# Patient Record
Sex: FEMALE | Race: WHITE | NOT HISPANIC OR LATINO | Employment: OTHER | URBAN - METROPOLITAN AREA
[De-identification: names, ages, dates, MRNs, and addresses within clinical notes are randomized per-mention and may not be internally consistent; named-entity substitution may affect disease eponyms.]

---

## 2017-01-05 ENCOUNTER — HOSPITAL ENCOUNTER (OUTPATIENT)
Dept: RADIOLOGY | Facility: HOSPITAL | Age: 82
Discharge: HOME | End: 2017-01-05
Attending: THORACIC SURGERY (CARDIOTHORACIC VASCULAR SURGERY)
Payer: MEDICARE

## 2017-01-05 DIAGNOSIS — I65.22 STENOSIS OF LEFT CAROTID ARTERY: ICD-10-CM

## 2017-01-05 PROCEDURE — 93880 EXTRACRANIAL BILAT STUDY: CPT | Mod: 26,,, | Performed by: RADIOLOGY

## 2017-01-05 PROCEDURE — 93880 EXTRACRANIAL BILAT STUDY: CPT | Mod: TC,PO

## 2017-01-09 ENCOUNTER — TELEPHONE (OUTPATIENT)
Dept: VASCULAR SURGERY | Facility: CLINIC | Age: 82
End: 2017-01-09

## 2017-01-09 DIAGNOSIS — I65.23 BILATERAL CAROTID ARTERY STENOSIS: Primary | ICD-10-CM

## 2017-01-30 ENCOUNTER — LAB VISIT (OUTPATIENT)
Dept: LAB | Facility: HOSPITAL | Age: 82
End: 2017-01-30
Attending: INTERNAL MEDICINE
Payer: MEDICARE

## 2017-01-30 DIAGNOSIS — D51.9 VITAMIN B12 DEFICIENCY ANEMIA: ICD-10-CM

## 2017-01-30 DIAGNOSIS — I10 ESSENTIAL HYPERTENSION, MALIGNANT: ICD-10-CM

## 2017-01-30 DIAGNOSIS — D50.9 IRON DEFICIENCY ANEMIA, UNSPECIFIED: ICD-10-CM

## 2017-01-30 DIAGNOSIS — E03.9 UNSPECIFIED HYPOTHYROIDISM: Primary | ICD-10-CM

## 2017-01-30 DIAGNOSIS — N18.30 CHRONIC KIDNEY DISEASE, STAGE III (MODERATE): ICD-10-CM

## 2017-01-30 DIAGNOSIS — E55.9 UNSPECIFIED VITAMIN D DEFICIENCY: ICD-10-CM

## 2017-01-30 DIAGNOSIS — M81.0 OSTEOPOROSIS, UNSPECIFIED: ICD-10-CM

## 2017-01-30 LAB
ANION GAP SERPL CALC-SCNC: 7 MMOL/L
BASOPHILS # BLD AUTO: 0.03 K/UL
BASOPHILS NFR BLD: 0.6 %
BUN SERPL-MCNC: 22 MG/DL
CALCIUM SERPL-MCNC: 9.3 MG/DL
CHLORIDE SERPL-SCNC: 107 MMOL/L
CO2 SERPL-SCNC: 27 MMOL/L
CREAT SERPL-MCNC: 0.9 MG/DL
DIFFERENTIAL METHOD: ABNORMAL
EOSINOPHIL # BLD AUTO: 0.4 K/UL
EOSINOPHIL NFR BLD: 8.5 %
ERYTHROCYTE [DISTWIDTH] IN BLOOD BY AUTOMATED COUNT: 12.8 %
EST. GFR  (AFRICAN AMERICAN): >60 ML/MIN/1.73 M^2
EST. GFR  (NON AFRICAN AMERICAN): >60 ML/MIN/1.73 M^2
FERRITIN SERPL-MCNC: 67 NG/ML
GLUCOSE SERPL-MCNC: 100 MG/DL
HCT VFR BLD AUTO: 41.4 %
HGB BLD-MCNC: 13.2 G/DL
IRON SERPL-MCNC: 73 UG/DL
LYMPHOCYTES # BLD AUTO: 1.4 K/UL
LYMPHOCYTES NFR BLD: 28.1 %
MCH RBC QN AUTO: 29.9 PG
MCHC RBC AUTO-ENTMCNC: 31.9 %
MCV RBC AUTO: 94 FL
MONOCYTES # BLD AUTO: 0.5 K/UL
MONOCYTES NFR BLD: 11.2 %
NEUTROPHILS # BLD AUTO: 2.5 K/UL
NEUTROPHILS NFR BLD: 51.4 %
PLATELET # BLD AUTO: 298 K/UL
PMV BLD AUTO: 10.1 FL
POTASSIUM SERPL-SCNC: 4.8 MMOL/L
RBC # BLD AUTO: 4.42 M/UL
SATURATED IRON: 23 %
SODIUM SERPL-SCNC: 141 MMOL/L
T3FREE SERPL-MCNC: 2.8 PG/ML
T4 FREE SERPL-MCNC: 1.2 NG/DL
TOTAL IRON BINDING CAPACITY: 314 UG/DL
TRANSFERRIN SERPL-MCNC: 212 MG/DL
TSH SERPL DL<=0.005 MIU/L-ACNC: 1.85 UIU/ML
WBC # BLD AUTO: 4.84 K/UL

## 2017-01-30 PROCEDURE — 82728 ASSAY OF FERRITIN: CPT

## 2017-01-30 PROCEDURE — 85025 COMPLETE CBC W/AUTO DIFF WBC: CPT

## 2017-01-30 PROCEDURE — 83540 ASSAY OF IRON: CPT

## 2017-01-30 PROCEDURE — 80048 BASIC METABOLIC PNL TOTAL CA: CPT

## 2017-01-30 PROCEDURE — 84443 ASSAY THYROID STIM HORMONE: CPT

## 2017-01-30 PROCEDURE — 36415 COLL VENOUS BLD VENIPUNCTURE: CPT | Mod: PO

## 2017-01-30 PROCEDURE — 84481 FREE ASSAY (FT-3): CPT

## 2017-01-30 PROCEDURE — 84439 ASSAY OF FREE THYROXINE: CPT

## 2017-03-13 RX ORDER — ATORVASTATIN CALCIUM 20 MG/1
20 TABLET, FILM COATED ORAL DAILY
Qty: 90 TABLET | Refills: 0 | Status: CANCELLED | OUTPATIENT
Start: 2017-03-13

## 2017-03-17 RX ORDER — ATORVASTATIN CALCIUM 20 MG/1
20 TABLET, FILM COATED ORAL DAILY
Qty: 90 TABLET | Refills: 0 | Status: SHIPPED | OUTPATIENT
Start: 2017-03-17 | End: 2017-05-27 | Stop reason: SDUPTHER

## 2017-03-17 NOTE — TELEPHONE ENCOUNTER
Notify patient her medications have been sent in; and that she needs to schedule an appointment with us to get established at the [facility] within the next 2-4 weeks

## 2017-03-17 NOTE — TELEPHONE ENCOUNTER
----- Message from Sayda Churchill sent at 3/17/2017  3:32 PM CDT -----  Contact: self  Patient called regarding refill of medication. Stating she spoke to the Nurse Nelli during the week and was advised she would submit to the pharmacy. They do not have it and she requested this 2 weeks ago. Stating is almost out of medication. Please contact [phone] (home)     atorvastatin 20 MG tablet    [facility] - [place], [place] - [address]  [address] [address]  Phone: [phone] Fax: [phone]

## 2017-03-24 ENCOUNTER — TELEPHONE (OUTPATIENT)
Dept: FAMILY MEDICINE | Facility: CLINIC | Age: 82
End: 2017-03-24

## 2017-03-24 NOTE — TELEPHONE ENCOUNTER
Spoke with pt she said that she has an appt scheduled for 05/15/2017 she will get new orders for labs and bone Density.

## 2017-03-24 NOTE — TELEPHONE ENCOUNTER
----- Message from Alicja Lynn sent at 3/24/2017  1:55 PM CDT -----  Contact:  call  //[phone]    Calling to  Get   A  Order  Put  In   computer   For  Bone density // please call

## 2017-04-10 ENCOUNTER — PATIENT MESSAGE (OUTPATIENT)
Dept: FAMILY MEDICINE | Facility: CLINIC | Age: 82
End: 2017-04-10

## 2017-04-17 RX ORDER — LEVOTHYROXINE SODIUM 125 UG/1
125 TABLET ORAL
COMMUNITY
End: 2017-06-12 | Stop reason: SDUPTHER

## 2017-05-01 ENCOUNTER — PATIENT OUTREACH (OUTPATIENT)
Dept: ADMINISTRATIVE | Facility: HOSPITAL | Age: 82
End: 2017-05-01

## 2017-05-15 ENCOUNTER — OFFICE VISIT (OUTPATIENT)
Dept: FAMILY MEDICINE | Facility: CLINIC | Age: 82
End: 2017-05-15
Payer: MEDICARE

## 2017-05-15 VITALS
TEMPERATURE: 99 F | SYSTOLIC BLOOD PRESSURE: 125 MMHG | HEIGHT: 64 IN | WEIGHT: 170.19 LBS | HEART RATE: 66 BPM | BODY MASS INDEX: 29.06 KG/M2 | DIASTOLIC BLOOD PRESSURE: 75 MMHG | OXYGEN SATURATION: 97 %

## 2017-05-15 DIAGNOSIS — M81.0 OSTEOPOROSIS, UNSPECIFIED OSTEOPOROSIS TYPE, UNSPECIFIED PATHOLOGICAL FRACTURE PRESENCE: ICD-10-CM

## 2017-05-15 DIAGNOSIS — N18.30 CKD (CHRONIC KIDNEY DISEASE) STAGE 3, GFR 30-59 ML/MIN: ICD-10-CM

## 2017-05-15 DIAGNOSIS — E61.1 IRON DEFICIENCY: ICD-10-CM

## 2017-05-15 DIAGNOSIS — E78.00 PURE HYPERCHOLESTEROLEMIA: Primary | ICD-10-CM

## 2017-05-15 DIAGNOSIS — F41.9 ANXIETY: ICD-10-CM

## 2017-05-15 DIAGNOSIS — B35.3 TINEA PEDIS OF BOTH FEET: ICD-10-CM

## 2017-05-15 DIAGNOSIS — K21.9 GASTROESOPHAGEAL REFLUX DISEASE, ESOPHAGITIS PRESENCE NOT SPECIFIED: ICD-10-CM

## 2017-05-15 DIAGNOSIS — E03.9 HYPOTHYROIDISM, UNSPECIFIED TYPE: ICD-10-CM

## 2017-05-15 DIAGNOSIS — R73.02 IMPAIRED GLUCOSE TOLERANCE: ICD-10-CM

## 2017-05-15 DIAGNOSIS — E55.9 VITAMIN D DEFICIENCY: ICD-10-CM

## 2017-05-15 DIAGNOSIS — I10 ESSENTIAL HYPERTENSION: ICD-10-CM

## 2017-05-15 DIAGNOSIS — Z98.890 S/P CAROTID ENDARTERECTOMY: ICD-10-CM

## 2017-05-15 DIAGNOSIS — E66.3 OVERWEIGHT (BMI 25.0-29.9): ICD-10-CM

## 2017-05-15 PROCEDURE — 99499 UNLISTED E&M SERVICE: CPT | Mod: S$GLB,,, | Performed by: INTERNAL MEDICINE

## 2017-05-15 PROCEDURE — 3074F SYST BP LT 130 MM HG: CPT | Mod: S$GLB,,, | Performed by: INTERNAL MEDICINE

## 2017-05-15 PROCEDURE — 1126F AMNT PAIN NOTED NONE PRSNT: CPT | Mod: S$GLB,,, | Performed by: INTERNAL MEDICINE

## 2017-05-15 PROCEDURE — 99215 OFFICE O/P EST HI 40 MIN: CPT | Mod: S$GLB,,, | Performed by: INTERNAL MEDICINE

## 2017-05-15 PROCEDURE — 3078F DIAST BP <80 MM HG: CPT | Mod: S$GLB,,, | Performed by: INTERNAL MEDICINE

## 2017-05-15 PROCEDURE — 99999 PR PBB SHADOW E&M-EST. PATIENT-LVL III: CPT | Mod: PBBFAC,,, | Performed by: INTERNAL MEDICINE

## 2017-05-15 PROCEDURE — 1159F MED LIST DOCD IN RCRD: CPT | Mod: S$GLB,,, | Performed by: INTERNAL MEDICINE

## 2017-05-15 PROCEDURE — 1160F RVW MEDS BY RX/DR IN RCRD: CPT | Mod: S$GLB,,, | Performed by: INTERNAL MEDICINE

## 2017-05-15 RX ORDER — CALCIUM CARBONATE/VITAMIN D3 500-10/5ML
2 LIQUID (ML) ORAL DAILY
COMMUNITY

## 2017-05-15 RX ORDER — CHOLECALCIFEROL (VITAMIN D3) 25 MCG
1000 TABLET ORAL 2 TIMES DAILY
COMMUNITY

## 2017-05-15 RX ORDER — ASCORBIC ACID 500 MG
500 TABLET ORAL DAILY
COMMUNITY

## 2017-05-15 RX ORDER — CLOTRIMAZOLE AND BETAMETHASONE DIPROPIONATE 10; .64 MG/G; MG/G
CREAM TOPICAL 2 TIMES DAILY
Qty: 45 G | Refills: 0 | Status: SHIPPED | OUTPATIENT
Start: 2017-05-15 | End: 2018-04-03

## 2017-05-15 RX ORDER — IBUPROFEN 200 MG
2 CAPSULE ORAL DAILY
COMMUNITY
End: 2018-04-03

## 2017-05-15 NOTE — PROGRESS NOTES
Subjective:       Patient ID: Nida Kline is a 81 y.o. female.    Chief Complaint: Establish Care    HPI  very pleasant 81-year-old established patient of mine here to get re-established with me at [facility].  Most recent documented office visit at [facility] 2/13/17.  At that time diagnosis is included: Hypothyroidism, CKD-3,, essential hypertension, history of CEA,, osteoporosis with vitamin D deficiency, impaired glucose tolerance test, pure hypercholesterolemia, GERD iron   deficiency and overweight.  Past medical history and surgical medical history were obtained, delineated, and documented.  Family medical history and social medical history were also delineated and noted.  Review of systems were obtained at length before physical exam was performed.  Appropriate labs were ordered on follow-up after review.  Patient appears to have rather significant involvement of athlete's feet bilaterally involving the plantar aspect of her feet and then rolling over the sides of her feet and then involving the distal pretibial area.  With impaired glucose tolerance, she is trying to get her weight down.  For hyperlipidemia, she is tolerating atorvastatin fine and she is on a low-fat high-fiber diet.  For history of hypertension her blood pressure runs around 120/70-75; she feels good.  She has a history of GERD which occasionally bothers her; she is on ranitidine which controls it.  She takes her thyroid medicines appropriately.  For osteoporosis, she does weight bearing exercise with walking 2-3 times per week; she takes a calcium with vitamin D and also takes magnesium supplemenst.  A DEXA scan as needed to be repeated with the last greater than 1 year.  Various diagnosis's were discussed as well as planning care.  Appropriate labs were ordered on follow-up    Review of Systems   Constitutional: Negative for appetite change, fatigue, fever and unexpected weight change.  "  HENT:         history of seasonal ALLERGIES    Eyes: Negative for discharge and itching.   Respiratory: Negative for cough, chest tightness, shortness of breath and wheezing.    Cardiovascular: Negative for chest pain, palpitations and leg swelling.   Gastrointestinal: Negative for abdominal distention, abdominal pain, blood in stool, constipation, diarrhea, nausea and vomiting.        Denies melena as well   Endocrine:        HLP; hypothyroid     Genitourinary: Negative for dysuria, hematuria and urgency.   Musculoskeletal: Negative for arthralgias and myalgias.   Skin: Negative for rash.        Patches of pruritic rash min involving LE's.    Allergic/Immunologic: Positive for environmental allergies. Negative for food allergies and immunocompromised state.        Denies seasonal or perennial ALLERGIES.   Neurological: Negative for tremors, seizures and syncope.   Hematological: Negative for adenopathy. Bruises/bleeds easily.   Psychiatric/Behavioral:        Denies anxiety or depression       Objective:      Vitals:    05/15/17 1439   BP: 125/75   BP Location: Right arm   Patient Position: Sitting   BP Method: Manual   Pulse: 66   Temp: 98.8 °F (37.1 °C)   TempSrc: Oral   SpO2: 97%   Weight: 77.2 kg (170 lb 3.1 oz)   Height: 5' 4" (1.626 m)     Body mass index is 29.21 kg/(m^2).    Physical Exam   Constitutional: She is oriented to person, place, and time. She appears well-developed and well-nourished.   HENT:   Head: Normocephalic and atraumatic.   Eyes: EOM are normal.   Neck: Normal range of motion. Neck supple. No thyromegaly present.   Cardiovascular: Normal rate, regular rhythm and normal heart sounds.  Exam reveals no gallop.    No murmur heard.  Pulmonary/Chest: Effort normal and breath sounds normal. No respiratory distress. She has no wheezes. She has no rales.   Abdominal: Soft. Bowel sounds are normal. She exhibits no distension. There is no tenderness. There is no rebound and no guarding. "   Musculoskeletal: Normal range of motion. She exhibits no edema.   Lymphadenopathy:     She has no cervical adenopathy.   Neurological: She is alert and oriented to person, place, and time.   Moves all 4 extremities fine.   Skin: Skin is dry. Rash noted.   forrest LE athlete's feet plantar aspect feet forrest turning around sides of foot; pruritic; does go into distal pretib   Psychiatric: She has a normal mood and affect. Her behavior is normal. Thought content normal.   Vitals reviewed.      Assessment:       1. Pure hypercholesterolemia    2. S/P carotid endarterectomy    3. Essential hypertension    4. Hypothyroidism, unspecified type    5. Gastroesophageal reflux disease, esophagitis presence not specified    6. Overweight (BMI 25.0-29.9)    7. Osteoporosis, unspecified osteoporosis type, unspecified pathological fracture presence    8. Vitamin D deficiency    9. Tinea pedis of both feet    10. CKD (chronic kidney disease) stage 3, GFR 30-59 ml/min    11. Iron deficiency    12. Anxiety    13. Impaired glucose tolerance        Plan:       Pure hypercholesterolemia. Maintain low fat high fiber diet, exercise regularly.  Continue atorvastatin.  -     Comprehensive metabolic panel; Future; Expected date: 5/15/17  -     Lipid panel; Future; Expected date: 5/15/17  -     TSH; Future; Expected date: 5/15/17  -     T4, free; Future; Expected date: 5/15/17  -     T3, free; Future; Expected date: 5/15/17    S/P carotid endarterectomy; continue aspirin 81 mg daily    Essential hypertension. Maintain < 2 Gm Na a day diet, and monitor BP at home; keep a log.  Continue metoprolol succinate with valsartan  -     CBC auto differential; Future; Expected date: 5/15/17  -     Comprehensive metabolic panel; Future; Expected date: 5/15/17  -     Urinalysis; Future; Expected date: 5/15/17    Hypothyroidism, unspecified type; continue levothyroxine at present dosing; thyroid function test pending  -     TSH; Future; Expected date:  5/15/17  -     T4, free; Future; Expected date: 5/15/17  -     T3, free; Future; Expected date: 5/15/17    Gastroesophageal reflux disease, esophagitis presence not specified; no bedtime snacking; continue omeprazole    Overweight (BMI 25.0-29.9). Caloric restriction w regular exercise and weight reduction.    Osteoporosis, unspecified osteoporosis type, unspecified pathological fracture presence; Weight bearing exercises, continue calcium and vit D supplements.  -     Magnesium; Future; Expected date: 5/15/17  -     DXA Bone Density Spine And Hip; Future; Expected date: 5/15/17    Vitamin D deficiency  -     Magnesium; Future; Expected date: 5/15/17  -     Vitamin D; Future; Expected date: 5/15/17  -     DXA Bone Density Spine And Hip; Future; Expected date: 5/15/17    Tinea pedis of both feet; he per feet dry needs to quit wearing her present pair of sandal shoes; Lac-Hydrin topical for dry skin  -     clotrimazole-betamethasone 1-0.05% (LOTRISONE) cream; Apply topically 2 (two) times daily. Til resolved; max 2 weeks.  Dispense: 45 g; Refill:     CKD (chronic kidney disease) stage 3, GFR 30-59 ml/min; minimum 6-7 glasses of fluid per day    Iron deficiency; we'll update iron, TIBC, and ferritin levels  -     Iron and TIBC; Future; Expected date: 5/15/17  -     Ferritin; Future; Expected date: 5/15/17    Anxiety; on BuSpar when necessary basis    Impaired glucose tolerance; will follow hemoglobin A1c's; regular weightbearing exercise and weight reduction needed

## 2017-05-15 NOTE — PATIENT INSTRUCTIONS
[facility] for [facility] labs; ordered; obtain before f/u in 6 weeks. DEXA to also be obtained before f/u. Cont presnt management. Monitor her BP at home; keep a log. Min 6-7 glasses of fluid a day. Weight bearing exercise w weight reduction.

## 2017-05-15 NOTE — MR AVS SNAPSHOT
[facility]  [address]  [place] [address]  Phone: [phone]  Fax: [phone]                  Nida Kline   5/15/2017 2:20 PM   Office Visit    Description:  Female : 1935   Provider:  Khoi Vazquez MD   Department:  [facility]           Reason for Visit     Establish Care           Diagnoses this Visit        Comments    Pure hypercholesterolemia    -  Primary     S/P carotid endarterectomy         Essential hypertension         Hypothyroidism, unspecified type         Gastroesophageal reflux disease, esophagitis presence not specified         Overweight (BMI 25.0-29.9)         Osteoporosis, unspecified osteoporosis type, unspecified pathological fracture presence         Vitamin D deficiency         Tinea pedis of both feet         CKD (chronic kidney disease) stage 3, GFR 30-59 ml/min         Iron deficiency                To Do List           Future Appointments        Provider Department Dept Phone    6/15/2017 8:15 AM LAB, [place] [facility] [phone]    6/15/2017 9:40 AM [facility] DEXA1 [facility] [phone]    6/15/2017 10:15 AM LAB, [place] [facility] [phone]    2017 1:00 PM Khoi Vazquez MD [facility] [phone]      Goals (5 Years of Data)     None      Follow-Up and Disposition     Return in about 6 weeks (around 2017) for Reassessment and go over her labs.       These Medications        Disp Refills Start End    clotrimazole-betamethasone 1-0.05% (LOTRISONE) cream 45 g 0 5/15/2017     Apply topically 2 (two) times daily. Til resolved; max 2 weeks. - Topical (Top)    Pharmacy: [facility] - [place] [id number] [facility] Ph #: [phone]         [facility]     [facility] Nurse Care Line -  Assistance  Unless otherwise directed by your provider, please contact [facility], our  nurse care line that is available for 24/7 assistance.     Registered nurses in the [facility] provide: appointment scheduling, clinical advisement, health education, and other advisory services.  Call: [phone] (toll free)               Medications           Message regarding Medications     Verify the changes and/or additions to your medication regime listed below are the same as discussed with your clinician today.  If any of these changes or additions are incorrect, please notify your healthcare provider.        START taking these NEW medications        Refills    clotrimazole-betamethasone 1-0.05% (LOTRISONE) cream 0    Sig: Apply topically 2 (two) times daily. Til resolved; max 2 weeks.    Class: Normal    Route: Topical (Top)           Verify that the below list of medications is an accurate representation of the medications you are currently taking.  If none reported, the list may be blank. If incorrect, please contact your healthcare provider. Carry this list with you in case of emergency.           Current Medications     ammonium lactate (LAC-HYDRIN) 12 % lotion Apply 1 application topically every evening.     ascorbic acid, vitamin C, (VITAMIN C) 500 MG tablet Take 500 mg by mouth once daily.    aspirin (ECOTRIN) 81 MG EC tablet Take 81 mg by mouth once.    atorvastatin (LIPITOR) 20 MG tablet Take 1 tablet (20 mg total) by mouth once daily.    busPIRone (BUSPAR) 15 MG tablet Take 7.5 mg by mouth 3 (three) times daily as needed.     calcium citrate (CALCITRATE) 200 mg (950 mg) tablet Take 2 tablets by mouth once daily.    levothyroxine (SYNTHROID) 125 MCG tablet Take 125 mcg by mouth. 1/2 (125 mcg) tab every M,W, F    levothyroxine (SYNTHROID) 50 MCG tablet Take 50 mcg by mouth every Tuesday, Thursday, Saturday, Sunday.     magnesium oxide 400 mg Cap Take 400 mg by mouth once daily.    metoprolol succinate (TOPROL-XL) 25 MG 24 hr tablet TAKE 1 TABLET EVERY EVENING    omeprazole  "(PRILOSEC) 40 MG capsule TAKE 1 CAPSULE DAILY    valsartan (DIOVAN) 80 MG tablet Take 80 mg by mouth once daily.    vitamin D 1000 units Tab Take 1,000 Units by mouth 2 (two) times daily.    clotrimazole-betamethasone 1-0.05% (LOTRISONE) cream Apply topically 2 (two) times daily. Til resolved; max 2 weeks.           Clinical Reference Information           Your Vitals Were     BP Pulse Temp Height Weight SpO2    125/75 (BP Location: Right arm, Patient Position: Sitting, BP Method: Manual) 66 98.8 °F (37.1 °C) (Oral) 5' 4" (1.626 m) 77.2 kg (170 lb 3.1 oz) 97%    BMI                29.21 kg/m2          Blood Pressure          Most Recent Value    BP  125/75      Allergies as of 5/15/2017     Lunesta [Eszopiclone]    Trazodone    Propofol Analogues      Immunizations Administered on Date of Encounter - 5/15/2017     None      Orders Placed During Today's Visit     Future Labs/Procedures Expected by Expires    CBC auto differential  5/15/2017 7/14/2018    Comprehensive metabolic panel  5/15/2017 7/14/2018    DXA Bone Density Spine And Hip  5/15/2017 5/15/2018    Ferritin  5/15/2017 7/14/2018    Iron and TIBC  5/15/2017 7/14/2018    Lipid panel  5/15/2017 7/14/2018    Magnesium  5/15/2017 7/14/2018    T3, free  5/15/2017 7/14/2018    T4, free  5/15/2017 7/14/2018    TSH  5/15/2017 7/14/2018    Urinalysis  5/15/2017 7/14/2018    Vitamin D  5/15/2017 7/14/2018      Instructions    [facility] for [facility]; ordered; obtain before f/u in 6 weeks. DEXA to also be obtained before f/u. Cont presnt management. Monitor her BP at home; keep a log. Min 6-7 glasses of fluid a day. Weight bearing exercise w weight reduction.       Language Assistance Services     ATTENTION: Language assistance services are available, free of charge. Please call [phone].      ATENCIÓN: Si habla mary, tiene a raymond disposición servicios gratuitos de asistencia lingüística. Llame al [phone].     RONAN Ý: N?u b?n nói Ti?ng Vi?t, có các d?ch v? " h? tr? sunshine ng? mi?n phí dành cho b?n. G?i s? [phone].         [facility] complies with applicable Federal civil rights laws and does not discriminate on the basis of race, color, national origin, age, disability, or sex.

## 2017-05-27 RX ORDER — ATORVASTATIN CALCIUM 20 MG/1
TABLET, FILM COATED ORAL
Qty: 90 TABLET | Refills: 1 | Status: SHIPPED | OUTPATIENT
Start: 2017-05-27 | End: 2017-10-24 | Stop reason: SDUPTHER

## 2017-06-08 ENCOUNTER — OFFICE VISIT (OUTPATIENT)
Dept: FAMILY MEDICINE | Facility: CLINIC | Age: 82
End: 2017-06-08
Payer: MEDICARE

## 2017-06-08 VITALS
TEMPERATURE: 98 F | BODY MASS INDEX: 29.13 KG/M2 | WEIGHT: 170.63 LBS | SYSTOLIC BLOOD PRESSURE: 120 MMHG | OXYGEN SATURATION: 97 % | HEART RATE: 78 BPM | DIASTOLIC BLOOD PRESSURE: 85 MMHG | HEIGHT: 64 IN

## 2017-06-08 DIAGNOSIS — B35.3 TINEA PEDIS OF BOTH FEET: ICD-10-CM

## 2017-06-08 DIAGNOSIS — I10 ESSENTIAL HYPERTENSION: Chronic | ICD-10-CM

## 2017-06-08 DIAGNOSIS — J30.2 SEASONAL ALLERGIC RHINITIS, UNSPECIFIED ALLERGIC RHINITIS TRIGGER: ICD-10-CM

## 2017-06-08 DIAGNOSIS — J02.9 ACUTE PHARYNGITIS, UNSPECIFIED ETIOLOGY: ICD-10-CM

## 2017-06-08 DIAGNOSIS — J01.90 ACUTE NON-RECURRENT SINUSITIS, UNSPECIFIED LOCATION: ICD-10-CM

## 2017-06-08 DIAGNOSIS — J98.01 BRONCHOSPASM, ACUTE: ICD-10-CM

## 2017-06-08 DIAGNOSIS — J20.9 ACUTE BRONCHITIS, UNSPECIFIED ORGANISM: Primary | ICD-10-CM

## 2017-06-08 PROCEDURE — 99999 PR PBB SHADOW E&M-EST. PATIENT-LVL IV: CPT | Mod: PBBFAC,,, | Performed by: INTERNAL MEDICINE

## 2017-06-08 PROCEDURE — 1159F MED LIST DOCD IN RCRD: CPT | Mod: S$GLB,,, | Performed by: INTERNAL MEDICINE

## 2017-06-08 PROCEDURE — 1126F AMNT PAIN NOTED NONE PRSNT: CPT | Mod: S$GLB,,, | Performed by: INTERNAL MEDICINE

## 2017-06-08 PROCEDURE — 99214 OFFICE O/P EST MOD 30 MIN: CPT | Mod: S$GLB,,, | Performed by: INTERNAL MEDICINE

## 2017-06-08 PROCEDURE — 99499 UNLISTED E&M SERVICE: CPT | Mod: S$GLB,,, | Performed by: INTERNAL MEDICINE

## 2017-06-08 RX ORDER — HYDROCODONE POLISTIREX AND CHLORPHENIRAMINE POLISTIREX 10; 8 MG/5ML; MG/5ML
5 SUSPENSION, EXTENDED RELEASE ORAL EVERY 12 HOURS PRN
Qty: 115 ML | Refills: 0 | Status: SHIPPED | OUTPATIENT
Start: 2017-06-08 | End: 2017-11-01

## 2017-06-08 RX ORDER — CEFUROXIME AXETIL 500 MG/1
500 TABLET ORAL EVERY 12 HOURS
Qty: 20 TABLET | Refills: 0 | Status: SHIPPED | OUTPATIENT
Start: 2017-06-08 | End: 2017-06-18

## 2017-06-08 RX ORDER — FLUTICASONE PROPIONATE 50 MCG
1 SPRAY, SUSPENSION (ML) NASAL 2 TIMES DAILY PRN
Qty: 1 BOTTLE | Refills: 0 | Status: SHIPPED | OUTPATIENT
Start: 2017-06-08 | End: 2017-11-01

## 2017-06-08 NOTE — PATIENT INSTRUCTIONS
Take ceftin 500 mg 2x a day for 10 days; use tussionex 5 ml q 12 hrs as needed for severe cough; proair 2 puffs laureen 4 hrs as needed for wheezing or shortness of breath. Use mucinex DM otc for cough and congestion; flonase nasal 1 spray 2x a day as needed for nasal congestion. Simply saline 1 spray 1-3x a day for congestion; keep follow up w us later in June w labs prior.

## 2017-06-08 NOTE — PROGRESS NOTES
Subjective:       Patient ID: Nida Kline is a 81 y.o. female.    Chief Complaint: Cough (x 5 days with dry cough; went on a crusie last week )    HPI  presents today for evaluation of cough fever and sore throat.  Her cough is been since she's gotten off her cruise ship recently from [place] cruise she took which even included tubing on a river.  She had fever 102° yesterday; took Advil; had some sweating last night apparently from her fever breaking; ; she's had a raspy throat the last 5 days; throat soreness has cleared; she has sinus drip and nasal congestion; does also have a headache from coughing at times and has spasm at night which she's heard some wheezing.  She has a congested cough but little produced; she is producing some yellow dark phlegm; she also has a history of seasonal ALLERGIES.    Review of Systems   Constitutional: Positive for fever. Negative for appetite change and unexpected weight change.        Resolved; yesterday.     HENT: Positive for postnasal drip, rhinorrhea, sinus pressure and sore throat. Negative for congestion.         Eyes history of seasonal ALLERGIES or perennial ALLERGIES   Eyes: Negative for discharge.   Respiratory: Positive for cough and wheezing. Negative for chest tightness and shortness of breath.    Cardiovascular: Negative for chest pain, palpitations and leg swelling.   Gastrointestinal: Negative for abdominal distention, abdominal pain, blood in stool, constipation, diarrhea, nausea and vomiting.        Denies melena as well   Endocrine: Negative for polydipsia, polyphagia and polyuria.   Genitourinary: Positive for urgency. Negative for dysuria and hematuria.        From coughing.   Musculoskeletal: Negative for arthralgias and myalgias.   Skin: Positive for rash.        T. Pedis and distal legs.   Allergic/Immunologic: Positive for environmental allergies. Negative for food allergies and immunocompromised state.        Denies seasonal or perennial  "ALLERGIES.   Neurological: Negative for tremors, seizures and syncope.   Hematological: Negative for adenopathy.   Psychiatric/Behavioral:        Denies anxiety or depression       Objective:      Vitals:    06/08/17 1121   BP: 120/85   BP Location: Left arm   Patient Position: Sitting   BP Method: Manual   Pulse: 78   Temp: 98.3 °F (36.8 °C)   TempSrc: Oral   SpO2: 97%   Weight: 77.4 kg (170 lb 10.2 oz)   Height: 5' 4" (1.626 m)     Body mass index is 29.29 kg/m².    Physical Exam   Constitutional: She is oriented to person, place, and time. She appears well-developed and well-nourished.   HENT:   Head: Normocephalic and atraumatic.   TM's pink; NMswollen/slightly inflamed; yel-gold mucus; no sinus tenderness to palp. Throat slighly inflamed and post pharynx.   Eyes: EOM are normal.   Neck: Normal range of motion. Neck supple. No thyromegaly present.   Cardiovascular: Normal rate, regular rhythm and normal heart sounds.  Exam reveals no gallop.    No murmur heard.  Pulmonary/Chest: Effort normal and breath sounds normal. No respiratory distress. She has no wheezes. She has no rales.   Abdominal: Soft. Bowel sounds are normal. She exhibits no distension. There is no tenderness. There is no rebound and no guarding.   Musculoskeletal: Normal range of motion. She exhibits no edema.   Lymphadenopathy:     She has no cervical adenopathy.   Neurological: She is alert and oriented to person, place, and time.   Moves all 4 extremities fine.   Skin: Rash noted.   Although infection involving her feet and had distal legs has improved a fair amount   Psychiatric: She has a normal mood and affect. Her behavior is normal. Thought content normal.   Vitals reviewed.      Assessment:       1. Acute bronchitis, unspecified organism    2. Bronchospasm, acute    3. Acute non-recurrent sinusitis, unspecified location    4. Acute pharyngitis, unspecified etiology    5. Essential hypertension    6. Tinea pedis of both feet    7. Seasonal " allergic rhinitis, unspecified allergic rhinitis trigger        Plan:       Acute bronchitis, unspecified organism; ceftin 500 mg BID for 10 days. Mucinex DM otc prn congestion/cough  -     hydrocodone-chlorpheniramine (TUSSIONEX) 10-8 mg/5 mL suspension; Take 5 mLs by mouth every 12 (twelve) hours as needed for Cough (cough/wheezing).  Dispense: 115 mL; Refill: 0    Bronchospasm, acute  -     hydrocodone-chlorpheniramine (TUSSIONEX) 10-8 mg/5 mL suspension; Take 5 mLs by mouth every 12 (twelve) hours as needed for Cough (cough/wheezing).  Dispense: 115 mL; Refill: 0    Acute non-recurrent sinusitis, unspecified location; claritin 10 mg a day prn congestion.    Acute pharyngitis, unspecified etiology; chloraseptic spray prn sore throat.    Essential hypertension; Maintain < 2 Gm Na a day diet, and monitor BP at home; keep a log.    T.pedis; improving on lotrisone.    Seasonal allergic rhinitis, unspecified allergic rhinitis trigger; Simply Saline 1 spray 1-3x a day prn congestion.

## 2017-06-10 RX ORDER — VALSARTAN 80 MG/1
TABLET ORAL
Qty: 90 TABLET | Refills: 1 | Status: SHIPPED | OUTPATIENT
Start: 2017-06-10 | End: 2017-09-14

## 2017-06-10 RX ORDER — OMEPRAZOLE 40 MG/1
CAPSULE, DELAYED RELEASE ORAL
Qty: 90 CAPSULE | Refills: 1 | Status: SHIPPED | OUTPATIENT
Start: 2017-06-10 | End: 2017-09-14 | Stop reason: SDUPTHER

## 2017-06-10 RX ORDER — METOPROLOL SUCCINATE 25 MG/1
TABLET, EXTENDED RELEASE ORAL
Qty: 90 TABLET | Refills: 1 | Status: SHIPPED | OUTPATIENT
Start: 2017-06-10 | End: 2017-10-24 | Stop reason: SDUPTHER

## 2017-06-13 RX ORDER — LEVOTHYROXINE SODIUM 125 UG/1
TABLET ORAL
Qty: 45 TABLET | Refills: 2 | Status: SHIPPED | OUTPATIENT
Start: 2017-06-13 | End: 2017-07-07 | Stop reason: SDUPTHER

## 2017-06-15 ENCOUNTER — HOSPITAL ENCOUNTER (OUTPATIENT)
Dept: RADIOLOGY | Facility: HOSPITAL | Age: 82
Discharge: HOME | End: 2017-06-15
Attending: INTERNAL MEDICINE
Payer: MEDICARE

## 2017-06-15 DIAGNOSIS — M81.0 OSTEOPOROSIS, UNSPECIFIED OSTEOPOROSIS TYPE, UNSPECIFIED PATHOLOGICAL FRACTURE PRESENCE: ICD-10-CM

## 2017-06-15 DIAGNOSIS — E55.9 VITAMIN D DEFICIENCY: ICD-10-CM

## 2017-06-15 PROCEDURE — 77080 DXA BONE DENSITY AXIAL: CPT | Mod: TC,PO

## 2017-06-15 PROCEDURE — 77080 DXA BONE DENSITY AXIAL: CPT | Mod: 26,,, | Performed by: RADIOLOGY

## 2017-06-16 ENCOUNTER — TELEPHONE (OUTPATIENT)
Dept: FAMILY MEDICINE | Facility: CLINIC | Age: 82
End: 2017-06-16

## 2017-06-16 NOTE — TELEPHONE ENCOUNTER
----- Message from Esther Mercado sent at 6/16/2017  2:55 PM CDT -----  Contact: Patient  Nida, patient [phone], Returning the nurse's call. Please advise. thanks.

## 2017-06-16 NOTE — TELEPHONE ENCOUNTER
Spoke with pt and informed her per Dr. Vazquez that her dexa scan shows that she has osteoporosis and that she needs to keep follow up appt scheduled for 06/29/2017.

## 2017-07-03 ENCOUNTER — TELEPHONE (OUTPATIENT)
Dept: FAMILY MEDICINE | Facility: CLINIC | Age: 82
End: 2017-07-03

## 2017-07-03 NOTE — TELEPHONE ENCOUNTER
----- Message from Michelle Tavera sent at 7/3/2017 11:23 AM CDT -----  Contact: Winston gamboa/ [facility]  Winston (Pharmacist w/ [facility]) calling in regards to requesting clarification of dosage for Levothyroxine. Specifically on the 50 mcg. Please advise.  Call back Winston , ext [phone]--- fax .  Thanks!

## 2017-07-06 NOTE — TELEPHONE ENCOUNTER
----- Message from Johnnie Zamudio sent at 7/6/2017 11:48 AM CDT -----  Contact: [facility] pharmacy- [phone]- [phone]  Pharmacy called regarding rx levothyroxine, need clarification for rx directions.Thanks!

## 2017-07-06 NOTE — TELEPHONE ENCOUNTER
Attempted to contact Winston with [facility] pharmacy; no answer; unable to leave message phone just rang and rang. Will try again later.

## 2017-07-07 ENCOUNTER — OFFICE VISIT (OUTPATIENT)
Dept: FAMILY MEDICINE | Facility: CLINIC | Age: 82
End: 2017-07-07
Payer: MEDICARE

## 2017-07-07 VITALS
HEIGHT: 64 IN | BODY MASS INDEX: 29.43 KG/M2 | TEMPERATURE: 98 F | SYSTOLIC BLOOD PRESSURE: 130 MMHG | DIASTOLIC BLOOD PRESSURE: 80 MMHG | OXYGEN SATURATION: 95 % | WEIGHT: 172.38 LBS | HEART RATE: 74 BPM

## 2017-07-07 DIAGNOSIS — E61.1 IRON DEFICIENCY: ICD-10-CM

## 2017-07-07 DIAGNOSIS — E55.9 VITAMIN D DEFICIENCY: ICD-10-CM

## 2017-07-07 DIAGNOSIS — B35.3 TINEA PEDIS OF BOTH FEET: ICD-10-CM

## 2017-07-07 DIAGNOSIS — E03.9 ACQUIRED HYPOTHYROIDISM: Primary | ICD-10-CM

## 2017-07-07 DIAGNOSIS — I10 ESSENTIAL HYPERTENSION: ICD-10-CM

## 2017-07-07 DIAGNOSIS — E66.3 OVERWEIGHT (BMI 25.0-29.9): ICD-10-CM

## 2017-07-07 DIAGNOSIS — K21.9 GASTROESOPHAGEAL REFLUX DISEASE, ESOPHAGITIS PRESENCE NOT SPECIFIED: ICD-10-CM

## 2017-07-07 DIAGNOSIS — N18.30 CKD (CHRONIC KIDNEY DISEASE) STAGE 3, GFR 30-59 ML/MIN: ICD-10-CM

## 2017-07-07 DIAGNOSIS — M81.0 OSTEOPOROSIS, UNSPECIFIED OSTEOPOROSIS TYPE, UNSPECIFIED PATHOLOGICAL FRACTURE PRESENCE: ICD-10-CM

## 2017-07-07 DIAGNOSIS — E53.8 B12 DEFICIENCY: ICD-10-CM

## 2017-07-07 DIAGNOSIS — E78.00 PURE HYPERCHOLESTEROLEMIA: ICD-10-CM

## 2017-07-07 PROCEDURE — 99215 OFFICE O/P EST HI 40 MIN: CPT | Mod: S$GLB,,, | Performed by: INTERNAL MEDICINE

## 2017-07-07 PROCEDURE — 1126F AMNT PAIN NOTED NONE PRSNT: CPT | Mod: S$GLB,,, | Performed by: INTERNAL MEDICINE

## 2017-07-07 PROCEDURE — 99499 UNLISTED E&M SERVICE: CPT | Mod: S$GLB,,, | Performed by: INTERNAL MEDICINE

## 2017-07-07 PROCEDURE — 1159F MED LIST DOCD IN RCRD: CPT | Mod: S$GLB,,, | Performed by: INTERNAL MEDICINE

## 2017-07-07 PROCEDURE — 99999 PR PBB SHADOW E&M-EST. PATIENT-LVL IV: CPT | Mod: PBBFAC,,, | Performed by: INTERNAL MEDICINE

## 2017-07-07 RX ORDER — LEVOTHYROXINE SODIUM 50 UG/1
50 TABLET ORAL
Qty: 90 TABLET | Refills: 1 | OUTPATIENT
Start: 2017-07-08

## 2017-07-07 RX ORDER — LEVOTHYROXINE SODIUM 125 UG/1
TABLET ORAL
Qty: 45 TABLET | Refills: 1 | Status: SHIPPED | OUTPATIENT
Start: 2017-07-07 | End: 2017-12-19 | Stop reason: SDUPTHER

## 2017-07-07 RX ORDER — LEVOTHYROXINE SODIUM 125 UG/1
TABLET ORAL
Qty: 90 TABLET | Refills: 1 | OUTPATIENT
Start: 2017-07-07

## 2017-07-07 RX ORDER — LEVOTHYROXINE SODIUM 125 UG/1
TABLET ORAL
Qty: 30 TABLET | Refills: 1 | Status: SHIPPED | OUTPATIENT
Start: 2017-07-07 | End: 2017-07-07

## 2017-07-07 RX ORDER — LEVOTHYROXINE SODIUM 50 UG/1
50 TABLET ORAL
Qty: 60 TABLET | Refills: 1 | Status: SHIPPED | OUTPATIENT
Start: 2017-07-08 | End: 2017-07-07

## 2017-07-07 NOTE — TELEPHONE ENCOUNTER
----- Message from Rosalie Caldwell sent at 7/7/2017 10:47 AM CDT -----  Contact: [facility] [phone] ext [phone]  [facility] [phone] ext [phone] returning nurse phone call

## 2017-07-07 NOTE — TELEPHONE ENCOUNTER
Spoke with Winston with [facility] and he said that he needs a new Rx sent for Levothyroxine 125 mcg on Mondays, Wednesdays and Friday and levothyroxine 50 mcg on Tuesdays, Thursdays, Saturdays and Sundays sent in.

## 2017-07-07 NOTE — PROGRESS NOTES
Subjective:       Patient ID: Nida Kline is a 82 y.o. female.    Chief Complaint: Follow up labs    HPI  Overall she's been doing fine. HTN: BP avr same about 130/80; watches her salt intake. CKD3 noted; GFR worsened slighly to 52.6; on valsartan and metoprolol.  Labs reviewed with patient at length and discussed.  GERD is doing fine. No bedtime snacks. On omeprazole qod and doing fine. HLP: on low fat high fiber diet. On fish oil and atorvastatin; tolerates them fine. Takes thyroid supplements appropriately. Refilled for her today 90 days +1 refill. DEXA scan discusseed from 6/15/17; osteoporotic still; doesn't want fosamax or miacalcin.  Repeat DEXA in 1 year. Cont her ca w vit D, and increase Vit D3 to 3000 iu a day.  Various diagnosis is discussed as well as plan of management; medications were refilled where needed.  After review thyroid function tests will increase her levothyroxine dosage to half of 125 µg by mouth daily; reassess on follow-up with thyroid function tests at 2 months with repeat BMP as well    Review of Systems   Constitutional: Negative for appetite change, fever and unexpected weight change.   HENT: Negative for postnasal drip, rhinorrhea and sinus pressure.         Eyes history of seasonal ALLERGIES or perennial ALLERGIES   Eyes: Negative for discharge and itching.   Respiratory: Negative for cough, chest tightness, shortness of breath and wheezing.    Cardiovascular: Negative for chest pain, palpitations and leg swelling.   Gastrointestinal: Negative for abdominal distention, abdominal pain, blood in stool, constipation, diarrhea, nausea and vomiting.        Denies melena as well   Endocrine: Negative for polydipsia, polyphagia and polyuria.   Genitourinary: Negative for dysuria, hematuria and urgency.   Musculoskeletal: Negative for arthralgias and myalgias.   Skin: Negative for rash.   Allergic/Immunologic: Negative for environmental allergies and food allergies.        Denies  "seasonal or perennial ALLERGIES.   Neurological: Negative for tremors, seizures and syncope.   Hematological: Negative for adenopathy. Bruises/bleeds easily.   Psychiatric/Behavioral:        Denies anxiety or depression       Objective:      Vitals:    07/07/17 1423   BP: 130/80   BP Location: Left arm   Patient Position: Sitting   BP Method: Manual   Pulse: 74   Temp: 98.3 °F (36.8 °C)   TempSrc: Oral   SpO2: 95%   Weight: 78.2 kg (172 lb 6.4 oz)   Height: 5' 4" (1.626 m)     Body mass index is 29.59 kg/m².    Physical Exam   Constitutional: She is oriented to person, place, and time. She appears well-developed and well-nourished.   HENT:   Head: Normocephalic and atraumatic.   Eyes: EOM are normal.   Neck: Normal range of motion. Neck supple. No thyromegaly present.   Cardiovascular: Normal rate, regular rhythm and normal heart sounds.  Exam reveals no gallop.    No murmur heard.  Pulmonary/Chest: Effort normal and breath sounds normal. No respiratory distress. She has no wheezes. She has no rales.   Abdominal: Soft. Bowel sounds are normal. She exhibits no distension. There is no tenderness. There is no rebound and no guarding.   Musculoskeletal: Normal range of motion. She exhibits no edema.   Lymphadenopathy:     She has no cervical adenopathy.   Neurological: She is alert and oriented to person, place, and time.   Moves all 4 extremities fine.   Skin: No rash noted.   Still w athlete's feet infection but a lot better; distal pretib and feet involved.   Psychiatric: She has a normal mood and affect. Her behavior is normal. Thought content normal.   Vitals reviewed.      Assessment:       1. Acquired hypothyroidism    2. Essential hypertension    3. CKD (chronic kidney disease) stage 3, GFR 30-59 ml/min    4. Gastroesophageal reflux disease, esophagitis presence not specified    5. Pure hypercholesterolemia    6. Iron deficiency    7. B12 deficiency    8. Overweight (BMI 25.0-29.9)    9. Osteoporosis, " unspecified osteoporosis type, unspecified pathological fracture presence    10. Vitamin D deficiency    11. Tinea pedis of both feet        Plan:       Essential hypertension. Maintain < 2 Gm Na a day diet, and monitor BP at home; keep a log.    CKD3; decrease valsartan to 40 mg a day. Min 6-7 glasses of fluid a day. No NSAID's; add metoprolol succinate 25 mg 1/2 po q 4-6 pm.    Acquired hypothyroidism; increase levothyroxine dosage to 125 mcg 1/2 po daily; check thyroid function test before follow-up for reassessment    Gastroesophageal reflux disease, esophagitis presence not specified; stable w PPI qod. No bedtime snacks.    Pure hypercholesterolemia. Maintain low fat high fiber diet, exercise regularly. Cont atorvastatin at present dose.    Overweight (BMI 25.0-29.9). Caloric restriction w regular exercise and weight reduction.    Iron deficiency; stop iron supplement daily; MVI w iron.    B12 deficiency; cont presnt doseage. Decrease dose back to 500 mcg a day. Had increased to 1000 mcg a day on her own.    Vit D deficiency; increase vit D to 3000 iu a day. DEXA in 1 yr. as osteoporosis noted on 6/15/17 DEXA; continue her calcium with vitamin D, and weightbearing exercises.  Patient does not desire Fosamax or miacalcin nasal spray    T. Pedis; cont same antifungal; to see derm for eval. as has improved but still present

## 2017-07-07 NOTE — TELEPHONE ENCOUNTER
2nd attempt to contact Winston with [facility] pharmacy, no answer; left message to return call.

## 2017-08-08 RX ORDER — BUSPIRONE HYDROCHLORIDE 15 MG/1
TABLET ORAL
Qty: 45 TABLET | Refills: 1 | Status: SHIPPED | OUTPATIENT
Start: 2017-08-08 | End: 2017-12-05 | Stop reason: SDUPTHER

## 2017-08-28 ENCOUNTER — LAB VISIT (OUTPATIENT)
Dept: LAB | Facility: HOSPITAL | Age: 82
End: 2017-08-28
Attending: INTERNAL MEDICINE
Payer: MEDICARE

## 2017-08-28 DIAGNOSIS — E53.8 B12 DEFICIENCY: ICD-10-CM

## 2017-08-28 DIAGNOSIS — R73.02 IMPAIRED GLUCOSE TOLERANCE: ICD-10-CM

## 2017-08-28 DIAGNOSIS — I10 ESSENTIAL HYPERTENSION: ICD-10-CM

## 2017-08-28 DIAGNOSIS — E03.9 ACQUIRED HYPOTHYROIDISM: ICD-10-CM

## 2017-08-28 DIAGNOSIS — N18.30 CKD (CHRONIC KIDNEY DISEASE) STAGE 3, GFR 30-59 ML/MIN: ICD-10-CM

## 2017-08-28 LAB
ANION GAP SERPL CALC-SCNC: 9 MMOL/L
BASOPHILS # BLD AUTO: 0.03 K/UL
BASOPHILS NFR BLD: 0.5 %
BUN SERPL-MCNC: 20 MG/DL
CALCIUM SERPL-MCNC: 8.9 MG/DL
CHLORIDE SERPL-SCNC: 109 MMOL/L
CO2 SERPL-SCNC: 25 MMOL/L
CREAT SERPL-MCNC: 1 MG/DL
DIFFERENTIAL METHOD: NORMAL
EOSINOPHIL # BLD AUTO: 0.4 K/UL
EOSINOPHIL NFR BLD: 5.5 %
ERYTHROCYTE [DISTWIDTH] IN BLOOD BY AUTOMATED COUNT: 13.4 %
EST. GFR  (AFRICAN AMERICAN): >60 ML/MIN/1.73 M^2
EST. GFR  (NON AFRICAN AMERICAN): 52.6 ML/MIN/1.73 M^2
ESTIMATED AVG GLUCOSE: 117 MG/DL
GLUCOSE SERPL-MCNC: 101 MG/DL
HBA1C MFR BLD HPLC: 5.7 %
HCT VFR BLD AUTO: 38.8 %
HGB BLD-MCNC: 12.6 G/DL
LYMPHOCYTES # BLD AUTO: 1.5 K/UL
LYMPHOCYTES NFR BLD: 23.9 %
MCH RBC QN AUTO: 30.3 PG
MCHC RBC AUTO-ENTMCNC: 32.5 G/DL
MCV RBC AUTO: 93 FL
MONOCYTES # BLD AUTO: 0.6 K/UL
MONOCYTES NFR BLD: 9.7 %
NEUTROPHILS # BLD AUTO: 3.8 K/UL
NEUTROPHILS NFR BLD: 60.2 %
PLATELET # BLD AUTO: 265 K/UL
PMV BLD AUTO: 10.5 FL
POTASSIUM SERPL-SCNC: 4.8 MMOL/L
RBC # BLD AUTO: 4.16 M/UL
SODIUM SERPL-SCNC: 143 MMOL/L
T3FREE SERPL-MCNC: 2.1 PG/ML
T4 FREE SERPL-MCNC: 1.11 NG/DL
TSH SERPL DL<=0.005 MIU/L-ACNC: 1.43 UIU/ML
VIT B12 SERPL-MCNC: 940 PG/ML
WBC # BLD AUTO: 6.36 K/UL

## 2017-08-28 PROCEDURE — 83036 HEMOGLOBIN GLYCOSYLATED A1C: CPT

## 2017-08-28 PROCEDURE — 84443 ASSAY THYROID STIM HORMONE: CPT

## 2017-08-28 PROCEDURE — 84439 ASSAY OF FREE THYROXINE: CPT

## 2017-08-28 PROCEDURE — 80048 BASIC METABOLIC PNL TOTAL CA: CPT

## 2017-08-28 PROCEDURE — 85025 COMPLETE CBC W/AUTO DIFF WBC: CPT

## 2017-08-28 PROCEDURE — 82607 VITAMIN B-12: CPT

## 2017-08-28 PROCEDURE — 84481 FREE ASSAY (FT-3): CPT

## 2017-08-28 PROCEDURE — 36415 COLL VENOUS BLD VENIPUNCTURE: CPT | Mod: PO

## 2017-09-14 ENCOUNTER — OFFICE VISIT (OUTPATIENT)
Dept: FAMILY MEDICINE | Facility: CLINIC | Age: 82
End: 2017-09-14
Payer: MEDICARE

## 2017-09-14 VITALS
HEART RATE: 68 BPM | WEIGHT: 172.38 LBS | TEMPERATURE: 98 F | SYSTOLIC BLOOD PRESSURE: 125 MMHG | BODY MASS INDEX: 29.43 KG/M2 | OXYGEN SATURATION: 96 % | HEIGHT: 64 IN | DIASTOLIC BLOOD PRESSURE: 80 MMHG

## 2017-09-14 DIAGNOSIS — E03.9 ACQUIRED HYPOTHYROIDISM: ICD-10-CM

## 2017-09-14 DIAGNOSIS — Z98.890 S/P CAROTID ENDARTERECTOMY: ICD-10-CM

## 2017-09-14 DIAGNOSIS — E66.3 OVERWEIGHT (BMI 25.0-29.9): ICD-10-CM

## 2017-09-14 DIAGNOSIS — N18.30 CKD (CHRONIC KIDNEY DISEASE) STAGE 3, GFR 30-59 ML/MIN: ICD-10-CM

## 2017-09-14 DIAGNOSIS — K21.9 GASTROESOPHAGEAL REFLUX DISEASE, ESOPHAGITIS PRESENCE NOT SPECIFIED: ICD-10-CM

## 2017-09-14 DIAGNOSIS — R73.01 IMPAIRED FASTING GLUCOSE: ICD-10-CM

## 2017-09-14 DIAGNOSIS — E78.00 PURE HYPERCHOLESTEROLEMIA: ICD-10-CM

## 2017-09-14 DIAGNOSIS — I10 ESSENTIAL HYPERTENSION: Primary | ICD-10-CM

## 2017-09-14 PROCEDURE — 99999 PR PBB SHADOW E&M-EST. PATIENT-LVL III: CPT | Mod: PBBFAC,,, | Performed by: INTERNAL MEDICINE

## 2017-09-14 PROCEDURE — 3008F BODY MASS INDEX DOCD: CPT | Mod: S$GLB,,, | Performed by: INTERNAL MEDICINE

## 2017-09-14 PROCEDURE — 3074F SYST BP LT 130 MM HG: CPT | Mod: S$GLB,,, | Performed by: INTERNAL MEDICINE

## 2017-09-14 PROCEDURE — 1159F MED LIST DOCD IN RCRD: CPT | Mod: S$GLB,,, | Performed by: INTERNAL MEDICINE

## 2017-09-14 PROCEDURE — 1126F AMNT PAIN NOTED NONE PRSNT: CPT | Mod: S$GLB,,, | Performed by: INTERNAL MEDICINE

## 2017-09-14 PROCEDURE — 99499 UNLISTED E&M SERVICE: CPT | Mod: S$GLB,,, | Performed by: INTERNAL MEDICINE

## 2017-09-14 PROCEDURE — 99214 OFFICE O/P EST MOD 30 MIN: CPT | Mod: S$GLB,,, | Performed by: INTERNAL MEDICINE

## 2017-09-14 PROCEDURE — 3079F DIAST BP 80-89 MM HG: CPT | Mod: S$GLB,,, | Performed by: INTERNAL MEDICINE

## 2017-09-14 RX ORDER — VALSARTAN 80 MG/1
80 TABLET ORAL EVERY MORNING
Qty: 90 TABLET | Refills: 1 | Status: SHIPPED | OUTPATIENT
Start: 2017-09-14 | End: 2017-09-14 | Stop reason: SDUPTHER

## 2017-09-14 RX ORDER — OMEPRAZOLE 40 MG/1
40 CAPSULE, DELAYED RELEASE ORAL DAILY
Qty: 90 CAPSULE | Refills: 1 | Status: SHIPPED | OUTPATIENT
Start: 2017-09-14 | End: 2018-04-03 | Stop reason: SDUPTHER

## 2017-09-14 RX ORDER — VALSARTAN 80 MG/1
40 TABLET ORAL EVERY MORNING
Qty: 90 TABLET | Refills: 1 | Status: SHIPPED | OUTPATIENT
Start: 2017-09-14 | End: 2017-10-24 | Stop reason: SDUPTHER

## 2017-09-14 NOTE — PROGRESS NOTES
Subjective:       Patient ID: Nida Kline is a 82 y.o. female.    Chief Complaint: Follow up labs    HPI  Overall she's been doing fine. HTN: BP avr 125/80; watches her salt intake; went back to 80 mg a day valsartan due to her BP, but forgot to add metoprolol 12.5 mg each pm. Will try again. GERD has been doing fine. No bedtime snacks. HLP: on low fat high fiber diet. Tolerates atorvastatin fine. Takes her thyroid med appropriately.Exercising 2-3x a week; walking. Labs discussed w pt.    Review of Systems   Constitutional: Negative for appetite change, fever and unexpected weight change.   HENT: Negative for postnasal drip, rhinorrhea and sinus pressure.         Eyes history of seasonal ALLERGIES or perennial ALLERGIES   Eyes: Negative for discharge and itching.   Respiratory: Negative for cough, chest tightness, shortness of breath and wheezing.    Cardiovascular: Negative for chest pain, palpitations and leg swelling.   Gastrointestinal: Negative for abdominal distention, abdominal pain, blood in stool, constipation, diarrhea, nausea and vomiting.        Denies melena as well   Endocrine: Positive for polyuria. Negative for polydipsia and polyphagia.        Hx of hysterectomy and bladder lift; BSO as well. Chronic cystitis for yrs til tx w sulphur med.   Genitourinary: Negative for dysuria and hematuria.   Musculoskeletal: Negative for arthralgias and myalgias.   Skin: Negative for rash.   Allergic/Immunologic: Negative for environmental allergies and food allergies.        Denies seasonal or perennial ALLERGIES.   Neurological: Negative for tremors, seizures and syncope.   Hematological: Negative for adenopathy. Does not bruise/bleed easily.   Psychiatric/Behavioral:        Denies anxiety or depression       Objective:      Vitals:    09/14/17 1457   BP: 125/80   BP Location: Right arm   Patient Position: Sitting   BP Method: Medium (Manual)   Pulse: 68   Temp: 98.2 °F (36.8 °C)   TempSrc: Oral   SpO2: 96%  "  Weight: 78.2 kg (172 lb 6.4 oz)   Height: 5' 4" (1.626 m)     Body mass index is 29.59 kg/m².    Physical Exam   Constitutional: She is oriented to person, place, and time. She appears well-developed and well-nourished.   HENT:   Head: Normocephalic and atraumatic.   Eyes: EOM are normal.   Neck: Normal range of motion. Neck supple. No thyromegaly present.   No carotid bruits heard.   Cardiovascular: Normal rate, regular rhythm and normal heart sounds.  Exam reveals no gallop.    No murmur heard.  Pulmonary/Chest: Effort normal and breath sounds normal. No respiratory distress. She has no wheezes. She has no rales.   Abdominal: Soft. Bowel sounds are normal. She exhibits no distension. There is no tenderness. There is no rebound and no guarding.   Musculoskeletal: Normal range of motion. She exhibits no edema.   Lymphadenopathy:     She has no cervical adenopathy.   Neurological: She is alert and oriented to person, place, and time.   Moves all 4 extremities fine.   Skin: No rash noted.   Psychiatric: She has a normal mood and affect. Her behavior is normal. Thought content normal.   Vitals reviewed.      Assessment:       1. Essential hypertension    2. CKD (chronic kidney disease) stage 3, GFR 30-59 ml/min    3. Gastroesophageal reflux disease, esophagitis presence not specified    4. Overweight (BMI 25.0-29.9)    5. Pure hypercholesterolemia    6. S/P carotid endarterectomy    7. Acquired hypothyroidism    8. Impaired fasting glucose        Plan:       Essential hypertension. Maintain < 2 Gm Na a day diet, and monitor BP at home; keep a log. Change valsartan to 40 mg each morning;     add metoprolol succinate 25 mg 1/2 po each 4 pm; if needed can go to 25 mg each 4 pm.    CKD (chronic kidney disease) stage 3, GFR 30-59 ml/min; see above changes to try and help her renal function. Adequate fluid intake.    Gastroesophageal reflux disease, esophagitis presence not specified; has been stable; no bedtime snacks. On " omeprazole 40 mg a day as needed.    Overweight (BMI 25.0-29.9). Caloric restriction w regular exercise and weight reduction.    Pure hypercholesterolemia. Maintain low fat high fiber diet, exercise regularly. Cont atorvastatin. Lipids on f/u.    S/P carotid endarterectomy; US last 1/5/17 w 30-50% narrowing dist ESTEPHANIA; due 1/2018    Acquired hypothyroidism; same thyroid dosing; q 6 mos TFT's.    Impaired fasting glucose. Exercise recommended with weight reduction and low carb diet; we'll follow hemoglobin A1c's with you periodically.

## 2017-09-14 NOTE — PATIENT INSTRUCTIONS
Essential hypertension. Maintain < 2 Gm Na a day diet, and monitor BP at home; keep a log. Change valsartan to 40 mg each morning;     add metoprolol succinate 25 mg 1/2 po each 4 pm; if needed can go to 25 mg each 4 pm.    CKD (chronic kidney disease) stage 3, GFR 30-59 ml/min; see above changes to try and help her renal function. Adequate fluid intake.    Gastroesophageal reflux disease, esophagitis presence not specified; has been stable; no bedtime snacks. On omeprazole 40 mg a day as needed.    Overweight (BMI 25.0-29.9). Caloric restriction w regular exercise and weight reduction.    Pure hypercholesterolemia. Maintain low fat high fiber diet, exercise regularly. Cont atorvastatin. Lipids on f/u.    S/P carotid endarterectomy; US last 1/5/17 w 30-50% narrowing dist ESTEPHANIA; due 1/2018    Acquired hypothyroidism; same thyroid dosing; q 6 mos TFT's.    Impaired fasting glucose. Exercise recommended with weight reduction and low carb diet; we'll follow hemoglobin A1c's with you periodically.    Return to clinic in early December w labs a few days prior. Overnight fast before her labs are done.

## 2017-10-25 RX ORDER — ATORVASTATIN CALCIUM 20 MG/1
TABLET, FILM COATED ORAL
Qty: 90 TABLET | Refills: 1 | Status: SHIPPED | OUTPATIENT
Start: 2017-10-25 | End: 2018-04-03 | Stop reason: SDUPTHER

## 2017-10-25 RX ORDER — VALSARTAN 80 MG/1
TABLET ORAL
Qty: 90 TABLET | Refills: 1 | Status: SHIPPED | OUTPATIENT
Start: 2017-10-25 | End: 2018-04-03 | Stop reason: SDUPTHER

## 2017-10-25 RX ORDER — METOPROLOL SUCCINATE 25 MG/1
TABLET, EXTENDED RELEASE ORAL
Qty: 90 TABLET | Refills: 1 | Status: SHIPPED | OUTPATIENT
Start: 2017-10-25 | End: 2018-04-03 | Stop reason: SDUPTHER

## 2017-11-01 ENCOUNTER — HOSPITAL ENCOUNTER (OUTPATIENT)
Dept: RADIOLOGY | Facility: HOSPITAL | Age: 82
Discharge: HOME | End: 2017-11-01
Attending: UROLOGY
Payer: MEDICARE

## 2017-11-01 ENCOUNTER — OFFICE VISIT (OUTPATIENT)
Dept: UROLOGY | Facility: CLINIC | Age: 82
End: 2017-11-01
Payer: MEDICARE

## 2017-11-01 VITALS
DIASTOLIC BLOOD PRESSURE: 72 MMHG | WEIGHT: 175.5 LBS | HEIGHT: 64 IN | HEART RATE: 68 BPM | SYSTOLIC BLOOD PRESSURE: 142 MMHG | BODY MASS INDEX: 29.96 KG/M2

## 2017-11-01 DIAGNOSIS — N19 RENAL FAILURE, UNSPECIFIED CHRONICITY: ICD-10-CM

## 2017-11-01 DIAGNOSIS — R35.1 NOCTURIA MORE THAN TWICE PER NIGHT: ICD-10-CM

## 2017-11-01 DIAGNOSIS — R35.0 INCREASED URINARY FREQUENCY: ICD-10-CM

## 2017-11-01 DIAGNOSIS — N18.30 CHRONIC RENAL FAILURE, STAGE 3 (MODERATE): Primary | ICD-10-CM

## 2017-11-01 LAB
BILIRUB SERPL-MCNC: NORMAL MG/DL
BLOOD URINE, POC: NORMAL
COLOR, POC UA: YELLOW
GLUCOSE UR QL STRIP: NORMAL
KETONES UR QL STRIP: NORMAL
LEUKOCYTE ESTERASE URINE, POC: NORMAL
NITRITE, POC UA: NORMAL
PH, POC UA: 5
PROTEIN, POC: NORMAL
SPECIFIC GRAVITY, POC UA: 1.01
UROBILINOGEN, POC UA: NORMAL

## 2017-11-01 PROCEDURE — 76770 US EXAM ABDO BACK WALL COMP: CPT | Mod: 26,,, | Performed by: RADIOLOGY

## 2017-11-01 PROCEDURE — 99999 PR PBB SHADOW E&M-EST. PATIENT-LVL III: CPT | Mod: PBBFAC,,, | Performed by: UROLOGY

## 2017-11-01 PROCEDURE — 81002 URINALYSIS NONAUTO W/O SCOPE: CPT | Mod: S$GLB,,, | Performed by: UROLOGY

## 2017-11-01 PROCEDURE — 99499 UNLISTED E&M SERVICE: CPT | Mod: S$GLB,,, | Performed by: UROLOGY

## 2017-11-01 PROCEDURE — 99203 OFFICE O/P NEW LOW 30 MIN: CPT | Mod: 25,S$GLB,, | Performed by: UROLOGY

## 2017-11-01 PROCEDURE — 76770 US EXAM ABDO BACK WALL COMP: CPT | Mod: TC,PO

## 2017-11-01 NOTE — PROGRESS NOTES
Subjective:       Patient ID: Nida lKine is a 82 y.o. female.    Chief Complaint: Annual Exam and Urinary Frequency    HPI     82 year old with History of chronic renal failure stage 3.  She is here for evaluation.  She also complains of urinary frequency, nocturia and urgency.  She denies incontinence.  She has a distant history of chronic urinary tract infection and at one time took a sulfa antibiotic for 3 months.  Had bladder lift 40 years ago.   She denies hematuria and dysuria.  She denies flank pain.  She has a distant history of kidney stones.  No previous OAB meds and she says she is not interested in taking any more medications.  Urine dipstick shows negative for all components.    Past Medical History:   Diagnosis Date    Back pain     Cataract extraction status of eye     Gallbladder disease     GERD (gastroesophageal reflux disease)     HTN (hypertension)     Hypothyroidism     Osteoporosis     Overweight (BMI 25.0-29.9)     PONV (postoperative nausea and vomiting)     Pure hypercholesterolemia     Squamous cell carcinoma     Thyroid disease     hypothyroidism    Trouble in sleeping     Vitamin D deficiency      Past Surgical History:   Procedure Laterality Date    CAROTID ENDARTERECTOMY Left 2015    CATARACT EXTRACTION      OU    CHOLECYSTECTOMY      HYSTERECTOMY      TOTAL ABDOMINAL HYSTERECTOMY W/ BILATERAL SALPINGOOPHORECTOMY         Current Outpatient Prescriptions:     ammonium lactate (LAC-HYDRIN) 12 % lotion, Apply 1 application topically every evening. , Disp: , Rfl: 0    ascorbic acid, vitamin C, (VITAMIN C) 500 MG tablet, Take 500 mg by mouth once daily., Disp: , Rfl:     aspirin (ECOTRIN) 81 MG EC tablet, Take 81 mg by mouth once., Disp: , Rfl:     atorvastatin (LIPITOR) 20 MG tablet, TAKE 1 TABLET EVERY DAY, Disp: 90 tablet, Rfl: 1    busPIRone (BUSPAR) 15 MG tablet, TAKE 1/3 TO 1/2 TABLET THREE TIMES A DAY AS NEEDED FOR ANXIETY, Disp: 45 tablet, Rfl: 1     calcium citrate (CALCITRATE) 200 mg (950 mg) tablet, Take 2 tablets by mouth once daily., Disp: , Rfl:     clotrimazole-betamethasone 1-0.05% (LOTRISONE) cream, Apply topically 2 (two) times daily. Til resolved; max 2 weeks., Disp: 45 g, Rfl: 0    levothyroxine (SYNTHROID) 125 MCG tablet, 1/2 of 125 mcg po daily., Disp: 45 tablet, Rfl: 1    magnesium oxide 400 mg Cap, Take 400 mg by mouth once daily., Disp: , Rfl:     metoprolol succinate (TOPROL-XL) 25 MG 24 hr tablet, TAKE 1 TABLET EVERY DAY, Disp: 90 tablet, Rfl: 1    omeprazole (PRILOSEC) 40 MG capsule, Take 1 capsule (40 mg total) by mouth once daily. Prn reflux, Disp: 90 capsule, Rfl: 1    valsartan (DIOVAN) 80 MG tablet, TAKE 1 TABLET EVERY MORNING, Disp: 90 tablet, Rfl: 1    vitamin D 1000 units Tab, Take 1,000 Units by mouth 2 (two) times daily., Disp: , Rfl:     Review of Systems   Constitutional: Negative for chills and fever.   Eyes: Negative for visual disturbance.   Respiratory: Negative for shortness of breath.    Cardiovascular: Negative for chest pain.   Gastrointestinal: Negative for abdominal pain and nausea.   Genitourinary: Negative for dysuria, flank pain and hematuria.   Musculoskeletal: Negative for gait problem.   Skin: Negative for rash.   Neurological: Negative for seizures.   Psychiatric/Behavioral: Negative for confusion.       Objective:      Physical Exam   Constitutional: She is oriented to person, place, and time. She appears well-developed and well-nourished.   HENT:   Head: Normocephalic.   Eyes: Conjunctivae and EOM are normal.   Neck: Normal range of motion.   Cardiovascular: Normal rate.    Pulmonary/Chest: Effort normal.   Abdominal: Soft. She exhibits no distension and no mass. There is no tenderness.   Genitourinary:   Genitourinary Comments: Bladder non-tender and nondistended  No CVA tenderness   Musculoskeletal: She exhibits no edema.   Neurological: She is alert and oriented to person, place, and time.   Skin: Skin  is warm and dry. No rash noted. No erythema.   Psychiatric: She has a normal mood and affect. Her behavior is normal.   Vitals reviewed.      Assessment:       1. Chronic renal failure, stage 3 (moderate)    2. Increased urinary frequency    3. Nocturia more than twice per night        Plan:       Chronic renal failure, stage 3 (moderate)  -     POCT urine dipstick without microscope  -     US Retroperitoneal Complete (Kidney and; Future; Expected date: 11/01/2017    Increased urinary frequency    Nocturia more than twice per night

## 2017-11-14 ENCOUNTER — OFFICE VISIT (OUTPATIENT)
Dept: FAMILY MEDICINE | Facility: CLINIC | Age: 82
End: 2017-11-14
Payer: MEDICARE

## 2017-11-14 VITALS
BODY MASS INDEX: 29.5 KG/M2 | HEIGHT: 64 IN | WEIGHT: 172.81 LBS | SYSTOLIC BLOOD PRESSURE: 122 MMHG | DIASTOLIC BLOOD PRESSURE: 65 MMHG | HEART RATE: 72 BPM

## 2017-11-14 DIAGNOSIS — E78.00 PURE HYPERCHOLESTEROLEMIA: ICD-10-CM

## 2017-11-14 DIAGNOSIS — E55.9 VITAMIN D DEFICIENCY: ICD-10-CM

## 2017-11-14 DIAGNOSIS — I51.89 DIASTOLIC DYSFUNCTION: Chronic | ICD-10-CM

## 2017-11-14 DIAGNOSIS — Z98.890 S/P CAROTID ENDARTERECTOMY: ICD-10-CM

## 2017-11-14 DIAGNOSIS — I10 ESSENTIAL HYPERTENSION: ICD-10-CM

## 2017-11-14 DIAGNOSIS — Z00.00 ENCOUNTER FOR PREVENTIVE HEALTH EXAMINATION: Primary | ICD-10-CM

## 2017-11-14 DIAGNOSIS — E61.1 IRON DEFICIENCY: ICD-10-CM

## 2017-11-14 DIAGNOSIS — I70.0 AORTIC ATHEROSCLEROSIS: ICD-10-CM

## 2017-11-14 DIAGNOSIS — G47.00 INSOMNIA, UNSPECIFIED TYPE: ICD-10-CM

## 2017-11-14 DIAGNOSIS — N18.30 CHRONIC KIDNEY DISEASE, STAGE III (MODERATE): ICD-10-CM

## 2017-11-14 DIAGNOSIS — K21.9 GASTROESOPHAGEAL REFLUX DISEASE, ESOPHAGITIS PRESENCE NOT SPECIFIED: ICD-10-CM

## 2017-11-14 DIAGNOSIS — M81.0 OSTEOPOROSIS, UNSPECIFIED OSTEOPOROSIS TYPE, UNSPECIFIED PATHOLOGICAL FRACTURE PRESENCE: ICD-10-CM

## 2017-11-14 DIAGNOSIS — E66.3 OVERWEIGHT (BMI 25.0-29.9): ICD-10-CM

## 2017-11-14 DIAGNOSIS — Z96.1 PSEUDOPHAKIA OF BOTH EYES: ICD-10-CM

## 2017-11-14 DIAGNOSIS — E03.9 ACQUIRED HYPOTHYROIDISM: ICD-10-CM

## 2017-11-14 DIAGNOSIS — I77.9 BILATERAL CAROTID ARTERY DISEASE: ICD-10-CM

## 2017-11-14 PROBLEM — J20.9 ACUTE BRONCHITIS: Status: RESOLVED | Noted: 2017-06-08 | Resolved: 2017-11-14

## 2017-11-14 PROCEDURE — 99499 UNLISTED E&M SERVICE: CPT | Mod: S$GLB,,, | Performed by: NURSE PRACTITIONER

## 2017-11-14 PROCEDURE — 99999 PR PBB SHADOW E&M-EST. PATIENT-LVL IV: CPT | Mod: PBBFAC,,, | Performed by: NURSE PRACTITIONER

## 2017-11-14 PROCEDURE — G0439 PPPS, SUBSEQ VISIT: HCPCS | Mod: S$GLB,,, | Performed by: NURSE PRACTITIONER

## 2017-11-14 NOTE — PATIENT INSTRUCTIONS
Counseling and Referral of Other Preventative  (Italic type indicates deductible and co-insurance are waived)    Patient Name: Nida Kline  Today's Date: 11/14/2017      SERVICE LIMITATIONS RECOMMENDATION    Vaccines    · Pneumococcal (once after 65)    · Influenza (annually)    · Hepatitis B (if medium/high risk)    · Prevnar 13      Hepatitis B medium/high risk factors:       - End-stage renal disease       - Hemophiliacs who received Factor VII or         IX concentrates       - Clients of institutions for the mentally             retarded       - Persons who live in the same house as          a HepB carrier       - Homosexual men       - Illicit injectable drug abusers     Pneumococcal: Done, no repeat necessary     Influenza: Recommended to patient, declined     Hepatitis B: N/A     Prevnar 13: Done, no repeat necessary    Mammogram (biennial age 50-74)  Annually (age 40 or over)  N/A    Pap (up to age 70 and after 70 if unknown history or abnormal study last 10 years)    N/A     The USPSTF recommends against screening for cervical cancer in women older than age 65 years who have had adequate prior screening and are not otherwise at high risk for cervical cancer.   and The USPSTF recommends against screening for cervical cancer in women who have had a hysterectomy with removal of the cervix and who do not have a history of a high-grade precancerous lesion (cervical intraepithelial neoplasia [WOOD] grade 2 or 3) or cervical cancer.     Colorectal cancer screening (to age 75)    · Fecal occult blood test (annual)  · Flexible sigmoidoscopy (5y)  · Screening colonoscopy (10y)  · Barium enema   N/A    Diabetes self-management training (no USPSTF recommendations)  Requires referral by treating physician for patient with diabetes or renal disease. 10 hours of initial DSMT sessions of no less than 30 minutes each in a continuous 12-month period. 2 hours of follow-up DSMT in subsequent years.  N/A    Bone mass  measurements (age 65 & older, biennial)  Requires diagnosis related to osteoporosis or estrogen deficiency. Biennial benefit unless patient has history of long-term glucocorticoid  Last done 6/2017, recommend to repeat every 3  years    Glaucoma screening (no USPSTF recommendation)  Diabetes mellitus, family history   , age 50 or over    American, age 65 or over  Recommend follow up with eye care professional regularly    Medical nutrition therapy for diabetes or renal disease (no recommended schedule)  Requires referral by treating physician for patient with diabetes or renal disease or kidney transplant within the past 3 years.  Can be provided in same year as diabetes self-management training (DSMT), and CMS recommends medical nutrition therapy take place after DSMT. Up to 3 hours for initial year and 2 hours in subsequent years.  N/A    Cardiovascular screening blood tests (every 5 years)  · Fasting lipid panel  Order as a panel if possible  Done this year, repeat every year    Diabetes screening tests (at least every 3 years, Medicare covers annually or at 6-month intervals for prediabetic patients)  · Fasting blood sugar (FBS) or glucose tolerance test (GTT)  Patient must be diagnosed with one of the following:       - Hypertension       - Dyslipidemia       - Obesity (BMI 30kg/m2)       - Previous elevated impaired FBS or GTT       ... or any two of the following:       - Overweight (BMI 25 but <30)       - Family history of diabetes       - Age 65 or older       - History of gestational diabetes or birth of baby weighing more than 9 pounds  Done this year, repeat every year    HIV screening (annually for increased risk patients)  · HIV-1 and HIV-2 by EIA, or CORNELIO, rapid antibody test or oral mucosa transudate  Patients must be at increased risk for HIV infection per USPSTF guidelines or pregnant. Tests covered annually for patient at increased risk or as requested by the patient.  Pregnant patients may receive up to 3 tests during pregnancy.  Risks discussed, screening is not recommended    Smoking cessation counseling (up to 8 sessions per year)  Patients must be asymptomatic of tobacco-related conditions to receive as a preventative service.  Non-smoker    Subsequent annual wellness visit  At least 12 months since last AWV  Return in one year     The following information is provided to all patients.  This information is to help you find resources for any of the problems found today that may be affecting your health:                Living healthy guide: www.[facility].[email or web address].[facility]      Understanding Diabetes: www.diabetes.org      Eating healthy: www.cdc.gov/healthyweight      [facility] home safety checklist: www.cdc.gov/steadi/patient.html      Agency on Aging: www.goea.[place].[facility]      Alcoholics anonymous (AA): www.aa.org      Physical Activity: www.tristin.nih.gov/ma0bwiy      Tobacco use: www.quitwithusla.org

## 2017-11-16 PROBLEM — I70.0 AORTIC ATHEROSCLEROSIS: Status: ACTIVE | Noted: 2017-11-16

## 2017-11-16 NOTE — PROGRESS NOTES
"Nida Kline presented for a  Medicare AWV and comprehensive Health Risk Assessment today. The following components were reviewed and updated:    · Medical history  · Family History  · Social history  · Allergies and Current Medications  · Health Risk Assessment  · Health Maintenance  · Care Team     Review of Systems   Constitutional: Negative for fever and malaise/fatigue.   Respiratory: Negative for cough, shortness of breath and wheezing.    Cardiovascular: Negative for chest pain.   Gastrointestinal: Negative for abdominal pain, blood in stool, constipation, diarrhea, nausea and vomiting.   Skin: Negative for rash.   Neurological: Negative for dizziness, weakness and headaches.     ** See Completed Assessments for Annual Wellness Visit within the encounter summary.**     The following assessments were completed:  · Living Situation  · CAGE  · Depression Screening  · Timed Get Up and Go  · Whisper Test  · Cognitive Function Screening      · Nutrition Screening  · ADL Screening  · PAQ Screening    Vitals:    11/14/17 1305   BP: 122/65   BP Location: Left arm   Patient Position: Sitting   BP Method: Medium (Automatic)   Pulse: 72   Weight: 78.4 kg (172 lb 13.5 oz)   Height: 5' 4" (1.626 m)     Body mass index is 29.67 kg/m².  Physical Exam   Constitutional: She is oriented to person, place, and time. She appears well-nourished.   Cardiovascular: Normal rate, regular rhythm, normal heart sounds and intact distal pulses.    Pulmonary/Chest: Effort normal and breath sounds normal. She has no wheezes. She has no rales.   Neurological: She is alert and oriented to person, place, and time.   Skin: Skin is warm and dry. No rash noted.   Vitals reviewed.        Diagnoses and health risks identified today and associated recommendations/orders:    1. Encounter for preventive health examination  Reviewed and discussed health maintenance.     2. Essential hypertension  Stable- continue current treatment and follow up " routinely with PCP     3. Pure hypercholesterolemia  Stable- continue current treatment and follow up routinely with PCP     4. Diastolic dysfunction  Stable- continue current treatment and follow up routinely with PCP     5. Bilateral carotid artery disease  Stable- continue current treatment and follow up routinely with PCP  And vascular (Dr. Salter)   Repeat US ordered     6. S/P carotid endarterectomy  Stable- continue current treatment and follow up routinely with PCP  And vascular (Dr. Salter)   Repeat US ordered     7. Iron deficiency  Stable- continue current treatment and follow up routinely with PCP     8. Overweight (BMI 25.0-29.9)  Encouraged healthy eating, weight loss and routine exercise     9. Acquired hypothyroidism  Stable- continue current treatment and follow up routinely with PCP     10. Vitamin D deficiency  Stable- continue current treatment and follow up routinely with PCP     11. Chronic kidney disease, stage III (moderate)  Stable- continue current treatment and follow up routinely with PCP   Labs reviewed  Encouraged to increase water intake and avoid NSAIDs    12. Gastroesophageal reflux disease, esophagitis presence not specified  Stable- continue current treatment and follow up routinely with PCP     13. Osteoporosis, unspecified osteoporosis type, unspecified pathological fracture presence  DEXA 2017. Continue current treatment and follow up routinely with PCP    14. Insomnia, unspecified type  Stable- continue current treatment and follow up routinely with PCP     15. Pseudophakia of both eyes  No new complaints. Follow routinely for eye exams    16. Aortic atherosclerosis  Stable- continue current treatment and follow up routinely with PCP   cxr 2015    Provided Nida with a 5-10 year written screening schedule and personal prevention plan. Recommendations were developed using the USPSTF age appropriate recommendations. Education, counseling, and referrals were provided as needed.  After Visit Summary printed and given to patient which includes a list of additional screenings\tests needed.    Roseanna Conway, NP

## 2017-11-21 ENCOUNTER — LAB VISIT (OUTPATIENT)
Dept: LAB | Facility: HOSPITAL | Age: 82
End: 2017-11-21
Attending: INTERNAL MEDICINE
Payer: MEDICARE

## 2017-11-21 DIAGNOSIS — N18.30 CKD (CHRONIC KIDNEY DISEASE) STAGE 3, GFR 30-59 ML/MIN: ICD-10-CM

## 2017-11-21 DIAGNOSIS — I10 ESSENTIAL HYPERTENSION: ICD-10-CM

## 2017-11-21 DIAGNOSIS — R73.01 IMPAIRED FASTING GLUCOSE: ICD-10-CM

## 2017-11-21 LAB
BACTERIA #/AREA URNS AUTO: ABNORMAL /HPF
BILIRUB UR QL STRIP: NEGATIVE
CLARITY UR REFRACT.AUTO: CLEAR
COLOR UR AUTO: YELLOW
GLUCOSE UR QL STRIP: NEGATIVE
HGB UR QL STRIP: NEGATIVE
KETONES UR QL STRIP: NEGATIVE
LEUKOCYTE ESTERASE UR QL STRIP: ABNORMAL
MICROSCOPIC COMMENT: ABNORMAL
NITRITE UR QL STRIP: NEGATIVE
NON-SQ EPI CELLS #/AREA URNS AUTO: <1 /HPF
PH UR STRIP: 5 [PH] (ref 5–8)
PROT UR QL STRIP: NEGATIVE
RBC #/AREA URNS AUTO: 0 /HPF (ref 0–4)
SP GR UR STRIP: 1.01 (ref 1–1.03)
SQUAMOUS #/AREA URNS AUTO: 3 /HPF
URN SPEC COLLECT METH UR: ABNORMAL
UROBILINOGEN UR STRIP-ACNC: NEGATIVE EU/DL
WBC #/AREA URNS AUTO: 8 /HPF (ref 0–5)

## 2017-11-21 PROCEDURE — 81001 URINALYSIS AUTO W/SCOPE: CPT

## 2017-12-05 ENCOUNTER — OFFICE VISIT (OUTPATIENT)
Dept: FAMILY MEDICINE | Facility: CLINIC | Age: 82
End: 2017-12-05
Payer: MEDICARE

## 2017-12-05 VITALS
TEMPERATURE: 99 F | WEIGHT: 172.81 LBS | HEART RATE: 70 BPM | BODY MASS INDEX: 29.5 KG/M2 | HEIGHT: 64 IN | OXYGEN SATURATION: 98 % | SYSTOLIC BLOOD PRESSURE: 120 MMHG | DIASTOLIC BLOOD PRESSURE: 75 MMHG

## 2017-12-05 DIAGNOSIS — I10 ESSENTIAL HYPERTENSION: Primary | Chronic | ICD-10-CM

## 2017-12-05 DIAGNOSIS — E55.9 VITAMIN D DEFICIENCY: ICD-10-CM

## 2017-12-05 DIAGNOSIS — E78.00 PURE HYPERCHOLESTEROLEMIA: ICD-10-CM

## 2017-12-05 DIAGNOSIS — B35.3 TINEA PEDIS OF BOTH FEET: ICD-10-CM

## 2017-12-05 DIAGNOSIS — R73.01 IMPAIRED FASTING GLUCOSE: ICD-10-CM

## 2017-12-05 DIAGNOSIS — N18.30 CHRONIC KIDNEY DISEASE, STAGE III (MODERATE): ICD-10-CM

## 2017-12-05 DIAGNOSIS — E03.9 ACQUIRED HYPOTHYROIDISM: ICD-10-CM

## 2017-12-05 DIAGNOSIS — E66.3 OVERWEIGHT (BMI 25.0-29.9): ICD-10-CM

## 2017-12-05 DIAGNOSIS — M81.0 OSTEOPOROSIS, UNSPECIFIED OSTEOPOROSIS TYPE, UNSPECIFIED PATHOLOGICAL FRACTURE PRESENCE: ICD-10-CM

## 2017-12-05 DIAGNOSIS — Z98.890 S/P CAROTID ENDARTERECTOMY: ICD-10-CM

## 2017-12-05 PROCEDURE — 99214 OFFICE O/P EST MOD 30 MIN: CPT | Mod: S$GLB,,, | Performed by: INTERNAL MEDICINE

## 2017-12-05 PROCEDURE — 99999 PR PBB SHADOW E&M-EST. PATIENT-LVL IV: CPT | Mod: PBBFAC,,, | Performed by: INTERNAL MEDICINE

## 2017-12-05 PROCEDURE — 99499 UNLISTED E&M SERVICE: CPT | Mod: S$GLB,,, | Performed by: INTERNAL MEDICINE

## 2017-12-05 RX ORDER — BUSPIRONE HYDROCHLORIDE 15 MG/1
TABLET ORAL
Qty: 45 TABLET | Refills: 1 | Status: SHIPPED | OUTPATIENT
Start: 2017-12-05 | End: 2018-11-12 | Stop reason: SDUPTHER

## 2017-12-05 NOTE — PATIENT INSTRUCTIONS
Essential hypertension. Maintain < 2 Gm Na a day diet, and monitor BP at home; keep a log. Cont valsartan/metoprolol.    Chronic kidney disease, stage III (moderate); stay well hydrated.    Pure hypercholesterolemia. Maintain low fat high fiber diet, exercise regularly. Increase atorvastatin to 40 mg on Mon/Fri; 20 mg on other days.    S/P carotid endarterectomy; US carotids in Jan, 2018.    Acquired hypothyroidism; TFT on f/u; cont present doseage of supplements    Impaired fasting glucose. Exercise recommended with weight reduction and low carb diet; we'll follow hemoglobin A1c's with you periodically.    Tinea pedis of both feet; change to lotrisone powdered spray 2x a day; change her shoes; use socks when not wearing open shoes.    Osteoporosis, unspecified osteoporosis type, unspecified pathological fracture presence. Weight bearing exercises, continue calcium and vit D supplements. DEXA scan need as 1 year repeat.    Vitamin D deficiency; will check levels.    Overweight (BMI 25.0-29.9). Caloric restriction w regular exercise and weight reduction.

## 2017-12-05 NOTE — PROGRESS NOTES
"Subjective:       Patient ID: Nida Kline is a 82 y.o. female.    Chief Complaint: Follow up on labs    HPI  Overall she's doing great. HTN: BP avr same as today; 120/75. HLP: on low fat high fiber diet; tolerates atorvastatin fine. Has carotid US for 1/2018; L CEA. GERD: doing fine. Prn omeprazole. No bedtime snacks. Hypothyroidism; takes her thyroid meds appropriately. Osteoporosis.: 6/15/17 last DEXA; Lsp -2.7; see DEXA report; repeat 1 Yr f/u. Exercises 2-3x a week; needs to do weightbearing 5x a week for 25-30 min each time. Doesn't want any other meds for osteoporosis.     Review of Systems   Constitutional: Negative for appetite change, fever and unexpected weight change.   HENT: Negative for congestion, postnasal drip, rhinorrhea and sinus pressure.    Eyes: Negative for discharge and itching.   Respiratory: Negative for cough, chest tightness, shortness of breath and wheezing.    Cardiovascular: Negative for chest pain, palpitations and leg swelling.   Gastrointestinal: Negative for abdominal distention, abdominal pain, blood in stool, constipation, diarrhea, nausea and vomiting.   Endocrine: Negative for polydipsia, polyphagia and polyuria.   Genitourinary: Negative for dysuria and hematuria.   Musculoskeletal: Negative for arthralgias and myalgias.   Skin: Negative for rash.   Allergic/Immunologic: Negative for environmental allergies and food allergies.   Neurological: Negative for tremors, seizures and syncope.   Hematological: Negative for adenopathy. Does not bruise/bleed easily.   Psychiatric/Behavioral:        Denies anxiety or depression       Objective:      Vitals:    12/05/17 1259   BP: 120/75   BP Location: Left arm   Patient Position: Sitting   BP Method: Medium (Manual)   Pulse: 70   Temp: 99 °F (37.2 °C)   TempSrc: Oral   SpO2: 98%   Weight: 78.4 kg (172 lb 13.5 oz)   Height: 5' 4" (1.626 m)     Body mass index is 29.67 kg/m².    Physical Exam   Constitutional: She is oriented to person, " place, and time. She appears well-developed and well-nourished.   HENT:   Head: Normocephalic and atraumatic.   Eyes: EOM are normal.   Neck: Normal range of motion. Neck supple. No thyromegaly present.   Cardiovascular: Normal rate, regular rhythm and normal heart sounds.  Exam reveals no gallop.    No murmur heard.  Pulmonary/Chest: Effort normal and breath sounds normal. No respiratory distress. She has no wheezes. She has no rales.   Abdominal: Soft. Bowel sounds are normal. She exhibits no distension. There is no tenderness. There is no rebound and no guarding.   Musculoskeletal: Normal range of motion. She exhibits no edema.   Lymphadenopathy:     She has no cervical adenopathy.   Neurological: She is alert and oriented to person, place, and time.   Moves all 4 extremities fine.   Skin: No rash noted.   Fungal foot inf noted R foot around periphery less so L foot; uses shoes without socks that rub against area; advised to use open shoes or shoes w higher tops w socks to absorb moisture.   Psychiatric: She has a normal mood and affect. Her behavior is normal. Thought content normal.   Vitals reviewed.      Assessment:       1. Essential hypertension    2. Chronic kidney disease, stage III (moderate)    3. Pure hypercholesterolemia    4. S/P carotid endarterectomy    5. Acquired hypothyroidism    6. Impaired fasting glucose    7. Tinea pedis of both feet    8. Osteoporosis, unspecified osteoporosis type, unspecified pathological fracture presence    9. Vitamin D deficiency    10. Overweight (BMI 25.0-29.9)        Plan:       Essential hypertension. Maintain < 2 Gm Na a day diet, and monitor BP at home; keep a log. Cont valsartan/metoprolol.    Chronic kidney disease, stage III (moderate); stay well hydrated.    Pure hypercholesterolemia. Maintain low fat high fiber diet, exercise regularly. Increase atorvastatin to 40 mg on Mon/Fri; 20 mg on other days.    S/P carotid endarterectomy; US carotids in Jan,  2018.    Acquired hypothyroidism; TFT on f/u; cont present doseage of supplements    Impaired fasting glucose. Exercise recommended with weight reduction and low carb diet; we'll follow hemoglobin A1c's with you periodically.    Tinea pedis of both feet; change to lotrisone powdered spray 2x a day; change her shoes; use socks when not wearing open shoes.    Osteoporosis, unspecified osteoporosis type, unspecified pathological fracture presence. Weight bearing exercises, continue calcium and vit D supplements. DEXA scan need as 1 year repeat.    Vitamin D deficiency; will check levels.    Overweight (BMI 25.0-29.9). Caloric restriction w regular exercise and weight reduction.

## 2017-12-20 RX ORDER — LEVOTHYROXINE SODIUM 125 UG/1
TABLET ORAL
Qty: 45 TABLET | Refills: 1 | Status: SHIPPED | OUTPATIENT
Start: 2017-12-20 | End: 2018-05-22 | Stop reason: SDUPTHER

## 2018-01-09 ENCOUNTER — HOSPITAL ENCOUNTER (OUTPATIENT)
Dept: RADIOLOGY | Facility: HOSPITAL | Age: 83
Discharge: HOME | End: 2018-01-09
Attending: THORACIC SURGERY (CARDIOTHORACIC VASCULAR SURGERY)
Payer: MEDICARE

## 2018-01-09 DIAGNOSIS — I65.23 BILATERAL CAROTID ARTERY STENOSIS: ICD-10-CM

## 2018-01-09 PROCEDURE — 93880 EXTRACRANIAL BILAT STUDY: CPT | Mod: 26,,, | Performed by: RADIOLOGY

## 2018-01-09 PROCEDURE — 93880 EXTRACRANIAL BILAT STUDY: CPT | Mod: TC,PO

## 2018-03-13 ENCOUNTER — LAB VISIT (OUTPATIENT)
Dept: LAB | Facility: HOSPITAL | Age: 83
End: 2018-03-13
Attending: INTERNAL MEDICINE
Payer: MEDICARE

## 2018-03-13 DIAGNOSIS — I10 ESSENTIAL HYPERTENSION: Chronic | ICD-10-CM

## 2018-03-13 DIAGNOSIS — R73.01 IMPAIRED FASTING GLUCOSE: ICD-10-CM

## 2018-03-13 LAB
BILIRUB UR QL STRIP: NEGATIVE
CLARITY UR REFRACT.AUTO: CLEAR
COLOR UR AUTO: YELLOW
GLUCOSE UR QL STRIP: NEGATIVE
HGB UR QL STRIP: NEGATIVE
KETONES UR QL STRIP: NEGATIVE
LEUKOCYTE ESTERASE UR QL STRIP: NEGATIVE
MICROSCOPIC COMMENT: NORMAL
NITRITE UR QL STRIP: NEGATIVE
PH UR STRIP: 5 [PH] (ref 5–8)
PROT UR QL STRIP: NEGATIVE
RBC #/AREA URNS AUTO: 0 /HPF (ref 0–4)
SP GR UR STRIP: 1.01 (ref 1–1.03)
URN SPEC COLLECT METH UR: NORMAL
UROBILINOGEN UR STRIP-ACNC: NEGATIVE EU/DL
WBC #/AREA URNS AUTO: 0 /HPF (ref 0–5)

## 2018-03-13 PROCEDURE — 81001 URINALYSIS AUTO W/SCOPE: CPT

## 2018-04-03 ENCOUNTER — OFFICE VISIT (OUTPATIENT)
Dept: FAMILY MEDICINE | Facility: CLINIC | Age: 83
End: 2018-04-03
Payer: MEDICARE

## 2018-04-03 VITALS
TEMPERATURE: 99 F | SYSTOLIC BLOOD PRESSURE: 130 MMHG | HEIGHT: 64 IN | HEART RATE: 70 BPM | DIASTOLIC BLOOD PRESSURE: 82 MMHG | WEIGHT: 173.5 LBS | BODY MASS INDEX: 29.62 KG/M2 | OXYGEN SATURATION: 97 %

## 2018-04-03 DIAGNOSIS — K21.9 GASTROESOPHAGEAL REFLUX DISEASE, ESOPHAGITIS PRESENCE NOT SPECIFIED: ICD-10-CM

## 2018-04-03 DIAGNOSIS — E55.9 VITAMIN D DEFICIENCY: ICD-10-CM

## 2018-04-03 DIAGNOSIS — E03.9 ACQUIRED HYPOTHYROIDISM: ICD-10-CM

## 2018-04-03 DIAGNOSIS — M81.0 OSTEOPOROSIS, UNSPECIFIED OSTEOPOROSIS TYPE, UNSPECIFIED PATHOLOGICAL FRACTURE PRESENCE: ICD-10-CM

## 2018-04-03 DIAGNOSIS — I10 ESSENTIAL HYPERTENSION: Primary | Chronic | ICD-10-CM

## 2018-04-03 DIAGNOSIS — L85.3 DRY SKIN DERMATITIS: ICD-10-CM

## 2018-04-03 DIAGNOSIS — B35.3 TINEA PEDIS OF BOTH FEET: ICD-10-CM

## 2018-04-03 DIAGNOSIS — N18.30 CHRONIC KIDNEY DISEASE, STAGE III (MODERATE): ICD-10-CM

## 2018-04-03 DIAGNOSIS — E78.00 PURE HYPERCHOLESTEROLEMIA: ICD-10-CM

## 2018-04-03 DIAGNOSIS — E66.3 OVERWEIGHT (BMI 25.0-29.9): ICD-10-CM

## 2018-04-03 PROCEDURE — 3075F SYST BP GE 130 - 139MM HG: CPT | Mod: CPTII,S$GLB,, | Performed by: INTERNAL MEDICINE

## 2018-04-03 PROCEDURE — 99499 UNLISTED E&M SERVICE: CPT | Mod: S$GLB,,, | Performed by: INTERNAL MEDICINE

## 2018-04-03 PROCEDURE — 99999 PR PBB SHADOW E&M-EST. PATIENT-LVL III: CPT | Mod: PBBFAC,,, | Performed by: INTERNAL MEDICINE

## 2018-04-03 PROCEDURE — 99214 OFFICE O/P EST MOD 30 MIN: CPT | Mod: S$GLB,,, | Performed by: INTERNAL MEDICINE

## 2018-04-03 PROCEDURE — 3079F DIAST BP 80-89 MM HG: CPT | Mod: CPTII,S$GLB,, | Performed by: INTERNAL MEDICINE

## 2018-04-03 RX ORDER — CLOTRIMAZOLE AND BETAMETHASONE DIPROPIONATE 10; .5 MG/ML; MG/ML
LOTION TOPICAL 2 TIMES DAILY
Qty: 30 ML | Refills: 2 | Status: SHIPPED | OUTPATIENT
Start: 2018-04-03 | End: 2018-04-03 | Stop reason: SDUPTHER

## 2018-04-03 RX ORDER — CLOTRIMAZOLE AND BETAMETHASONE DIPROPIONATE 10; .5 MG/ML; MG/ML
LOTION TOPICAL 2 TIMES DAILY
Qty: 30 ML | Refills: 2 | Status: SHIPPED | OUTPATIENT
Start: 2018-04-03 | End: 2018-04-24

## 2018-04-03 RX ORDER — AMMONIUM LACTATE 12 G/100G
LOTION TOPICAL 2 TIMES DAILY PRN
Qty: 225 G | Refills: 1 | Status: SHIPPED | OUTPATIENT
Start: 2018-04-03 | End: 2019-09-26

## 2018-04-03 RX ORDER — AMMONIUM LACTATE 12 G/100G
LOTION TOPICAL 2 TIMES DAILY PRN
Qty: 225 G | Refills: 1 | Status: SHIPPED | OUTPATIENT
Start: 2018-04-03 | End: 2018-04-03 | Stop reason: SDUPTHER

## 2018-04-03 NOTE — PATIENT INSTRUCTIONS
Essential hypertension. Maintain < 2 Gm Na a day diet, and monitor BP at home; keep a log. Cont her meds;     Pure hypercholesterolemia. Maintain low fat high fiber diet, exercise regularly. Decrease dairy products.    Chronic kidney disease, stage III (moderate); keep pushing fluids; improved.    Tinea pedis of both feet/legs; lac hydrin 12% for dry skin dermatitis 2x a day as needed.  -     clotrimazole-betamethasone (LOTRISONE) lotion; Apply topically 2 (two) times daily. For total 2 weeks.  Dispense: 30 mL;            Refill: 2; apply between toes as well as feet and lower legs.    Osteoporosis, unspecified osteoporosis type, unspecified pathological fracture presence; Weight bearing exercises, continue calcium and       vit D supplements. DEXA scabn at 2 yr intervals as needed. DEXA due 6/15/18. Cont calcium w vit d and D3 supplements; see HPI.    Vitamin D deficiency; increase Vit D3 to 2000 iu a day.     Overweight (BMI 25.0-29.9). Caloric restriction w regular exercise and weight reduction.    Acquired hypothyroidism; therapeutic levels; same dosing; serial TFT's.    Gastroesophageal reflux disease, esophagitis presence not specified. No bedtime snacks; weight reduction. Omeprazole as needed daily.

## 2018-04-03 NOTE — PROGRESS NOTES
Subjective:       Patient ID: Nida Kline is a 82 y.o. female.    Chief Complaint: Follow-up (labs)    HPI  Overall doing fine. HTN: BP runs at avr 125/80; watches her salt intake. BP here 130/82. On valsartan/metoprolol. GERD: doing fine; occ omeprazole. Overweight: exercises 2-3x a week; 20-25 min each. HLP: on low fat high fiber diet; tolerates atorvastatin. Osteoporosis; walks frequently; on calcium citrate 500-D 2 at once daily; needs to separate by 6 hrs; vit D3 1000 iu a day. Needs to increase to 2000 iu a day. Doesn't want miacalcin, or biphosphinate. DEXA 6/15/17 discussed w pt at length needs to repeat 6/2018. Weight bearing exercise 5-7x a week for min 30 minutes each time. Labs reviewed w pt; has increased fluid intake; GFR improved as welll.    Review of Systems   Constitutional: Negative for appetite change, fever and unexpected weight change.   HENT: Negative for congestion, postnasal drip, rhinorrhea and sinus pressure.    Eyes: Negative for discharge and itching.   Respiratory: Negative for cough, chest tightness, shortness of breath and wheezing.    Cardiovascular: Negative for chest pain, palpitations and leg swelling.   Gastrointestinal: Negative for abdominal distention, abdominal pain, blood in stool, constipation, diarrhea, nausea and vomiting.        Reflux has been stable.   Endocrine: Negative for polydipsia, polyphagia and polyuria.   Genitourinary: Negative for dysuria and hematuria.   Musculoskeletal: Negative for arthralgias and myalgias.   Skin: Positive for rash.        Feet and ascends up her lower leg/ankles    Allergic/Immunologic: Negative for environmental allergies.   Neurological: Negative for tremors, seizures and syncope.   Hematological: Negative for adenopathy. Does not bruise/bleed easily.   Psychiatric/Behavioral:        Denies anxiety or depression.       Objective:      Vitals:    04/03/18 1305   BP: 130/82   BP Location: Left arm   Patient Position: Sitting   BP  "Method: Medium (Manual)   Pulse: 70   Temp: 99 °F (37.2 °C)   TempSrc: Oral   SpO2: 97%   Weight: 78.7 kg (173 lb 8 oz)   Height: 5' 4" (1.626 m)     Body mass index is 29.78 kg/m².    Physical Exam   Constitutional: She is oriented to person, place, and time. She appears well-developed and well-nourished.   HENT:   Head: Normocephalic and atraumatic.   Eyes: EOM are normal.   Neck: Normal range of motion. Neck supple. No thyromegaly present.   Cardiovascular: Normal rate, regular rhythm and normal heart sounds.  Exam reveals no gallop.    No murmur heard.  Pulmonary/Chest: Effort normal and breath sounds normal. No respiratory distress. She has no wheezes. She has no rales.   Abdominal: Soft. Bowel sounds are normal. She exhibits no distension. There is no tenderness. There is no rebound and no guarding.   Musculoskeletal: Normal range of motion. She exhibits no edema.   Lymphadenopathy:     She has no cervical adenopathy.   Neurological: She is alert and oriented to person, place, and time.   Moves all 4 extremities fine.   Skin: No rash noted.   Feet plantar areas and ascending onto feet above as well as up her ankles and lower legs; suspected fungal rash; between toes as well; scaly and dry; using calamine as well; occ pruritic.    Psychiatric: She has a normal mood and affect. Her behavior is normal. Thought content normal.   Vitals reviewed.      Assessment:       1. Essential hypertension    2. Pure hypercholesterolemia    3. Chronic kidney disease, stage III (moderate)    4. Tinea pedis of both feet    5. Osteoporosis, unspecified osteoporosis type, unspecified pathological fracture presence    6. Vitamin D deficiency    7. Overweight (BMI 25.0-29.9)    8. Acquired hypothyroidism    9. Gastroesophageal reflux disease, esophagitis presence not specified        Plan:       Essential hypertension. Maintain < 2 Gm Na a day diet, and monitor BP at home; keep a log. Cont her meds;     Pure hypercholesterolemia. " Maintain low fat high fiber diet, exercise regularly. Decrease dairy products.    Chronic kidney disease, stage III (moderate); keep pushing fluids; improved.    Tinea pedis of both feet/legs; lac hydrin 12% for dry skin dermatitis 2x a day as needed.  -     clotrimazole-betamethasone (LOTRISONE) lotion; Apply topically 2 (two) times daily. For total 2 weeks.  Dispense: 30 mL;            Refill: 2; apply between toes as well as feet and lower legs.    Osteoporosis, unspecified osteoporosis type, unspecified pathological fracture presence; Weight bearing exercises, continue calcium and       vit D supplements. DEXA scabn at 2 yr intervals as needed. DEXA due 6/15/18. Cont calcium w vit d and D3 supplements; see HPI.    Vitamin D deficiency; increase Vit D3 to 2000 iu a day.     Overweight (BMI 25.0-29.9). Caloric restriction w regular exercise and weight reduction.    Acquired hypothyroidism; therapeutic levels; same dosing; serial TFT's.    Gastroesophageal reflux disease, esophagitis presence not specified. No bedtime snacks; weight reduction. Omeprazole as needed daily.

## 2018-04-03 NOTE — TELEPHONE ENCOUNTER
Pt was seen in office today and you sent medications to [facility] mail order. Pt is needing these medications to go to her local pharmacy.     Thanks.     Spoke with Katia at [facility] and she cancelled the order.

## 2018-04-05 RX ORDER — VALSARTAN 80 MG/1
TABLET ORAL
Qty: 90 TABLET | Refills: 1 | Status: SHIPPED | OUTPATIENT
Start: 2018-04-05 | End: 2018-07-24

## 2018-04-05 RX ORDER — ATORVASTATIN CALCIUM 20 MG/1
TABLET, FILM COATED ORAL
Qty: 90 TABLET | Refills: 1 | Status: SHIPPED | OUTPATIENT
Start: 2018-04-05 | End: 2018-07-24

## 2018-04-05 RX ORDER — OMEPRAZOLE 40 MG/1
CAPSULE, DELAYED RELEASE ORAL
Qty: 90 CAPSULE | Refills: 1 | Status: SHIPPED | OUTPATIENT
Start: 2018-04-05 | End: 2018-11-12 | Stop reason: SDUPTHER

## 2018-04-05 RX ORDER — METOPROLOL SUCCINATE 25 MG/1
TABLET, EXTENDED RELEASE ORAL
Qty: 90 TABLET | Refills: 1 | Status: SHIPPED | OUTPATIENT
Start: 2018-04-05 | End: 2018-11-12 | Stop reason: SDUPTHER

## 2018-04-24 ENCOUNTER — OFFICE VISIT (OUTPATIENT)
Dept: FAMILY MEDICINE | Facility: CLINIC | Age: 83
End: 2018-04-24
Payer: MEDICARE

## 2018-04-24 VITALS
TEMPERATURE: 98 F | HEIGHT: 64 IN | WEIGHT: 172.06 LBS | BODY MASS INDEX: 29.38 KG/M2 | OXYGEN SATURATION: 98 % | SYSTOLIC BLOOD PRESSURE: 130 MMHG | HEART RATE: 77 BPM | DIASTOLIC BLOOD PRESSURE: 80 MMHG

## 2018-04-24 DIAGNOSIS — E78.2 MIXED HYPERLIPIDEMIA: ICD-10-CM

## 2018-04-24 DIAGNOSIS — I10 ESSENTIAL HYPERTENSION: ICD-10-CM

## 2018-04-24 DIAGNOSIS — B35.3 TINEA PEDIS OF BOTH FEET: Primary | ICD-10-CM

## 2018-04-24 DIAGNOSIS — E03.9 ACQUIRED HYPOTHYROIDISM: ICD-10-CM

## 2018-04-24 DIAGNOSIS — L85.3 DRY SKIN DERMATITIS: ICD-10-CM

## 2018-04-24 DIAGNOSIS — N18.30 CHRONIC KIDNEY DISEASE, STAGE III (MODERATE): ICD-10-CM

## 2018-04-24 DIAGNOSIS — M81.0 AGE-RELATED OSTEOPOROSIS WITHOUT CURRENT PATHOLOGICAL FRACTURE: ICD-10-CM

## 2018-04-24 PROCEDURE — 3075F SYST BP GE 130 - 139MM HG: CPT | Mod: CPTII,S$GLB,, | Performed by: INTERNAL MEDICINE

## 2018-04-24 PROCEDURE — 99499 UNLISTED E&M SERVICE: CPT | Mod: S$GLB,,, | Performed by: INTERNAL MEDICINE

## 2018-04-24 PROCEDURE — 3079F DIAST BP 80-89 MM HG: CPT | Mod: CPTII,S$GLB,, | Performed by: INTERNAL MEDICINE

## 2018-04-24 PROCEDURE — 99213 OFFICE O/P EST LOW 20 MIN: CPT | Mod: S$GLB,,, | Performed by: INTERNAL MEDICINE

## 2018-04-24 PROCEDURE — 99999 PR PBB SHADOW E&M-EST. PATIENT-LVL III: CPT | Mod: PBBFAC,,, | Performed by: INTERNAL MEDICINE

## 2018-04-24 RX ORDER — CLOTRIMAZOLE AND BETAMETHASONE DIPROPIONATE 10; .64 MG/G; MG/G
CREAM TOPICAL
Qty: 15 G | Refills: 2 | Status: SHIPPED | OUTPATIENT
Start: 2018-04-24 | End: 2018-10-11 | Stop reason: SDUPTHER

## 2018-04-24 NOTE — PATIENT INSTRUCTIONS
Tinea pedis of both feet; sees derm Dr Young next week for evaluation. Keep feet dry.  -     clotrimazole-betamethasone 1-0.05% (LOTRISONE) cream; aplly topically BID as needed for fungal foot involvement.  Dispense: 15 g; Refill: 2    Dry skin dermatitis; ammonium lactate topical helping. Use BID as needed.    Essential hypertension. Maintain < 2 Gm Na a day diet, and monitor BP at home; keep a log. On valsartan and metoprolol  -     Comprehensive metabolic panel; Future; Expected date: 04/24/2018  -     Urinalysis Microscopic; Future; Expected date: 07/24/2018    Mixed hyperlipidemia. Maintain low fat high fiber diet, exercise regularly. On atorvastatin. Fish oil. Limit dairy products as well.  -     Lipid panel; Future; Expected date: 04/24/2018  -     Comprehensive metabolic panel; Future; Expected date: 04/24/2018    Acquired hypothyroidism; TFT on f/u;deborah dosing for now.  -     TSH; Future; Expected date: 04/24/2018  -     T4, free; Future; Expected date: 04/24/2018  -     T3, free; Future; Expected date: 04/24/2018    Chronic kidney disease, stage III (moderate); keep well hydrated; no NSAID agents.    Age-related osteoporosis without current pathological. .-     Vitamin D; Future; Expected date: 04/24/2018  -     TSH; Future; Expected date: 04/24/2018  -     T4, free; Future; Expected date: 04/24/2018  -     T3, free; Future; Expected date: 04/24/2018  -     DXA Bone Density Spine And Hip; Future; Expected date: 04/24/2018

## 2018-04-24 NOTE — PROGRESS NOTES
"Subjective:       Patient ID: Nida Kline is a 82 y.o. female.    Chief Complaint: Follow-up (on Tinea Pedis )    HPI  She's been doing fine. Ammonium lactate lotion helping her dry skin in her feet; didn't start lotrisone due to cost of lotion; will change to cream due to cost savings.   HTN at home avr 130/80; 130/80 here as well.   Mix HLP: on low fat high fiber diet; tolerates atorvastatin fine.  Hypothyroidism; takes her thyroid supplements appropriately.   Osteoporosis; last DEXA 6/15/17 w Lsp Tscore -2.7; L fem neck -2.2; needs repeat this June.     Review of Systems   Constitutional: Negative for appetite change, fatigue and unexpected weight change.   HENT: Negative for congestion, postnasal drip, rhinorrhea and sinus pressure.    Respiratory: Negative for cough, chest tightness, shortness of breath and wheezing.    Cardiovascular: Negative for palpitations and leg swelling.   Gastrointestinal: Negative for abdominal pain, blood in stool, constipation, diarrhea and nausea.   Genitourinary: Negative for dysuria and urgency.   Musculoskeletal: Negative for arthralgias and myalgias.   Skin: Positive for rash.        forrest feet and above plantar areas.       Objective:      Vitals:    04/24/18 1329   BP: 130/80   BP Location: Right arm   Patient Position: Sitting   BP Method: Medium (Manual)   Pulse: 77   Temp: 98.2 °F (36.8 °C)   TempSrc: Oral   SpO2: 98%   Weight: 78 kg (172 lb 1.1 oz)   Height: 5' 4" (1.626 m)     Body mass index is 29.54 kg/m².    Physical Exam   Constitutional: She is oriented to person, place, and time. She appears well-developed and well-nourished.   HENT:   Head: Normocephalic and atraumatic.   Eyes: EOM are normal.   Neck: Normal range of motion. Neck supple. No thyromegaly present.   Cardiovascular: Normal rate, regular rhythm and normal heart sounds.  Exam reveals no gallop.    No murmur heard.  Pulmonary/Chest: Effort normal and breath sounds normal. No respiratory distress. She " has no wheezes. She has no rales.   Abdominal: Soft. Bowel sounds are normal. She exhibits no distension. There is no tenderness. There is no rebound and no guarding.   Musculoskeletal: Normal range of motion. She exhibits no edema.   Lymphadenopathy:     She has no cervical adenopathy.   Neurological: She is alert and oriented to person, place, and time.   Moves all 4 extremities fine.   Skin: No rash noted.   Dry skin noted both feet extending up sides of her feet above plantar areas bilaterally. Also between her toes as well; extending up the bacck of her lower achilles bilaterally.   Psychiatric: She has a normal mood and affect. Her behavior is normal. Thought content normal.   Vitals reviewed.      Assessment:       1. Tinea pedis of both feet    2. Dry skin dermatitis    3. Essential hypertension    4. Mixed hyperlipidemia    5. Acquired hypothyroidism    6. Chronic kidney disease, stage III (moderate)    7. Age-related osteoporosis without current pathological fracture        Plan:       Tinea pedis of both feet; sees derm Dr Young next week for evaluation. Keep feet dry.  -     clotrimazole-betamethasone 1-0.05% (LOTRISONE) cream; aplly topically BID as needed for fungal foot involvement.  Dispense: 15 g; Refill: 2    Dry skin dermatitis; ammonium lactate topical helping. Use BID as needed.    Essential hypertension. Maintain < 2 Gm Na a day diet, and monitor BP at home; keep a log. On valsartan and metoprolol  -     Comprehensive metabolic panel; Future; Expected date: 04/24/2018  -     Urinalysis Microscopic; Future; Expected date: 07/24/2018    Mixed hyperlipidemia. Maintain low fat high fiber diet, exercise regularly. On atorvastatin. Fish oil. Limit dairy products as well.  -     Lipid panel; Future; Expected date: 04/24/2018  -     Comprehensive metabolic panel; Future; Expected date: 04/24/2018    Acquired hypothyroidism; TFT on f/u;deborah dosing for now.  -     TSH; Future; Expected date:  04/24/2018  -     T4, free; Future; Expected date: 04/24/2018  -     T3, free; Future; Expected date: 04/24/2018    Chronic kidney disease, stage III (moderate); keep well hydrated; no NSAID agents.    Age-related osteoporosis without current pathological. DEXA scan update needed.  -     Vitamin D; Future; Expected date: 04/24/2018  -     TSH; Future; Expected date: 04/24/2018  -     T4, free; Future; Expected date: 04/24/2018  -     T3, free; Future; Expected date: 04/24/2018  -     DXA Bone Density Spine And Hip; Future; Expected date: 04/24/2018

## 2018-04-26 ENCOUNTER — TELEPHONE (OUTPATIENT)
Dept: FAMILY MEDICINE | Facility: CLINIC | Age: 83
End: 2018-04-26

## 2018-04-26 NOTE — TELEPHONE ENCOUNTER
Spoke with pt and informed her that [employer] already has script on truck to go out today and that it would only $9.99 compared to $18.00.     Pt was ok with keeping it with [facility]

## 2018-04-26 NOTE — TELEPHONE ENCOUNTER
Attempted to contact pt to inform her that I spoke with [facility] and they said that her medication is already on the truck to go out today and that the cost would be $9.99 where as at [facility] it was $18.    No answer; left message to return call.

## 2018-04-26 NOTE — TELEPHONE ENCOUNTER
----- Message from Dc Baldwin sent at 4/26/2018 12:03 PM CDT -----  Contact: self   Patient want to inform office to send clotrimazole-betamethasone to [facility] any questions please call back at [phone] (home)     [facility]pharm[facility] #[id number] - [place] - [address]  [place] [id number]  Phone: [phone] Fax: [phone]

## 2018-05-22 RX ORDER — LEVOTHYROXINE SODIUM 125 UG/1
TABLET ORAL
Qty: 45 TABLET | Refills: 1 | Status: SHIPPED | OUTPATIENT
Start: 2018-05-22 | End: 2018-11-26 | Stop reason: SDUPTHER

## 2018-06-19 ENCOUNTER — HOSPITAL ENCOUNTER (OUTPATIENT)
Dept: RADIOLOGY | Facility: HOSPITAL | Age: 83
Discharge: HOME | End: 2018-06-19
Attending: INTERNAL MEDICINE
Payer: MEDICARE

## 2018-06-19 DIAGNOSIS — M81.0 AGE-RELATED OSTEOPOROSIS WITHOUT CURRENT PATHOLOGICAL FRACTURE: ICD-10-CM

## 2018-06-19 PROCEDURE — 77080 DXA BONE DENSITY AXIAL: CPT | Mod: TC,PO

## 2018-06-19 PROCEDURE — 77080 DXA BONE DENSITY AXIAL: CPT | Mod: 26,,, | Performed by: RADIOLOGY

## 2018-06-20 ENCOUNTER — PES CALL (OUTPATIENT)
Dept: ADMINISTRATIVE | Facility: CLINIC | Age: 83
End: 2018-06-20

## 2018-07-10 ENCOUNTER — LAB VISIT (OUTPATIENT)
Dept: LAB | Facility: HOSPITAL | Age: 83
End: 2018-07-10
Attending: INTERNAL MEDICINE
Payer: MEDICARE

## 2018-07-10 DIAGNOSIS — I10 ESSENTIAL HYPERTENSION: ICD-10-CM

## 2018-07-10 DIAGNOSIS — M81.0 AGE-RELATED OSTEOPOROSIS WITHOUT CURRENT PATHOLOGICAL FRACTURE: ICD-10-CM

## 2018-07-10 DIAGNOSIS — E03.9 ACQUIRED HYPOTHYROIDISM: ICD-10-CM

## 2018-07-10 DIAGNOSIS — E78.2 MIXED HYPERLIPIDEMIA: ICD-10-CM

## 2018-07-10 LAB
25(OH)D3+25(OH)D2 SERPL-MCNC: 43 NG/ML
ALBUMIN SERPL BCP-MCNC: 3.6 G/DL
ALP SERPL-CCNC: 99 U/L
ALT SERPL W/O P-5'-P-CCNC: 12 U/L
ANION GAP SERPL CALC-SCNC: 10 MMOL/L
AST SERPL-CCNC: 16 U/L
BILIRUB SERPL-MCNC: 0.5 MG/DL
BUN SERPL-MCNC: 24 MG/DL
CALCIUM SERPL-MCNC: 9.4 MG/DL
CHLORIDE SERPL-SCNC: 105 MMOL/L
CHOLEST SERPL-MCNC: 158 MG/DL
CHOLEST/HDLC SERPL: 2.8 {RATIO}
CO2 SERPL-SCNC: 25 MMOL/L
CREAT SERPL-MCNC: 1 MG/DL
EST. GFR  (AFRICAN AMERICAN): >60 ML/MIN/1.73 M^2
EST. GFR  (NON AFRICAN AMERICAN): 52.2 ML/MIN/1.73 M^2
GLUCOSE SERPL-MCNC: 89 MG/DL
HDLC SERPL-MCNC: 56 MG/DL
HDLC SERPL: 35.4 %
LDLC SERPL CALC-MCNC: 81.4 MG/DL
MAGNESIUM SERPL-MCNC: 2.3 MG/DL
NONHDLC SERPL-MCNC: 102 MG/DL
POTASSIUM SERPL-SCNC: 4.8 MMOL/L
PROT SERPL-MCNC: 7.3 G/DL
SODIUM SERPL-SCNC: 140 MMOL/L
T3FREE SERPL-MCNC: 2.4 PG/ML
T4 FREE SERPL-MCNC: 1.03 NG/DL
TRIGL SERPL-MCNC: 103 MG/DL
TSH SERPL DL<=0.005 MIU/L-ACNC: 2.07 UIU/ML

## 2018-07-10 PROCEDURE — 36415 COLL VENOUS BLD VENIPUNCTURE: CPT | Mod: PN

## 2018-07-10 PROCEDURE — 84443 ASSAY THYROID STIM HORMONE: CPT

## 2018-07-10 PROCEDURE — 84439 ASSAY OF FREE THYROXINE: CPT

## 2018-07-10 PROCEDURE — 83735 ASSAY OF MAGNESIUM: CPT

## 2018-07-10 PROCEDURE — 80053 COMPREHEN METABOLIC PANEL: CPT

## 2018-07-10 PROCEDURE — 80061 LIPID PANEL: CPT

## 2018-07-10 PROCEDURE — 82306 VITAMIN D 25 HYDROXY: CPT

## 2018-07-10 PROCEDURE — 84481 FREE ASSAY (FT-3): CPT

## 2018-07-24 ENCOUNTER — OFFICE VISIT (OUTPATIENT)
Dept: FAMILY MEDICINE | Facility: CLINIC | Age: 83
End: 2018-07-24
Payer: MEDICARE

## 2018-07-24 VITALS
DIASTOLIC BLOOD PRESSURE: 60 MMHG | HEART RATE: 64 BPM | WEIGHT: 171.38 LBS | BODY MASS INDEX: 29.26 KG/M2 | SYSTOLIC BLOOD PRESSURE: 122 MMHG | TEMPERATURE: 99 F | HEIGHT: 64 IN

## 2018-07-24 DIAGNOSIS — I10 ESSENTIAL HYPERTENSION: Primary | ICD-10-CM

## 2018-07-24 DIAGNOSIS — E78.00 PURE HYPERCHOLESTEROLEMIA: ICD-10-CM

## 2018-07-24 DIAGNOSIS — Z98.890 S/P CAROTID ENDARTERECTOMY: ICD-10-CM

## 2018-07-24 DIAGNOSIS — E03.9 ACQUIRED HYPOTHYROIDISM: ICD-10-CM

## 2018-07-24 DIAGNOSIS — E66.3 OVERWEIGHT (BMI 25.0-29.9): ICD-10-CM

## 2018-07-24 DIAGNOSIS — M81.0 OSTEOPOROSIS WITHOUT CURRENT PATHOLOGICAL FRACTURE, UNSPECIFIED OSTEOPOROSIS TYPE: ICD-10-CM

## 2018-07-24 DIAGNOSIS — N18.30 CHRONIC KIDNEY DISEASE, STAGE III (MODERATE): ICD-10-CM

## 2018-07-24 PROCEDURE — 3074F SYST BP LT 130 MM HG: CPT | Mod: CPTII,S$GLB,, | Performed by: INTERNAL MEDICINE

## 2018-07-24 PROCEDURE — 99214 OFFICE O/P EST MOD 30 MIN: CPT | Mod: S$GLB,,, | Performed by: INTERNAL MEDICINE

## 2018-07-24 PROCEDURE — 99999 PR PBB SHADOW E&M-EST. PATIENT-LVL III: CPT | Mod: PBBFAC,,, | Performed by: INTERNAL MEDICINE

## 2018-07-24 PROCEDURE — 3078F DIAST BP <80 MM HG: CPT | Mod: CPTII,S$GLB,, | Performed by: INTERNAL MEDICINE

## 2018-07-24 RX ORDER — LOSARTAN POTASSIUM 50 MG/1
50 TABLET ORAL EVERY MORNING
Qty: 30 TABLET | Refills: 2 | Status: SHIPPED | OUTPATIENT
Start: 2018-07-24 | End: 2018-11-02 | Stop reason: SDUPTHER

## 2018-07-24 RX ORDER — ATORVASTATIN CALCIUM 40 MG/1
40 TABLET, FILM COATED ORAL DAILY
Qty: 30 TABLET | Refills: 2 | Status: SHIPPED | OUTPATIENT
Start: 2018-07-24 | End: 2018-11-12 | Stop reason: SDUPTHER

## 2018-07-24 NOTE — PROGRESS NOTES
"Subjective:       Patient ID: Nida Kline is a 83 y.o. female.    Chief Complaint: Follow-up and recall (valastatin)    HPI  Overall she's been doing fine.   HTN: BP avr 120/70-80 at home; watches her salt intake. On valsartan 80 mg a day which will be changed due to recall.  GERD: doing well; omeprazole 1-2x a week needed.  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  Mix HLP: on low fat high fiber diet; tolerates atorvastatin fine.  Hypothyroid; takes her supplements appropriately.  CKD-3; tries to stay well hydrated; 60 ounces of fluid a day. No NSAID agents.  Osteoporosis; 6/19/18 DEXA reviewed; Lsp Tscore -2.5; f neck -2.4; repeat in 1 yr.    Review of Systems   Constitutional: Negative for appetite change, fever and unexpected weight change.   HENT: Negative for congestion, postnasal drip, rhinorrhea and sinus pressure.    Eyes: Negative for discharge and itching.   Respiratory: Negative for cough, chest tightness, shortness of breath and wheezing.    Cardiovascular: Negative for chest pain, palpitations and leg swelling.   Gastrointestinal: Negative for abdominal distention, abdominal pain, anal bleeding, blood in stool, constipation, diarrhea, nausea and vomiting.   Endocrine: Negative for polydipsia, polyphagia and polyuria.   Genitourinary: Negative for dysuria and hematuria.   Musculoskeletal: Negative for arthralgias and myalgias.   Skin: Negative for rash.   Allergic/Immunologic: Negative for environmental allergies and food allergies.   Neurological: Negative for tremors, seizures and syncope.   Hematological: Negative for adenopathy. Does not bruise/bleed easily.   Psychiatric/Behavioral:        Denies anxiety or depression       Objective:      Vitals:    07/24/18 1347   BP: 122/60   Pulse: 64   Temp: 99.3 °F (37.4 °C)   Weight: 77.7 kg (171 lb 6.4 oz)   Height: 5' 4" (1.626 m)     Body mass index is 29.42 kg/m².    Physical Exam "   Constitutional: She is oriented to person, place, and time. She appears well-developed and well-nourished.   HENT:   Head: Normocephalic and atraumatic.   Eyes: EOM are normal.   Neck: Normal range of motion. Neck supple. No thyromegaly present.   Cardiovascular: Normal rate, regular rhythm and normal heart sounds.  Exam reveals no gallop.    No murmur heard.  Pulmonary/Chest: Effort normal and breath sounds normal. No respiratory distress. She has no wheezes. She has no rales.   Abdominal: Soft. Bowel sounds are normal. She exhibits no distension. There is no tenderness. There is no rebound and no guarding.   Musculoskeletal: Normal range of motion. She exhibits no edema.   Lymphadenopathy:     She has no cervical adenopathy.   Neurological: She is alert and oriented to person, place, and time.   Moves all 4 extremities fine.   Skin: No rash noted.   Psychiatric: She has a normal mood and affect. Her behavior is normal. Thought content normal.   Vitals reviewed.      Assessment:       1. Essential hypertension    2. Chronic kidney disease, stage III (moderate)    3. Overweight (BMI 25.0-29.9)    4. Acquired hypothyroidism    5. S/P carotid endarterectomy    6. Pure hypercholesterolemia    7. Osteoporosis without current pathological fracture, unspecified osteoporosis type        Plan:       Essential hypertension; stop valsartan due to recall. Add cozaar 50 mg a day. Maintain < 2 Gm Na a day diet, and monitor BP at home; keep a log.  -     losartan (COZAAR) 50 MG tablet; Take 1 tablet (50 mg total) by mouth every morning.  Dispense: 30 tablet; Refill: 2    Chronic kidney disease, stage III (moderate); maintain hydration; no NSAID agents; push more fluids during the day.  -     losartan (COZAAR) 50 MG tablet; Take 1 tablet (50 mg total) by mouth every morning.  Dispense: 30 tablet; Refill: 2    Overweight (BMI 25.0-29.9). Caloric restriction w regular exercise and weight reduction.    Acquired hypothyroidism;  therapeutic levels; same dosing.    S/P carotid endarterectomy; yearly US carotids; 1/9/18 US w no significant hem significant stenosis noted. Repeat in 1 yr.    Pure hypercholesterolemia. Maintain low fat high fiber diet, exercise regularly. Increase atorvastatin to 40 mg a day; adhere to diet     Osteoporosis; DEXA after 6/19/19; Weight bearing exercises, continue calcium and vit D supplements.Take your calcium w vit D. Doesn't want fosamax, prollia or miacalcin nasal. Ector declined as well.

## 2018-07-24 NOTE — PATIENT INSTRUCTIONS
Essential hypertension; stop valsartan due to recall. Add cozaar 50 mg a day. Maintain < 2 Gm Na a day diet, and monitor BP at home; keep a log.  -     losartan (COZAAR) 50 MG tablet; Take 1 tablet (50 mg total) by mouth every morning.  Dispense: 30 tablet; Refill: 2    Chronic kidney disease, stage III (moderate); maintain hydration; no NSAID agents; push more fluids during the day.  -     losartan (COZAAR) 50 MG tablet; Take 1 tablet (50 mg total) by mouth every morning.  Dispense: 30 tablet; Refill: 2    Overweight (BMI 25.0-29.9). Caloric restriction w regular exercise and weight reduction.    Acquired hypothyroidism; therapeutic levels; same dosing.    S/P carotid endarterectomy; yearly US carotids; 1/9/18 US w no significant hem significant stenosis noted. Repeat in 1 yr.    Pure hypercholesterolemia. Maintain low fat high fiber diet, exercise regularly. Increase atorvastatin to 40 mg a day; adhere to diet     Osteoporosis; DEXA after 6/19/19; Weight bearing exercises, continue calcium and vit D supplements.Take your calcium w vit D. Doesn't want fosamax, prollia or miacalcin nasal. Ector declined as well.

## 2018-07-30 ENCOUNTER — PES CALL (OUTPATIENT)
Dept: ADMINISTRATIVE | Facility: CLINIC | Age: 83
End: 2018-07-30

## 2018-08-22 ENCOUNTER — PES CALL (OUTPATIENT)
Dept: ADMINISTRATIVE | Facility: CLINIC | Age: 83
End: 2018-08-22

## 2018-09-11 ENCOUNTER — TELEPHONE (OUTPATIENT)
Dept: FAMILY MEDICINE | Facility: CLINIC | Age: 83
End: 2018-09-11

## 2018-10-11 ENCOUNTER — TELEPHONE (OUTPATIENT)
Dept: FAMILY MEDICINE | Facility: CLINIC | Age: 83
End: 2018-10-11

## 2018-10-11 DIAGNOSIS — B35.3 TINEA PEDIS OF BOTH FEET: ICD-10-CM

## 2018-10-11 NOTE — TELEPHONE ENCOUNTER
----- Message from Pete Thompson sent at 10/11/2018 10:48 AM CDT -----  Contact: self  Pt called in about getting refill on cream: clotrimazole-betamethasone 1-0.05% (LOTRISONE) cream. Pt would like the nurse to give her a call back      Pt can be reached at [phone]        TY

## 2018-10-11 NOTE — TELEPHONE ENCOUNTER
----- Message from Cheryl Robert sent at 10/11/2018 12:36 PM CDT -----  Contact: Patient  Type:  Patient Returning Call    Who Called:  Patient  Who Left Message for Patient:  Neda  Does the patient know what this is regarding?:    Best Call Back Number:    Additional Information:

## 2018-10-12 RX ORDER — CLOTRIMAZOLE AND BETAMETHASONE DIPROPIONATE 10; .64 MG/G; MG/G
CREAM TOPICAL
Qty: 15 G | Refills: 2 | Status: SHIPPED | OUTPATIENT
Start: 2018-10-12 | End: 2019-09-26

## 2018-10-30 ENCOUNTER — LAB VISIT (OUTPATIENT)
Dept: LAB | Facility: HOSPITAL | Age: 83
End: 2018-10-30
Attending: INTERNAL MEDICINE
Payer: MEDICARE

## 2018-10-30 DIAGNOSIS — I10 ESSENTIAL HYPERTENSION: ICD-10-CM

## 2018-10-30 DIAGNOSIS — M81.0 OSTEOPOROSIS WITHOUT CURRENT PATHOLOGICAL FRACTURE, UNSPECIFIED OSTEOPOROSIS TYPE: ICD-10-CM

## 2018-10-30 DIAGNOSIS — N18.30 CHRONIC KIDNEY DISEASE, STAGE III (MODERATE): ICD-10-CM

## 2018-10-30 DIAGNOSIS — Z98.890 S/P CAROTID ENDARTERECTOMY: ICD-10-CM

## 2018-10-30 DIAGNOSIS — E78.00 PURE HYPERCHOLESTEROLEMIA: ICD-10-CM

## 2018-10-30 LAB
ALBUMIN SERPL BCP-MCNC: 3.4 G/DL
ALP SERPL-CCNC: 94 U/L
ALT SERPL W/O P-5'-P-CCNC: 8 U/L
ANION GAP SERPL CALC-SCNC: 6 MMOL/L
AST SERPL-CCNC: 12 U/L
BILIRUB SERPL-MCNC: 0.5 MG/DL
BUN SERPL-MCNC: 16 MG/DL
CALCIUM SERPL-MCNC: 9 MG/DL
CHLORIDE SERPL-SCNC: 107 MMOL/L
CHOLEST SERPL-MCNC: 157 MG/DL
CHOLEST/HDLC SERPL: 3.2 {RATIO}
CO2 SERPL-SCNC: 26 MMOL/L
CREAT SERPL-MCNC: 1 MG/DL
EST. GFR  (AFRICAN AMERICAN): >60 ML/MIN/1.73 M^2
EST. GFR  (NON AFRICAN AMERICAN): 52.2 ML/MIN/1.73 M^2
GLUCOSE SERPL-MCNC: 103 MG/DL
HDLC SERPL-MCNC: 49 MG/DL
HDLC SERPL: 31.2 %
LDLC SERPL CALC-MCNC: 87.6 MG/DL
MAGNESIUM SERPL-MCNC: 2.1 MG/DL
NONHDLC SERPL-MCNC: 108 MG/DL
POTASSIUM SERPL-SCNC: 4.6 MMOL/L
PROT SERPL-MCNC: 7.2 G/DL
SODIUM SERPL-SCNC: 139 MMOL/L
TRIGL SERPL-MCNC: 102 MG/DL

## 2018-10-30 PROCEDURE — 80053 COMPREHEN METABOLIC PANEL: CPT | Mod: HCWC

## 2018-10-30 PROCEDURE — 80061 LIPID PANEL: CPT | Mod: HCWC

## 2018-10-30 PROCEDURE — 83735 ASSAY OF MAGNESIUM: CPT | Mod: HCWC

## 2018-10-30 PROCEDURE — 36415 COLL VENOUS BLD VENIPUNCTURE: CPT | Mod: HCWC,PN

## 2018-11-02 DIAGNOSIS — N18.30 CHRONIC KIDNEY DISEASE, STAGE III (MODERATE): ICD-10-CM

## 2018-11-02 DIAGNOSIS — I10 ESSENTIAL HYPERTENSION: ICD-10-CM

## 2018-11-03 RX ORDER — LOSARTAN POTASSIUM 50 MG/1
50 TABLET ORAL EVERY MORNING
Qty: 90 TABLET | Refills: 1 | Status: SHIPPED | OUTPATIENT
Start: 2018-11-03 | End: 2019-06-25

## 2018-11-06 ENCOUNTER — OFFICE VISIT (OUTPATIENT)
Dept: FAMILY MEDICINE | Facility: CLINIC | Age: 83
End: 2018-11-06
Payer: MEDICARE

## 2018-11-06 VITALS
HEIGHT: 64 IN | WEIGHT: 172.38 LBS | TEMPERATURE: 98 F | HEART RATE: 68 BPM | BODY MASS INDEX: 29.43 KG/M2 | SYSTOLIC BLOOD PRESSURE: 110 MMHG | DIASTOLIC BLOOD PRESSURE: 56 MMHG | RESPIRATION RATE: 16 BRPM

## 2018-11-06 DIAGNOSIS — G47.00 INSOMNIA, UNSPECIFIED TYPE: ICD-10-CM

## 2018-11-06 DIAGNOSIS — N18.30 CHRONIC KIDNEY DISEASE, STAGE III (MODERATE): ICD-10-CM

## 2018-11-06 DIAGNOSIS — I10 ESSENTIAL HYPERTENSION: Primary | Chronic | ICD-10-CM

## 2018-11-06 DIAGNOSIS — K21.9 GASTROESOPHAGEAL REFLUX DISEASE, ESOPHAGITIS PRESENCE NOT SPECIFIED: ICD-10-CM

## 2018-11-06 DIAGNOSIS — F41.9 ANXIETY: ICD-10-CM

## 2018-11-06 DIAGNOSIS — E78.2 MIXED HYPERLIPIDEMIA: ICD-10-CM

## 2018-11-06 DIAGNOSIS — Z98.890 S/P CAROTID ENDARTERECTOMY: ICD-10-CM

## 2018-11-06 PROCEDURE — 3078F DIAST BP <80 MM HG: CPT | Mod: CPTII,HCWC,S$GLB, | Performed by: INTERNAL MEDICINE

## 2018-11-06 PROCEDURE — 99999 PR PBB SHADOW E&M-EST. PATIENT-LVL III: CPT | Mod: PBBFAC,HCWC,, | Performed by: INTERNAL MEDICINE

## 2018-11-06 PROCEDURE — 1101F PT FALLS ASSESS-DOCD LE1/YR: CPT | Mod: CPTII,HCWC,S$GLB, | Performed by: INTERNAL MEDICINE

## 2018-11-06 PROCEDURE — 99214 OFFICE O/P EST MOD 30 MIN: CPT | Mod: HCWC,S$GLB,, | Performed by: INTERNAL MEDICINE

## 2018-11-06 PROCEDURE — 3074F SYST BP LT 130 MM HG: CPT | Mod: CPTII,HCWC,S$GLB, | Performed by: INTERNAL MEDICINE

## 2018-11-06 RX ORDER — EZETIMIBE 10 MG/1
TABLET ORAL
Qty: 30 TABLET | Refills: 1 | Status: SHIPPED | OUTPATIENT
Start: 2018-11-06 | End: 2019-05-01

## 2018-11-06 NOTE — PROGRESS NOTES
"Subjective:       Patient ID: Nida Kline is a 83 y.o. female.    Chief Complaint: Follow-up    HPI    Review of Systems   Constitutional: Negative for appetite change, fever and unexpected weight change.   HENT: Negative for congestion, postnasal drip, rhinorrhea and sinus pressure.    Eyes: Negative for discharge and itching.   Respiratory: Negative for cough, chest tightness, shortness of breath and wheezing.    Cardiovascular: Negative for chest pain, palpitations and leg swelling.   Gastrointestinal: Negative for abdominal distention, abdominal pain, blood in stool, constipation, diarrhea, nausea and vomiting.   Endocrine: Negative for polydipsia, polyphagia and polyuria.   Genitourinary: Negative for dysuria and hematuria.   Musculoskeletal: Negative for arthralgias and myalgias.   Skin: Negative for rash.   Allergic/Immunologic: Negative for environmental allergies and food allergies.   Neurological: Negative for tremors, seizures and syncope.   Hematological: Negative for adenopathy. Does not bruise/bleed easily.   Psychiatric/Behavioral:        No depression. Has buspar for anxiety. Melatonin for sleep if needed.       Objective:      Vitals:    11/06/18 1346   BP: (!) 110/56   Pulse: 68   Resp: 16   Temp: 98.3 °F (36.8 °C)   TempSrc: Oral   Weight: 78.2 kg (172 lb 6.4 oz)   Height: 5' 4" (1.626 m)     Body mass index is 29.59 kg/m².    Physical Exam   Constitutional: She is oriented to person, place, and time. She appears well-developed and well-nourished.   HENT:   Head: Normocephalic and atraumatic.   Eyes: EOM are normal.   Neck: Normal range of motion. Neck supple. No thyromegaly present.   S/p L CEA; no carotid bruits heard.   Cardiovascular: Normal rate, regular rhythm and normal heart sounds. Exam reveals no gallop.   No murmur heard.  Pulmonary/Chest: Effort normal and breath sounds normal. No respiratory distress. She has no wheezes. She has no rales.   Abdominal: Soft. Bowel sounds are " normal. She exhibits no distension. There is no tenderness. There is no rebound and no guarding.   Musculoskeletal: Normal range of motion. She exhibits no edema.   Lymphadenopathy:     She has no cervical adenopathy.   Neurological: She is alert and oriented to person, place, and time.   Moves all 4 extremities fine.   Skin: No rash noted.   Psychiatric: She has a normal mood and affect. Her behavior is normal. Thought content normal.   Vitals reviewed.      Assessment:       1. Essential hypertension    2. S/P carotid endarterectomy    3. Mixed hyperlipidemia    4. Chronic kidney disease, stage III (moderate)    5. Gastroesophageal reflux disease, esophagitis presence not specified    6. Anxiety    7. Insomnia, unspecified type        Plan:       Essential hypertension. Maintain < 2 Gm Na a day diet, and monitor BP at home; keep a log. On losartan and metoprolol.  -     Comprehensive metabolic panel; Future; Expected date: 11/06/2018  -     Lipid panel; Future; Expected date: 11/06/2018  -     TSH; Future; Expected date: 11/06/2018  -     T4, free; Future; Expected date: 11/06/2018  -     T3, free; Future; Expected date: 11/06/2018  -     Urinalysis; Future; Expected date: 11/06/2018    S/P carotid endarterectomy; followed by Dr Salter  -     Lipid panel; Future; Expected date: 11/06/2018    Mixed hyperlipidemia. Maintain low fat high fiber diet, exercise regularly. Add zetia 1/2 of 10 mg every other day. Cont atorvastatin at 40 mg a day  -     Comprehensive metabolic panel; Future; Expected date: 11/06/2018  -     Lipid panel; Future; Expected date: 11/06/2018    Chronic kidney disease, stage III (moderate); keep well hydrated; nop NSAID agents. Use tylenol for pain.  -     Comprehensive metabolic panel; Future; Expected date: 11/06/2018    Gastroesophageal reflux disease, esophagitis presence not specified. No bedtime snacks; weight reduction. Omeprazole or ranitidine as needed.    Anxiety; buspar as needed.  -      TSH; Future; Expected date: 11/06/2018  -     T4, free; Future; Expected date: 11/06/2018  -     T3, free; Future; Expected date: 11/06/2018    Insomnia, unspecified type; melatonin up to 10 mg as needed for sleep.  -     TSH; Future; Expected date: 11/06/2018  -     T4, free; Future; Expected date: 11/06/2018  -     T3, free; Future; Expected date: 11/06/2018

## 2018-11-06 NOTE — PATIENT INSTRUCTIONS
Essential hypertension. Maintain < 2 Gm Na a day diet, and monitor BP at home; keep a log. On losartan and metoprolol.  -     Comprehensive metabolic panel; Future; Expected date: 11/06/2018  -     Lipid panel; Future; Expected date: 11/06/2018  -     TSH; Future; Expected date: 11/06/2018  -     T4, free; Future; Expected date: 11/06/2018  -     T3, free; Future; Expected date: 11/06/2018  -     Urinalysis; Future; Expected date: 11/06/2018    S/P carotid endarterectomy; followed by Dr Salter  -     Lipid panel; Future; Expected date: 11/06/2018    Mixed hyperlipidemia. Maintain low fat high fiber diet, exercise regularly. Add zetia 1/2 of 10 mg every other day. Cont atorvastatin at 40 mg a day  -     Comprehensive metabolic panel; Future; Expected date: 11/06/2018  -     Lipid panel; Future; Expected date: 11/06/2018    Chronic kidney disease, stage III (moderate); keep well hydrated; nop NSAID agents. Use tylenol for pain.  -     Comprehensive metabolic panel; Future; Expected date: 11/06/2018    Gastroesophageal reflux disease, esophagitis presence not specified. No bedtime snacks; weight reduction. Omeprazole or ranitidine as needed.    Anxiety; buspar as needed.  -     TSH; Future; Expected date: 11/06/2018  -     T4, free; Future; Expected date: 11/06/2018  -     T3, free; Future; Expected date: 11/06/2018    Insomnia, unspecified type; melatonin up to 10 mg as needed for sleep.  -     TSH; Future; Expected date: 11/06/2018  -     T4, free; Future; Expected date: 11/06/2018  -     T3, free; Future; Expected date: 11/06/2018

## 2018-11-12 DIAGNOSIS — Z98.890 S/P CAROTID ENDARTERECTOMY: ICD-10-CM

## 2018-11-12 DIAGNOSIS — E78.00 PURE HYPERCHOLESTEROLEMIA: ICD-10-CM

## 2018-11-12 DIAGNOSIS — F41.9 ANXIETY: ICD-10-CM

## 2018-11-12 DIAGNOSIS — K21.9 GASTROESOPHAGEAL REFLUX DISEASE, ESOPHAGITIS PRESENCE NOT SPECIFIED: Primary | ICD-10-CM

## 2018-11-12 NOTE — TELEPHONE ENCOUNTER
Last seen on: 11/06/2018    Next appt: 02/12/2019    Last refill:04/05/2018, 07/24/2018, 12/15/2017    Allergies:   Review of patient's allergies indicates:   Allergen Reactions    Lunesta [eszopiclone]      Paresthesia, lip swell     Trazodone     Propofol analogues Nausea And Vomiting       Pharmacy:   [facility] - [place], [place] - [address]  [address] [address]  Phone: [phone] Fax: [phone]    [id number] - [place] - [address]  [address]  [place] [address]  Phone: [phone] Fax: [phone]      Labs: Please review.    CMP  Sodium   Date Value Ref Range Status   10/30/2018 139 136 - 145 mmol/L Final     Potassium   Date Value Ref Range Status   10/30/2018 4.6 3.5 - 5.1 mmol/L Final     Chloride   Date Value Ref Range Status   10/30/2018 107 95 - 110 mmol/L Final     CO2   Date Value Ref Range Status   10/30/2018 26 23 - 29 mmol/L Final     Glucose   Date Value Ref Range Status   10/30/2018 103 70 - 110 mg/dL Final     BUN, Bld   Date Value Ref Range Status   10/30/2018 16 8 - 23 mg/dL Final     Creatinine   Date Value Ref Range Status   10/30/2018 1.0 0.5 - 1.4 mg/dL Final     Calcium   Date Value Ref Range Status   10/30/2018 9.0 8.7 - 10.5 mg/dL Final     Total Protein   Date Value Ref Range Status   10/30/2018 7.2 6.0 - 8.4 g/dL Final     Albumin   Date Value Ref Range Status   10/30/2018 3.4 (L) 3.5 - 5.2 g/dL Final     Total Bilirubin   Date Value Ref Range Status   10/30/2018 0.5 0.1 - 1.0 mg/dL Final     Comment:     For infants and newborns, interpretation of results should be based  on gestational age, weight and in agreement with clinical  observations.  Premature Infant recommended reference ranges:  Up to 24 hours.............<8.0 mg/dL  Up to 48 hours............<12.0 mg/dL  3-5 days..................<15.0 mg/dL  6-29 days.................<15.0 mg/dL       Alkaline Phosphatase   Date Value Ref Range Status    10/30/2018 94 55 - 135 U/L Final     AST   Date Value Ref Range Status   10/30/2018 12 10 - 40 U/L Final     ALT   Date Value Ref Range Status   10/30/2018 8 (L) 10 - 44 U/L Final     Anion Gap   Date Value Ref Range Status   10/30/2018 6 (L) 8 - 16 mmol/L Final     eGFR if    Date Value Ref Range Status   10/30/2018 >60.0 >60 mL/min/1.73 m^2 Final     eGFR if non    Date Value Ref Range Status   10/30/2018 52.2 (A) >60 mL/min/1.73 m^2 Final     Comment:     Calculation used to obtain the estimated glomerular filtration  rate (eGFR) is the CKD-EPI equation.          Lab Results   Component Value Date    TSH 2.071 07/10/2018       Lab Results   Component Value Date    WBC 6.53 03/13/2018    HGB 12.6 03/13/2018    HCT 40.2 03/13/2018    MCV 93 03/13/2018     03/13/2018         Please review! Thank you!

## 2018-11-13 RX ORDER — BUSPIRONE HYDROCHLORIDE 15 MG/1
TABLET ORAL
Qty: 90 TABLET | Refills: 1 | Status: SHIPPED | OUTPATIENT
Start: 2018-11-13 | End: 2019-12-13 | Stop reason: SDUPTHER

## 2018-11-13 RX ORDER — ATORVASTATIN CALCIUM 40 MG/1
40 TABLET, FILM COATED ORAL DAILY
Qty: 90 TABLET | Refills: 1 | Status: SHIPPED | OUTPATIENT
Start: 2018-11-13 | End: 2019-10-29 | Stop reason: SDUPTHER

## 2018-11-13 RX ORDER — METOPROLOL SUCCINATE 25 MG/1
25 TABLET, EXTENDED RELEASE ORAL DAILY
Qty: 90 TABLET | Refills: 1 | Status: SHIPPED | OUTPATIENT
Start: 2018-11-13 | End: 2019-04-26 | Stop reason: SDUPTHER

## 2018-11-13 RX ORDER — OMEPRAZOLE 40 MG/1
CAPSULE, DELAYED RELEASE ORAL
Qty: 90 CAPSULE | Refills: 1 | Status: SHIPPED | OUTPATIENT
Start: 2018-11-13 | End: 2019-12-13 | Stop reason: SDUPTHER

## 2018-11-27 RX ORDER — LEVOTHYROXINE SODIUM 125 UG/1
TABLET ORAL
Qty: 45 TABLET | Refills: 1 | Status: SHIPPED | OUTPATIENT
Start: 2018-11-27 | End: 2019-02-12

## 2018-12-24 ENCOUNTER — TELEPHONE (OUTPATIENT)
Dept: VASCULAR SURGERY | Facility: CLINIC | Age: 83
End: 2018-12-24

## 2018-12-24 NOTE — TELEPHONE ENCOUNTER
----- Message from Gracia Barfield sent at 12/24/2018 10:50 AM CST -----  Contact: self [phone]  She said you normally order an ultrasound of her right carotid artery.  Please enter the order and notify her so she can schedule it.  Thank you!

## 2018-12-26 DIAGNOSIS — I65.23 BILATERAL CAROTID ARTERY STENOSIS: Primary | ICD-10-CM

## 2018-12-26 NOTE — TELEPHONE ENCOUNTER
S/P left carotid endarterectomy done 12/12/15, 12 month follow up ultrasound ordered and scheduled per written order guidelines, informed once results are reviewed by Dr Salter we will call her back with his recommendations. Voices understanding, will call back as needed.

## 2019-01-03 ENCOUNTER — HOSPITAL ENCOUNTER (OUTPATIENT)
Dept: RADIOLOGY | Facility: HOSPITAL | Age: 84
Discharge: HOME | End: 2019-01-03
Attending: THORACIC SURGERY (CARDIOTHORACIC VASCULAR SURGERY)
Payer: MEDICARE

## 2019-01-03 DIAGNOSIS — I65.23 BILATERAL CAROTID ARTERY STENOSIS: ICD-10-CM

## 2019-01-03 PROCEDURE — 93880 US CAROTID BILATERAL: ICD-10-PCS | Mod: 26,,, | Performed by: RADIOLOGY

## 2019-01-03 PROCEDURE — 93880 EXTRACRANIAL BILAT STUDY: CPT | Mod: TC,PO

## 2019-01-03 PROCEDURE — 93880 EXTRACRANIAL BILAT STUDY: CPT | Mod: 26,,, | Performed by: RADIOLOGY

## 2019-01-29 ENCOUNTER — LAB VISIT (OUTPATIENT)
Dept: LAB | Facility: HOSPITAL | Age: 84
End: 2019-01-29
Attending: INTERNAL MEDICINE
Payer: MEDICARE

## 2019-01-29 DIAGNOSIS — E78.2 MIXED HYPERLIPIDEMIA: ICD-10-CM

## 2019-01-29 DIAGNOSIS — F41.9 ANXIETY: ICD-10-CM

## 2019-01-29 DIAGNOSIS — Z98.890 S/P CAROTID ENDARTERECTOMY: ICD-10-CM

## 2019-01-29 DIAGNOSIS — I10 ESSENTIAL HYPERTENSION: Chronic | ICD-10-CM

## 2019-01-29 DIAGNOSIS — G47.00 INSOMNIA, UNSPECIFIED TYPE: ICD-10-CM

## 2019-01-29 DIAGNOSIS — N18.30 CHRONIC KIDNEY DISEASE, STAGE III (MODERATE): ICD-10-CM

## 2019-01-29 LAB
ALBUMIN SERPL BCP-MCNC: 3.3 G/DL
ALP SERPL-CCNC: 85 U/L
ALT SERPL W/O P-5'-P-CCNC: 8 U/L
ANION GAP SERPL CALC-SCNC: 7 MMOL/L
AST SERPL-CCNC: 14 U/L
BILIRUB SERPL-MCNC: 0.6 MG/DL
BUN SERPL-MCNC: 30 MG/DL
CALCIUM SERPL-MCNC: 9.2 MG/DL
CHLORIDE SERPL-SCNC: 105 MMOL/L
CHOLEST SERPL-MCNC: 145 MG/DL
CHOLEST/HDLC SERPL: 3.1 {RATIO}
CO2 SERPL-SCNC: 25 MMOL/L
CREAT SERPL-MCNC: 0.9 MG/DL
EST. GFR  (AFRICAN AMERICAN): >60 ML/MIN/1.73 M^2
EST. GFR  (NON AFRICAN AMERICAN): 59.3 ML/MIN/1.73 M^2
GLUCOSE SERPL-MCNC: 101 MG/DL
HDLC SERPL-MCNC: 47 MG/DL
HDLC SERPL: 32.4 %
LDLC SERPL CALC-MCNC: 75.4 MG/DL
NONHDLC SERPL-MCNC: 98 MG/DL
POTASSIUM SERPL-SCNC: 4.4 MMOL/L
PROT SERPL-MCNC: 6.9 G/DL
SODIUM SERPL-SCNC: 137 MMOL/L
T3FREE SERPL-MCNC: 2.2 PG/ML
T4 FREE SERPL-MCNC: 1.05 NG/DL
TRIGL SERPL-MCNC: 113 MG/DL
TSH SERPL DL<=0.005 MIU/L-ACNC: 2.92 UIU/ML

## 2019-01-29 PROCEDURE — 84481 FREE ASSAY (FT-3): CPT | Mod: HCNC

## 2019-01-29 PROCEDURE — 80061 LIPID PANEL: CPT | Mod: HCNC

## 2019-01-29 PROCEDURE — 36415 COLL VENOUS BLD VENIPUNCTURE: CPT | Mod: HCNC,PN

## 2019-01-29 PROCEDURE — 84443 ASSAY THYROID STIM HORMONE: CPT | Mod: HCNC

## 2019-01-29 PROCEDURE — 84439 ASSAY OF FREE THYROXINE: CPT | Mod: HCNC

## 2019-01-29 PROCEDURE — 80053 COMPREHEN METABOLIC PANEL: CPT | Mod: HCNC

## 2019-01-31 ENCOUNTER — TELEPHONE (OUTPATIENT)
Dept: FAMILY MEDICINE | Facility: CLINIC | Age: 84
End: 2019-01-31

## 2019-01-31 NOTE — TELEPHONE ENCOUNTER
"----- Message from Marbella Parra sent at 1/31/2019  9:30 AM CST -----  Type:  Patient Returning Call    Who Called:  Patient   Who Left Message for Patient:  "nurse"  Does the patient know what this is regarding?:  ??  Best Call Back Number: [phone]  Additional Information:      "

## 2019-02-12 ENCOUNTER — PES CALL (OUTPATIENT)
Dept: ADMINISTRATIVE | Facility: CLINIC | Age: 84
End: 2019-02-12

## 2019-02-12 ENCOUNTER — OFFICE VISIT (OUTPATIENT)
Dept: FAMILY MEDICINE | Facility: CLINIC | Age: 84
End: 2019-02-12
Payer: MEDICARE

## 2019-02-12 VITALS
DIASTOLIC BLOOD PRESSURE: 62 MMHG | HEIGHT: 64 IN | TEMPERATURE: 98 F | HEART RATE: 64 BPM | BODY MASS INDEX: 29.42 KG/M2 | SYSTOLIC BLOOD PRESSURE: 112 MMHG | WEIGHT: 172.31 LBS | OXYGEN SATURATION: 97 %

## 2019-02-12 DIAGNOSIS — E03.9 ACQUIRED HYPOTHYROIDISM: ICD-10-CM

## 2019-02-12 DIAGNOSIS — M81.0 AGE-RELATED OSTEOPOROSIS WITHOUT CURRENT PATHOLOGICAL FRACTURE: ICD-10-CM

## 2019-02-12 DIAGNOSIS — E66.3 OVERWEIGHT (BMI 25.0-29.9): ICD-10-CM

## 2019-02-12 DIAGNOSIS — K21.9 GASTROESOPHAGEAL REFLUX DISEASE, ESOPHAGITIS PRESENCE NOT SPECIFIED: ICD-10-CM

## 2019-02-12 DIAGNOSIS — N18.30 CKD (CHRONIC KIDNEY DISEASE) STAGE 3, GFR 30-59 ML/MIN: ICD-10-CM

## 2019-02-12 DIAGNOSIS — E78.2 MIXED HYPERLIPIDEMIA: ICD-10-CM

## 2019-02-12 DIAGNOSIS — I10 ESSENTIAL HYPERTENSION: Primary | Chronic | ICD-10-CM

## 2019-02-12 DIAGNOSIS — Z98.890 S/P CAROTID ENDARTERECTOMY: ICD-10-CM

## 2019-02-12 PROCEDURE — 3078F PR MOST RECENT DIASTOLIC BLOOD PRESSURE < 80 MM HG: ICD-10-PCS | Mod: HCNC,CPTII,S$GLB, | Performed by: INTERNAL MEDICINE

## 2019-02-12 PROCEDURE — 99214 PR OFFICE/OUTPT VISIT, EST, LEVL IV, 30-39 MIN: ICD-10-PCS | Mod: HCNC,S$GLB,, | Performed by: INTERNAL MEDICINE

## 2019-02-12 PROCEDURE — 3074F SYST BP LT 130 MM HG: CPT | Mod: HCNC,CPTII,S$GLB, | Performed by: INTERNAL MEDICINE

## 2019-02-12 PROCEDURE — 99214 OFFICE O/P EST MOD 30 MIN: CPT | Mod: HCNC,S$GLB,, | Performed by: INTERNAL MEDICINE

## 2019-02-12 PROCEDURE — 99999 PR PBB SHADOW E&M-EST. PATIENT-LVL III: CPT | Mod: PBBFAC,HCNC,, | Performed by: INTERNAL MEDICINE

## 2019-02-12 PROCEDURE — 3078F DIAST BP <80 MM HG: CPT | Mod: HCNC,CPTII,S$GLB, | Performed by: INTERNAL MEDICINE

## 2019-02-12 PROCEDURE — 99499 UNLISTED E&M SERVICE: CPT | Mod: HCNC,S$GLB,, | Performed by: INTERNAL MEDICINE

## 2019-02-12 PROCEDURE — 1101F PT FALLS ASSESS-DOCD LE1/YR: CPT | Mod: HCNC,CPTII,S$GLB, | Performed by: INTERNAL MEDICINE

## 2019-02-12 PROCEDURE — 3074F PR MOST RECENT SYSTOLIC BLOOD PRESSURE < 130 MM HG: ICD-10-PCS | Mod: HCNC,CPTII,S$GLB, | Performed by: INTERNAL MEDICINE

## 2019-02-12 PROCEDURE — 1101F PR PT FALLS ASSESS DOC 0-1 FALLS W/OUT INJ PAST YR: ICD-10-PCS | Mod: HCNC,CPTII,S$GLB, | Performed by: INTERNAL MEDICINE

## 2019-02-12 PROCEDURE — 99499 RISK ADDL DX/OHS AUDIT: ICD-10-PCS | Mod: HCNC,S$GLB,, | Performed by: INTERNAL MEDICINE

## 2019-02-12 PROCEDURE — 99999 PR PBB SHADOW E&M-EST. PATIENT-LVL III: ICD-10-PCS | Mod: PBBFAC,HCNC,, | Performed by: INTERNAL MEDICINE

## 2019-02-12 RX ORDER — LEVOTHYROXINE SODIUM 75 UG/1
75 TABLET ORAL DAILY
Qty: 90 TABLET | Refills: 1 | Status: SHIPPED | OUTPATIENT
Start: 2019-02-12 | End: 2019-07-18 | Stop reason: SDUPTHER

## 2019-02-12 NOTE — PROGRESS NOTES
Subjective:       Patient ID: Nida Kline is a 83 y.o. female.    Chief Complaint: Follow-up    HPI  Overall she's been doing fine.  HTN: BP avr 120/80 at home; watches her salt. Valsartan changed to losartan; to check w her pharmacist regarding any recall w her lot of losartan. Feeling good w her BP.  GERD: has been interm; omeprazole helps when bothered or ranitidine.   Hypothyroidism: takes her thyroid supplements appropriately.  Mix HLP; on low fat high fiber diet; tolerates atorvastatin fine. Not taking zetia as afraid of potential SE's. Also doesn't want to change her cholesterol med. LDL 75; forrest carotid ds w left CEA 4 yrs ago. 95% blockage before surgery. Repeat US carotid reviewed w pt.    Overweight : BMI 29.57; weight bearing exercises daily or close; for about 20 minutes; goal is 30 minutes.   Osteoporosis: 6/19/18 DEXA w Lsp -2.5; left femoral neck -2.4. DEXA in 1 yr. On calcium w vit D, and Mg supplements. Doesn't want any other meds for her osteoporosis.    Review of Systems   Constitutional: Negative for appetite change, fever and unexpected weight change.   HENT: Negative for congestion, postnasal drip, rhinorrhea and sinus pressure.    Eyes: Negative for discharge and itching.   Respiratory: Negative for cough, chest tightness, shortness of breath and wheezing.    Cardiovascular: Negative for chest pain, palpitations and leg swelling.   Gastrointestinal: Negative for abdominal distention, abdominal pain, blood in stool, constipation, diarrhea, nausea and vomiting.        Some reflux at times.   Endocrine: Negative for polydipsia, polyphagia and polyuria.   Genitourinary: Negative for dysuria and hematuria.   Musculoskeletal: Negative for arthralgias and myalgias.   Skin: Negative for rash.   Allergic/Immunologic: Negative for environmental allergies and food allergies.   Neurological: Negative for tremors, seizures and syncope.   Hematological: Negative for adenopathy. Bruises/bleeds easily.  "  Psychiatric/Behavioral:        Denies anxiety or depression       Objective:      Vitals:    02/12/19 1317   BP: 112/62   BP Location: Right arm   Patient Position: Sitting   BP Method: Medium (Manual)   Pulse: 64   Temp: 98.3 °F (36.8 °C)   TempSrc: Oral   SpO2: 97%   Weight: 78.1 kg (172 lb 4.6 oz)   Height: 5' 4" (1.626 m)     Body mass index is 29.57 kg/m².    Physical Exam   Constitutional: She is oriented to person, place, and time. She appears well-developed and well-nourished.   HENT:   Head: Normocephalic and atraumatic.   Eyes: EOM are normal.   Neck: Normal range of motion. Neck supple. No thyromegaly present.   Cardiovascular: Normal rate, regular rhythm and normal heart sounds. Exam reveals no gallop.   No murmur heard.  Pulmonary/Chest: Effort normal and breath sounds normal. No respiratory distress. She has no wheezes. She has no rales.   Abdominal: Soft. Bowel sounds are normal. She exhibits no distension. There is no tenderness. There is no rebound and no guarding.   Musculoskeletal: Normal range of motion. She exhibits no edema.   Lymphadenopathy:     She has no cervical adenopathy.   Neurological: She is alert and oriented to person, place, and time.   Moves all 4 extremities fine.   Skin: No rash noted.   Psychiatric: She has a normal mood and affect. Her behavior is normal. Thought content normal.   Vitals reviewed.      Assessment:       1. Essential hypertension    2. S/P carotid endarterectomy    3. Mixed hyperlipidemia    4. CKD (chronic kidney disease) stage 3, GFR 30-59 ml/min    5. Acquired hypothyroidism    6. Overweight (BMI 25.0-29.9)    7. Gastroesophageal reflux disease, esophagitis presence not specified    8. Age-related osteoporosis without current pathological fracture        Plan:       Essential hypertension. Maintain < 2 Gm Na a day diet, and monitor BP at home; keep a log.  -     T3, free; Future; Expected date: 02/12/2019  -     T4, free; Future; Expected date: " 02/12/2019  -     TSH; Future; Expected date: 02/12/2019  -     Basic metabolic panel; Future; Expected date: 02/12/2019  -     CBC auto differential; Future; Expected date: 02/12/2019    S/P carotid endarterectomy; ASA 81 mg a day to cont. Thyroid supplement increase should help her chol numbers.  -     levothyroxine (SYNTHROID) 75 MCG tablet; Take 1 tablet (75 mcg total) by mouth once daily.  Dispense: 90 tablet; Refill: 1    Mixed hyperlipidemia. Maintain low fat high fiber diet, exercise regularly. On atorvastatin; doesn't want to increase further. Can reach <70 w diet and exercise w weight reduction  -     levothyroxine (SYNTHROID) 75 MCG tablet; Take 1 tablet (75 mcg total) by mouth once daily.  Dispense: 90 tablet; Refill: 1  -     T3, free; Future; Expected date: 02/12/2019  -     T4, free; Future; Expected date: 02/12/2019  -     TSH; Future; Expected date: 02/12/2019    CKD (chronic kidney disease) stage 3, GFR 30-59 ml/min; keep well hydrated. Avoid NSAID agents; can use tylenol for pain.  -     Basic metabolic panel; Future; Expected date: 02/12/2019    Acquired hypothyroidism  -     levothyroxine (SYNTHROID) 75 MCG tablet; Take 1 tablet (75 mcg total) by mouth once daily.  Dispense: 90 tablet; Refill: 1  -     T3, free; Future; Expected date: 02/12/2019  -     T4, free; Future; Expected date: 02/12/2019  -     TSH; Future; Expected date: 02/12/2019    Overweight (BMI 25.0-29.9)  -     levothyroxine (SYNTHROID) 75 MCG tablet; Take 1 tablet (75 mcg total) by mouth once daily.  Dispense: 90 tablet; Refill: 1    Gastroesophageal reflux disease, esophagitis presence not specified    Age-related osteoporosis without current pathological fracture. Weight bearing exercises, continue calcium and vit D supplements. DEXA due 6/19/2019; doesn't want biphosphonates or any other meds for her osteoporosis.

## 2019-02-12 NOTE — PATIENT INSTRUCTIONS
Essential hypertension. Maintain < 2 Gm Na a day diet, and monitor BP at home; keep a log.  -     T3, free; Future; Expected date: 02/12/2019  -     T4, free; Future; Expected date: 02/12/2019  -     TSH; Future; Expected date: 02/12/2019  -     Basic metabolic panel; Future; Expected date: 02/12/2019  -     CBC auto differential; Future; Expected date: 02/12/2019    S/P carotid endarterectomy; ASA 81 mg a day to cont. Thyroid supplement increase should help her chol numbers.  -     levothyroxine (SYNTHROID) 75 MCG tablet; Take 1 tablet (75 mcg total) by mouth once daily.  Dispense: 90 tablet; Refill: 1    Mixed hyperlipidemia. Maintain low fat high fiber diet, exercise regularly. On atorvastatin; doesn't want to increase further. Can reach <70 w diet and exercise w weight reduction  -     levothyroxine (SYNTHROID) 75 MCG tablet; Take 1 tablet (75 mcg total) by mouth once daily.  Dispense: 90 tablet; Refill: 1  -     T3, free; Future; Expected date: 02/12/2019  -     T4, free; Future; Expected date: 02/12/2019  -     TSH; Future; Expected date: 02/12/2019    CKD (chronic kidney disease) stage 3, GFR 30-59 ml/min; keep well hydrated. Avoid NSAID agents; can use tylenol for pain.  -     Basic metabolic panel; Future; Expected date: 02/12/2019    Acquired hypothyroidism  -     levothyroxine (SYNTHROID) 75 MCG tablet; Take 1 tablet (75 mcg total) by mouth once daily.  Dispense: 90 tablet; Refill: 1  -     T3, free; Future; Expected date: 02/12/2019  -     T4, free; Future; Expected date: 02/12/2019  -     TSH; Future; Expected date: 02/12/2019    Overweight (BMI 25.0-29.9)  -     levothyroxine (SYNTHROID) 75 MCG tablet; Take 1 tablet (75 mcg total) by mouth once daily.  Dispense: 90 tablet; Refill: 1    Gastroesophageal reflux disease, esophagitis presence not specified    Age-related osteoporosis without current pathological fracture. Weight bearing exercises, continue calcium and vit D supplements. DEXA due 6/19/2019;  doesn't want biphosphonates or any other meds for her osteoporosis.

## 2019-04-30 ENCOUNTER — TELEPHONE (OUTPATIENT)
Dept: FAMILY MEDICINE | Facility: CLINIC | Age: 84
End: 2019-04-30

## 2019-04-30 RX ORDER — METOPROLOL SUCCINATE 25 MG/1
TABLET, EXTENDED RELEASE ORAL
Qty: 90 TABLET | Refills: 1 | Status: SHIPPED | OUTPATIENT
Start: 2019-04-30 | End: 2019-11-14 | Stop reason: SDUPTHER

## 2019-04-30 NOTE — TELEPHONE ENCOUNTER
----- Message from Miya Gonzalez sent at 4/30/2019 12:13 PM CDT -----  Contact: Nida  Type: Needs Medical Advice    Who Called:  patient  Symptoms (please be specific):  Cough and sinus drip   How long has patient had these symptoms:  Two weeks  Pharmacy name and phone #:    [id number] - [place] - [address]  [address]  [place] [address]  Phone: [phone] Fax: [phone]  Best Call Back Number: [phone]  Additional Information:  Asking for Rx as first available appointment is 5/2/19 with Dr Vazquez and 5/1/19 with any other provider and she does not want to wait. Thanks!

## 2019-05-01 ENCOUNTER — OFFICE VISIT (OUTPATIENT)
Dept: FAMILY MEDICINE | Facility: CLINIC | Age: 84
End: 2019-05-01
Payer: MEDICARE

## 2019-05-01 VITALS
HEIGHT: 64 IN | BODY MASS INDEX: 29.03 KG/M2 | SYSTOLIC BLOOD PRESSURE: 104 MMHG | RESPIRATION RATE: 16 BRPM | WEIGHT: 170.06 LBS | DIASTOLIC BLOOD PRESSURE: 58 MMHG | TEMPERATURE: 98 F | HEART RATE: 80 BPM

## 2019-05-01 DIAGNOSIS — R05.9 COUGH: ICD-10-CM

## 2019-05-01 DIAGNOSIS — R09.82 POST-NASAL DRIP: Primary | ICD-10-CM

## 2019-05-01 PROCEDURE — 3074F SYST BP LT 130 MM HG: CPT | Mod: HCNC,CPTII,S$GLB, | Performed by: NURSE PRACTITIONER

## 2019-05-01 PROCEDURE — 1101F PR PT FALLS ASSESS DOC 0-1 FALLS W/OUT INJ PAST YR: ICD-10-PCS | Mod: HCNC,CPTII,S$GLB, | Performed by: NURSE PRACTITIONER

## 2019-05-01 PROCEDURE — 3074F PR MOST RECENT SYSTOLIC BLOOD PRESSURE < 130 MM HG: ICD-10-PCS | Mod: HCNC,CPTII,S$GLB, | Performed by: NURSE PRACTITIONER

## 2019-05-01 PROCEDURE — 99214 PR OFFICE/OUTPT VISIT, EST, LEVL IV, 30-39 MIN: ICD-10-PCS | Mod: HCNC,S$GLB,, | Performed by: NURSE PRACTITIONER

## 2019-05-01 PROCEDURE — 1101F PT FALLS ASSESS-DOCD LE1/YR: CPT | Mod: HCNC,CPTII,S$GLB, | Performed by: NURSE PRACTITIONER

## 2019-05-01 PROCEDURE — 99214 OFFICE O/P EST MOD 30 MIN: CPT | Mod: HCNC,S$GLB,, | Performed by: NURSE PRACTITIONER

## 2019-05-01 PROCEDURE — 3078F DIAST BP <80 MM HG: CPT | Mod: HCNC,CPTII,S$GLB, | Performed by: NURSE PRACTITIONER

## 2019-05-01 PROCEDURE — 99999 PR PBB SHADOW E&M-EST. PATIENT-LVL IV: CPT | Mod: PBBFAC,HCNC,, | Performed by: NURSE PRACTITIONER

## 2019-05-01 PROCEDURE — 3078F PR MOST RECENT DIASTOLIC BLOOD PRESSURE < 80 MM HG: ICD-10-PCS | Mod: HCNC,CPTII,S$GLB, | Performed by: NURSE PRACTITIONER

## 2019-05-01 PROCEDURE — 99999 PR PBB SHADOW E&M-EST. PATIENT-LVL IV: ICD-10-PCS | Mod: PBBFAC,HCNC,, | Performed by: NURSE PRACTITIONER

## 2019-05-01 RX ORDER — FLUTICASONE PROPIONATE 50 MCG
1 SPRAY, SUSPENSION (ML) NASAL DAILY
Qty: 16 G | Refills: 0 | Status: SHIPPED | OUTPATIENT
Start: 2019-05-01 | End: 2019-05-31

## 2019-05-01 NOTE — PROGRESS NOTES
This dictation has been generated using Modal Fluency Dictation some phonetic errors may occur. Please contact author for clarification if needed.     Problem List Items Addressed This Visit     None      Visit Diagnoses     Post-nasal drip    -  Primary    Cough              Orders Placed This Encounter    fluticasone propionate (FLONASE) 50 mcg/actuation nasal spray     Patient Instructions   Claritin(loratadine) for runny nose, post nasal drip, or congestion. Take in the pm.     Flonase in the AM     Postnasal drip and cough.  No evidence of sinusitis.  No bronchitis or pneumonia.    Follow up in about 1 week (around 5/8/2019), or if symptoms worsen or fail to improve.    ________________________________________________________________  ________________________________________________________________      Chief Complaint   Patient presents with    Sinus Problem    Cough    Hypotension     History of present illness  This 83 y.o. presents today for complaint of sinus drip.  Symptoms have been present for 3 weeks.  A week ago she started taking Coricidin which helped some.  Symptoms have not resolved.  Review of systems  No fever or chills.  She does note fatigue.  No sinus pain. No sore throat symptoms.  She does note postnasal drip.  She has a headache but notes it is due to coughing.  Cough is productive.  No chest pain or shortness of breath.  No nausea vomiting diarrhea reflux symptoms  Past medical and social history reviewed.  Patient new to me.  Follows with in the clinic    Past Medical History:   Diagnosis Date    Back pain     Cataract extraction status of eye     Gallbladder disease     GERD (gastroesophageal reflux disease)     HTN (hypertension)     Hypothyroidism     Osteoporosis     Overweight (BMI 25.0-29.9)     PONV (postoperative nausea and vomiting)     Pure hypercholesterolemia     Squamous cell carcinoma     Thyroid disease     hypothyroidism    Trouble in sleeping      Vitamin D deficiency        Past Surgical History:   Procedure Laterality Date    CAROTID ENDARTERECTOMY Left 2015    CATARACT EXTRACTION      OU    CHOLECYSTECTOMY      ENDARTERECTOMY-CAROTID Left 12/30/2015    [facility]    HYSTERECTOMY      TOTAL ABDOMINAL HYSTERECTOMY W/ BILATERAL SALPINGOOPHORECTOMY         Family History   Problem Relation Age of Onset    Kidney disease Mother     Diabetes Mother     COPD Father     Heart disease Father        Social History     Socioeconomic History    Marital status:      Spouse name: Not on file    Number of children: 4    Years of education: Not on file    Highest education level: Not on file   Occupational History    Occupation: retired insurance work.   Social Needs    Financial resource strain: Not on file    Food insecurity:     Worry: Not on file     Inability: Not on file    Transportation needs:     Medical: Not on file     Non-medical: Not on file   Tobacco Use    Smoking status: Never Smoker    Smokeless tobacco: Never Used   Substance and Sexual Activity    Alcohol use: No    Drug use: No    Sexual activity: Not on file   Lifestyle    Physical activity:     Days per week: Not on file     Minutes per session: Not on file    Stress: Not on file   Relationships    Social connections:     Talks on phone: Not on file     Gets together: Not on file     Attends Buddhist service: Not on file     Active member of club or organization: Not on file     Attends meetings of clubs or organizations: Not on file     Relationship status: Not on file   Other Topics Concern    Not on file   Social History Narrative    Not on file       Current Outpatient Medications   Medication Sig Dispense Refill    ammonium lactate (LAC-HYDRIN) 12 % lotion Apply topically 2 (two) times daily as needed for Dry Skin. 225 g 1    ascorbic acid, vitamin C, (VITAMIN C) 500 MG tablet Take 500 mg by mouth once daily.      aspirin  (ECOTRIN) 81 MG EC tablet Take 81 mg by mouth once.      atorvastatin (LIPITOR) 40 MG tablet Take 1 tablet (40 mg total) by mouth once daily. 90 tablet 1    busPIRone (BUSPAR) 15 MG tablet TAKE 1/3 TO 1/2 TABLET THREE TIMES A DAY AS NEEDED FOR ANXIETY 90 tablet 1    clotrimazole-betamethasone 1-0.05% (LOTRISONE) cream aplly topically BID as needed for fungal foot involvement. 15 g 2    levothyroxine (SYNTHROID) 75 MCG tablet Take 1 tablet (75 mcg total) by mouth once daily. 90 tablet 1    losartan (COZAAR) 50 MG tablet TAKE 1 TABLET (50 MG TOTAL) BY MOUTH EVERY MORNING. 90 tablet 1    magnesium oxide 400 mg Cap Take 400 mg by mouth once daily.      metoprolol succinate (TOPROL-XL) 25 MG 24 hr tablet TAKE 1 TABLET EVERY DAY 90 tablet 1    omeprazole (PRILOSEC) 40 MG capsule TAKE 1 CAPSULE EVERY DAY AS NEEDED FOR REFLUX 90 capsule 1    vitamin D 1000 units Tab Take 1,000 Units by mouth 2 (two) times daily.      fluticasone propionate (FLONASE) 50 mcg/actuation nasal spray 1 spray (50 mcg total) by Each Nare route once daily. 16 g 0     No current facility-administered medications for this visit.        Review of patient's allergies indicates:   Allergen Reactions    Lunesta [eszopiclone]      Paresthesia, lip swell     Trazodone     Propofol analogues Nausea And Vomiting       Physical examination  Vitals Reviewed  Gen. Well-dressed well-nourished   Skin warm dry and intact.  No rashes noted.  HEENT.  TM intact bilateral with normal light reflex.  No mastoid tenderness during percussion.  Nares patent bilateral.  Pharynx is unremarkable except postnasal drip.  No maxillary or frontal sinus tenderness when percussed.   Neck is supple without adenopathy  Chest.  Respirations are even unlabored.  Lungs are clear to auscultation.  Cardiac regular rate and rhythm.  No chest wall adenopathy noted.  Neuro. Awake alert oriented x4.  Normal judgment and cognition noted.  Extremities no clubbing cyanosis or edema  noted.     Call or return to clinic prn if these symptoms worsen or fail to improve as anticipated.

## 2019-05-01 NOTE — PATIENT INSTRUCTIONS
Claritin(loratadine) for runny nose, post nasal drip, or congestion. Take in the pm.     Flonase in the AM

## 2019-06-11 ENCOUNTER — PATIENT OUTREACH (OUTPATIENT)
Dept: ADMINISTRATIVE | Facility: HOSPITAL | Age: 84
End: 2019-06-11

## 2019-06-19 ENCOUNTER — LAB VISIT (OUTPATIENT)
Dept: LAB | Facility: HOSPITAL | Age: 84
End: 2019-06-19
Attending: INTERNAL MEDICINE
Payer: MEDICARE

## 2019-06-19 DIAGNOSIS — E03.9 ACQUIRED HYPOTHYROIDISM: ICD-10-CM

## 2019-06-19 DIAGNOSIS — I10 ESSENTIAL HYPERTENSION: Chronic | ICD-10-CM

## 2019-06-19 DIAGNOSIS — E78.2 MIXED HYPERLIPIDEMIA: ICD-10-CM

## 2019-06-19 DIAGNOSIS — N18.30 CKD (CHRONIC KIDNEY DISEASE) STAGE 3, GFR 30-59 ML/MIN: ICD-10-CM

## 2019-06-19 LAB
ANION GAP SERPL CALC-SCNC: 11 MMOL/L (ref 8–16)
BASOPHILS # BLD AUTO: 0.04 K/UL (ref 0–0.2)
BASOPHILS NFR BLD: 0.7 % (ref 0–1.9)
BUN SERPL-MCNC: 25 MG/DL (ref 8–23)
CALCIUM SERPL-MCNC: 9.3 MG/DL (ref 8.7–10.5)
CHLORIDE SERPL-SCNC: 107 MMOL/L (ref 95–110)
CO2 SERPL-SCNC: 21 MMOL/L (ref 23–29)
CREAT SERPL-MCNC: 1.1 MG/DL (ref 0.5–1.4)
DIFFERENTIAL METHOD: ABNORMAL
EOSINOPHIL # BLD AUTO: 0.4 K/UL (ref 0–0.5)
EOSINOPHIL NFR BLD: 6 % (ref 0–8)
ERYTHROCYTE [DISTWIDTH] IN BLOOD BY AUTOMATED COUNT: 14.1 % (ref 11.5–14.5)
EST. GFR  (AFRICAN AMERICAN): 53.3 ML/MIN/1.73 M^2
EST. GFR  (NON AFRICAN AMERICAN): 46.2 ML/MIN/1.73 M^2
GLUCOSE SERPL-MCNC: 149 MG/DL (ref 70–110)
HCT VFR BLD AUTO: 35.8 % (ref 37–48.5)
HGB BLD-MCNC: 11 G/DL (ref 12–16)
IMM GRANULOCYTES # BLD AUTO: 0.02 K/UL (ref 0–0.04)
IMM GRANULOCYTES NFR BLD AUTO: 0.3 % (ref 0–0.5)
LYMPHOCYTES # BLD AUTO: 1.6 K/UL (ref 1–4.8)
LYMPHOCYTES NFR BLD: 26.7 % (ref 18–48)
MCH RBC QN AUTO: 28.4 PG (ref 27–31)
MCHC RBC AUTO-ENTMCNC: 30.7 G/DL (ref 32–36)
MCV RBC AUTO: 93 FL (ref 82–98)
MONOCYTES # BLD AUTO: 0.5 K/UL (ref 0.3–1)
MONOCYTES NFR BLD: 9.1 % (ref 4–15)
NEUTROPHILS # BLD AUTO: 3.4 K/UL (ref 1.8–7.7)
NEUTROPHILS NFR BLD: 57.2 % (ref 38–73)
NRBC BLD-RTO: 0 /100 WBC
PLATELET # BLD AUTO: 315 K/UL (ref 150–350)
PMV BLD AUTO: 11.1 FL (ref 9.2–12.9)
POTASSIUM SERPL-SCNC: 4.6 MMOL/L (ref 3.5–5.1)
RBC # BLD AUTO: 3.87 M/UL (ref 4–5.4)
SODIUM SERPL-SCNC: 139 MMOL/L (ref 136–145)
T3FREE SERPL-MCNC: 2.4 PG/ML (ref 2.3–4.2)
T4 FREE SERPL-MCNC: 1.19 NG/DL (ref 0.71–1.51)
TSH SERPL DL<=0.005 MIU/L-ACNC: 0.94 UIU/ML (ref 0.4–4)
WBC # BLD AUTO: 5.96 K/UL (ref 3.9–12.7)

## 2019-06-19 PROCEDURE — 84481 FREE ASSAY (FT-3): CPT | Mod: HCNC

## 2019-06-19 PROCEDURE — 84439 ASSAY OF FREE THYROXINE: CPT | Mod: HCNC

## 2019-06-19 PROCEDURE — 85025 COMPLETE CBC W/AUTO DIFF WBC: CPT | Mod: HCNC

## 2019-06-19 PROCEDURE — 36415 COLL VENOUS BLD VENIPUNCTURE: CPT | Mod: HCNC,PN

## 2019-06-19 PROCEDURE — 80048 BASIC METABOLIC PNL TOTAL CA: CPT | Mod: HCNC

## 2019-06-19 PROCEDURE — 84443 ASSAY THYROID STIM HORMONE: CPT | Mod: HCNC

## 2019-06-25 ENCOUNTER — TELEPHONE (OUTPATIENT)
Dept: FAMILY MEDICINE | Facility: CLINIC | Age: 84
End: 2019-06-25

## 2019-06-25 ENCOUNTER — LAB VISIT (OUTPATIENT)
Dept: LAB | Facility: HOSPITAL | Age: 84
End: 2019-06-25
Attending: INTERNAL MEDICINE
Payer: MEDICARE

## 2019-06-25 ENCOUNTER — OFFICE VISIT (OUTPATIENT)
Dept: FAMILY MEDICINE | Facility: CLINIC | Age: 84
End: 2019-06-25
Payer: MEDICARE

## 2019-06-25 VITALS
SYSTOLIC BLOOD PRESSURE: 100 MMHG | HEART RATE: 64 BPM | WEIGHT: 170.63 LBS | HEIGHT: 64 IN | BODY MASS INDEX: 29.13 KG/M2 | DIASTOLIC BLOOD PRESSURE: 66 MMHG

## 2019-06-25 DIAGNOSIS — Z98.890 S/P CAROTID ENDARTERECTOMY: ICD-10-CM

## 2019-06-25 DIAGNOSIS — G47.00 INSOMNIA, UNSPECIFIED TYPE: ICD-10-CM

## 2019-06-25 DIAGNOSIS — I10 ESSENTIAL HYPERTENSION: Primary | ICD-10-CM

## 2019-06-25 DIAGNOSIS — E61.1 IRON DEFICIENCY: ICD-10-CM

## 2019-06-25 DIAGNOSIS — D64.9 NORMOCYTIC ANEMIA: ICD-10-CM

## 2019-06-25 DIAGNOSIS — K21.9 GASTROESOPHAGEAL REFLUX DISEASE, ESOPHAGITIS PRESENCE NOT SPECIFIED: ICD-10-CM

## 2019-06-25 DIAGNOSIS — E66.3 OVERWEIGHT (BMI 25.0-29.9): ICD-10-CM

## 2019-06-25 DIAGNOSIS — E03.9 ACQUIRED HYPOTHYROIDISM: ICD-10-CM

## 2019-06-25 DIAGNOSIS — Z86.39 HISTORY OF IRON DEFICIENCY: ICD-10-CM

## 2019-06-25 DIAGNOSIS — N18.30 CHRONIC KIDNEY DISEASE, STAGE III (MODERATE): ICD-10-CM

## 2019-06-25 DIAGNOSIS — E78.2 MIXED HYPERLIPIDEMIA: ICD-10-CM

## 2019-06-25 DIAGNOSIS — N28.9 ACUTE RENAL INSUFFICIENCY: ICD-10-CM

## 2019-06-25 LAB — RETICS/RBC NFR AUTO: 1.9 % (ref 0.5–2.5)

## 2019-06-25 PROCEDURE — 83540 ASSAY OF IRON: CPT | Mod: HCNC

## 2019-06-25 PROCEDURE — 82728 ASSAY OF FERRITIN: CPT | Mod: HCNC

## 2019-06-25 PROCEDURE — 3078F PR MOST RECENT DIASTOLIC BLOOD PRESSURE < 80 MM HG: ICD-10-PCS | Mod: HCNC,CPTII,S$GLB, | Performed by: INTERNAL MEDICINE

## 2019-06-25 PROCEDURE — 36415 COLL VENOUS BLD VENIPUNCTURE: CPT | Mod: HCNC,PN

## 2019-06-25 PROCEDURE — 99499 RISK ADDL DX/OHS AUDIT: ICD-10-PCS | Mod: S$GLB,,, | Performed by: INTERNAL MEDICINE

## 2019-06-25 PROCEDURE — 99214 OFFICE O/P EST MOD 30 MIN: CPT | Mod: HCNC,S$GLB,, | Performed by: INTERNAL MEDICINE

## 2019-06-25 PROCEDURE — 99214 PR OFFICE/OUTPT VISIT, EST, LEVL IV, 30-39 MIN: ICD-10-PCS | Mod: HCNC,S$GLB,, | Performed by: INTERNAL MEDICINE

## 2019-06-25 PROCEDURE — 99499 UNLISTED E&M SERVICE: CPT | Mod: S$GLB,,, | Performed by: INTERNAL MEDICINE

## 2019-06-25 PROCEDURE — 1101F PR PT FALLS ASSESS DOC 0-1 FALLS W/OUT INJ PAST YR: ICD-10-PCS | Mod: HCNC,CPTII,S$GLB, | Performed by: INTERNAL MEDICINE

## 2019-06-25 PROCEDURE — 85045 AUTOMATED RETICULOCYTE COUNT: CPT | Mod: HCNC

## 2019-06-25 PROCEDURE — 3078F DIAST BP <80 MM HG: CPT | Mod: HCNC,CPTII,S$GLB, | Performed by: INTERNAL MEDICINE

## 2019-06-25 PROCEDURE — 99999 PR PBB SHADOW E&M-EST. PATIENT-LVL III: ICD-10-PCS | Mod: PBBFAC,HCNC,, | Performed by: INTERNAL MEDICINE

## 2019-06-25 PROCEDURE — 1101F PT FALLS ASSESS-DOCD LE1/YR: CPT | Mod: HCNC,CPTII,S$GLB, | Performed by: INTERNAL MEDICINE

## 2019-06-25 PROCEDURE — 3074F PR MOST RECENT SYSTOLIC BLOOD PRESSURE < 130 MM HG: ICD-10-PCS | Mod: HCNC,CPTII,S$GLB, | Performed by: INTERNAL MEDICINE

## 2019-06-25 PROCEDURE — 99999 PR PBB SHADOW E&M-EST. PATIENT-LVL III: CPT | Mod: PBBFAC,HCNC,, | Performed by: INTERNAL MEDICINE

## 2019-06-25 PROCEDURE — 3074F SYST BP LT 130 MM HG: CPT | Mod: HCNC,CPTII,S$GLB, | Performed by: INTERNAL MEDICINE

## 2019-06-25 RX ORDER — LOSARTAN POTASSIUM 25 MG/1
25 TABLET ORAL DAILY
Qty: 90 TABLET | Refills: 1 | Status: SHIPPED | OUTPATIENT
Start: 2019-06-25 | End: 2019-12-13 | Stop reason: SDUPTHER

## 2019-06-25 RX ORDER — FERROUS GLUCONATE 324(38)MG
324 TABLET ORAL
COMMUNITY
End: 2019-09-26

## 2019-06-25 NOTE — PATIENT INSTRUCTIONS
Essential hypertension; Maintain < 2 Gm Na a day diet, and monitor BP at home; keep a log. Decrease losartan from 50 to 25 mg a day due to hypotension; and worsened renal function. Increase her fluid intake during the day   -     losartan (COZAAR) 25 MG tablet; Take 1 tablet (25 mg total) by mouth once daily.  Dispense: 90 tablet; Refill: 1  -     Basic metabolic panel; Future; Expected date: 06/25/2019  -     CBC auto differential; Future; Expected date: 06/25/2019  -     Urinalysis Microscopic; Future; Expected date: 06/25/2019  -     Urinalysis; Future; Expected date: 06/25/2019    Acute renal insufficiency; no alcohol or NSAID agents. Push fluids more during the day. Decrease losartan to 25 mg a day from 50 mg a day.  -     Basic metabolic panel; Future; Expected date: 06/25/2019  -     CBC auto differential; Future; Expected date: 06/25/2019  -     Urinalysis Microscopic; Future; Expected date: 06/25/2019  -     Urinalysis; Future; Expected date: 06/25/2019    Chronic kidney disease, stage III (moderate); keep well hydrated.   -     Basic metabolic panel; Future; Expected date: 06/25/2019    Normocytic anemia; hx of iron deficiency; resume her iron supplements one a day; to call w med and doseage.   -     Iron and TIBC; Future; Expected date: 06/25/2019  -     Ferritin; Future; Expected date: 06/25/2019  -     Reticulocytes; Future; Expected date: 06/25/2019  -     CBC auto differential; Future; Expected date: 06/25/2019    History of iron deficiency; as above.   -     Iron and TIBC; Future; Expected date: 06/25/2019  -     Ferritin; Future; Expected date: 06/25/2019  -     Reticulocytes; Future; Expected date: 06/25/2019  -     CBC auto differential; Future; Expected date: 06/25/2019    Mixed hyperlipidemia.Maintain low fat high fiber diet, exercise regularly. Cont present treatment.     Acquired hypothyroidism; same thyroid dosing; levels are therapeutic.     S/P carotid endarterectomy    Overweight (BMI  25.0-29.9).Caloric restriction w regular exercise and weight reduction.    Gastroesophageal reflux disease, esophagitis presence not specified; stop omeprazole; take zantac  mg each night as needed for reflux.     Insomnia, unspecified type; limit caffeine; melatonin as needed for sleep. May reduce doseage.

## 2019-06-25 NOTE — TELEPHONE ENCOUNTER
----- Message from Karlavenu Yepez sent at 6/25/2019  3:42 PM CDT -----  Contact: Patient  Type: Needs Medical Advice    Who Called:  Patient  Additional Information: Patient is stating she was told to call office once she arrived home after her appointment today.Please call back and advise.

## 2019-06-25 NOTE — PROGRESS NOTES
Subjective:       Patient ID: Nida Kline is a 84 y.o. female.    Chief Complaint: Follow-up (3 month f/u for HTN)    HPI  patient here today for reassessment and go over internal medicine issues including reassessment of her hypertension.  Essential hypertension:  She adheres to low-salt diet blood pressure here manually is borderline low at 100 over 66.  She claims average at home was 100/60; will decrease her Cozaar to 25 mg a day due to low blood pressure.    Acquired hypothyroidism:  Takes her levothyroxine a appropriately she is on 75 mcg a day.  Thyroid function test are therapeutic; continue same dosing.    Mixed hyperlipidemia:  She is on low-fat high-fiber diet; tolerates atorvastatin 40 mg per day fine.    Acute renal insufficiency/CKD stage 3:  GFR has worsened from 59.3-46.2; recently she has had decreased fluid intake; she has also recently been on a cruise for 1 week before her labs were drawn and drank less    GERD:  Doing fine except with acidic food.  She has been on omeprazole about 3 times a week; will change to Zantac  mg per night for reflux  Overweight:  BMI 29.29.  Regular exercise and caloric restriction for weight reduction will help.    Iron deficiency anemia suspected; off iron tablets around 3 months; now hemoglobin slightly reduced at 11.0/35.8 MCV within normal limits; resume her iron tablets 1 a day; to call with dosage that she used to take.    Insomnia:  Sleeps better with melatonin use.  Knows to limit caffeine late afternoon    Review of Systems   Constitutional: Negative for appetite change, fever and unexpected weight change.   HENT: Negative for congestion, postnasal drip, rhinorrhea and sinus pressure.    Eyes: Negative for discharge and itching.   Respiratory: Negative for cough, chest tightness, shortness of breath and wheezing.    Cardiovascular: Negative for chest pain, palpitations and leg swelling.   Gastrointestinal: Negative for abdominal distention,  "abdominal pain, blood in stool, constipation, diarrhea, nausea and vomiting.        Occ reflux noted.    Endocrine: Negative for polydipsia, polyphagia and polyuria.   Genitourinary: Negative for dysuria and hematuria.   Musculoskeletal: Negative for arthralgias and myalgias.   Skin: Negative for rash.   Allergic/Immunologic: Negative for environmental allergies and food allergies.   Neurological: Negative for tremors, seizures and syncope.   Hematological: Negative for adenopathy. Does not bruise/bleed easily.   Psychiatric/Behavioral:        Denies anxiety or depression; sleeps better w melatonin.        Objective:      Vitals:    06/25/19 1251   BP: 100/66   Pulse: 64   Weight: 77.4 kg (170 lb 10.2 oz)   Height: 5' 4" (1.626 m)     Body mass index is 29.29 kg/m².    Physical Exam   Constitutional: She is oriented to person, place, and time. She appears well-developed and well-nourished.   HENT:   Head: Normocephalic and atraumatic.   Eyes: EOM are normal.   Neck: Normal range of motion. Neck supple. No thyromegaly present.   No carotid bruits heard.   Cardiovascular: Normal rate, regular rhythm and normal heart sounds. Exam reveals no gallop.   No murmur heard.  Pulmonary/Chest: Effort normal and breath sounds normal. No respiratory distress. She has no wheezes. She has no rales.   Abdominal: Soft. Bowel sounds are normal. She exhibits no distension. There is no tenderness. There is no rebound and no guarding.   Musculoskeletal: Normal range of motion. She exhibits no edema.   Lymphadenopathy:     She has no cervical adenopathy.   Neurological: She is alert and oriented to person, place, and time.   Moves all 4 extremities fine.   Skin: No rash noted.   Psychiatric: She has a normal mood and affect. Her behavior is normal. Thought content normal.   Vitals reviewed.      Assessment:       1. Essential hypertension    2. Acute renal insufficiency    3. Chronic kidney disease, stage III (moderate)    4. Normocytic " anemia    5. History of iron deficiency    6. Mixed hyperlipidemia    7. Acquired hypothyroidism    8. S/P carotid endarterectomy    9. Overweight (BMI 25.0-29.9)    10. Gastroesophageal reflux disease, esophagitis presence not specified    11. Insomnia, unspecified type        Plan:       Essential hypertension; Maintain < 2 Gm Na a day diet, and monitor BP at home; keep a log. Decrease losartan from 50 to 25 mg a day due to hypotension; and worsened renal function. Increase her fluid intake during the day   -     losartan (COZAAR) 25 MG tablet; Take 1 tablet (25 mg total) by mouth once daily.  Dispense: 90 tablet; Refill: 1  -     Basic metabolic panel; Future; Expected date: 06/25/2019  -     CBC auto differential; Future; Expected date: 06/25/2019  -     Urinalysis Microscopic; Future; Expected date: 06/25/2019  -     Urinalysis; Future; Expected date: 06/25/2019    Acute renal insufficiency; no alcohol or NSAID agents. Push fluids more during the day. Decrease losartan to 25 mg a day from 50 mg a day.  Knows not to take any NSA ID agents and has been advised to avoid caffeine as well  -     Basic metabolic panel; Future; Expected date: 06/25/2019  -     CBC auto differential; Future; Expected date: 06/25/2019  -     Urinalysis Microscopic; Future; Expected date: 06/25/2019  -     Urinalysis; Future; Expected date: 06/25/2019    Chronic kidney disease, stage III (moderate); keep well hydrated.   -     Basic metabolic panel; Future; Expected date: 06/25/2019    Normocytic anemia; hx of iron deficiency; resume her iron supplements one a day; to call w med and doseage.   -     Iron and TIBC; Future; Expected date: 06/25/2019  -     Ferritin; Future; Expected date: 06/25/2019  -     Reticulocytes; Future; Expected date: 06/25/2019  -     CBC auto differential; Future; Expected date: 06/25/2019    History of iron deficiency; as above.   -     Iron and TIBC; Future; Expected date: 06/25/2019  -     Ferritin; Future;  Expected date: 06/25/2019  -     Reticulocytes; Future; Expected date: 06/25/2019  -     CBC auto differential; Future; Expected date: 06/25/2019    Mixed hyperlipidemia.Maintain low fat high fiber diet, exercise regularly. Cont present treatment.     Acquired hypothyroidism; same thyroid dosing; levels are therapeutic.     S/P carotid endarterectomy    Overweight (BMI 25.0-29.9).Caloric restriction w regular exercise and weight reduction.    Gastroesophageal reflux disease, esophagitis presence not specified; stop omeprazole; take zantac  mg each night as needed for reflux.     Insomnia, unspecified type; limit caffeine; melatonin as needed for sleep. May reduce doseage.

## 2019-06-26 LAB
FERRITIN SERPL-MCNC: 21 NG/ML (ref 20–300)
IRON SERPL-MCNC: 56 UG/DL (ref 30–160)
SATURATED IRON: 13 % (ref 20–50)
TOTAL IRON BINDING CAPACITY: 419 UG/DL (ref 250–450)
TRANSFERRIN SERPL-MCNC: 283 MG/DL (ref 200–375)

## 2019-06-30 ENCOUNTER — TELEPHONE (OUTPATIENT)
Dept: FAMILY MEDICINE | Facility: CLINIC | Age: 84
End: 2019-06-30

## 2019-06-30 DIAGNOSIS — D64.9 NORMOCYTIC ANEMIA: ICD-10-CM

## 2019-06-30 DIAGNOSIS — E61.1 IRON DEFICIENCY: Primary | ICD-10-CM

## 2019-06-30 NOTE — TELEPHONE ENCOUNTER
Please add iron levels as well as ferritin levels with retake count to has scheduled labs for follow-up.  These have been ordered.  Please also inquire of any signs and symptoms from the patient of a possible urinary tract infection as a UA dipstick with microscopic was recently performed and is suspicious for UTI

## 2019-07-02 NOTE — TELEPHONE ENCOUNTER
Please notify patient that her ferritin is low normal at 21; serum iron is low normal at 56; and percentage on sat is reduced at 13.  She never did call us and let us know what her iron tablets or that she has been taking at home; please obtain further details about how iron supplementation that she start resumed at home at 1 a day.  Please also inquire about any signs and symptoms of a UTI; please see her urinalysis.    Please also tell patient that higher sent in stool for occult blood x3 for her to perform due to the fact that she has iron deficiency anemia to check for any blood loss in her stool as a source of iron loss; please ask her to perform 3 of these

## 2019-07-03 NOTE — TELEPHONE ENCOUNTER
Spoke w/ pt , pt denies pain , denies odor, denies frequency , denies urgency  . Pt did give clean catch specimen     Added Ferrous Gluconate 324 mg, and b-12 1,000 mcg been on for one week.    Spoke w/ pt. Informed pt about results and recommendations per provider. pt verbalized understanding.    (see above notes)

## 2019-07-18 DIAGNOSIS — Z98.890 S/P CAROTID ENDARTERECTOMY: ICD-10-CM

## 2019-07-18 DIAGNOSIS — E66.3 OVERWEIGHT (BMI 25.0-29.9): ICD-10-CM

## 2019-07-18 DIAGNOSIS — E78.2 MIXED HYPERLIPIDEMIA: ICD-10-CM

## 2019-07-18 DIAGNOSIS — E03.9 ACQUIRED HYPOTHYROIDISM: ICD-10-CM

## 2019-07-19 RX ORDER — LEVOTHYROXINE SODIUM 75 UG/1
TABLET ORAL
Qty: 90 TABLET | Refills: 1 | Status: SHIPPED | OUTPATIENT
Start: 2019-07-19 | End: 2020-01-26

## 2019-09-17 ENCOUNTER — LAB VISIT (OUTPATIENT)
Dept: LAB | Facility: HOSPITAL | Age: 84
End: 2019-09-17
Attending: INTERNAL MEDICINE
Payer: MEDICARE

## 2019-09-17 DIAGNOSIS — N28.9 ACUTE RENAL INSUFFICIENCY: ICD-10-CM

## 2019-09-17 DIAGNOSIS — D64.9 NORMOCYTIC ANEMIA: ICD-10-CM

## 2019-09-17 DIAGNOSIS — I10 ESSENTIAL HYPERTENSION: ICD-10-CM

## 2019-09-17 DIAGNOSIS — N18.30 CHRONIC KIDNEY DISEASE, STAGE III (MODERATE): ICD-10-CM

## 2019-09-17 DIAGNOSIS — Z86.39 HISTORY OF IRON DEFICIENCY: ICD-10-CM

## 2019-09-17 DIAGNOSIS — E78.2 MIXED HYPERLIPIDEMIA: ICD-10-CM

## 2019-09-17 DIAGNOSIS — Z98.890 S/P CAROTID ENDARTERECTOMY: ICD-10-CM

## 2019-09-17 DIAGNOSIS — E61.1 IRON DEFICIENCY: ICD-10-CM

## 2019-09-17 LAB
BASOPHILS # BLD AUTO: 0.03 K/UL (ref 0–0.2)
BASOPHILS NFR BLD: 0.5 % (ref 0–1.9)
DIFFERENTIAL METHOD: ABNORMAL
EOSINOPHIL # BLD AUTO: 0.4 K/UL (ref 0–0.5)
EOSINOPHIL NFR BLD: 6.3 % (ref 0–8)
ERYTHROCYTE [DISTWIDTH] IN BLOOD BY AUTOMATED COUNT: 14.4 % (ref 11.5–14.5)
HCT VFR BLD AUTO: 39.2 % (ref 37–48.5)
HGB BLD-MCNC: 11.9 G/DL (ref 12–16)
IMM GRANULOCYTES # BLD AUTO: 0.01 K/UL (ref 0–0.04)
IMM GRANULOCYTES NFR BLD AUTO: 0.2 % (ref 0–0.5)
LYMPHOCYTES # BLD AUTO: 1.6 K/UL (ref 1–4.8)
LYMPHOCYTES NFR BLD: 27.3 % (ref 18–48)
MCH RBC QN AUTO: 29.5 PG (ref 27–31)
MCHC RBC AUTO-ENTMCNC: 30.4 G/DL (ref 32–36)
MCV RBC AUTO: 97 FL (ref 82–98)
MONOCYTES # BLD AUTO: 0.6 K/UL (ref 0.3–1)
MONOCYTES NFR BLD: 10.1 % (ref 4–15)
NEUTROPHILS # BLD AUTO: 3.2 K/UL (ref 1.8–7.7)
NEUTROPHILS NFR BLD: 55.6 % (ref 38–73)
NRBC BLD-RTO: 0 /100 WBC
PLATELET # BLD AUTO: 311 K/UL (ref 150–350)
PMV BLD AUTO: 10.5 FL (ref 9.2–12.9)
RBC # BLD AUTO: 4.04 M/UL (ref 4–5.4)
RETICS/RBC NFR AUTO: 2.2 % (ref 0.5–2.5)
WBC # BLD AUTO: 5.75 K/UL (ref 3.9–12.7)

## 2019-09-17 PROCEDURE — 83540 ASSAY OF IRON: CPT | Mod: HCNC

## 2019-09-17 PROCEDURE — 85045 AUTOMATED RETICULOCYTE COUNT: CPT | Mod: HCNC

## 2019-09-17 PROCEDURE — 80061 LIPID PANEL: CPT | Mod: HCNC

## 2019-09-17 PROCEDURE — 80048 BASIC METABOLIC PNL TOTAL CA: CPT | Mod: HCNC

## 2019-09-17 PROCEDURE — 85025 COMPLETE CBC W/AUTO DIFF WBC: CPT | Mod: HCNC

## 2019-09-17 PROCEDURE — 82728 ASSAY OF FERRITIN: CPT | Mod: HCNC

## 2019-09-17 PROCEDURE — 36415 COLL VENOUS BLD VENIPUNCTURE: CPT | Mod: HCNC,PN

## 2019-09-18 LAB
ANION GAP SERPL CALC-SCNC: 10 MMOL/L (ref 8–16)
BUN SERPL-MCNC: 20 MG/DL (ref 8–23)
CALCIUM SERPL-MCNC: 9.1 MG/DL (ref 8.7–10.5)
CHLORIDE SERPL-SCNC: 106 MMOL/L (ref 95–110)
CHOLEST SERPL-MCNC: 158 MG/DL (ref 120–199)
CHOLEST/HDLC SERPL: 2.7 {RATIO} (ref 2–5)
CO2 SERPL-SCNC: 24 MMOL/L (ref 23–29)
CREAT SERPL-MCNC: 1 MG/DL (ref 0.5–1.4)
EST. GFR  (AFRICAN AMERICAN): 59.8 ML/MIN/1.73 M^2
EST. GFR  (NON AFRICAN AMERICAN): 51.9 ML/MIN/1.73 M^2
FERRITIN SERPL-MCNC: 28 NG/ML (ref 20–300)
GLUCOSE SERPL-MCNC: 97 MG/DL (ref 70–110)
HDLC SERPL-MCNC: 59 MG/DL (ref 40–75)
HDLC SERPL: 37.3 % (ref 20–50)
IRON SERPL-MCNC: 48 UG/DL (ref 30–160)
LDLC SERPL CALC-MCNC: 77.4 MG/DL (ref 63–159)
NONHDLC SERPL-MCNC: 99 MG/DL
POTASSIUM SERPL-SCNC: 4.4 MMOL/L (ref 3.5–5.1)
SATURATED IRON: 13 % (ref 20–50)
SODIUM SERPL-SCNC: 140 MMOL/L (ref 136–145)
TOTAL IRON BINDING CAPACITY: 358 UG/DL (ref 250–450)
TRANSFERRIN SERPL-MCNC: 242 MG/DL (ref 200–375)
TRIGL SERPL-MCNC: 108 MG/DL (ref 30–150)

## 2019-09-26 ENCOUNTER — TELEPHONE (OUTPATIENT)
Dept: FAMILY MEDICINE | Facility: CLINIC | Age: 84
End: 2019-09-26

## 2019-09-26 ENCOUNTER — OFFICE VISIT (OUTPATIENT)
Dept: FAMILY MEDICINE | Facility: CLINIC | Age: 84
End: 2019-09-26
Payer: MEDICARE

## 2019-09-26 VITALS
WEIGHT: 170.19 LBS | DIASTOLIC BLOOD PRESSURE: 64 MMHG | OXYGEN SATURATION: 96 % | BODY MASS INDEX: 29.06 KG/M2 | SYSTOLIC BLOOD PRESSURE: 116 MMHG | HEART RATE: 62 BPM | HEIGHT: 64 IN | TEMPERATURE: 98 F

## 2019-09-26 DIAGNOSIS — K21.9 GASTROESOPHAGEAL REFLUX DISEASE, ESOPHAGITIS PRESENCE NOT SPECIFIED: ICD-10-CM

## 2019-09-26 DIAGNOSIS — Z98.890 S/P CAROTID ENDARTERECTOMY: ICD-10-CM

## 2019-09-26 DIAGNOSIS — E03.9 ACQUIRED HYPOTHYROIDISM: ICD-10-CM

## 2019-09-26 DIAGNOSIS — I10 ESSENTIAL HYPERTENSION: Primary | Chronic | ICD-10-CM

## 2019-09-26 DIAGNOSIS — D50.9 IRON DEFICIENCY ANEMIA, UNSPECIFIED IRON DEFICIENCY ANEMIA TYPE: ICD-10-CM

## 2019-09-26 DIAGNOSIS — Z23 NEED FOR 23-POLYVALENT PNEUMOCOCCAL POLYSACCHARIDE VACCINE: ICD-10-CM

## 2019-09-26 DIAGNOSIS — N18.30 CHRONIC KIDNEY DISEASE, STAGE III (MODERATE): ICD-10-CM

## 2019-09-26 DIAGNOSIS — E78.2 MIXED HYPERLIPIDEMIA: ICD-10-CM

## 2019-09-26 PROCEDURE — 99999 PR PBB SHADOW E&M-EST. PATIENT-LVL III: CPT | Mod: PBBFAC,HCNC,, | Performed by: INTERNAL MEDICINE

## 2019-09-26 PROCEDURE — 99999 PR PBB SHADOW E&M-EST. PATIENT-LVL III: ICD-10-PCS | Mod: PBBFAC,HCNC,, | Performed by: INTERNAL MEDICINE

## 2019-09-26 PROCEDURE — 1101F PR PT FALLS ASSESS DOC 0-1 FALLS W/OUT INJ PAST YR: ICD-10-PCS | Mod: HCNC,CPTII,S$GLB, | Performed by: INTERNAL MEDICINE

## 2019-09-26 PROCEDURE — G0009 ADMIN PNEUMOCOCCAL VACCINE: HCPCS | Mod: HCNC,S$GLB,, | Performed by: INTERNAL MEDICINE

## 2019-09-26 PROCEDURE — 3078F DIAST BP <80 MM HG: CPT | Mod: HCNC,CPTII,S$GLB, | Performed by: INTERNAL MEDICINE

## 2019-09-26 PROCEDURE — 99214 PR OFFICE/OUTPT VISIT, EST, LEVL IV, 30-39 MIN: ICD-10-PCS | Mod: HCNC,25,S$GLB, | Performed by: INTERNAL MEDICINE

## 2019-09-26 PROCEDURE — 99214 OFFICE O/P EST MOD 30 MIN: CPT | Mod: HCNC,25,S$GLB, | Performed by: INTERNAL MEDICINE

## 2019-09-26 PROCEDURE — 3074F PR MOST RECENT SYSTOLIC BLOOD PRESSURE < 130 MM HG: ICD-10-PCS | Mod: HCNC,CPTII,S$GLB, | Performed by: INTERNAL MEDICINE

## 2019-09-26 PROCEDURE — G0009 PNEUMOCOCCAL POLYSACCHARIDE VACCINE 23-VALENT =>2YO SQ IM: ICD-10-PCS | Mod: HCNC,S$GLB,, | Performed by: INTERNAL MEDICINE

## 2019-09-26 PROCEDURE — 3078F PR MOST RECENT DIASTOLIC BLOOD PRESSURE < 80 MM HG: ICD-10-PCS | Mod: HCNC,CPTII,S$GLB, | Performed by: INTERNAL MEDICINE

## 2019-09-26 PROCEDURE — 3074F SYST BP LT 130 MM HG: CPT | Mod: HCNC,CPTII,S$GLB, | Performed by: INTERNAL MEDICINE

## 2019-09-26 PROCEDURE — 1101F PT FALLS ASSESS-DOCD LE1/YR: CPT | Mod: HCNC,CPTII,S$GLB, | Performed by: INTERNAL MEDICINE

## 2019-09-26 PROCEDURE — 90732 PNEUMOCOCCAL POLYSACCHARIDE VACCINE 23-VALENT =>2YO SQ IM: ICD-10-PCS | Mod: HCNC,S$GLB,, | Performed by: INTERNAL MEDICINE

## 2019-09-26 PROCEDURE — 90732 PPSV23 VACC 2 YRS+ SUBQ/IM: CPT | Mod: HCNC,S$GLB,, | Performed by: INTERNAL MEDICINE

## 2019-09-26 PROCEDURE — 99499 RISK ADDL DX/OHS AUDIT: ICD-10-PCS | Mod: HCNC,S$GLB,, | Performed by: INTERNAL MEDICINE

## 2019-09-26 PROCEDURE — 99499 UNLISTED E&M SERVICE: CPT | Mod: HCNC,S$GLB,, | Performed by: INTERNAL MEDICINE

## 2019-09-26 RX ORDER — FERROUS SULFATE 325(65) MG
325 TABLET ORAL DAILY
COMMUNITY
End: 2020-01-28

## 2019-09-26 NOTE — TELEPHONE ENCOUNTER
Call and check with patient to ensure that she picked up herstool for occult blood x3 cart before she left here from her visit today.  Magnesium lab ordered also please add to his scheduled labs before her follow-up

## 2019-09-26 NOTE — PATIENT INSTRUCTIONS
Essential hypertension.Maintain < 2 Gm Na a day diet, and monitor BP at home; keep a log. Same treatment.   -     CBC auto differential; Future; Expected date: 09/26/2019  -     Comprehensive metabolic panel; Future; Expected date: 09/26/2019  -     Lipid panel; Future; Expected date: 09/26/2019  -     TSH; Future; Expected date: 09/26/2019  -     T4, free; Future; Expected date: 09/26/2019  -     T3, free; Future; Expected date: 09/26/2019    Mixed hyperlipidemia; Maintain low fat high fiber diet, exercise regularly. Weight reduction where indicated. Cont atorvastatin,   add metamucil once daily.   -     Comprehensive metabolic panel; Future; Expected date: 09/26/2019  -     Lipid panel; Future; Expected date: 09/26/2019    S/P carotid endarterectomy; Dr Humphries does yearly studies.   -     Lipid panel; Future; Expected date: 09/26/2019    Chronic kidney disease, stage III (moderate); keep well hydrated. No NSAID agents.   -     Comprehensive metabolic panel; Future; Expected date: 09/26/2019    Acquired hypothyroidism; same thyroid dosing.   -     TSH; Future; Expected date: 09/26/2019  -     T4, free; Future; Expected date: 09/26/2019  -     T3, free; Future; Expected date: 09/26/2019    Gastroesophageal reflux disease, esophagitis presence not specified.No bedtime snacks; weight reduction. Has omeprazole if needed for reflux.     Iron deficiency anemia, unspecified iron deficiency anemia type; needs stool for occul;t blood x3. Ferrous sulfate 325 mg a day to continue.   -     Occult blood x 1, stool; Future; Expected date: 09/26/2019  -     Occult blood x 1, stool; Future; Expected date: 09/26/2019  -     Occult blood x 1, stool; Future; Expected date: 09/26/2019  -     CBC auto differential; Future; Expected date: 09/26/2019  -     Ferritin; Future; Expected date: 09/26/2019    Need for 23-polyvalent pneumococcal polysaccharide vaccine; to be given in office today

## 2019-09-26 NOTE — TELEPHONE ENCOUNTER
Spoke to pt. Pt states she did not get the stool cards today but will come by and get them either tomorrow or Monday.

## 2019-09-26 NOTE — PROGRESS NOTES
Subjective:       Patient ID: Nida Kline is a 84 y.o. female.    Chief Complaint: Follow-up (review lab results)    HPI  Pt here today for reassessment and go over her labs.  Essential hypertension:  On a low-salt diet; blood pressure here manually- 116/64; blood pressure at home has been averaging-  115/54 at home.   Have recently cut back on losartan  CKD-stage III:  Has improved from a GFR 46 to 52; advised to keep well hydrated during the day.  Mixed hyperlipidemia:  On low-fat high-fiber diet or at least try; tolerates atorvastatin fine.  Status post carotid end arterectomy; left on 2015; Dr Salter follows her w carotid study every January. LDL 77; doesn't want to increase her statin any further. Goal 60's for LDL.   Hypothyroidism:  Takes her levothyroxine appropriately; she is on 75 mcg p.o. Daily. 6/19/19 therapeutic thyroid levels.   GERD:  Knows not to take bedtime snacks; patient has omeprazole 40 mg daily as needed for reflux.   Iron deficiency anemia:  Hemoglobin has improved with iron levels being about the same; ferritin has improved from 21 to 28 but still low normal; on ferrous sulfate 325 mg a day.   Overweight:  BMI 29.217:  To exercise regularly as tolerated along with caloric restriction help her weight come down.    Review of Systems   Constitutional: Negative for appetite change, fever and unexpected weight change.   HENT: Negative for congestion, postnasal drip and rhinorrhea.    Respiratory: Negative for cough, chest tightness, shortness of breath and wheezing.    Cardiovascular: Negative for chest pain, palpitations and leg swelling.   Gastrointestinal: Negative for abdominal pain, blood in stool, constipation, diarrhea, nausea and vomiting.   Genitourinary: Negative for dysuria and hematuria.   Musculoskeletal: Negative for arthralgias and myalgias.   Skin: Negative for rash.   Allergic/Immunologic: Negative for food allergies.   Neurological: Negative for syncope and weakness.  "  Hematological: Negative for adenopathy. Does not bruise/bleed easily.   Psychiatric/Behavioral: Negative for dysphoric mood. The patient is not nervous/anxious.         No anxiety or depression       Objective:      Vitals:    09/26/19 1258   BP: 116/64   BP Location: Right arm   Patient Position: Sitting   BP Method: Large (Manual)   Pulse: 62   Temp: 98 °F (36.7 °C)   TempSrc: Oral   SpO2: 96%   Weight: 77.2 kg (170 lb 3.1 oz)   Height: 5' 4" (1.626 m)     Body mass index is 29.21 kg/m².    Physical Exam   Constitutional: She is oriented to person, place, and time. She appears well-developed and well-nourished.   HENT:   Head: Normocephalic and atraumatic.   Eyes: EOM are normal.   Neck: Normal range of motion. Neck supple. No thyromegaly present.   No carotid bruits heard   Cardiovascular: Normal rate, regular rhythm and normal heart sounds. Exam reveals no gallop.   No murmur heard.  Pulmonary/Chest: Effort normal and breath sounds normal. No respiratory distress. She has no wheezes. She has no rales.   Abdominal: Soft. Bowel sounds are normal. She exhibits no distension. There is no tenderness. There is no rebound and no guarding.   Musculoskeletal: Normal range of motion. She exhibits no edema.   Lymphadenopathy:     She has no cervical adenopathy.   Neurological: She is alert and oriented to person, place, and time.   Moves all 4 extremities fine.   Skin: No rash noted.   Psychiatric: She has a normal mood and affect. Her behavior is normal. Thought content normal.   Vitals reviewed.      Assessment:       1. Essential hypertension    2. Mixed hyperlipidemia    3. S/P carotid endarterectomy    4. Chronic kidney disease, stage III (moderate)    5. Acquired hypothyroidism    6. Gastroesophageal reflux disease, esophagitis presence not specified    7. Iron deficiency anemia, unspecified iron deficiency anemia type    8. Need for 23-polyvalent pneumococcal polysaccharide vaccine        Plan:       Essential " hypertension.Maintain < 2 Gm Na a day diet, and monitor BP at home; keep a log. Same treatment.   -     CBC auto differential; Future; Expected date: 09/26/2019  -     Comprehensive metabolic panel; Future; Expected date: 09/26/2019  -     Lipid panel; Future; Expected date: 09/26/2019  -     TSH; Future; Expected date: 09/26/2019  -     T4, free; Future; Expected date: 09/26/2019  -     T3, free; Future; Expected date: 09/26/2019    Mixed hyperlipidemia; Maintain low fat high fiber diet, exercise regularly. Weight reduction where indicated. Cont atorvastatin,   add metamucil once daily.   -     Comprehensive metabolic panel; Future; Expected date: 09/26/2019  -     Lipid panel; Future; Expected date: 09/26/2019    S/P carotid endarterectomy; Dr Salter does yearly studies.   -     Lipid panel; Future; Expected date: 09/26/2019    Chronic kidney disease, stage III (moderate); keep well hydrated. No NSAID agents.   -     Comprehensive metabolic panel; Future; Expected date: 09/26/2019    Acquired hypothyroidism; same thyroid dosing.   -     TSH; Future; Expected date: 09/26/2019  -     T4, free; Future; Expected date: 09/26/2019  -     T3, free; Future; Expected date: 09/26/2019    Gastroesophageal reflux disease, esophagitis presence not specified.No bedtime snacks; weight reduction. Has omeprazole if needed for reflux.     Iron deficiency anemia, unspecified iron deficiency anemia type; needs stool for occul;t blood x3. Ferrous sulfate 325 mg a day to continue.   -     Occult blood x 1, stool; Future; Expected date: 09/26/2019  -     Occult blood x 1, stool; Future; Expected date: 09/26/2019  -     Occult blood x 1, stool; Future; Expected date: 09/26/2019  -     CBC auto differential; Future; Expected date: 09/26/2019  -     Ferritin; Future; Expected date: 09/26/2019    Need for 23-polyvalent pneumococcal polysaccharide vaccine; to be given in office today

## 2019-10-29 DIAGNOSIS — Z98.890 S/P CAROTID ENDARTERECTOMY: ICD-10-CM

## 2019-10-29 DIAGNOSIS — E78.00 PURE HYPERCHOLESTEROLEMIA: ICD-10-CM

## 2019-10-30 RX ORDER — ATORVASTATIN CALCIUM 40 MG/1
TABLET, FILM COATED ORAL
Qty: 90 TABLET | Refills: 1 | Status: SHIPPED | OUTPATIENT
Start: 2019-10-30 | End: 2020-03-30

## 2019-11-16 RX ORDER — METOPROLOL SUCCINATE 25 MG/1
TABLET, EXTENDED RELEASE ORAL
Qty: 90 TABLET | Refills: 3 | Status: SHIPPED | OUTPATIENT
Start: 2019-11-16 | End: 2020-09-01

## 2019-12-04 DIAGNOSIS — I65.23 BILATERAL CAROTID ARTERY STENOSIS: Primary | ICD-10-CM

## 2019-12-13 DIAGNOSIS — F41.9 ANXIETY: ICD-10-CM

## 2019-12-13 DIAGNOSIS — K21.9 GASTROESOPHAGEAL REFLUX DISEASE, ESOPHAGITIS PRESENCE NOT SPECIFIED: ICD-10-CM

## 2019-12-13 DIAGNOSIS — I10 ESSENTIAL HYPERTENSION: ICD-10-CM

## 2019-12-15 RX ORDER — BUSPIRONE HYDROCHLORIDE 15 MG/1
TABLET ORAL
Qty: 90 TABLET | Refills: 1 | Status: SHIPPED | OUTPATIENT
Start: 2019-12-15 | End: 2020-03-30

## 2019-12-15 RX ORDER — OMEPRAZOLE 40 MG/1
CAPSULE, DELAYED RELEASE ORAL
Qty: 90 CAPSULE | Refills: 1 | Status: SHIPPED | OUTPATIENT
Start: 2019-12-15 | End: 2020-01-28

## 2019-12-15 RX ORDER — LOSARTAN POTASSIUM 25 MG/1
TABLET ORAL
Qty: 90 TABLET | Refills: 1 | Status: SHIPPED | OUTPATIENT
Start: 2019-12-15 | End: 2020-08-05 | Stop reason: SDUPTHER

## 2020-01-14 ENCOUNTER — HOSPITAL ENCOUNTER (OUTPATIENT)
Dept: RADIOLOGY | Facility: HOSPITAL | Age: 85
Discharge: HOME | End: 2020-01-14
Attending: THORACIC SURGERY (CARDIOTHORACIC VASCULAR SURGERY)
Payer: MEDICARE

## 2020-01-14 DIAGNOSIS — I65.23 BILATERAL CAROTID ARTERY STENOSIS: ICD-10-CM

## 2020-01-14 PROCEDURE — 93880 EXTRACRANIAL BILAT STUDY: CPT | Mod: TC,HCNC,PO

## 2020-01-14 PROCEDURE — 93880 US CAROTID BILATERAL: ICD-10-PCS | Mod: 26,HCNC,, | Performed by: RADIOLOGY

## 2020-01-14 PROCEDURE — 93880 EXTRACRANIAL BILAT STUDY: CPT | Mod: 26,HCNC,, | Performed by: RADIOLOGY

## 2020-01-21 ENCOUNTER — LAB VISIT (OUTPATIENT)
Dept: LAB | Facility: HOSPITAL | Age: 85
End: 2020-01-21
Attending: INTERNAL MEDICINE
Payer: MEDICARE

## 2020-01-21 DIAGNOSIS — Z98.890 S/P CAROTID ENDARTERECTOMY: ICD-10-CM

## 2020-01-21 DIAGNOSIS — D50.9 IRON DEFICIENCY ANEMIA, UNSPECIFIED IRON DEFICIENCY ANEMIA TYPE: ICD-10-CM

## 2020-01-21 DIAGNOSIS — I10 ESSENTIAL HYPERTENSION: Chronic | ICD-10-CM

## 2020-01-21 DIAGNOSIS — N18.30 CHRONIC KIDNEY DISEASE, STAGE III (MODERATE): ICD-10-CM

## 2020-01-21 DIAGNOSIS — E78.2 MIXED HYPERLIPIDEMIA: ICD-10-CM

## 2020-01-21 DIAGNOSIS — E03.9 ACQUIRED HYPOTHYROIDISM: ICD-10-CM

## 2020-01-21 LAB
ALBUMIN SERPL BCP-MCNC: 3.7 G/DL (ref 3.5–5.2)
ALP SERPL-CCNC: 96 U/L (ref 55–135)
ALT SERPL W/O P-5'-P-CCNC: 8 U/L (ref 10–44)
ANION GAP SERPL CALC-SCNC: 11 MMOL/L (ref 8–16)
AST SERPL-CCNC: 15 U/L (ref 10–40)
BASOPHILS # BLD AUTO: 0.04 K/UL (ref 0–0.2)
BASOPHILS NFR BLD: 0.6 % (ref 0–1.9)
BILIRUB SERPL-MCNC: 0.6 MG/DL (ref 0.1–1)
BUN SERPL-MCNC: 18 MG/DL (ref 8–23)
CALCIUM SERPL-MCNC: 9.5 MG/DL (ref 8.7–10.5)
CHLORIDE SERPL-SCNC: 106 MMOL/L (ref 95–110)
CHOLEST SERPL-MCNC: 155 MG/DL (ref 120–199)
CHOLEST/HDLC SERPL: 2.9 {RATIO} (ref 2–5)
CO2 SERPL-SCNC: 25 MMOL/L (ref 23–29)
CREAT SERPL-MCNC: 0.9 MG/DL (ref 0.5–1.4)
DIFFERENTIAL METHOD: ABNORMAL
EOSINOPHIL # BLD AUTO: 0.5 K/UL (ref 0–0.5)
EOSINOPHIL NFR BLD: 7.3 % (ref 0–8)
ERYTHROCYTE [DISTWIDTH] IN BLOOD BY AUTOMATED COUNT: 13.2 % (ref 11.5–14.5)
EST. GFR  (AFRICAN AMERICAN): >60 ML/MIN/1.73 M^2
EST. GFR  (NON AFRICAN AMERICAN): 58.9 ML/MIN/1.73 M^2
FERRITIN SERPL-MCNC: 50 NG/ML (ref 20–300)
GLUCOSE SERPL-MCNC: 93 MG/DL (ref 70–110)
HCT VFR BLD AUTO: 41.6 % (ref 37–48.5)
HDLC SERPL-MCNC: 54 MG/DL (ref 40–75)
HDLC SERPL: 34.8 % (ref 20–50)
HGB BLD-MCNC: 12.5 G/DL (ref 12–16)
IMM GRANULOCYTES # BLD AUTO: 0.01 K/UL (ref 0–0.04)
IMM GRANULOCYTES NFR BLD AUTO: 0.2 % (ref 0–0.5)
LDLC SERPL CALC-MCNC: 84.8 MG/DL (ref 63–159)
LYMPHOCYTES # BLD AUTO: 1.5 K/UL (ref 1–4.8)
LYMPHOCYTES NFR BLD: 24.4 % (ref 18–48)
MCH RBC QN AUTO: 30 PG (ref 27–31)
MCHC RBC AUTO-ENTMCNC: 30 G/DL (ref 32–36)
MCV RBC AUTO: 100 FL (ref 82–98)
MONOCYTES # BLD AUTO: 0.7 K/UL (ref 0.3–1)
MONOCYTES NFR BLD: 10.6 % (ref 4–15)
NEUTROPHILS # BLD AUTO: 3.6 K/UL (ref 1.8–7.7)
NEUTROPHILS NFR BLD: 56.9 % (ref 38–73)
NONHDLC SERPL-MCNC: 101 MG/DL
NRBC BLD-RTO: 0 /100 WBC
PLATELET # BLD AUTO: 247 K/UL (ref 150–350)
PMV BLD AUTO: 11 FL (ref 9.2–12.9)
POTASSIUM SERPL-SCNC: 4.5 MMOL/L (ref 3.5–5.1)
PROT SERPL-MCNC: 7.4 G/DL (ref 6–8.4)
RBC # BLD AUTO: 4.17 M/UL (ref 4–5.4)
SODIUM SERPL-SCNC: 142 MMOL/L (ref 136–145)
T4 FREE SERPL-MCNC: 1.25 NG/DL (ref 0.71–1.51)
TRIGL SERPL-MCNC: 81 MG/DL (ref 30–150)
TSH SERPL DL<=0.005 MIU/L-ACNC: 1.26 UIU/ML (ref 0.4–4)
WBC # BLD AUTO: 6.31 K/UL (ref 3.9–12.7)

## 2020-01-21 PROCEDURE — 84439 ASSAY OF FREE THYROXINE: CPT | Mod: HCNC

## 2020-01-21 PROCEDURE — 85025 COMPLETE CBC W/AUTO DIFF WBC: CPT | Mod: HCNC

## 2020-01-21 PROCEDURE — 84481 FREE ASSAY (FT-3): CPT | Mod: HCNC

## 2020-01-21 PROCEDURE — 36415 COLL VENOUS BLD VENIPUNCTURE: CPT | Mod: HCNC,PN

## 2020-01-21 PROCEDURE — 84443 ASSAY THYROID STIM HORMONE: CPT | Mod: HCNC

## 2020-01-21 PROCEDURE — 80061 LIPID PANEL: CPT | Mod: HCNC

## 2020-01-21 PROCEDURE — 80053 COMPREHEN METABOLIC PANEL: CPT | Mod: HCNC

## 2020-01-21 PROCEDURE — 82728 ASSAY OF FERRITIN: CPT | Mod: HCNC

## 2020-01-22 LAB — T3FREE SERPL-MCNC: 2.8 PG/ML (ref 2.3–4.2)

## 2020-01-23 DIAGNOSIS — E78.2 MIXED HYPERLIPIDEMIA: ICD-10-CM

## 2020-01-23 DIAGNOSIS — E03.9 ACQUIRED HYPOTHYROIDISM: ICD-10-CM

## 2020-01-23 DIAGNOSIS — E66.3 OVERWEIGHT (BMI 25.0-29.9): ICD-10-CM

## 2020-01-23 DIAGNOSIS — Z98.890 S/P CAROTID ENDARTERECTOMY: ICD-10-CM

## 2020-01-26 RX ORDER — LEVOTHYROXINE SODIUM 75 UG/1
TABLET ORAL
Qty: 90 TABLET | Refills: 3 | Status: SHIPPED | OUTPATIENT
Start: 2020-01-26 | End: 2020-11-20

## 2020-01-28 ENCOUNTER — OFFICE VISIT (OUTPATIENT)
Dept: FAMILY MEDICINE | Facility: CLINIC | Age: 85
End: 2020-01-28
Payer: MEDICARE

## 2020-01-28 VITALS
HEIGHT: 64 IN | SYSTOLIC BLOOD PRESSURE: 102 MMHG | DIASTOLIC BLOOD PRESSURE: 64 MMHG | TEMPERATURE: 98 F | WEIGHT: 169.44 LBS | BODY MASS INDEX: 28.93 KG/M2 | HEART RATE: 60 BPM | OXYGEN SATURATION: 96 %

## 2020-01-28 DIAGNOSIS — E66.3 OVERWEIGHT (BMI 25.0-29.9): ICD-10-CM

## 2020-01-28 DIAGNOSIS — D64.9 ANEMIA, UNSPECIFIED TYPE: ICD-10-CM

## 2020-01-28 DIAGNOSIS — E03.9 ACQUIRED HYPOTHYROIDISM: ICD-10-CM

## 2020-01-28 DIAGNOSIS — E78.2 MIXED HYPERLIPIDEMIA: ICD-10-CM

## 2020-01-28 DIAGNOSIS — I10 ESSENTIAL HYPERTENSION: Primary | Chronic | ICD-10-CM

## 2020-01-28 DIAGNOSIS — N18.30 CHRONIC KIDNEY DISEASE, STAGE III (MODERATE): ICD-10-CM

## 2020-01-28 DIAGNOSIS — K21.9 GASTROESOPHAGEAL REFLUX DISEASE, ESOPHAGITIS PRESENCE NOT SPECIFIED: ICD-10-CM

## 2020-01-28 PROCEDURE — 1101F PT FALLS ASSESS-DOCD LE1/YR: CPT | Mod: HCNC,CPTII,S$GLB, | Performed by: INTERNAL MEDICINE

## 2020-01-28 PROCEDURE — 3078F PR MOST RECENT DIASTOLIC BLOOD PRESSURE < 80 MM HG: ICD-10-PCS | Mod: HCNC,CPTII,S$GLB, | Performed by: INTERNAL MEDICINE

## 2020-01-28 PROCEDURE — 99999 PR PBB SHADOW E&M-EST. PATIENT-LVL III: CPT | Mod: PBBFAC,HCNC,, | Performed by: INTERNAL MEDICINE

## 2020-01-28 PROCEDURE — 99499 UNLISTED E&M SERVICE: CPT | Mod: HCNC,S$GLB,, | Performed by: INTERNAL MEDICINE

## 2020-01-28 PROCEDURE — 99214 OFFICE O/P EST MOD 30 MIN: CPT | Mod: HCNC,S$GLB,, | Performed by: INTERNAL MEDICINE

## 2020-01-28 PROCEDURE — 1101F PR PT FALLS ASSESS DOC 0-1 FALLS W/OUT INJ PAST YR: ICD-10-PCS | Mod: HCNC,CPTII,S$GLB, | Performed by: INTERNAL MEDICINE

## 2020-01-28 PROCEDURE — 1159F PR MEDICATION LIST DOCUMENTED IN MEDICAL RECORD: ICD-10-PCS | Mod: HCNC,S$GLB,, | Performed by: INTERNAL MEDICINE

## 2020-01-28 PROCEDURE — 1159F MED LIST DOCD IN RCRD: CPT | Mod: HCNC,S$GLB,, | Performed by: INTERNAL MEDICINE

## 2020-01-28 PROCEDURE — 99499 RISK ADDL DX/OHS AUDIT: ICD-10-PCS | Mod: HCNC,S$GLB,, | Performed by: INTERNAL MEDICINE

## 2020-01-28 PROCEDURE — 3074F SYST BP LT 130 MM HG: CPT | Mod: HCNC,CPTII,S$GLB, | Performed by: INTERNAL MEDICINE

## 2020-01-28 PROCEDURE — 3074F PR MOST RECENT SYSTOLIC BLOOD PRESSURE < 130 MM HG: ICD-10-PCS | Mod: HCNC,CPTII,S$GLB, | Performed by: INTERNAL MEDICINE

## 2020-01-28 PROCEDURE — 3078F DIAST BP <80 MM HG: CPT | Mod: HCNC,CPTII,S$GLB, | Performed by: INTERNAL MEDICINE

## 2020-01-28 PROCEDURE — 99999 PR PBB SHADOW E&M-EST. PATIENT-LVL III: ICD-10-PCS | Mod: PBBFAC,HCNC,, | Performed by: INTERNAL MEDICINE

## 2020-01-28 PROCEDURE — 99214 PR OFFICE/OUTPT VISIT, EST, LEVL IV, 30-39 MIN: ICD-10-PCS | Mod: HCNC,S$GLB,, | Performed by: INTERNAL MEDICINE

## 2020-01-28 RX ORDER — OMEPRAZOLE 20 MG/1
CAPSULE, DELAYED RELEASE ORAL
Qty: 90 CAPSULE | Refills: 0 | Status: SHIPPED | OUTPATIENT
Start: 2020-01-28 | End: 2020-09-01

## 2020-01-28 NOTE — PATIENT INSTRUCTIONS
Essential hypertension.Maintain < 2 Gm Na a day diet, and monitor BP at home; keep a log. Decrease metoprolol to 1/2 of 25 mg every morning.   -     Comprehensive metabolic panel; Future; Expected date: 01/28/2020  -     CBC auto differential; Future; Expected date: 01/28/2020  -     Lipid panel; Future; Expected date: 01/28/2020  -     TSH; Future; Expected date: 01/28/2020  -     T4, free; Future; Expected date: 01/28/2020  -     T3, free; Future; Expected date: 01/28/2020  -     Urinalysis; Future; Expected date: 01/28/2020    Chronic kidney disease, stage III (moderate); keep well hydrated; no NSAID agents; can use tylenol for pain  -     Comprehensive metabolic panel; Future; Expected date: 01/28/2020  -     Urinalysis; Future; Expected date: 01/28/2020    Mixed hyperlipidemia.Maintain low fat high fiber diet, exercise regularly. Weight reduction where indicated. Cont atorvastatin at 40 mg a day.   -     Comprehensive metabolic panel; Future; Expected date: 01/28/2020  -     Lipid panel; Future; Expected date: 01/28/2020  -     TSH; Future; Expected date: 01/28/2020  -     T4, free; Future; Expected date: 01/28/2020  -     T3, free; Future; Expected date: 01/28/2020    Acquired hypothyroidism; same thyroid dosing. Levothyroxine 75 mcg a day.   -     TSH; Future; Expected date: 01/28/2020  -     T4, free; Future; Expected date: 01/28/2020  -     T3, free; Future; Expected date: 01/28/2020    Gastroesophageal reflux disease, esophagitis presence not specified; No bedtime snacks; weight reduction. Change omeprazole from 40 to 20 mg a day as needed for reflux.   -     omeprazole (PRILOSEC) 20 MG capsule; 20 mg p.o. daily as needed for reflux  Dispense: 90 capsule; Refill: 0    Overweight (BMI 25.0-29.9).Caloric restriction w regular exercise and weight reduction.    Anemia, unspecified type; stop iron/feosol/ferrous sulfate supplements; cont MVI one a day.   -     CBC auto differential; Future; Expected date:  01/28/2020

## 2020-01-28 NOTE — PROGRESS NOTES
Subjective:       Patient ID: Nida Kline is a 84 y.o. female.    Chief Complaint: Follow-up    HPI  patient here today for reassessment and go over her labs. Labs reviewed w pt and ordered for f/u.   Essential hypertension:  Blood pressure at home has been averaging 115/64; she does watch her salt intake here manually she is 102/64.  Decrease metoprolol to half of a 25 mg tablet daily to let her blood pressure come up some.  CKD stage 3:  Keeps well hydrated; knows not to use any NSA ID agents; knows to use Tylenol as needed for pain.  Mixed hyperlipidemia:  On low-fat high-fiber diet or at least tries; tolerates atorvastatin fine.  w HDL 54 and LDL 84.8. Carotid US discussed w pt; no evidence of significant hemodynamic stenosis; add findings: Mild homogeneous plaque at the right carotid bifurcation with less than 50% luminal stenosis  Acquired hypothyroidism:  Takes levothyroxine appropriately; on levothyroxine 75 mcg p.o. Daily.  GERD:  Has not been a problem; she knows to avoid bedtime snacks; she has been advised to stop ranitidine due to his recent association with cancer.  Which she was apparently a rope aware of already.  Will change omeprazole to 20 mg p.o. daily as needed for reflux, from 40 mg daily p.r.n. Reflux  Anemia: has resolved; ferritin improved.     Review of Systems   Constitutional: Negative for fever and unexpected weight change.   HENT: Negative for congestion, postnasal drip and rhinorrhea.    Respiratory: Negative for cough, chest tightness, shortness of breath and wheezing.    Cardiovascular: Negative for chest pain, palpitations and leg swelling.   Gastrointestinal: Negative for abdominal pain, blood in stool, constipation, diarrhea, nausea and vomiting.        No reflux.    Genitourinary: Negative for dysuria and hematuria.   Allergic/Immunologic: Negative for environmental allergies and food allergies.   Neurological: Negative for syncope and weakness.  "  Psychiatric/Behavioral: Negative for dysphoric mood. The patient is not nervous/anxious.        Objective:      Vitals:    01/28/20 1307   BP: 102/64   BP Location: Right arm   Patient Position: Sitting   BP Method: Medium (Manual)   Pulse: 60   Temp: 98.1 °F (36.7 °C)   TempSrc: Oral   SpO2: 96%   Weight: 76.9 kg (169 lb 6.8 oz)   Height: 5' 4" (1.626 m)     Body mass index is 29.08 kg/m².  Wt Readings from Last 3 Encounters:   01/28/20 76.9 kg (169 lb 6.8 oz)   09/26/19 77.2 kg (170 lb 3.1 oz)   06/25/19 77.4 kg (170 lb 10.2 oz)        Physical Exam   Constitutional: She is oriented to person, place, and time. She appears well-developed and well-nourished.   HENT:   Head: Normocephalic and atraumatic.   Eyes: EOM are normal.   Neck: Normal range of motion. Neck supple. No thyromegaly present.   No carotid bruits heard.    Cardiovascular: Normal rate, regular rhythm and normal heart sounds. Exam reveals no gallop.   No murmur heard.  Pulmonary/Chest: Effort normal and breath sounds normal. No respiratory distress. She has no wheezes. She has no rales.   Abdominal: Soft. Bowel sounds are normal. She exhibits no distension. There is no tenderness. There is no rebound and no guarding.   Musculoskeletal: Normal range of motion. She exhibits no edema.   Lymphadenopathy:     She has no cervical adenopathy.   Neurological: She is alert and oriented to person, place, and time.   Moves all 4 extremities fine.   Skin: No rash noted.   Psychiatric: She has a normal mood and affect. Her behavior is normal. Thought content normal.   Vitals reviewed.      Assessment:       1. Essential hypertension    2. Chronic kidney disease, stage III (moderate)    3. Mixed hyperlipidemia    4. Acquired hypothyroidism    5. Gastroesophageal reflux disease, esophagitis presence not specified    6. Overweight (BMI 25.0-29.9)    7. Anemia, unspecified type        Plan:       Essential hypertension.Maintain < 2 Gm Na a day diet, and monitor BP " at home; keep a log. Decrease metoprolol to 1/2 of 25 mg every morning.   -     Comprehensive metabolic panel; Future; Expected date: 01/28/2020  -     CBC auto differential; Future; Expected date: 01/28/2020  -     Lipid panel; Future; Expected date: 01/28/2020  -     TSH; Future; Expected date: 01/28/2020  -     T4, free; Future; Expected date: 01/28/2020  -     T3, free; Future; Expected date: 01/28/2020  -     Urinalysis; Future; Expected date: 01/28/2020    Chronic kidney disease, stage III (moderate); keep well hydrated; no NSAID agents; can use tylenol for pain  -     Comprehensive metabolic panel; Future; Expected date: 01/28/2020  -     Urinalysis; Future; Expected date: 01/28/2020    Mixed hyperlipidemia.Maintain low fat high fiber diet, exercise regularly. Weight reduction where indicated. Cont atorvastatin at 40 mg a day.   -     Comprehensive metabolic panel; Future; Expected date: 01/28/2020  -     Lipid panel; Future; Expected date: 01/28/2020  -     TSH; Future; Expected date: 01/28/2020  -     T4, free; Future; Expected date: 01/28/2020  -     T3, free; Future; Expected date: 01/28/2020    Acquired hypothyroidism; same thyroid dosing. Levothyroxine 75 mcg a day.   -     TSH; Future; Expected date: 01/28/2020  -     T4, free; Future; Expected date: 01/28/2020  -     T3, free; Future; Expected date: 01/28/2020    Gastroesophageal reflux disease, esophagitis presence not specified; No bedtime snacks; weight reduction. Change omeprazole from 40 to 20 mg a day as needed for reflux.   -     omeprazole (PRILOSEC) 20 MG capsule; 20 mg p.o. daily as needed for reflux  Dispense: 90 capsule; Refill: 0    Overweight (BMI 25.0-29.9).Caloric restriction w regular exercise and weight reduction.    Anemia, unspecified type; stop iron/feosol/ferrous sulfate supplements; cont MVI one a day.   -     CBC auto differential; Future; Expected date: 01/28/2020

## 2020-02-17 DIAGNOSIS — I65.23 BILATERAL CAROTID ARTERY STENOSIS: Primary | ICD-10-CM

## 2020-03-30 DIAGNOSIS — E78.00 PURE HYPERCHOLESTEROLEMIA: ICD-10-CM

## 2020-03-30 DIAGNOSIS — F41.9 ANXIETY: ICD-10-CM

## 2020-03-30 DIAGNOSIS — Z98.890 S/P CAROTID ENDARTERECTOMY: ICD-10-CM

## 2020-03-30 RX ORDER — ATORVASTATIN CALCIUM 40 MG/1
TABLET, FILM COATED ORAL
Qty: 90 TABLET | Refills: 1 | Status: SHIPPED | OUTPATIENT
Start: 2020-03-30 | End: 2020-09-01

## 2020-03-30 RX ORDER — BUSPIRONE HYDROCHLORIDE 15 MG/1
TABLET ORAL
Qty: 90 TABLET | Refills: 1 | Status: SHIPPED | OUTPATIENT
Start: 2020-03-30 | End: 2020-09-01

## 2020-05-06 ENCOUNTER — PATIENT MESSAGE (OUTPATIENT)
Dept: ADMINISTRATIVE | Facility: HOSPITAL | Age: 85
End: 2020-05-06

## 2020-05-08 NOTE — TELEPHONE ENCOUNTER
----- Message from Maria Luisa Rachel sent at 3/13/2017 10:57 AM CDT -----  Contact: self  Patient is requesting a refill on atorvastatin 20mg    Please send to    [facility] - [place], [place] - [address]  [address]  [facility] [address]  Phone: [phone] Fax: [phone]       Attending MD Lemus:  I personally have seen and examined this patient.  Resident note reviewed and agree on plan of care and except where noted.

## 2020-07-10 ENCOUNTER — LAB VISIT (OUTPATIENT)
Dept: LAB | Facility: HOSPITAL | Age: 85
End: 2020-07-10
Attending: INTERNAL MEDICINE
Payer: MEDICARE

## 2020-07-10 DIAGNOSIS — E78.2 MIXED HYPERLIPIDEMIA: ICD-10-CM

## 2020-07-10 DIAGNOSIS — E03.9 ACQUIRED HYPOTHYROIDISM: ICD-10-CM

## 2020-07-10 DIAGNOSIS — I10 ESSENTIAL HYPERTENSION: Chronic | ICD-10-CM

## 2020-07-10 DIAGNOSIS — D64.9 ANEMIA, UNSPECIFIED TYPE: ICD-10-CM

## 2020-07-10 DIAGNOSIS — N18.30 CHRONIC KIDNEY DISEASE, STAGE III (MODERATE): ICD-10-CM

## 2020-07-10 LAB
ALBUMIN SERPL BCP-MCNC: 3.7 G/DL (ref 3.5–5.2)
ALP SERPL-CCNC: 99 U/L (ref 55–135)
ALT SERPL W/O P-5'-P-CCNC: 8 U/L (ref 10–44)
ANION GAP SERPL CALC-SCNC: 10 MMOL/L (ref 8–16)
AST SERPL-CCNC: 14 U/L (ref 10–40)
BASOPHILS # BLD AUTO: 0.05 K/UL (ref 0–0.2)
BASOPHILS NFR BLD: 0.8 % (ref 0–1.9)
BILIRUB SERPL-MCNC: 0.6 MG/DL (ref 0.1–1)
BUN SERPL-MCNC: 31 MG/DL (ref 8–23)
CALCIUM SERPL-MCNC: 8.9 MG/DL (ref 8.7–10.5)
CHLORIDE SERPL-SCNC: 106 MMOL/L (ref 95–110)
CHOLEST SERPL-MCNC: 146 MG/DL (ref 120–199)
CHOLEST/HDLC SERPL: 3 {RATIO} (ref 2–5)
CO2 SERPL-SCNC: 23 MMOL/L (ref 23–29)
CREAT SERPL-MCNC: 1.1 MG/DL (ref 0.5–1.4)
DIFFERENTIAL METHOD: ABNORMAL
EOSINOPHIL # BLD AUTO: 0.4 K/UL (ref 0–0.5)
EOSINOPHIL NFR BLD: 6.7 % (ref 0–8)
ERYTHROCYTE [DISTWIDTH] IN BLOOD BY AUTOMATED COUNT: 13.3 % (ref 11.5–14.5)
EST. GFR  (AFRICAN AMERICAN): 52.9 ML/MIN/1.73 M^2
EST. GFR  (NON AFRICAN AMERICAN): 45.9 ML/MIN/1.73 M^2
GLUCOSE SERPL-MCNC: 90 MG/DL (ref 70–110)
HCT VFR BLD AUTO: 39.5 % (ref 37–48.5)
HDLC SERPL-MCNC: 48 MG/DL (ref 40–75)
HDLC SERPL: 32.9 % (ref 20–50)
HGB BLD-MCNC: 11.8 G/DL (ref 12–16)
IMM GRANULOCYTES # BLD AUTO: 0.01 K/UL (ref 0–0.04)
IMM GRANULOCYTES NFR BLD AUTO: 0.2 % (ref 0–0.5)
LDLC SERPL CALC-MCNC: 79.2 MG/DL (ref 63–159)
LYMPHOCYTES # BLD AUTO: 1.8 K/UL (ref 1–4.8)
LYMPHOCYTES NFR BLD: 26.6 % (ref 18–48)
MCH RBC QN AUTO: 29.5 PG (ref 27–31)
MCHC RBC AUTO-ENTMCNC: 29.9 G/DL (ref 32–36)
MCV RBC AUTO: 99 FL (ref 82–98)
MONOCYTES # BLD AUTO: 0.6 K/UL (ref 0.3–1)
MONOCYTES NFR BLD: 9.4 % (ref 4–15)
NEUTROPHILS # BLD AUTO: 3.7 K/UL (ref 1.8–7.7)
NEUTROPHILS NFR BLD: 56.3 % (ref 38–73)
NONHDLC SERPL-MCNC: 98 MG/DL
NRBC BLD-RTO: 0 /100 WBC
PLATELET # BLD AUTO: 256 K/UL (ref 150–350)
PMV BLD AUTO: 11.1 FL (ref 9.2–12.9)
POTASSIUM SERPL-SCNC: 4.4 MMOL/L (ref 3.5–5.1)
PROT SERPL-MCNC: 7.2 G/DL (ref 6–8.4)
RBC # BLD AUTO: 4 M/UL (ref 4–5.4)
SODIUM SERPL-SCNC: 139 MMOL/L (ref 136–145)
TRIGL SERPL-MCNC: 94 MG/DL (ref 30–150)
WBC # BLD AUTO: 6.57 K/UL (ref 3.9–12.7)

## 2020-07-10 PROCEDURE — 80061 LIPID PANEL: CPT | Mod: HCNC

## 2020-07-10 PROCEDURE — 36415 COLL VENOUS BLD VENIPUNCTURE: CPT | Mod: HCNC,PN

## 2020-07-10 PROCEDURE — 84481 FREE ASSAY (FT-3): CPT | Mod: HCNC

## 2020-07-10 PROCEDURE — 85025 COMPLETE CBC W/AUTO DIFF WBC: CPT | Mod: HCNC

## 2020-07-10 PROCEDURE — 84439 ASSAY OF FREE THYROXINE: CPT | Mod: HCNC

## 2020-07-10 PROCEDURE — 84443 ASSAY THYROID STIM HORMONE: CPT | Mod: HCNC

## 2020-07-10 PROCEDURE — 80053 COMPREHEN METABOLIC PANEL: CPT | Mod: HCNC

## 2020-07-11 LAB
T3FREE SERPL-MCNC: 3.1 PG/ML (ref 2.3–4.2)
T4 FREE SERPL-MCNC: 1.24 NG/DL (ref 0.71–1.51)
TSH SERPL DL<=0.005 MIU/L-ACNC: 1.02 UIU/ML (ref 0.4–4)

## 2020-07-17 ENCOUNTER — OFFICE VISIT (OUTPATIENT)
Dept: FAMILY MEDICINE | Facility: CLINIC | Age: 85
End: 2020-07-17
Payer: MEDICARE

## 2020-07-17 VITALS
DIASTOLIC BLOOD PRESSURE: 56 MMHG | BODY MASS INDEX: 29.08 KG/M2 | HEART RATE: 62 BPM | WEIGHT: 170.31 LBS | OXYGEN SATURATION: 97 % | TEMPERATURE: 98 F | HEIGHT: 64 IN | SYSTOLIC BLOOD PRESSURE: 108 MMHG

## 2020-07-17 DIAGNOSIS — E03.9 ACQUIRED HYPOTHYROIDISM: ICD-10-CM

## 2020-07-17 DIAGNOSIS — N18.30 CHRONIC KIDNEY DISEASE, STAGE III (MODERATE): ICD-10-CM

## 2020-07-17 DIAGNOSIS — E78.2 MIXED HYPERLIPIDEMIA: ICD-10-CM

## 2020-07-17 DIAGNOSIS — D51.8 MACROCYTIC ANEMIA WITH VITAMIN B12 DEFICIENCY: ICD-10-CM

## 2020-07-17 DIAGNOSIS — K21.9 GASTROESOPHAGEAL REFLUX DISEASE, ESOPHAGITIS PRESENCE NOT SPECIFIED: ICD-10-CM

## 2020-07-17 DIAGNOSIS — E66.3 OVERWEIGHT (BMI 25.0-29.9): ICD-10-CM

## 2020-07-17 DIAGNOSIS — Z98.890 S/P CAROTID ENDARTERECTOMY: ICD-10-CM

## 2020-07-17 DIAGNOSIS — I77.9 BILATERAL CAROTID ARTERY DISEASE, UNSPECIFIED TYPE: ICD-10-CM

## 2020-07-17 DIAGNOSIS — I10 ESSENTIAL HYPERTENSION: Primary | Chronic | ICD-10-CM

## 2020-07-17 DIAGNOSIS — R79.89 PRERENAL AZOTEMIA: ICD-10-CM

## 2020-07-17 PROCEDURE — 99499 UNLISTED E&M SERVICE: CPT | Mod: HCNC,S$GLB,, | Performed by: INTERNAL MEDICINE

## 2020-07-17 PROCEDURE — 3074F PR MOST RECENT SYSTOLIC BLOOD PRESSURE < 130 MM HG: ICD-10-PCS | Mod: HCNC,CPTII,S$GLB, | Performed by: INTERNAL MEDICINE

## 2020-07-17 PROCEDURE — 99214 OFFICE O/P EST MOD 30 MIN: CPT | Mod: HCNC,S$GLB,, | Performed by: INTERNAL MEDICINE

## 2020-07-17 PROCEDURE — 3074F SYST BP LT 130 MM HG: CPT | Mod: HCNC,CPTII,S$GLB, | Performed by: INTERNAL MEDICINE

## 2020-07-17 PROCEDURE — 1101F PR PT FALLS ASSESS DOC 0-1 FALLS W/OUT INJ PAST YR: ICD-10-PCS | Mod: HCNC,CPTII,S$GLB, | Performed by: INTERNAL MEDICINE

## 2020-07-17 PROCEDURE — 99999 PR PBB SHADOW E&M-EST. PATIENT-LVL IV: ICD-10-PCS | Mod: PBBFAC,HCNC,, | Performed by: INTERNAL MEDICINE

## 2020-07-17 PROCEDURE — 99214 PR OFFICE/OUTPT VISIT, EST, LEVL IV, 30-39 MIN: ICD-10-PCS | Mod: HCNC,S$GLB,, | Performed by: INTERNAL MEDICINE

## 2020-07-17 PROCEDURE — 1101F PT FALLS ASSESS-DOCD LE1/YR: CPT | Mod: HCNC,CPTII,S$GLB, | Performed by: INTERNAL MEDICINE

## 2020-07-17 PROCEDURE — 99499 RISK ADDL DX/OHS AUDIT: ICD-10-PCS | Mod: HCNC,S$GLB,, | Performed by: INTERNAL MEDICINE

## 2020-07-17 PROCEDURE — 99999 PR PBB SHADOW E&M-EST. PATIENT-LVL IV: CPT | Mod: PBBFAC,HCNC,, | Performed by: INTERNAL MEDICINE

## 2020-07-17 PROCEDURE — 3078F PR MOST RECENT DIASTOLIC BLOOD PRESSURE < 80 MM HG: ICD-10-PCS | Mod: HCNC,CPTII,S$GLB, | Performed by: INTERNAL MEDICINE

## 2020-07-17 PROCEDURE — 3078F DIAST BP <80 MM HG: CPT | Mod: HCNC,CPTII,S$GLB, | Performed by: INTERNAL MEDICINE

## 2020-07-17 PROCEDURE — 1159F MED LIST DOCD IN RCRD: CPT | Mod: HCNC,S$GLB,, | Performed by: INTERNAL MEDICINE

## 2020-07-17 PROCEDURE — 1159F PR MEDICATION LIST DOCUMENTED IN MEDICAL RECORD: ICD-10-PCS | Mod: HCNC,S$GLB,, | Performed by: INTERNAL MEDICINE

## 2020-07-17 NOTE — PATIENT INSTRUCTIONS
Essential hypertension: Maintain < 2 Gm Na a day diet, and monitor BP at home; keep a log. Decrease losartan to 1/2 of 25 mg a day. Low nl SBP.   -     Lipid Panel; Future; Expected date: 07/17/2020  -     CBC auto differential; Future; Expected date: 07/17/2020  -     Basic metabolic panel; Future; Expected date: 07/17/2020    Chronic kidney disease, stage III (moderate): decrease losartan as above.   -     CBC auto differential; Future; Expected date: 07/17/2020  -     Basic metabolic panel; Future; Expected date: 07/17/2020    Prerenal azotemia: decrease losartan to 1/2 of 25 mg a day; Push fluids during the day more. .   -     CBC auto differential; Future; Expected date: 07/17/2020  -     Basic metabolic panel; Future; Expected date: 07/17/2020    Mixed hyperlipidemia: Maintain low fat high fiber diet, exercise regularly. Weight reduction where indicated. Cont atorvastatin 40 mg a day. Eat oatmeal in mornings.   -     Lipid Panel; Future; Expected date: 07/17/2020    S/P carotid endarterectomy; left; followe by Dr Humphries.     Bilateral carotid artery disease, unspecified type: ASA 81 mg a day.     Acquired hypothyroidism: same thyroid dosing. TFT down the line.    Gastroesophageal reflux disease, esophagitis presence not specified. Omeprazole 20 mg a day as needed.     Overweight (BMI 25.0-29.9: Caloric restriction w regular exercise and weight reduction.)  -     Lipid Panel; Future; Expected date: 07/17/2020    Macrocytic anemia with vitamin B12 deficiency: on B12 supplements.   -     Ferritin; Future; Expected date: 07/17/2020  -     Iron and TIBC; Future; Expected date: 07/17/2020  -     Vitamin B12; Future; Expected date: 07/17/2020  -     Folate; Future; Expected date: 07/17/2020

## 2020-07-17 NOTE — PROGRESS NOTES
Subjective:       Patient ID: Nida Kline is a 85 y.o. female.    Chief Complaint: Follow-up and Fatigue    HPI  Pt here for reassessment and to go over her labs.     Essential hypertension:  Blood pressures been running at home 110-115/60 she does watch his salt intake.  Here she is little bit lower at 108/56 manually with pulse 62.  She does have some complaints of fatigue recently reduced her metoprolol to half of a 25 mg tablet daily.     Prerenal azotemia:  BUN increased to 31 over creatinine 1.1.  GFR reduced from 58.9 to presently 45.9.  Decrease losartan to half a 25 mg p.o. daily to try and help her systolic blood pressure as well as her renal function.     CKD III:  Knows to avoid NSA ID agents and keep well hydrated.  GFR has worsened some from 58.9 to 45.9     Status post carotid end arterectomy in the left 2015; followed by Dr. Salter; recent carotid ultrasound January 2020: Mild homogeneous plaque at the right carotid bifurcation with less than 50% luminal stenosis.  On ASA 81 mg per day.     Mixed hyperlipidemia:  On low-fat high-fiber diet; tolerates atorvastatin 40 mg a day fine.  Total cholesterol 146/triglyceride 94/HDL 48/LDL 79.2.  Would add Metamucil daily to try and improve her LDL further and HDL.  Patient needs to cut back on dairy products she eats pepper Lucas cheese daily.     Hypothyroidism:  Takes levothyroxine appropriately on 75 g per day.  TSH 1.017 continue same dosing     Macrocytic anemia:  Hemoglobin 11.8 with MCV 99.  No alcohol intake.  Patient is on B12 supplements.  Will repeat B12 and folic acid levels on follow-up.      GERD:  No belching but occasional heartburn on omeprazole 20 mg a day p.r.n. basis averages about 2 times a week.     Overweight:  Can benefit from regular exercise and small portions to help her weight come down BMI 29.23.     Total time:  1:10 p.m. to 1:50 p.m..  Greater than 50% of time spent in discussion, counseling, and review.  Labs reviewed with  "patient at length in ordered for follow-up.  Additional 15 min spent on supplemental documentation and review       Review of Systems   Constitutional: Negative for appetite change and fever.   HENT: Negative for congestion, postnasal drip, rhinorrhea and sinus pressure.    Eyes: Negative for discharge and itching.   Respiratory: Negative for cough, chest tightness, shortness of breath and wheezing.    Cardiovascular: Negative for chest pain, palpitations and leg swelling.   Gastrointestinal: Negative for abdominal distention, abdominal pain, blood in stool, constipation, diarrhea, nausea and vomiting.   Endocrine: Negative for polydipsia, polyphagia and polyuria.   Genitourinary: Negative for dysuria and hematuria.   Musculoskeletal: Negative for arthralgias and myalgias.   Skin: Negative for rash.   Allergic/Immunologic: Negative for environmental allergies and food allergies.   Neurological: Negative for tremors, seizures and syncope.   Hematological: Negative for adenopathy. Does not bruise/bleed easily.   Psychiatric/Behavioral: Negative for dysphoric mood. The patient is not nervous/anxious.        Objective:      Vitals:    07/17/20 1302   BP: (!) 108/56   BP Location: Left arm   Patient Position: Sitting   BP Method: Large (Manual)   Pulse: 62   Temp: 98.1 °F (36.7 °C)   TempSrc: Temporal   SpO2: 97%   Weight: 77.3 kg (170 lb 4.9 oz)   Height: 5' 4" (1.626 m)     Body mass index is 29.23 kg/m².  Wt Readings from Last 3 Encounters:   07/17/20 77.3 kg (170 lb 4.9 oz)   01/28/20 76.9 kg (169 lb 6.8 oz)   09/26/19 77.2 kg (170 lb 3.1 oz)        Physical Exam  Vitals signs reviewed.   Constitutional:       Appearance: She is well-developed.   HENT:      Head: Normocephalic and atraumatic.   Neck:      Musculoskeletal: Normal range of motion and neck supple.      Thyroid: No thyromegaly.      Vascular: No carotid bruit.   Cardiovascular:      Rate and Rhythm: Normal rate and regular rhythm.      Heart sounds: " Normal heart sounds. No murmur. No gallop.    Pulmonary:      Effort: Pulmonary effort is normal. No respiratory distress.      Breath sounds: Normal breath sounds. No wheezing or rales.   Abdominal:      General: Bowel sounds are normal. There is no distension.      Palpations: Abdomen is soft.      Tenderness: There is no abdominal tenderness. There is no guarding or rebound.   Musculoskeletal: Normal range of motion.   Lymphadenopathy:      Cervical: No cervical adenopathy.   Skin:     Findings: No rash.   Neurological:      Mental Status: She is alert and oriented to person, place, and time.      Comments: Moves all 4 extremities fine.   Psychiatric:         Behavior: Behavior normal.         Thought Content: Thought content normal.         Assessment:       1. Essential hypertension    2. Chronic kidney disease, stage III (moderate)    3. Prerenal azotemia    4. Mixed hyperlipidemia    5. S/P carotid endarterectomy    6. Bilateral carotid artery disease, unspecified type    7. Acquired hypothyroidism    8. Gastroesophageal reflux disease, esophagitis presence not specified    9. Overweight (BMI 25.0-29.9)    10. Macrocytic anemia with vitamin B12 deficiency        Plan:       Essential hypertension: Maintain < 2 Gm Na a day diet, and monitor BP at home; keep a log. Decrease losartan to 1/2 of 25 mg a day. Low nl SBP.   -     Lipid Panel; Future; Expected date: 07/17/2020  -     CBC auto differential; Future; Expected date: 07/17/2020  -     Basic metabolic panel; Future; Expected date: 07/17/2020    Chronic kidney disease, stage III (moderate): decrease losartan as above. Push fluids during day more.   -     CBC auto differential; Future; Expected date: 07/17/2020  -     Basic metabolic panel; Future; Expected date: 07/17/2020    Prerenal azotemia: decrease losartan to 1/2 of 25 mg a day; push fluids during the day more. .   -     CBC auto differential; Future; Expected date: 07/17/2020  -     Basic metabolic  panel; Future; Expected date: 07/17/2020    Mixed hyperlipidemia: Maintain low fat high fiber diet, exercise regularly. Weight reduction where indicated. Cont atorvastatin 40 mg a day. Add metamucil daily; eat oatmeal in mornings.  Patient needs to adhere to diet better and exercise regularly in attempts to bring her weight down.  She is needs to cut back on dairy products as she has been eating pepper Jack cheese daily.  -     Lipid Panel; Future; Expected date: 07/17/2020    S/P carotid endarterectomy; left; followed by Dr Humphries.  Recent carotid ultrasound January/2020. Mild homogeneous plaque at the right carotid bifurcation with less than 50% luminal stenosis.  On ASA 81 mg per day.    Bilateral carotid artery disease, unspecified type: ASA 81 mg a day.     Acquired hypothyroidism: same thyroid dosing. TFT down the line.  Continue levothyroxine 75 mcg p.o. daily    Recently Gastroesophageal reflux disease, esophagitis presence not specified: omeprazole 2 mg as needed daily.  Avoid bedtime snacks    Overweight (BMI 25.0-29.9: Caloric restriction w regular exercise and weight reduction.  -     Lipid Panel; Future; Expected date: 07/17/2020    Macrocytic anemia with vitamin B12 deficiency: on B12 supplements.   -     Ferritin; Future; Expected date: 07/17/2020  -     Iron and TIBC; Future; Expected date: 07/17/2020  -     Vitamin B12; Future; Expected date: 07/17/2020  -     Folate; Future; Expected date: 07/17/2020

## 2020-08-05 DIAGNOSIS — I10 ESSENTIAL HYPERTENSION: ICD-10-CM

## 2020-08-09 RX ORDER — LOSARTAN POTASSIUM 25 MG/1
25 TABLET ORAL DAILY
Qty: 90 TABLET | Refills: 1 | Status: SHIPPED | OUTPATIENT
Start: 2020-08-09 | End: 2020-12-23

## 2020-08-27 ENCOUNTER — TELEPHONE (OUTPATIENT)
Dept: FAMILY MEDICINE | Facility: CLINIC | Age: 85
End: 2020-08-27

## 2020-08-27 DIAGNOSIS — I10 ESSENTIAL HYPERTENSION: Primary | ICD-10-CM

## 2020-08-27 DIAGNOSIS — Z01.84 ENCOUNTER FOR ANTIBODY RESPONSE EXAMINATION: ICD-10-CM

## 2020-08-27 NOTE — TELEPHONE ENCOUNTER
----- Message from Rolando Mcdonald sent at 8/27/2020  1:52 PM CDT -----  Type: Needs Medical Advice  Who Called: Patient    Best Call Back Number: [phone]  Additional Information: Patient states that she would like orders for Covid anti body testing added to her Lab on 09/10  Please call to advise

## 2020-08-29 NOTE — TELEPHONE ENCOUNTER
Please notify patient that orders for about COVID antibodies IgG have been signed by me.  Please add him to scheduled labs as per her request before her next follow-up appointment

## 2020-09-10 ENCOUNTER — LAB VISIT (OUTPATIENT)
Dept: LAB | Facility: HOSPITAL | Age: 85
End: 2020-09-10
Attending: INTERNAL MEDICINE
Payer: MEDICARE

## 2020-09-10 DIAGNOSIS — N18.30 CHRONIC KIDNEY DISEASE, STAGE III (MODERATE): ICD-10-CM

## 2020-09-10 DIAGNOSIS — R79.89 PRERENAL AZOTEMIA: ICD-10-CM

## 2020-09-10 DIAGNOSIS — E66.3 OVERWEIGHT (BMI 25.0-29.9): ICD-10-CM

## 2020-09-10 DIAGNOSIS — E78.2 MIXED HYPERLIPIDEMIA: ICD-10-CM

## 2020-09-10 DIAGNOSIS — D51.8 MACROCYTIC ANEMIA WITH VITAMIN B12 DEFICIENCY: ICD-10-CM

## 2020-09-10 DIAGNOSIS — I10 ESSENTIAL HYPERTENSION: Chronic | ICD-10-CM

## 2020-09-10 DIAGNOSIS — Z01.84 ENCOUNTER FOR ANTIBODY RESPONSE EXAMINATION: ICD-10-CM

## 2020-09-10 LAB
ANION GAP SERPL CALC-SCNC: 7 MMOL/L (ref 8–16)
BASOPHILS # BLD AUTO: 0.05 K/UL (ref 0–0.2)
BASOPHILS NFR BLD: 0.8 % (ref 0–1.9)
BUN SERPL-MCNC: 26 MG/DL (ref 8–23)
CALCIUM SERPL-MCNC: 9.2 MG/DL (ref 8.7–10.5)
CHLORIDE SERPL-SCNC: 108 MMOL/L (ref 95–110)
CHOLEST SERPL-MCNC: 152 MG/DL (ref 120–199)
CHOLEST/HDLC SERPL: 2.5 {RATIO} (ref 2–5)
CO2 SERPL-SCNC: 26 MMOL/L (ref 23–29)
CREAT SERPL-MCNC: 1.1 MG/DL (ref 0.5–1.4)
DIFFERENTIAL METHOD: ABNORMAL
EOSINOPHIL # BLD AUTO: 0.4 K/UL (ref 0–0.5)
EOSINOPHIL NFR BLD: 6.4 % (ref 0–8)
ERYTHROCYTE [DISTWIDTH] IN BLOOD BY AUTOMATED COUNT: 13.2 % (ref 11.5–14.5)
EST. GFR  (AFRICAN AMERICAN): 52.9 ML/MIN/1.73 M^2
EST. GFR  (NON AFRICAN AMERICAN): 45.9 ML/MIN/1.73 M^2
FERRITIN SERPL-MCNC: 34 NG/ML (ref 20–300)
FOLATE SERPL-MCNC: 9.3 NG/ML (ref 4–24)
GLUCOSE SERPL-MCNC: 102 MG/DL (ref 70–110)
HCT VFR BLD AUTO: 42.1 % (ref 37–48.5)
HDLC SERPL-MCNC: 61 MG/DL (ref 40–75)
HDLC SERPL: 40.1 % (ref 20–50)
HGB BLD-MCNC: 12.4 G/DL (ref 12–16)
IMM GRANULOCYTES # BLD AUTO: 0.01 K/UL (ref 0–0.04)
IMM GRANULOCYTES NFR BLD AUTO: 0.2 % (ref 0–0.5)
IRON SERPL-MCNC: 54 UG/DL (ref 30–160)
LDLC SERPL CALC-MCNC: 73.6 MG/DL (ref 63–159)
LYMPHOCYTES # BLD AUTO: 1.8 K/UL (ref 1–4.8)
LYMPHOCYTES NFR BLD: 28.2 % (ref 18–48)
MCH RBC QN AUTO: 29.2 PG (ref 27–31)
MCHC RBC AUTO-ENTMCNC: 29.5 G/DL (ref 32–36)
MCV RBC AUTO: 99 FL (ref 82–98)
MONOCYTES # BLD AUTO: 0.7 K/UL (ref 0.3–1)
MONOCYTES NFR BLD: 11 % (ref 4–15)
NEUTROPHILS # BLD AUTO: 3.4 K/UL (ref 1.8–7.7)
NEUTROPHILS NFR BLD: 53.4 % (ref 38–73)
NONHDLC SERPL-MCNC: 91 MG/DL
NRBC BLD-RTO: 0 /100 WBC
PLATELET # BLD AUTO: 287 K/UL (ref 150–350)
PMV BLD AUTO: 10.9 FL (ref 9.2–12.9)
POTASSIUM SERPL-SCNC: 5 MMOL/L (ref 3.5–5.1)
RBC # BLD AUTO: 4.24 M/UL (ref 4–5.4)
SARS-COV-2 IGG SERPLBLD QL IA.RAPID: NEGATIVE
SATURATED IRON: 15 % (ref 20–50)
SODIUM SERPL-SCNC: 141 MMOL/L (ref 136–145)
TOTAL IRON BINDING CAPACITY: 360 UG/DL (ref 250–450)
TRANSFERRIN SERPL-MCNC: 243 MG/DL (ref 200–375)
TRIGL SERPL-MCNC: 87 MG/DL (ref 30–150)
VIT B12 SERPL-MCNC: 1033 PG/ML (ref 210–950)
WBC # BLD AUTO: 6.39 K/UL (ref 3.9–12.7)

## 2020-09-10 PROCEDURE — 82607 VITAMIN B-12: CPT | Mod: HCNC

## 2020-09-10 PROCEDURE — 82746 ASSAY OF FOLIC ACID SERUM: CPT | Mod: HCNC

## 2020-09-10 PROCEDURE — 36415 COLL VENOUS BLD VENIPUNCTURE: CPT | Mod: HCNC,PN

## 2020-09-10 PROCEDURE — 80048 BASIC METABOLIC PNL TOTAL CA: CPT | Mod: HCNC

## 2020-09-10 PROCEDURE — 80061 LIPID PANEL: CPT | Mod: HCNC

## 2020-09-10 PROCEDURE — 83540 ASSAY OF IRON: CPT | Mod: HCNC

## 2020-09-10 PROCEDURE — 82728 ASSAY OF FERRITIN: CPT | Mod: HCNC

## 2020-09-10 PROCEDURE — 85025 COMPLETE CBC W/AUTO DIFF WBC: CPT | Mod: HCNC

## 2020-09-10 PROCEDURE — 86769 SARS-COV-2 COVID-19 ANTIBODY: CPT | Mod: HCNC

## 2020-09-17 ENCOUNTER — OFFICE VISIT (OUTPATIENT)
Dept: FAMILY MEDICINE | Facility: CLINIC | Age: 85
End: 2020-09-17
Payer: MEDICARE

## 2020-09-17 VITALS
HEART RATE: 63 BPM | OXYGEN SATURATION: 97 % | WEIGHT: 169.75 LBS | SYSTOLIC BLOOD PRESSURE: 112 MMHG | DIASTOLIC BLOOD PRESSURE: 64 MMHG | TEMPERATURE: 98 F | HEIGHT: 64 IN | BODY MASS INDEX: 28.98 KG/M2

## 2020-09-17 DIAGNOSIS — M81.0 OSTEOPOROSIS OF LUMBAR SPINE: ICD-10-CM

## 2020-09-17 DIAGNOSIS — E78.2 MIXED HYPERLIPIDEMIA: ICD-10-CM

## 2020-09-17 DIAGNOSIS — E61.1 IRON DEFICIENCY: ICD-10-CM

## 2020-09-17 DIAGNOSIS — E03.9 ACQUIRED HYPOTHYROIDISM: ICD-10-CM

## 2020-09-17 DIAGNOSIS — I10 ESSENTIAL HYPERTENSION: Primary | ICD-10-CM

## 2020-09-17 DIAGNOSIS — Z98.890 S/P CAROTID ENDARTERECTOMY: ICD-10-CM

## 2020-09-17 DIAGNOSIS — N18.31 STAGE 3A CHRONIC KIDNEY DISEASE: ICD-10-CM

## 2020-09-17 DIAGNOSIS — R74.8 ELEVATED VITAMIN B12 LEVEL: ICD-10-CM

## 2020-09-17 DIAGNOSIS — E55.9 VITAMIN D DEFICIENCY: ICD-10-CM

## 2020-09-17 DIAGNOSIS — R79.89 PRERENAL AZOTEMIA: ICD-10-CM

## 2020-09-17 DIAGNOSIS — E53.8 B12 DEFICIENCY: ICD-10-CM

## 2020-09-17 DIAGNOSIS — Z01.89 ENCOUNTER FOR LABORATORY TEST: ICD-10-CM

## 2020-09-17 DIAGNOSIS — D75.89 MACROCYTOSIS WITHOUT ANEMIA: ICD-10-CM

## 2020-09-17 DIAGNOSIS — Z78.0 POSTMENOPAUSAL: ICD-10-CM

## 2020-09-17 DIAGNOSIS — E53.8 FOLATE DEFICIENCY: ICD-10-CM

## 2020-09-17 PROCEDURE — 99999 PR PBB SHADOW E&M-EST. PATIENT-LVL IV: ICD-10-PCS | Mod: PBBFAC,HCNC,, | Performed by: INTERNAL MEDICINE

## 2020-09-17 PROCEDURE — 1159F PR MEDICATION LIST DOCUMENTED IN MEDICAL RECORD: ICD-10-PCS | Mod: HCNC,S$GLB,, | Performed by: INTERNAL MEDICINE

## 2020-09-17 PROCEDURE — 99214 OFFICE O/P EST MOD 30 MIN: CPT | Mod: HCNC,S$GLB,, | Performed by: INTERNAL MEDICINE

## 2020-09-17 PROCEDURE — 1101F PR PT FALLS ASSESS DOC 0-1 FALLS W/OUT INJ PAST YR: ICD-10-PCS | Mod: HCNC,CPTII,S$GLB, | Performed by: INTERNAL MEDICINE

## 2020-09-17 PROCEDURE — 3078F PR MOST RECENT DIASTOLIC BLOOD PRESSURE < 80 MM HG: ICD-10-PCS | Mod: HCNC,CPTII,S$GLB, | Performed by: INTERNAL MEDICINE

## 2020-09-17 PROCEDURE — 99999 PR PBB SHADOW E&M-EST. PATIENT-LVL IV: CPT | Mod: PBBFAC,HCNC,, | Performed by: INTERNAL MEDICINE

## 2020-09-17 PROCEDURE — 3074F SYST BP LT 130 MM HG: CPT | Mod: HCNC,CPTII,S$GLB, | Performed by: INTERNAL MEDICINE

## 2020-09-17 PROCEDURE — 99214 PR OFFICE/OUTPT VISIT, EST, LEVL IV, 30-39 MIN: ICD-10-PCS | Mod: HCNC,S$GLB,, | Performed by: INTERNAL MEDICINE

## 2020-09-17 PROCEDURE — 1159F MED LIST DOCD IN RCRD: CPT | Mod: HCNC,S$GLB,, | Performed by: INTERNAL MEDICINE

## 2020-09-17 PROCEDURE — 1101F PT FALLS ASSESS-DOCD LE1/YR: CPT | Mod: HCNC,CPTII,S$GLB, | Performed by: INTERNAL MEDICINE

## 2020-09-17 PROCEDURE — 3074F PR MOST RECENT SYSTOLIC BLOOD PRESSURE < 130 MM HG: ICD-10-PCS | Mod: HCNC,CPTII,S$GLB, | Performed by: INTERNAL MEDICINE

## 2020-09-17 PROCEDURE — 3078F DIAST BP <80 MM HG: CPT | Mod: HCNC,CPTII,S$GLB, | Performed by: INTERNAL MEDICINE

## 2020-09-17 NOTE — PROGRESS NOTES
Subjective:       Patient ID: Nida Kline is a 85 y.o. female.    Chief Complaint: Follow-up (review lab & Covid results)    HPI patient here for reassessment and go over her labs.  Advised that her COVID IgG antibody results from 09/10/2020 were negative     Hypertension:  Watches his salt intake; blood pressures been averaging around 110/60; blood pressure here today in clinic manually is 112/64.     Mixed hyperlipidemia:  On low-fat high-fiber diet at least tries; atorvastatin 40 mg per day tolerated fine; based on lipid profile will add Metamucil once daily     Hypothyroidism:  Takes levothyroxine appropriately; on 75 mcg daily     Chronic kidney disease stage 3a:  GFR 45.9, stable no NSA ID agents told to use Tylenol for pain.  BUN over creatinine 26/1.1 with bicarb 26 will reduce her losartan to 1/2 of a 25 mg tablet daily to see if this helps her renal function; GFR stable over the last 2 months.     GERD:  No bedtime snacks; doing fairly well; uses omeprazole on a p.r.n. basis.     Osteoporosis:  Walks 4615-5936 sounds in steps per day; on calcium and vitamin-D supplementation.  DEXA scan last in epic 06/19/2018 with DEXA scan having the following results:  T-score lumbar spine -2.5 which is a 3.6% increased from the prior study; left femoral neck T-score -2.4 which is a 2.9% decreased from the prior study.  Weight-bearing exercise and calcium and vitamin-D supplementation to continue.  DEXA scan due after 06/19/2019; will order for updated DEXA scan.  Update vitamin-D labs on follow-up      Encounter for lab test:  Reviewed with patient and ordered for follow-up  Total time 2:45 p.m. till 3:24 p.m..  Greater than 50% of time spent in discussion, counseling, and review.  Additional 15 min spent on supplemental documentation and review afterwards    Review of Systems   Constitutional: Negative for appetite change and fever.   HENT: Negative for congestion, postnasal drip, rhinorrhea and sinus pressure.   "  Eyes: Negative for discharge and itching.   Respiratory: Negative for cough, chest tightness, shortness of breath and wheezing.    Cardiovascular: Negative for chest pain, palpitations and leg swelling.   Gastrointestinal: Negative for abdominal distention, abdominal pain, blood in stool, constipation, diarrhea, nausea and vomiting.   Endocrine: Negative for polydipsia, polyphagia and polyuria.   Genitourinary: Negative for dysuria and hematuria.   Musculoskeletal: Negative for arthralgias and myalgias.   Skin: Negative for rash.   Allergic/Immunologic: Negative for environmental allergies and food allergies.   Neurological: Negative for tremors, seizures and syncope.   Hematological: Negative for adenopathy. Does not bruise/bleed easily.       Objective:      Vitals:    09/17/20 1418   BP: 112/64   BP Location: Left arm   Patient Position: Sitting   BP Method: Large (Manual)   Pulse: 63   Temp: 97.9 °F (36.6 °C)   TempSrc: Temporal   SpO2: 97%   Weight: 77 kg (169 lb 12.1 oz)   Height: 5' 4" (1.626 m)     Body mass index is 29.14 kg/m².  Wt Readings from Last 3 Encounters:   09/17/20 77 kg (169 lb 12.1 oz)   07/17/20 77.3 kg (170 lb 4.9 oz)   01/28/20 76.9 kg (169 lb 6.8 oz)        Physical Exam  Vitals signs reviewed.   Constitutional:       Appearance: She is well-developed.   HENT:      Head: Normocephalic and atraumatic.   Neck:      Musculoskeletal: Normal range of motion and neck supple.      Thyroid: No thyromegaly.      Vascular: No carotid bruit.   Cardiovascular:      Rate and Rhythm: Normal rate and regular rhythm.      Heart sounds: Normal heart sounds. No murmur. No gallop.    Pulmonary:      Effort: Pulmonary effort is normal. No respiratory distress.      Breath sounds: Normal breath sounds. No wheezing or rales.   Abdominal:      General: Bowel sounds are normal. There is no distension.      Palpations: Abdomen is soft.      Tenderness: There is no abdominal tenderness. There is no guarding or " rebound.   Musculoskeletal: Normal range of motion.      Right lower leg: No edema.      Left lower leg: No edema.   Lymphadenopathy:      Cervical: No cervical adenopathy.   Skin:     Findings: No rash.   Neurological:      Mental Status: She is alert and oriented to person, place, and time.      Comments: Moves all 4 extremities fine.   Psychiatric:         Behavior: Behavior normal.         Thought Content: Thought content normal.         Assessment:       1. Essential hypertension    2. Stage 3a chronic kidney disease    3. Prerenal azotemia    4. Mixed hyperlipidemia    5. S/P carotid endarterectomy    6. Acquired hypothyroidism    7. Iron deficiency    8. Macrocytosis without anemia    9. Elevated vitamin B12 level    10. Folate deficiency    11. B12 deficiency    12. Osteoporosis of lumbar spine    13. Vitamin D deficiency    14. Postmenopausal    15. Encounter for laboratory test        Plan:       Essential hypertension: Maintain < 2 Gm Na a day diet, and monitor BP at home; keep a log. Same treatment.  -     Basic metabolic panel; Future; Expected date: 09/17/2020    Chronic kidney disease, stage IIIa:  GFR 45.9; stable. Keep well hydrated. No NSAID agents; can use tylenol for pain; decrease losartan to half of a 25 mg tablet daily  -     CBC auto differential; Future; Expected date: 09/17/2020  -     Basic metabolic panel; Future; Expected date: 09/17/2020    Prerenal azotemia: more fluids during day.  Decrease losartan to half of a 25 mg tablet p.o. daily  -     Basic metabolic panel; Future; Expected date: 09/17/2020    Mixed hyperlipidemia: Maintain low fat high fiber diet, exercise regularly. Weight reduction where indicated. Cont atorvastatin and metamucil.     S/P carotid endarterectomy:  On ASA 81 mg daily    Acquired hypothyroidism: same levothyroxine dosing.    Iron deficiency: resume ferrous gluconate/fergon 324 mg a day.   -     CBC auto differential; Future; Expected date: 09/17/2020  -      Ferritin; Future; Expected date: 09/17/2020  -     Iron and TIBC; Future; Expected date: 09/17/2020    Macrocytosis without anemia: hold B12 supplements; Gummies MVI.   -     CBC auto differential; Future; Expected date: 09/17/2020  -     Folate; Future; Expected date: 09/17/2020    Elevated vitamin B12 level: stop B12 supplements.   -     Folate; Future; Expected date: 09/17/2020    Folate deficiency: add folate/folic acid 2 of 400 mcg a day.   -     Folate; Future; Expected date: 09/17/2020    B12 deficiency; prior; now stopped due to elevated.  -     Folate; Future; Expected date: 09/17/2020    Osteoporosis: Weight bearing exercises, continue calcium and vit D supplements. DEXA scabn at 1 yr intervals as needed.  Last DEXA in epic 06/19/2018.  DEXA scan needed for update.  Will obtain updated DEXA scan before making further recommendations on medications needed for osteoporosis    Vitamin-D deficiency:  Continue vitamin-D supplementation will check levels with next lab work    Encounter for lab test:  Labs reviewed with patient at length an ordered for follow-up.

## 2020-09-17 NOTE — PATIENT INSTRUCTIONS
Essential hypertension: Maintain < 2 Gm Na a day diet, and monitor BP at home; keep a log. Same treatment.  -     Basic metabolic panel; Future; Expected date: 09/17/2020    Chronic kidney disease, stage III (moderate): Keep well hydrated. No NSAID agents; can use tylenol for pain  -     CBC auto differential; Future; Expected date: 09/17/2020  -     Basic metabolic panel; Future; Expected date: 09/17/2020    Prerenal azotemia: more fluids during day.   -     Basic metabolic panel; Future; Expected date: 09/17/2020    Mixed hyperlipidemia: Maintain low fat high fiber diet, exercise regularly. Weight reduction where indicated. Cont atorvastatin and metamucil.     S/P carotid endarterectomy    Acquired hypothyroidism: same levothyrox    Iron deficiency: resume ferrous gluconate/fergon 324 mg a day.   -     CBC auto differential; Future; Expected date: 09/17/2020  -     Ferritin; Future; Expected date: 09/17/2020  -     Iron and TIBC; Future; Expected date: 09/17/2020    Macrocytosis without anemia: hold B12 supplements; Gummies MVI.   -     CBC auto differential; Future; Expected date: 09/17/2020  -     Folate; Future; Expected date: 09/17/2020    Elevated vitamin B12 level: stop B12 supplements.   -     Folate; Future; Expected date: 09/17/2020    Folate deficiency: add folate/folic acid 2 of 400 mcg a day.   -     Folate; Future; Expected date: 09/17/2020    B12 deficiency; prior; now stopped due to elevated.  -     Folate; Future; Expected date: 09/17/2020

## 2020-09-29 ENCOUNTER — PATIENT MESSAGE (OUTPATIENT)
Dept: OTHER | Facility: OTHER | Age: 85
End: 2020-09-29

## 2020-10-04 ENCOUNTER — TELEPHONE (OUTPATIENT)
Dept: FAMILY MEDICINE | Facility: CLINIC | Age: 85
End: 2020-10-04

## 2020-10-04 NOTE — TELEPHONE ENCOUNTER
Please call patient and ask her to please schedule her follow-up DEXA scan which she needs to have updated and order has been placed.  Please also remind her to drop her losartan to 1/2 of a 25 mg tablet daily; and to push fluids during the daytime.  Vitamin-D level was added for her labs for follow-up.  Please add this to her scheduled list of labs to be obtained before follow-up.  It has been ordered.  Thank you

## 2020-10-08 NOTE — TELEPHONE ENCOUNTER
Phoned pt to schedule dexa scan, also per provider's instructions pt to reduce Losartan 25 mg tablet to 1/2 tab daily. Pt verbalized understanding and scheduled dexa scan appt.

## 2020-10-16 ENCOUNTER — HOSPITAL ENCOUNTER (OUTPATIENT)
Dept: RADIOLOGY | Facility: HOSPITAL | Age: 85
Discharge: HOME | End: 2020-10-16
Attending: INTERNAL MEDICINE
Payer: MEDICARE

## 2020-10-16 DIAGNOSIS — Z78.0 POSTMENOPAUSAL: ICD-10-CM

## 2020-10-16 DIAGNOSIS — M81.0 OSTEOPOROSIS OF LUMBAR SPINE: ICD-10-CM

## 2020-10-16 DIAGNOSIS — E55.9 VITAMIN D DEFICIENCY: ICD-10-CM

## 2020-10-16 PROCEDURE — 77080 DEXA BONE DENSITY SPINE HIP: ICD-10-PCS | Mod: 26,HCNC,, | Performed by: RADIOLOGY

## 2020-10-16 PROCEDURE — 77080 DXA BONE DENSITY AXIAL: CPT | Mod: TC,HCNC,PO

## 2020-10-16 PROCEDURE — 77080 DXA BONE DENSITY AXIAL: CPT | Mod: 26,HCNC,, | Performed by: RADIOLOGY

## 2020-11-16 DIAGNOSIS — E78.2 MIXED HYPERLIPIDEMIA: ICD-10-CM

## 2020-11-16 DIAGNOSIS — E03.9 ACQUIRED HYPOTHYROIDISM: ICD-10-CM

## 2020-11-16 DIAGNOSIS — Z98.890 S/P CAROTID ENDARTERECTOMY: ICD-10-CM

## 2020-11-16 DIAGNOSIS — E66.3 OVERWEIGHT (BMI 25.0-29.9): ICD-10-CM

## 2020-11-20 RX ORDER — LEVOTHYROXINE SODIUM 75 UG/1
TABLET ORAL
Qty: 90 TABLET | Refills: 3 | Status: SHIPPED | OUTPATIENT
Start: 2020-11-20 | End: 2021-11-12

## 2020-12-11 ENCOUNTER — PATIENT MESSAGE (OUTPATIENT)
Dept: OTHER | Facility: OTHER | Age: 85
End: 2020-12-11

## 2020-12-23 DIAGNOSIS — I10 ESSENTIAL HYPERTENSION: ICD-10-CM

## 2020-12-23 RX ORDER — LOSARTAN POTASSIUM 25 MG/1
25 TABLET ORAL DAILY
Qty: 90 TABLET | Refills: 1 | Status: SHIPPED | OUTPATIENT
Start: 2020-12-23 | End: 2021-05-05

## 2021-01-05 ENCOUNTER — HOSPITAL ENCOUNTER (OUTPATIENT)
Dept: RADIOLOGY | Facility: HOSPITAL | Age: 86
Discharge: HOME | End: 2021-01-05
Attending: THORACIC SURGERY (CARDIOTHORACIC VASCULAR SURGERY)
Payer: MEDICARE

## 2021-01-05 DIAGNOSIS — I65.23 BILATERAL CAROTID ARTERY STENOSIS: ICD-10-CM

## 2021-01-05 PROCEDURE — 93880 US CAROTID BILATERAL: ICD-10-PCS | Mod: 26,HCNC,, | Performed by: RADIOLOGY

## 2021-01-05 PROCEDURE — 93880 EXTRACRANIAL BILAT STUDY: CPT | Mod: 26,HCNC,, | Performed by: RADIOLOGY

## 2021-01-05 PROCEDURE — 93880 EXTRACRANIAL BILAT STUDY: CPT | Mod: TC,HCNC,PO

## 2021-01-10 ENCOUNTER — IMMUNIZATION (OUTPATIENT)
Dept: FAMILY MEDICINE | Facility: CLINIC | Age: 86
End: 2021-01-10
Payer: MEDICARE

## 2021-01-10 DIAGNOSIS — Z23 NEED FOR VACCINATION: ICD-10-CM

## 2021-01-10 PROCEDURE — 91300 COVID-19, MRNA, LNP-S, PF, 30 MCG/0.3 ML DOSE VACCINE: CPT | Mod: PBBFAC | Performed by: FAMILY MEDICINE

## 2021-01-14 ENCOUNTER — LAB VISIT (OUTPATIENT)
Dept: LAB | Facility: HOSPITAL | Age: 86
End: 2021-01-14
Attending: INTERNAL MEDICINE
Payer: MEDICARE

## 2021-01-14 DIAGNOSIS — E55.9 VITAMIN D DEFICIENCY: ICD-10-CM

## 2021-01-14 DIAGNOSIS — E61.1 IRON DEFICIENCY: ICD-10-CM

## 2021-01-14 DIAGNOSIS — R79.89 PRERENAL AZOTEMIA: ICD-10-CM

## 2021-01-14 DIAGNOSIS — M81.0 OSTEOPOROSIS OF LUMBAR SPINE: ICD-10-CM

## 2021-01-14 DIAGNOSIS — E53.8 FOLATE DEFICIENCY: ICD-10-CM

## 2021-01-14 DIAGNOSIS — N18.31 STAGE 3A CHRONIC KIDNEY DISEASE: ICD-10-CM

## 2021-01-14 DIAGNOSIS — I10 ESSENTIAL HYPERTENSION: ICD-10-CM

## 2021-01-14 DIAGNOSIS — E53.8 B12 DEFICIENCY: ICD-10-CM

## 2021-01-14 DIAGNOSIS — D75.89 MACROCYTOSIS WITHOUT ANEMIA: ICD-10-CM

## 2021-01-14 DIAGNOSIS — R74.8 ELEVATED VITAMIN B12 LEVEL: ICD-10-CM

## 2021-01-14 LAB
BASOPHILS # BLD AUTO: 0.03 K/UL (ref 0–0.2)
BASOPHILS NFR BLD: 0.5 % (ref 0–1.9)
DIFFERENTIAL METHOD: ABNORMAL
EOSINOPHIL # BLD AUTO: 0.4 K/UL (ref 0–0.5)
EOSINOPHIL NFR BLD: 5.7 % (ref 0–8)
ERYTHROCYTE [DISTWIDTH] IN BLOOD BY AUTOMATED COUNT: 13.2 % (ref 11.5–14.5)
HCT VFR BLD AUTO: 43.9 % (ref 37–48.5)
HGB BLD-MCNC: 13.1 G/DL (ref 12–16)
IMM GRANULOCYTES # BLD AUTO: 0.01 K/UL (ref 0–0.04)
IMM GRANULOCYTES NFR BLD AUTO: 0.2 % (ref 0–0.5)
LYMPHOCYTES # BLD AUTO: 1.5 K/UL (ref 1–4.8)
LYMPHOCYTES NFR BLD: 23.8 % (ref 18–48)
MCH RBC QN AUTO: 30 PG (ref 27–31)
MCHC RBC AUTO-ENTMCNC: 29.8 G/DL (ref 32–36)
MCV RBC AUTO: 101 FL (ref 82–98)
MONOCYTES # BLD AUTO: 0.6 K/UL (ref 0.3–1)
MONOCYTES NFR BLD: 8.7 % (ref 4–15)
NEUTROPHILS # BLD AUTO: 4 K/UL (ref 1.8–7.7)
NEUTROPHILS NFR BLD: 61.1 % (ref 38–73)
NRBC BLD-RTO: 0 /100 WBC
PLATELET # BLD AUTO: 289 K/UL (ref 150–350)
PMV BLD AUTO: 10.6 FL (ref 9.2–12.9)
RBC # BLD AUTO: 4.37 M/UL (ref 4–5.4)
WBC # BLD AUTO: 6.47 K/UL (ref 3.9–12.7)

## 2021-01-14 PROCEDURE — 82728 ASSAY OF FERRITIN: CPT

## 2021-01-14 PROCEDURE — 82746 ASSAY OF FOLIC ACID SERUM: CPT

## 2021-01-14 PROCEDURE — 80048 BASIC METABOLIC PNL TOTAL CA: CPT

## 2021-01-14 PROCEDURE — 83540 ASSAY OF IRON: CPT

## 2021-01-14 PROCEDURE — 36415 COLL VENOUS BLD VENIPUNCTURE: CPT | Mod: PN

## 2021-01-14 PROCEDURE — 82306 VITAMIN D 25 HYDROXY: CPT

## 2021-01-14 PROCEDURE — 85025 COMPLETE CBC W/AUTO DIFF WBC: CPT

## 2021-01-15 LAB
25(OH)D3+25(OH)D2 SERPL-MCNC: 74 NG/ML (ref 30–96)
ANION GAP SERPL CALC-SCNC: 12 MMOL/L (ref 8–16)
BUN SERPL-MCNC: 21 MG/DL (ref 8–23)
CALCIUM SERPL-MCNC: 9.2 MG/DL (ref 8.7–10.5)
CHLORIDE SERPL-SCNC: 104 MMOL/L (ref 95–110)
CO2 SERPL-SCNC: 26 MMOL/L (ref 23–29)
CREAT SERPL-MCNC: 1 MG/DL (ref 0.5–1.4)
EST. GFR  (AFRICAN AMERICAN): 59.4 ML/MIN/1.73 M^2
EST. GFR  (NON AFRICAN AMERICAN): 51.5 ML/MIN/1.73 M^2
FERRITIN SERPL-MCNC: 41 NG/ML (ref 20–300)
FOLATE SERPL-MCNC: 16.4 NG/ML (ref 4–24)
GLUCOSE SERPL-MCNC: 97 MG/DL (ref 70–110)
IRON SERPL-MCNC: 49 UG/DL (ref 30–160)
POTASSIUM SERPL-SCNC: 4.5 MMOL/L (ref 3.5–5.1)
SATURATED IRON: 14 % (ref 20–50)
SODIUM SERPL-SCNC: 142 MMOL/L (ref 136–145)
TOTAL IRON BINDING CAPACITY: 360 UG/DL (ref 250–450)
TRANSFERRIN SERPL-MCNC: 243 MG/DL (ref 200–375)

## 2021-01-21 ENCOUNTER — OFFICE VISIT (OUTPATIENT)
Dept: FAMILY MEDICINE | Facility: CLINIC | Age: 86
End: 2021-01-21
Payer: MEDICARE

## 2021-01-21 VITALS
HEIGHT: 64 IN | BODY MASS INDEX: 29.15 KG/M2 | WEIGHT: 170.75 LBS | HEART RATE: 64 BPM | DIASTOLIC BLOOD PRESSURE: 60 MMHG | OXYGEN SATURATION: 98 % | SYSTOLIC BLOOD PRESSURE: 118 MMHG

## 2021-01-21 DIAGNOSIS — I77.9 BILATERAL CAROTID ARTERY DISEASE, UNSPECIFIED TYPE: ICD-10-CM

## 2021-01-21 DIAGNOSIS — Z98.890 S/P CAROTID ENDARTERECTOMY: ICD-10-CM

## 2021-01-21 DIAGNOSIS — E78.2 MIXED HYPERLIPIDEMIA: ICD-10-CM

## 2021-01-21 DIAGNOSIS — D75.89 MACROCYTIC: ICD-10-CM

## 2021-01-21 DIAGNOSIS — I10 ESSENTIAL HYPERTENSION: Primary | Chronic | ICD-10-CM

## 2021-01-21 DIAGNOSIS — Z01.89 ENCOUNTER FOR LABORATORY TEST: ICD-10-CM

## 2021-01-21 DIAGNOSIS — N18.31 STAGE 3A CHRONIC KIDNEY DISEASE: ICD-10-CM

## 2021-01-21 DIAGNOSIS — M81.6 LOCALIZED OSTEOPOROSIS, UNSPECIFIED PATHOLOGICAL FRACTURE PRESENCE: ICD-10-CM

## 2021-01-21 DIAGNOSIS — E55.9 VITAMIN D DEFICIENCY: ICD-10-CM

## 2021-01-21 PROCEDURE — 3074F SYST BP LT 130 MM HG: CPT | Mod: CPTII,S$GLB,, | Performed by: INTERNAL MEDICINE

## 2021-01-21 PROCEDURE — 99499 RISK ADDL DX/OHS AUDIT: ICD-10-PCS | Mod: S$GLB,,, | Performed by: INTERNAL MEDICINE

## 2021-01-21 PROCEDURE — 1159F PR MEDICATION LIST DOCUMENTED IN MEDICAL RECORD: ICD-10-PCS | Mod: S$GLB,,, | Performed by: INTERNAL MEDICINE

## 2021-01-21 PROCEDURE — 1101F PR PT FALLS ASSESS DOC 0-1 FALLS W/OUT INJ PAST YR: ICD-10-PCS | Mod: CPTII,S$GLB,, | Performed by: INTERNAL MEDICINE

## 2021-01-21 PROCEDURE — 3074F PR MOST RECENT SYSTOLIC BLOOD PRESSURE < 130 MM HG: ICD-10-PCS | Mod: CPTII,S$GLB,, | Performed by: INTERNAL MEDICINE

## 2021-01-21 PROCEDURE — 99499 UNLISTED E&M SERVICE: CPT | Mod: S$GLB,,, | Performed by: INTERNAL MEDICINE

## 2021-01-21 PROCEDURE — 99214 OFFICE O/P EST MOD 30 MIN: CPT | Mod: S$GLB,,, | Performed by: INTERNAL MEDICINE

## 2021-01-21 PROCEDURE — 3078F DIAST BP <80 MM HG: CPT | Mod: CPTII,S$GLB,, | Performed by: INTERNAL MEDICINE

## 2021-01-21 PROCEDURE — 99214 PR OFFICE/OUTPT VISIT, EST, LEVL IV, 30-39 MIN: ICD-10-PCS | Mod: S$GLB,,, | Performed by: INTERNAL MEDICINE

## 2021-01-21 PROCEDURE — 99999 PR PBB SHADOW E&M-EST. PATIENT-LVL IV: ICD-10-PCS | Mod: PBBFAC,,, | Performed by: INTERNAL MEDICINE

## 2021-01-21 PROCEDURE — 3288F FALL RISK ASSESSMENT DOCD: CPT | Mod: CPTII,S$GLB,, | Performed by: INTERNAL MEDICINE

## 2021-01-21 PROCEDURE — 3288F PR FALLS RISK ASSESSMENT DOCUMENTED: ICD-10-PCS | Mod: CPTII,S$GLB,, | Performed by: INTERNAL MEDICINE

## 2021-01-21 PROCEDURE — 1159F MED LIST DOCD IN RCRD: CPT | Mod: S$GLB,,, | Performed by: INTERNAL MEDICINE

## 2021-01-21 PROCEDURE — 3078F PR MOST RECENT DIASTOLIC BLOOD PRESSURE < 80 MM HG: ICD-10-PCS | Mod: CPTII,S$GLB,, | Performed by: INTERNAL MEDICINE

## 2021-01-21 PROCEDURE — 99999 PR PBB SHADOW E&M-EST. PATIENT-LVL IV: CPT | Mod: PBBFAC,,, | Performed by: INTERNAL MEDICINE

## 2021-01-21 PROCEDURE — 1101F PT FALLS ASSESS-DOCD LE1/YR: CPT | Mod: CPTII,S$GLB,, | Performed by: INTERNAL MEDICINE

## 2021-01-22 ENCOUNTER — TELEPHONE (OUTPATIENT)
Dept: FAMILY MEDICINE | Facility: CLINIC | Age: 86
End: 2021-01-22

## 2021-01-28 ENCOUNTER — PATIENT MESSAGE (OUTPATIENT)
Dept: HEMATOLOGY/ONCOLOGY | Facility: CLINIC | Age: 86
End: 2021-01-28

## 2021-01-31 ENCOUNTER — IMMUNIZATION (OUTPATIENT)
Dept: FAMILY MEDICINE | Facility: CLINIC | Age: 86
End: 2021-01-31
Payer: MEDICARE

## 2021-01-31 DIAGNOSIS — Z23 NEED FOR VACCINATION: Primary | ICD-10-CM

## 2021-01-31 PROCEDURE — 0002A COVID-19, MRNA, LNP-S, PF, 30 MCG/0.3 ML DOSE VACCINE: CPT | Mod: PBBFAC | Performed by: INTERNAL MEDICINE

## 2021-01-31 PROCEDURE — 91300 COVID-19, MRNA, LNP-S, PF, 30 MCG/0.3 ML DOSE VACCINE: CPT | Mod: PBBFAC | Performed by: INTERNAL MEDICINE

## 2021-02-02 ENCOUNTER — OFFICE VISIT (OUTPATIENT)
Dept: HEMATOLOGY/ONCOLOGY | Facility: CLINIC | Age: 86
End: 2021-02-02
Payer: MEDICARE

## 2021-02-02 VITALS
OXYGEN SATURATION: 96 % | BODY MASS INDEX: 29.5 KG/M2 | TEMPERATURE: 98 F | HEIGHT: 64 IN | SYSTOLIC BLOOD PRESSURE: 129 MMHG | HEART RATE: 65 BPM | DIASTOLIC BLOOD PRESSURE: 63 MMHG | RESPIRATION RATE: 18 BRPM | WEIGHT: 172.81 LBS

## 2021-02-02 DIAGNOSIS — D75.89 MACROCYTIC: ICD-10-CM

## 2021-02-02 DIAGNOSIS — D75.89 MACROCYTOSIS WITHOUT ANEMIA: Primary | ICD-10-CM

## 2021-02-02 PROCEDURE — 3074F SYST BP LT 130 MM HG: CPT | Mod: CPTII,S$GLB,, | Performed by: INTERNAL MEDICINE

## 2021-02-02 PROCEDURE — 1101F PR PT FALLS ASSESS DOC 0-1 FALLS W/OUT INJ PAST YR: ICD-10-PCS | Mod: CPTII,S$GLB,, | Performed by: INTERNAL MEDICINE

## 2021-02-02 PROCEDURE — 99204 OFFICE O/P NEW MOD 45 MIN: CPT | Mod: S$GLB,,, | Performed by: INTERNAL MEDICINE

## 2021-02-02 PROCEDURE — 3078F DIAST BP <80 MM HG: CPT | Mod: CPTII,S$GLB,, | Performed by: INTERNAL MEDICINE

## 2021-02-02 PROCEDURE — 1101F PT FALLS ASSESS-DOCD LE1/YR: CPT | Mod: CPTII,S$GLB,, | Performed by: INTERNAL MEDICINE

## 2021-02-02 PROCEDURE — 1126F PR PAIN SEVERITY QUANTIFIED, NO PAIN PRESENT: ICD-10-PCS | Mod: S$GLB,,, | Performed by: INTERNAL MEDICINE

## 2021-02-02 PROCEDURE — 1126F AMNT PAIN NOTED NONE PRSNT: CPT | Mod: S$GLB,,, | Performed by: INTERNAL MEDICINE

## 2021-02-02 PROCEDURE — 3078F PR MOST RECENT DIASTOLIC BLOOD PRESSURE < 80 MM HG: ICD-10-PCS | Mod: CPTII,S$GLB,, | Performed by: INTERNAL MEDICINE

## 2021-02-02 PROCEDURE — 3074F PR MOST RECENT SYSTOLIC BLOOD PRESSURE < 130 MM HG: ICD-10-PCS | Mod: CPTII,S$GLB,, | Performed by: INTERNAL MEDICINE

## 2021-02-02 PROCEDURE — 99999 PR PBB SHADOW E&M-EST. PATIENT-LVL IV: ICD-10-PCS | Mod: PBBFAC,,, | Performed by: INTERNAL MEDICINE

## 2021-02-02 PROCEDURE — 1159F MED LIST DOCD IN RCRD: CPT | Mod: S$GLB,,, | Performed by: INTERNAL MEDICINE

## 2021-02-02 PROCEDURE — 1159F PR MEDICATION LIST DOCUMENTED IN MEDICAL RECORD: ICD-10-PCS | Mod: S$GLB,,, | Performed by: INTERNAL MEDICINE

## 2021-02-02 PROCEDURE — 99999 PR PBB SHADOW E&M-EST. PATIENT-LVL IV: CPT | Mod: PBBFAC,,, | Performed by: INTERNAL MEDICINE

## 2021-02-02 PROCEDURE — 99204 PR OFFICE/OUTPT VISIT, NEW, LEVL IV, 45-59 MIN: ICD-10-PCS | Mod: S$GLB,,, | Performed by: INTERNAL MEDICINE

## 2021-02-02 PROCEDURE — 3288F FALL RISK ASSESSMENT DOCD: CPT | Mod: CPTII,S$GLB,, | Performed by: INTERNAL MEDICINE

## 2021-02-02 PROCEDURE — 3288F PR FALLS RISK ASSESSMENT DOCUMENTED: ICD-10-PCS | Mod: CPTII,S$GLB,, | Performed by: INTERNAL MEDICINE

## 2021-02-23 DIAGNOSIS — E78.00 PURE HYPERCHOLESTEROLEMIA: ICD-10-CM

## 2021-02-23 DIAGNOSIS — K21.9 GASTROESOPHAGEAL REFLUX DISEASE: ICD-10-CM

## 2021-02-23 DIAGNOSIS — F41.9 ANXIETY: ICD-10-CM

## 2021-02-23 DIAGNOSIS — Z98.890 S/P CAROTID ENDARTERECTOMY: ICD-10-CM

## 2021-02-27 RX ORDER — OMEPRAZOLE 20 MG/1
CAPSULE, DELAYED RELEASE ORAL
Qty: 90 CAPSULE | Refills: 1 | Status: SHIPPED | OUTPATIENT
Start: 2021-02-27 | End: 2021-07-20

## 2021-02-27 RX ORDER — ATORVASTATIN CALCIUM 40 MG/1
TABLET, FILM COATED ORAL
Qty: 90 TABLET | Refills: 1 | Status: SHIPPED | OUTPATIENT
Start: 2021-02-27 | End: 2021-07-17

## 2021-02-27 RX ORDER — METOPROLOL SUCCINATE 25 MG/1
TABLET, EXTENDED RELEASE ORAL
Qty: 45 TABLET | Refills: 1 | Status: SHIPPED | OUTPATIENT
Start: 2021-02-27 | End: 2021-07-20

## 2021-02-27 RX ORDER — BUSPIRONE HYDROCHLORIDE 15 MG/1
TABLET ORAL
Qty: 90 TABLET | Refills: 1 | Status: SHIPPED | OUTPATIENT
Start: 2021-02-27 | End: 2021-07-20

## 2021-05-03 DIAGNOSIS — I10 ESSENTIAL HYPERTENSION: ICD-10-CM

## 2021-05-05 RX ORDER — LOSARTAN POTASSIUM 25 MG/1
TABLET ORAL
Qty: 90 TABLET | Refills: 1 | Status: SHIPPED | OUTPATIENT
Start: 2021-05-05 | End: 2022-01-19

## 2021-05-13 ENCOUNTER — LAB VISIT (OUTPATIENT)
Dept: LAB | Facility: HOSPITAL | Age: 86
End: 2021-05-13
Attending: INTERNAL MEDICINE
Payer: MEDICARE

## 2021-05-13 DIAGNOSIS — I10 ESSENTIAL HYPERTENSION: Chronic | ICD-10-CM

## 2021-05-13 DIAGNOSIS — E78.2 MIXED HYPERLIPIDEMIA: ICD-10-CM

## 2021-05-13 DIAGNOSIS — I77.9 BILATERAL CAROTID ARTERY DISEASE, UNSPECIFIED TYPE: ICD-10-CM

## 2021-05-13 DIAGNOSIS — Z98.890 S/P CAROTID ENDARTERECTOMY: ICD-10-CM

## 2021-05-13 DIAGNOSIS — D75.89 MACROCYTIC: ICD-10-CM

## 2021-05-13 LAB
BASOPHILS # BLD AUTO: 0.02 K/UL (ref 0–0.2)
BASOPHILS NFR BLD: 0.3 % (ref 0–1.9)
DIFFERENTIAL METHOD: ABNORMAL
EOSINOPHIL # BLD AUTO: 0.4 K/UL (ref 0–0.5)
EOSINOPHIL NFR BLD: 4.9 % (ref 0–8)
ERYTHROCYTE [DISTWIDTH] IN BLOOD BY AUTOMATED COUNT: 12.6 % (ref 11.5–14.5)
HCT VFR BLD AUTO: 38.8 % (ref 37–48.5)
HGB BLD-MCNC: 12.1 G/DL (ref 12–16)
IMM GRANULOCYTES # BLD AUTO: 0.03 K/UL (ref 0–0.04)
IMM GRANULOCYTES NFR BLD AUTO: 0.4 % (ref 0–0.5)
LYMPHOCYTES # BLD AUTO: 1.1 K/UL (ref 1–4.8)
LYMPHOCYTES NFR BLD: 14.8 % (ref 18–48)
MCH RBC QN AUTO: 30.3 PG (ref 27–31)
MCHC RBC AUTO-ENTMCNC: 31.2 G/DL (ref 32–36)
MCV RBC AUTO: 97 FL (ref 82–98)
MONOCYTES # BLD AUTO: 0.7 K/UL (ref 0.3–1)
MONOCYTES NFR BLD: 9 % (ref 4–15)
NEUTROPHILS # BLD AUTO: 5.4 K/UL (ref 1.8–7.7)
NEUTROPHILS NFR BLD: 70.6 % (ref 38–73)
NRBC BLD-RTO: 0 /100 WBC
PLATELET # BLD AUTO: 351 K/UL (ref 150–450)
PMV BLD AUTO: 10.4 FL (ref 9.2–12.9)
RBC # BLD AUTO: 3.99 M/UL (ref 4–5.4)
WBC # BLD AUTO: 7.58 K/UL (ref 3.9–12.7)

## 2021-05-13 PROCEDURE — 80053 COMPREHEN METABOLIC PANEL: CPT | Performed by: INTERNAL MEDICINE

## 2021-05-13 PROCEDURE — 84439 ASSAY OF FREE THYROXINE: CPT | Performed by: INTERNAL MEDICINE

## 2021-05-13 PROCEDURE — 84443 ASSAY THYROID STIM HORMONE: CPT | Performed by: INTERNAL MEDICINE

## 2021-05-13 PROCEDURE — 80061 LIPID PANEL: CPT | Performed by: INTERNAL MEDICINE

## 2021-05-13 PROCEDURE — 83735 ASSAY OF MAGNESIUM: CPT | Performed by: INTERNAL MEDICINE

## 2021-05-13 PROCEDURE — 85025 COMPLETE CBC W/AUTO DIFF WBC: CPT | Performed by: INTERNAL MEDICINE

## 2021-05-13 PROCEDURE — 36415 COLL VENOUS BLD VENIPUNCTURE: CPT | Mod: PN | Performed by: INTERNAL MEDICINE

## 2021-05-13 PROCEDURE — 84481 FREE ASSAY (FT-3): CPT | Performed by: INTERNAL MEDICINE

## 2021-05-14 LAB
ALBUMIN SERPL BCP-MCNC: 3.3 G/DL (ref 3.5–5.2)
ALP SERPL-CCNC: 98 U/L (ref 55–135)
ALT SERPL W/O P-5'-P-CCNC: 8 U/L (ref 10–44)
ANION GAP SERPL CALC-SCNC: 13 MMOL/L (ref 8–16)
AST SERPL-CCNC: 16 U/L (ref 10–40)
BILIRUB SERPL-MCNC: 0.6 MG/DL (ref 0.1–1)
BUN SERPL-MCNC: 19 MG/DL (ref 8–23)
CALCIUM SERPL-MCNC: 9.3 MG/DL (ref 8.7–10.5)
CHLORIDE SERPL-SCNC: 106 MMOL/L (ref 95–110)
CHOLEST SERPL-MCNC: 142 MG/DL (ref 120–199)
CHOLEST/HDLC SERPL: 3.2 {RATIO} (ref 2–5)
CO2 SERPL-SCNC: 21 MMOL/L (ref 23–29)
CREAT SERPL-MCNC: 0.9 MG/DL (ref 0.5–1.4)
EST. GFR  (AFRICAN AMERICAN): >60 ML/MIN/1.73 M^2
EST. GFR  (NON AFRICAN AMERICAN): 58.5 ML/MIN/1.73 M^2
GLUCOSE SERPL-MCNC: 74 MG/DL (ref 70–110)
HDLC SERPL-MCNC: 44 MG/DL (ref 40–75)
HDLC SERPL: 31 % (ref 20–50)
LDLC SERPL CALC-MCNC: 80.4 MG/DL (ref 63–159)
MAGNESIUM SERPL-MCNC: 2.1 MG/DL (ref 1.6–2.6)
NONHDLC SERPL-MCNC: 98 MG/DL
POTASSIUM SERPL-SCNC: 5 MMOL/L (ref 3.5–5.1)
PROT SERPL-MCNC: 7.5 G/DL (ref 6–8.4)
SODIUM SERPL-SCNC: 140 MMOL/L (ref 136–145)
T3FREE SERPL-MCNC: 1.9 PG/ML (ref 2.3–4.2)
T4 FREE SERPL-MCNC: 1.25 NG/DL (ref 0.71–1.51)
TRIGL SERPL-MCNC: 88 MG/DL (ref 30–150)
TSH SERPL DL<=0.005 MIU/L-ACNC: 1.57 UIU/ML (ref 0.4–4)

## 2021-05-20 ENCOUNTER — OFFICE VISIT (OUTPATIENT)
Dept: FAMILY MEDICINE | Facility: CLINIC | Age: 86
End: 2021-05-20
Payer: MEDICARE

## 2021-05-20 VITALS
OXYGEN SATURATION: 97 % | HEIGHT: 64 IN | HEART RATE: 65 BPM | BODY MASS INDEX: 29.64 KG/M2 | WEIGHT: 173.63 LBS | SYSTOLIC BLOOD PRESSURE: 116 MMHG | DIASTOLIC BLOOD PRESSURE: 64 MMHG

## 2021-05-20 DIAGNOSIS — K21.9 GASTROESOPHAGEAL REFLUX DISEASE, UNSPECIFIED WHETHER ESOPHAGITIS PRESENT: ICD-10-CM

## 2021-05-20 DIAGNOSIS — I10 ESSENTIAL HYPERTENSION: Primary | Chronic | ICD-10-CM

## 2021-05-20 DIAGNOSIS — E66.3 OVERWEIGHT (BMI 25.0-29.9): ICD-10-CM

## 2021-05-20 DIAGNOSIS — D75.89 MACROCYTOSIS WITHOUT ANEMIA: ICD-10-CM

## 2021-05-20 DIAGNOSIS — E78.5 DYSLIPIDEMIA: ICD-10-CM

## 2021-05-20 DIAGNOSIS — E78.2 MIXED HYPERLIPIDEMIA: ICD-10-CM

## 2021-05-20 DIAGNOSIS — E03.9 ACQUIRED HYPOTHYROIDISM: ICD-10-CM

## 2021-05-20 DIAGNOSIS — N18.31 STAGE 3A CHRONIC KIDNEY DISEASE: ICD-10-CM

## 2021-05-20 DIAGNOSIS — E55.9 VITAMIN D DEFICIENCY: ICD-10-CM

## 2021-05-20 DIAGNOSIS — R74.8 ELEVATED VITAMIN B12 LEVEL: ICD-10-CM

## 2021-05-20 DIAGNOSIS — Z98.890 S/P CAROTID ENDARTERECTOMY: ICD-10-CM

## 2021-05-20 DIAGNOSIS — G47.00 INSOMNIA, UNSPECIFIED TYPE: ICD-10-CM

## 2021-05-20 DIAGNOSIS — Z01.89 ENCOUNTER FOR LABORATORY TEST: ICD-10-CM

## 2021-05-20 PROCEDURE — 3288F PR FALLS RISK ASSESSMENT DOCUMENTED: ICD-10-PCS | Mod: CPTII,S$GLB,, | Performed by: INTERNAL MEDICINE

## 2021-05-20 PROCEDURE — 3074F PR MOST RECENT SYSTOLIC BLOOD PRESSURE < 130 MM HG: ICD-10-PCS | Mod: CPTII,S$GLB,, | Performed by: INTERNAL MEDICINE

## 2021-05-20 PROCEDURE — 3078F PR MOST RECENT DIASTOLIC BLOOD PRESSURE < 80 MM HG: ICD-10-PCS | Mod: CPTII,S$GLB,, | Performed by: INTERNAL MEDICINE

## 2021-05-20 PROCEDURE — 1101F PT FALLS ASSESS-DOCD LE1/YR: CPT | Mod: CPTII,S$GLB,, | Performed by: INTERNAL MEDICINE

## 2021-05-20 PROCEDURE — 3078F DIAST BP <80 MM HG: CPT | Mod: CPTII,S$GLB,, | Performed by: INTERNAL MEDICINE

## 2021-05-20 PROCEDURE — 99214 OFFICE O/P EST MOD 30 MIN: CPT | Mod: S$GLB,,, | Performed by: INTERNAL MEDICINE

## 2021-05-20 PROCEDURE — 99999 PR PBB SHADOW E&M-EST. PATIENT-LVL IV: ICD-10-PCS | Mod: PBBFAC,,, | Performed by: INTERNAL MEDICINE

## 2021-05-20 PROCEDURE — 99214 PR OFFICE/OUTPT VISIT, EST, LEVL IV, 30-39 MIN: ICD-10-PCS | Mod: S$GLB,,, | Performed by: INTERNAL MEDICINE

## 2021-05-20 PROCEDURE — 99499 UNLISTED E&M SERVICE: CPT | Mod: HCNC,S$GLB,, | Performed by: INTERNAL MEDICINE

## 2021-05-20 PROCEDURE — 3288F FALL RISK ASSESSMENT DOCD: CPT | Mod: CPTII,S$GLB,, | Performed by: INTERNAL MEDICINE

## 2021-05-20 PROCEDURE — 1159F MED LIST DOCD IN RCRD: CPT | Mod: S$GLB,,, | Performed by: INTERNAL MEDICINE

## 2021-05-20 PROCEDURE — 99499 RISK ADDL DX/OHS AUDIT: ICD-10-PCS | Mod: HCNC,S$GLB,, | Performed by: INTERNAL MEDICINE

## 2021-05-20 PROCEDURE — 1101F PR PT FALLS ASSESS DOC 0-1 FALLS W/OUT INJ PAST YR: ICD-10-PCS | Mod: CPTII,S$GLB,, | Performed by: INTERNAL MEDICINE

## 2021-05-20 PROCEDURE — 1159F PR MEDICATION LIST DOCUMENTED IN MEDICAL RECORD: ICD-10-PCS | Mod: S$GLB,,, | Performed by: INTERNAL MEDICINE

## 2021-05-20 PROCEDURE — 99999 PR PBB SHADOW E&M-EST. PATIENT-LVL IV: CPT | Mod: PBBFAC,,, | Performed by: INTERNAL MEDICINE

## 2021-05-20 PROCEDURE — 3074F SYST BP LT 130 MM HG: CPT | Mod: CPTII,S$GLB,, | Performed by: INTERNAL MEDICINE

## 2021-05-21 ENCOUNTER — PATIENT MESSAGE (OUTPATIENT)
Dept: FAMILY MEDICINE | Facility: CLINIC | Age: 86
End: 2021-05-21

## 2021-05-30 ENCOUNTER — TELEPHONE (OUTPATIENT)
Dept: FAMILY MEDICINE | Facility: CLINIC | Age: 86
End: 2021-05-30

## 2021-06-04 ENCOUNTER — PATIENT MESSAGE (OUTPATIENT)
Dept: FAMILY MEDICINE | Facility: CLINIC | Age: 86
End: 2021-06-04

## 2021-06-04 ENCOUNTER — TELEPHONE (OUTPATIENT)
Dept: RHEUMATOLOGY | Facility: CLINIC | Age: 86
End: 2021-06-04

## 2021-06-11 ENCOUNTER — TELEPHONE (OUTPATIENT)
Dept: FAMILY MEDICINE | Facility: CLINIC | Age: 86
End: 2021-06-11

## 2021-06-11 DIAGNOSIS — M25.50 ARTHRALGIA, UNSPECIFIED JOINT: Primary | ICD-10-CM

## 2021-06-14 ENCOUNTER — PATIENT MESSAGE (OUTPATIENT)
Dept: FAMILY MEDICINE | Facility: CLINIC | Age: 86
End: 2021-06-14

## 2021-06-16 ENCOUNTER — TELEPHONE (OUTPATIENT)
Dept: FAMILY MEDICINE | Facility: CLINIC | Age: 86
End: 2021-06-16

## 2021-06-22 ENCOUNTER — LAB VISIT (OUTPATIENT)
Dept: LAB | Facility: HOSPITAL | Age: 86
End: 2021-06-22
Attending: INTERNAL MEDICINE
Payer: MEDICARE

## 2021-06-22 DIAGNOSIS — D75.89 MACROCYTOSIS WITHOUT ANEMIA: ICD-10-CM

## 2021-06-22 DIAGNOSIS — E78.2 MIXED HYPERLIPIDEMIA: ICD-10-CM

## 2021-06-22 DIAGNOSIS — N18.31 STAGE 3A CHRONIC KIDNEY DISEASE: ICD-10-CM

## 2021-06-22 DIAGNOSIS — E03.9 ACQUIRED HYPOTHYROIDISM: ICD-10-CM

## 2021-06-22 DIAGNOSIS — E78.5 DYSLIPIDEMIA: ICD-10-CM

## 2021-06-22 DIAGNOSIS — M25.50 ARTHRALGIA, UNSPECIFIED JOINT: ICD-10-CM

## 2021-06-22 DIAGNOSIS — I10 ESSENTIAL HYPERTENSION: Chronic | ICD-10-CM

## 2021-06-22 LAB
ALBUMIN SERPL BCP-MCNC: 3.3 G/DL (ref 3.5–5.2)
ALP SERPL-CCNC: 94 U/L (ref 55–135)
ALT SERPL W/O P-5'-P-CCNC: 7 U/L (ref 10–44)
ANION GAP SERPL CALC-SCNC: 14 MMOL/L (ref 8–16)
AST SERPL-CCNC: 10 U/L (ref 10–40)
BASOPHILS # BLD AUTO: 0.03 K/UL (ref 0–0.2)
BASOPHILS NFR BLD: 0.4 % (ref 0–1.9)
BILIRUB SERPL-MCNC: 0.5 MG/DL (ref 0.1–1)
BUN SERPL-MCNC: 24 MG/DL (ref 8–23)
CALCIUM SERPL-MCNC: 9.5 MG/DL (ref 8.7–10.5)
CHLORIDE SERPL-SCNC: 108 MMOL/L (ref 95–110)
CHOLEST SERPL-MCNC: 135 MG/DL (ref 120–199)
CHOLEST/HDLC SERPL: 2.9 {RATIO} (ref 2–5)
CO2 SERPL-SCNC: 20 MMOL/L (ref 23–29)
CREAT SERPL-MCNC: 0.9 MG/DL (ref 0.5–1.4)
DIFFERENTIAL METHOD: ABNORMAL
EOSINOPHIL # BLD AUTO: 0.4 K/UL (ref 0–0.5)
EOSINOPHIL NFR BLD: 5.1 % (ref 0–8)
ERYTHROCYTE [DISTWIDTH] IN BLOOD BY AUTOMATED COUNT: 12.8 % (ref 11.5–14.5)
ERYTHROCYTE [SEDIMENTATION RATE] IN BLOOD BY WESTERGREN METHOD: 75 MM/HR (ref 0–36)
EST. GFR  (AFRICAN AMERICAN): >60 ML/MIN/1.73 M^2
EST. GFR  (NON AFRICAN AMERICAN): 58.1 ML/MIN/1.73 M^2
GLUCOSE SERPL-MCNC: 147 MG/DL (ref 70–110)
HCT VFR BLD AUTO: 37.8 % (ref 37–48.5)
HDLC SERPL-MCNC: 47 MG/DL (ref 40–75)
HDLC SERPL: 34.8 % (ref 20–50)
HGB BLD-MCNC: 11.7 G/DL (ref 12–16)
IMM GRANULOCYTES # BLD AUTO: 0.03 K/UL (ref 0–0.04)
IMM GRANULOCYTES NFR BLD AUTO: 0.4 % (ref 0–0.5)
LDLC SERPL CALC-MCNC: 71 MG/DL (ref 63–159)
LYMPHOCYTES # BLD AUTO: 1 K/UL (ref 1–4.8)
LYMPHOCYTES NFR BLD: 14.1 % (ref 18–48)
MCH RBC QN AUTO: 29.5 PG (ref 27–31)
MCHC RBC AUTO-ENTMCNC: 31 G/DL (ref 32–36)
MCV RBC AUTO: 95 FL (ref 82–98)
MONOCYTES # BLD AUTO: 0.4 K/UL (ref 0.3–1)
MONOCYTES NFR BLD: 5.1 % (ref 4–15)
NEUTROPHILS # BLD AUTO: 5.3 K/UL (ref 1.8–7.7)
NEUTROPHILS NFR BLD: 74.9 % (ref 38–73)
NONHDLC SERPL-MCNC: 88 MG/DL
NRBC BLD-RTO: 0 /100 WBC
PLATELET # BLD AUTO: 326 K/UL (ref 150–450)
PMV BLD AUTO: 10.3 FL (ref 9.2–12.9)
POTASSIUM SERPL-SCNC: 3.9 MMOL/L (ref 3.5–5.1)
PROT SERPL-MCNC: 7.4 G/DL (ref 6–8.4)
RBC # BLD AUTO: 3.97 M/UL (ref 4–5.4)
RHEUMATOID FACT SERPL-ACNC: <10 IU/ML (ref 0–15)
SODIUM SERPL-SCNC: 142 MMOL/L (ref 136–145)
T3FREE SERPL-MCNC: 2.6 PG/ML (ref 2.3–4.2)
T4 FREE SERPL-MCNC: 1.2 NG/DL (ref 0.71–1.51)
TRIGL SERPL-MCNC: 85 MG/DL (ref 30–150)
TSH SERPL DL<=0.005 MIU/L-ACNC: 2.32 UIU/ML (ref 0.4–4)
URATE SERPL-MCNC: 4.5 MG/DL (ref 2.4–5.7)
WBC # BLD AUTO: 7.11 K/UL (ref 3.9–12.7)

## 2021-06-22 PROCEDURE — 85025 COMPLETE CBC W/AUTO DIFF WBC: CPT | Performed by: INTERNAL MEDICINE

## 2021-06-22 PROCEDURE — 86038 ANTINUCLEAR ANTIBODIES: CPT | Performed by: INTERNAL MEDICINE

## 2021-06-22 PROCEDURE — 84439 ASSAY OF FREE THYROXINE: CPT | Performed by: INTERNAL MEDICINE

## 2021-06-22 PROCEDURE — 36415 COLL VENOUS BLD VENIPUNCTURE: CPT | Mod: PN | Performed by: INTERNAL MEDICINE

## 2021-06-22 PROCEDURE — 80061 LIPID PANEL: CPT | Performed by: INTERNAL MEDICINE

## 2021-06-22 PROCEDURE — 84481 FREE ASSAY (FT-3): CPT | Performed by: INTERNAL MEDICINE

## 2021-06-22 PROCEDURE — 86431 RHEUMATOID FACTOR QUANT: CPT | Performed by: INTERNAL MEDICINE

## 2021-06-22 PROCEDURE — 80053 COMPREHEN METABOLIC PANEL: CPT | Performed by: INTERNAL MEDICINE

## 2021-06-22 PROCEDURE — 85652 RBC SED RATE AUTOMATED: CPT | Performed by: INTERNAL MEDICINE

## 2021-06-22 PROCEDURE — 84443 ASSAY THYROID STIM HORMONE: CPT | Performed by: INTERNAL MEDICINE

## 2021-06-22 PROCEDURE — 84550 ASSAY OF BLOOD/URIC ACID: CPT | Performed by: INTERNAL MEDICINE

## 2021-06-23 LAB — ANA SER QL IF: NORMAL

## 2021-06-29 ENCOUNTER — TELEPHONE (OUTPATIENT)
Dept: FAMILY MEDICINE | Facility: CLINIC | Age: 86
End: 2021-06-29

## 2021-06-29 ENCOUNTER — OFFICE VISIT (OUTPATIENT)
Dept: FAMILY MEDICINE | Facility: CLINIC | Age: 86
End: 2021-06-29
Payer: MEDICARE

## 2021-06-29 VITALS
SYSTOLIC BLOOD PRESSURE: 126 MMHG | HEIGHT: 64 IN | HEART RATE: 66 BPM | WEIGHT: 170.06 LBS | BODY MASS INDEX: 29.03 KG/M2 | OXYGEN SATURATION: 96 % | DIASTOLIC BLOOD PRESSURE: 76 MMHG

## 2021-06-29 DIAGNOSIS — M81.6 LOCALIZED OSTEOPOROSIS WITHOUT CURRENT PATHOLOGICAL FRACTURE: ICD-10-CM

## 2021-06-29 DIAGNOSIS — M25.50 ARTHRALGIA, UNSPECIFIED JOINT: Primary | ICD-10-CM

## 2021-06-29 DIAGNOSIS — E03.9 ACQUIRED HYPOTHYROIDISM: ICD-10-CM

## 2021-06-29 DIAGNOSIS — N18.31 STAGE 3A CHRONIC KIDNEY DISEASE: ICD-10-CM

## 2021-06-29 DIAGNOSIS — E78.2 MIXED HYPERLIPIDEMIA: ICD-10-CM

## 2021-06-29 DIAGNOSIS — M79.10 MYALGIA: ICD-10-CM

## 2021-06-29 DIAGNOSIS — I10 ESSENTIAL HYPERTENSION: Chronic | ICD-10-CM

## 2021-06-29 DIAGNOSIS — E66.3 OVERWEIGHT (BMI 25.0-29.9): ICD-10-CM

## 2021-06-29 DIAGNOSIS — M47.812 ARTHRITIS OF NECK: ICD-10-CM

## 2021-06-29 DIAGNOSIS — M40.204 KYPHOSIS OF THORACIC REGION, UNSPECIFIED KYPHOSIS TYPE: ICD-10-CM

## 2021-06-29 DIAGNOSIS — E55.9 VITAMIN D DEFICIENCY: ICD-10-CM

## 2021-06-29 PROCEDURE — 99999 PR PBB SHADOW E&M-EST. PATIENT-LVL III: ICD-10-PCS | Mod: PBBFAC,,, | Performed by: INTERNAL MEDICINE

## 2021-06-29 PROCEDURE — 1101F PT FALLS ASSESS-DOCD LE1/YR: CPT | Mod: CPTII,S$GLB,, | Performed by: INTERNAL MEDICINE

## 2021-06-29 PROCEDURE — 1101F PR PT FALLS ASSESS DOC 0-1 FALLS W/OUT INJ PAST YR: ICD-10-PCS | Mod: CPTII,S$GLB,, | Performed by: INTERNAL MEDICINE

## 2021-06-29 PROCEDURE — 99214 OFFICE O/P EST MOD 30 MIN: CPT | Mod: S$GLB,,, | Performed by: INTERNAL MEDICINE

## 2021-06-29 PROCEDURE — 1159F MED LIST DOCD IN RCRD: CPT | Mod: S$GLB,,, | Performed by: INTERNAL MEDICINE

## 2021-06-29 PROCEDURE — 3288F FALL RISK ASSESSMENT DOCD: CPT | Mod: CPTII,S$GLB,, | Performed by: INTERNAL MEDICINE

## 2021-06-29 PROCEDURE — 99214 PR OFFICE/OUTPT VISIT, EST, LEVL IV, 30-39 MIN: ICD-10-PCS | Mod: S$GLB,,, | Performed by: INTERNAL MEDICINE

## 2021-06-29 PROCEDURE — 99999 PR PBB SHADOW E&M-EST. PATIENT-LVL III: CPT | Mod: PBBFAC,,, | Performed by: INTERNAL MEDICINE

## 2021-06-29 PROCEDURE — 1159F PR MEDICATION LIST DOCUMENTED IN MEDICAL RECORD: ICD-10-PCS | Mod: S$GLB,,, | Performed by: INTERNAL MEDICINE

## 2021-06-29 PROCEDURE — 3288F PR FALLS RISK ASSESSMENT DOCUMENTED: ICD-10-PCS | Mod: CPTII,S$GLB,, | Performed by: INTERNAL MEDICINE

## 2021-06-29 RX ORDER — LEVOTHYROXINE SODIUM 88 UG/1
TABLET ORAL
Qty: 50 TABLET | Refills: 1 | Status: SHIPPED | OUTPATIENT
Start: 2021-06-29 | End: 2021-12-07

## 2021-06-29 RX ORDER — TIZANIDINE 2 MG/1
2 TABLET ORAL EVERY 8 HOURS PRN
Qty: 30 TABLET | Refills: 1 | Status: SHIPPED | OUTPATIENT
Start: 2021-06-29 | End: 2021-07-09

## 2021-07-19 ENCOUNTER — PATIENT MESSAGE (OUTPATIENT)
Dept: FAMILY MEDICINE | Facility: CLINIC | Age: 86
End: 2021-07-19

## 2021-07-19 ENCOUNTER — PATIENT MESSAGE (OUTPATIENT)
Dept: HEMATOLOGY/ONCOLOGY | Facility: CLINIC | Age: 86
End: 2021-07-19

## 2021-07-19 DIAGNOSIS — M25.50 ARTHRALGIA, UNSPECIFIED JOINT: Primary | ICD-10-CM

## 2021-07-20 ENCOUNTER — PATIENT MESSAGE (OUTPATIENT)
Dept: RHEUMATOLOGY | Facility: CLINIC | Age: 86
End: 2021-07-20

## 2021-07-20 ENCOUNTER — PATIENT MESSAGE (OUTPATIENT)
Dept: FAMILY MEDICINE | Facility: CLINIC | Age: 86
End: 2021-07-20

## 2021-07-20 ENCOUNTER — TELEPHONE (OUTPATIENT)
Dept: RHEUMATOLOGY | Facility: CLINIC | Age: 86
End: 2021-07-20

## 2021-07-21 ENCOUNTER — PATIENT MESSAGE (OUTPATIENT)
Dept: RHEUMATOLOGY | Facility: CLINIC | Age: 86
End: 2021-07-21

## 2021-07-23 ENCOUNTER — PATIENT MESSAGE (OUTPATIENT)
Dept: FAMILY MEDICINE | Facility: CLINIC | Age: 86
End: 2021-07-23

## 2021-08-12 ENCOUNTER — PATIENT MESSAGE (OUTPATIENT)
Dept: HEMATOLOGY/ONCOLOGY | Facility: CLINIC | Age: 86
End: 2021-08-12

## 2021-08-13 ENCOUNTER — PATIENT MESSAGE (OUTPATIENT)
Dept: RHEUMATOLOGY | Facility: CLINIC | Age: 86
End: 2021-08-13

## 2021-08-16 ENCOUNTER — PATIENT MESSAGE (OUTPATIENT)
Dept: RHEUMATOLOGY | Facility: CLINIC | Age: 86
End: 2021-08-16

## 2021-08-18 ENCOUNTER — PATIENT OUTREACH (OUTPATIENT)
Dept: ADMINISTRATIVE | Facility: OTHER | Age: 86
End: 2021-08-18

## 2021-08-23 ENCOUNTER — OFFICE VISIT (OUTPATIENT)
Dept: ORTHOPEDICS | Facility: CLINIC | Age: 86
End: 2021-08-23
Payer: MEDICARE

## 2021-08-23 VITALS
DIASTOLIC BLOOD PRESSURE: 78 MMHG | HEART RATE: 71 BPM | HEIGHT: 64 IN | SYSTOLIC BLOOD PRESSURE: 142 MMHG | BODY MASS INDEX: 29.02 KG/M2 | WEIGHT: 170 LBS

## 2021-08-23 DIAGNOSIS — M79.10 MYALGIA, UNSPECIFIED SITE: Primary | ICD-10-CM

## 2021-08-23 DIAGNOSIS — G56.93 BILATERAL NEUROPATHY OF UPPER EXTREMITIES: ICD-10-CM

## 2021-08-23 PROCEDURE — 99999 PR PBB SHADOW E&M-EST. PATIENT-LVL III: ICD-10-PCS | Mod: PBBFAC,,, | Performed by: PHYSICIAN ASSISTANT

## 2021-08-23 PROCEDURE — 1160F PR REVIEW ALL MEDS BY PRESCRIBER/CLIN PHARMACIST DOCUMENTED: ICD-10-PCS | Mod: CPTII,S$GLB,, | Performed by: PHYSICIAN ASSISTANT

## 2021-08-23 PROCEDURE — 1101F PR PT FALLS ASSESS DOC 0-1 FALLS W/OUT INJ PAST YR: ICD-10-PCS | Mod: CPTII,S$GLB,, | Performed by: PHYSICIAN ASSISTANT

## 2021-08-23 PROCEDURE — 3288F PR FALLS RISK ASSESSMENT DOCUMENTED: ICD-10-PCS | Mod: CPTII,S$GLB,, | Performed by: PHYSICIAN ASSISTANT

## 2021-08-23 PROCEDURE — 1125F AMNT PAIN NOTED PAIN PRSNT: CPT | Mod: CPTII,S$GLB,, | Performed by: PHYSICIAN ASSISTANT

## 2021-08-23 PROCEDURE — 1159F MED LIST DOCD IN RCRD: CPT | Mod: CPTII,S$GLB,, | Performed by: PHYSICIAN ASSISTANT

## 2021-08-23 PROCEDURE — 3288F FALL RISK ASSESSMENT DOCD: CPT | Mod: CPTII,S$GLB,, | Performed by: PHYSICIAN ASSISTANT

## 2021-08-23 PROCEDURE — 99203 OFFICE O/P NEW LOW 30 MIN: CPT | Mod: S$GLB,,, | Performed by: PHYSICIAN ASSISTANT

## 2021-08-23 PROCEDURE — 99999 PR PBB SHADOW E&M-EST. PATIENT-LVL III: CPT | Mod: PBBFAC,,, | Performed by: PHYSICIAN ASSISTANT

## 2021-08-23 PROCEDURE — 1125F PR PAIN SEVERITY QUANTIFIED, PAIN PRESENT: ICD-10-PCS | Mod: CPTII,S$GLB,, | Performed by: PHYSICIAN ASSISTANT

## 2021-08-23 PROCEDURE — 1101F PT FALLS ASSESS-DOCD LE1/YR: CPT | Mod: CPTII,S$GLB,, | Performed by: PHYSICIAN ASSISTANT

## 2021-08-23 PROCEDURE — 99203 PR OFFICE/OUTPT VISIT, NEW, LEVL III, 30-44 MIN: ICD-10-PCS | Mod: S$GLB,,, | Performed by: PHYSICIAN ASSISTANT

## 2021-08-23 PROCEDURE — 1159F PR MEDICATION LIST DOCUMENTED IN MEDICAL RECORD: ICD-10-PCS | Mod: CPTII,S$GLB,, | Performed by: PHYSICIAN ASSISTANT

## 2021-08-23 PROCEDURE — 1160F RVW MEDS BY RX/DR IN RCRD: CPT | Mod: CPTII,S$GLB,, | Performed by: PHYSICIAN ASSISTANT

## 2021-08-23 RX ORDER — METHYLPREDNISOLONE 4 MG/1
TABLET ORAL
Qty: 1 PACKAGE | Refills: 0 | Status: SHIPPED | OUTPATIENT
Start: 2021-08-23 | End: 2021-09-13

## 2021-08-23 RX ORDER — TRAZODONE HYDROCHLORIDE 50 MG/1
50 TABLET ORAL NIGHTLY
COMMUNITY
End: 2022-10-03

## 2021-08-26 ENCOUNTER — TELEPHONE (OUTPATIENT)
Dept: ORTHOPEDICS | Facility: CLINIC | Age: 86
End: 2021-08-26

## 2021-09-20 ENCOUNTER — LAB VISIT (OUTPATIENT)
Dept: LAB | Facility: HOSPITAL | Age: 86
End: 2021-09-20
Attending: INTERNAL MEDICINE
Payer: MEDICARE

## 2021-09-20 DIAGNOSIS — M79.10 MYALGIA: ICD-10-CM

## 2021-09-20 DIAGNOSIS — E55.9 VITAMIN D DEFICIENCY: ICD-10-CM

## 2021-09-20 DIAGNOSIS — D75.89 MACROCYTOSIS WITHOUT ANEMIA: ICD-10-CM

## 2021-09-20 DIAGNOSIS — M81.6 LOCALIZED OSTEOPOROSIS WITHOUT CURRENT PATHOLOGICAL FRACTURE: ICD-10-CM

## 2021-09-20 DIAGNOSIS — N18.31 STAGE 3A CHRONIC KIDNEY DISEASE: ICD-10-CM

## 2021-09-20 DIAGNOSIS — E78.2 MIXED HYPERLIPIDEMIA: ICD-10-CM

## 2021-09-20 DIAGNOSIS — M25.50 ARTHRALGIA, UNSPECIFIED JOINT: ICD-10-CM

## 2021-09-20 LAB
25(OH)D3+25(OH)D2 SERPL-MCNC: 64 NG/ML (ref 30–96)
ANION GAP SERPL CALC-SCNC: 15 MMOL/L (ref 8–16)
ANION GAP SERPL CALC-SCNC: 15 MMOL/L (ref 8–16)
BASOPHILS # BLD AUTO: 0.03 K/UL (ref 0–0.2)
BASOPHILS NFR BLD: 0.5 % (ref 0–1.9)
BUN SERPL-MCNC: 15 MG/DL (ref 8–23)
BUN SERPL-MCNC: 15 MG/DL (ref 8–23)
CALCIUM SERPL-MCNC: 9.6 MG/DL (ref 8.7–10.5)
CHLORIDE SERPL-SCNC: 103 MMOL/L (ref 95–110)
CHLORIDE SERPL-SCNC: 103 MMOL/L (ref 95–110)
CHOLEST SERPL-MCNC: 157 MG/DL (ref 120–199)
CHOLEST/HDLC SERPL: 2.7 {RATIO} (ref 2–5)
CK SERPL-CCNC: 39 U/L (ref 20–180)
CO2 SERPL-SCNC: 20 MMOL/L (ref 23–29)
CO2 SERPL-SCNC: 20 MMOL/L (ref 23–29)
CREAT SERPL-MCNC: 0.8 MG/DL (ref 0.5–1.4)
CREAT SERPL-MCNC: 0.8 MG/DL (ref 0.5–1.4)
DIFFERENTIAL METHOD: NORMAL
EOSINOPHIL # BLD AUTO: 0.4 K/UL (ref 0–0.5)
EOSINOPHIL NFR BLD: 6.3 % (ref 0–8)
ERYTHROCYTE [DISTWIDTH] IN BLOOD BY AUTOMATED COUNT: 14 % (ref 11.5–14.5)
EST. GFR  (AFRICAN AMERICAN): >60 ML/MIN/1.73 M^2
EST. GFR  (AFRICAN AMERICAN): >60 ML/MIN/1.73 M^2
EST. GFR  (NON AFRICAN AMERICAN): >60 ML/MIN/1.73 M^2
EST. GFR  (NON AFRICAN AMERICAN): >60 ML/MIN/1.73 M^2
GLUCOSE SERPL-MCNC: 92 MG/DL (ref 70–110)
GLUCOSE SERPL-MCNC: 92 MG/DL (ref 70–110)
HCT VFR BLD AUTO: 38.8 % (ref 37–48.5)
HDLC SERPL-MCNC: 58 MG/DL (ref 40–75)
HDLC SERPL: 36.9 % (ref 20–50)
HGB BLD-MCNC: 12.5 G/DL (ref 12–16)
IMM GRANULOCYTES # BLD AUTO: 0.02 K/UL (ref 0–0.04)
IMM GRANULOCYTES NFR BLD AUTO: 0.3 % (ref 0–0.5)
LDLC SERPL CALC-MCNC: 78.8 MG/DL (ref 63–159)
LYMPHOCYTES # BLD AUTO: 1.2 K/UL (ref 1–4.8)
LYMPHOCYTES NFR BLD: 21 % (ref 18–48)
MAGNESIUM SERPL-MCNC: 1.9 MG/DL (ref 1.6–2.6)
MCH RBC QN AUTO: 30.3 PG (ref 27–31)
MCHC RBC AUTO-ENTMCNC: 32.2 G/DL (ref 32–36)
MCV RBC AUTO: 94 FL (ref 82–98)
MONOCYTES # BLD AUTO: 0.6 K/UL (ref 0.3–1)
MONOCYTES NFR BLD: 10.4 % (ref 4–15)
NEUTROPHILS # BLD AUTO: 3.6 K/UL (ref 1.8–7.7)
NEUTROPHILS NFR BLD: 61.5 % (ref 38–73)
NONHDLC SERPL-MCNC: 99 MG/DL
NRBC BLD-RTO: 0 /100 WBC
PLATELET # BLD AUTO: 309 K/UL (ref 150–450)
PMV BLD AUTO: 10.2 FL (ref 9.2–12.9)
POTASSIUM SERPL-SCNC: 3.9 MMOL/L (ref 3.5–5.1)
POTASSIUM SERPL-SCNC: 3.9 MMOL/L (ref 3.5–5.1)
RBC # BLD AUTO: 4.13 M/UL (ref 4–5.4)
SODIUM SERPL-SCNC: 138 MMOL/L (ref 136–145)
SODIUM SERPL-SCNC: 138 MMOL/L (ref 136–145)
T3FREE SERPL-MCNC: 3.2 PG/ML (ref 2.3–4.2)
T4 FREE SERPL-MCNC: 1.13 NG/DL (ref 0.71–1.51)
TRIGL SERPL-MCNC: 101 MG/DL (ref 30–150)
TSH SERPL DL<=0.005 MIU/L-ACNC: 1.57 UIU/ML (ref 0.4–4)
WBC # BLD AUTO: 5.85 K/UL (ref 3.9–12.7)

## 2021-09-20 PROCEDURE — 84439 ASSAY OF FREE THYROXINE: CPT | Mod: HCNC | Performed by: INTERNAL MEDICINE

## 2021-09-20 PROCEDURE — 80061 LIPID PANEL: CPT | Mod: HCNC | Performed by: INTERNAL MEDICINE

## 2021-09-20 PROCEDURE — 82306 VITAMIN D 25 HYDROXY: CPT | Mod: HCNC | Performed by: INTERNAL MEDICINE

## 2021-09-20 PROCEDURE — 36415 COLL VENOUS BLD VENIPUNCTURE: CPT | Mod: HCNC,PN | Performed by: INTERNAL MEDICINE

## 2021-09-20 PROCEDURE — 85025 COMPLETE CBC W/AUTO DIFF WBC: CPT | Mod: HCNC,PO | Performed by: INTERNAL MEDICINE

## 2021-09-20 PROCEDURE — 80048 BASIC METABOLIC PNL TOTAL CA: CPT | Mod: HCNC | Performed by: INTERNAL MEDICINE

## 2021-09-20 PROCEDURE — 84443 ASSAY THYROID STIM HORMONE: CPT | Mod: HCNC | Performed by: INTERNAL MEDICINE

## 2021-09-20 PROCEDURE — 84481 FREE ASSAY (FT-3): CPT | Mod: HCNC | Performed by: INTERNAL MEDICINE

## 2021-09-20 PROCEDURE — 83735 ASSAY OF MAGNESIUM: CPT | Mod: HCNC | Performed by: INTERNAL MEDICINE

## 2021-09-20 PROCEDURE — 82550 ASSAY OF CK (CPK): CPT | Mod: HCNC | Performed by: INTERNAL MEDICINE

## 2021-09-27 ENCOUNTER — HOSPITAL ENCOUNTER (OUTPATIENT)
Dept: RADIOLOGY | Facility: HOSPITAL | Age: 86
Discharge: HOME | End: 2021-09-27
Attending: INTERNAL MEDICINE
Payer: MEDICARE

## 2021-09-27 ENCOUNTER — OFFICE VISIT (OUTPATIENT)
Dept: FAMILY MEDICINE | Facility: CLINIC | Age: 86
End: 2021-09-27
Payer: MEDICARE

## 2021-09-27 VITALS
OXYGEN SATURATION: 97 % | WEIGHT: 169.31 LBS | SYSTOLIC BLOOD PRESSURE: 132 MMHG | HEART RATE: 69 BPM | DIASTOLIC BLOOD PRESSURE: 70 MMHG | BODY MASS INDEX: 29.06 KG/M2

## 2021-09-27 DIAGNOSIS — M06.041 RHEUMATOID ARTHRITIS INVOLVING BOTH HANDS WITH NEGATIVE RHEUMATOID FACTOR: ICD-10-CM

## 2021-09-27 DIAGNOSIS — M81.0 OSTEOPOROSIS OF LUMBAR SPINE: ICD-10-CM

## 2021-09-27 DIAGNOSIS — I10 PRIMARY HYPERTENSION: ICD-10-CM

## 2021-09-27 DIAGNOSIS — M25.512 ACUTE PAIN OF LEFT SHOULDER: ICD-10-CM

## 2021-09-27 DIAGNOSIS — S46.002A INJURY OF LEFT ROTATOR CUFF, INITIAL ENCOUNTER: ICD-10-CM

## 2021-09-27 DIAGNOSIS — E78.5 DYSLIPIDEMIA: ICD-10-CM

## 2021-09-27 DIAGNOSIS — M06.042 RHEUMATOID ARTHRITIS INVOLVING BOTH HANDS WITH NEGATIVE RHEUMATOID FACTOR: ICD-10-CM

## 2021-09-27 DIAGNOSIS — Z01.89 ENCOUNTER FOR LABORATORY TEST: ICD-10-CM

## 2021-09-27 DIAGNOSIS — Z98.890 S/P CAROTID ENDARTERECTOMY: ICD-10-CM

## 2021-09-27 DIAGNOSIS — I77.9 BILATERAL CAROTID ARTERY DISEASE, UNSPECIFIED TYPE: ICD-10-CM

## 2021-09-27 DIAGNOSIS — M25.642 STIFFNESS OF FINGER JOINT OF LEFT HAND: ICD-10-CM

## 2021-09-27 DIAGNOSIS — E55.9 VITAMIN D DEFICIENCY: ICD-10-CM

## 2021-09-27 DIAGNOSIS — E03.9 HYPOTHYROIDISM, UNSPECIFIED TYPE: ICD-10-CM

## 2021-09-27 DIAGNOSIS — M25.641 STIFFNESS OF FINGER JOINT OF RIGHT HAND: ICD-10-CM

## 2021-09-27 DIAGNOSIS — M19.042 OSTEOARTHRITIS OF BOTH HANDS, UNSPECIFIED OSTEOARTHRITIS TYPE: ICD-10-CM

## 2021-09-27 DIAGNOSIS — M25.642 STIFFNESS OF FINGER JOINT OF LEFT HAND: Primary | ICD-10-CM

## 2021-09-27 DIAGNOSIS — E78.2 MIXED HYPERLIPIDEMIA: ICD-10-CM

## 2021-09-27 DIAGNOSIS — N18.31 STAGE 3A CHRONIC KIDNEY DISEASE: ICD-10-CM

## 2021-09-27 DIAGNOSIS — M19.041 OSTEOARTHRITIS OF BOTH HANDS, UNSPECIFIED OSTEOARTHRITIS TYPE: ICD-10-CM

## 2021-09-27 PROCEDURE — 99499 UNLISTED E&M SERVICE: CPT | Mod: HCNC,S$GLB,, | Performed by: INTERNAL MEDICINE

## 2021-09-27 PROCEDURE — 99499 RISK ADDL DX/OHS AUDIT: ICD-10-PCS | Mod: HCNC,S$GLB,, | Performed by: INTERNAL MEDICINE

## 2021-09-27 PROCEDURE — 1125F PR PAIN SEVERITY QUANTIFIED, PAIN PRESENT: ICD-10-PCS | Mod: HCNC,CPTII,S$GLB, | Performed by: INTERNAL MEDICINE

## 2021-09-27 PROCEDURE — 73030 XR SHOULDER COMPLETE 2 OR MORE VIEWS LEFT: ICD-10-PCS | Mod: 26,HCNC,LT, | Performed by: RADIOLOGY

## 2021-09-27 PROCEDURE — 1159F PR MEDICATION LIST DOCUMENTED IN MEDICAL RECORD: ICD-10-PCS | Mod: HCNC,CPTII,S$GLB, | Performed by: INTERNAL MEDICINE

## 2021-09-27 PROCEDURE — 73030 X-RAY EXAM OF SHOULDER: CPT | Mod: 26,HCNC,LT, | Performed by: RADIOLOGY

## 2021-09-27 PROCEDURE — 73130 XR HAND COMPLETE 3 VIEWS BILATERAL: ICD-10-PCS | Mod: 26,50,HCNC, | Performed by: RADIOLOGY

## 2021-09-27 PROCEDURE — 73030 X-RAY EXAM OF SHOULDER: CPT | Mod: TC,HCNC,PN,LT

## 2021-09-27 PROCEDURE — 1160F PR REVIEW ALL MEDS BY PRESCRIBER/CLIN PHARMACIST DOCUMENTED: ICD-10-PCS | Mod: HCNC,CPTII,S$GLB, | Performed by: INTERNAL MEDICINE

## 2021-09-27 PROCEDURE — 99999 PR PBB SHADOW E&M-EST. PATIENT-LVL IV: CPT | Mod: PBBFAC,HCNC,, | Performed by: INTERNAL MEDICINE

## 2021-09-27 PROCEDURE — 1160F RVW MEDS BY RX/DR IN RCRD: CPT | Mod: HCNC,CPTII,S$GLB, | Performed by: INTERNAL MEDICINE

## 2021-09-27 PROCEDURE — 99215 OFFICE O/P EST HI 40 MIN: CPT | Mod: HCNC,S$GLB,, | Performed by: INTERNAL MEDICINE

## 2021-09-27 PROCEDURE — 73130 X-RAY EXAM OF HAND: CPT | Mod: TC,50,HCNC,PN

## 2021-09-27 PROCEDURE — 99215 PR OFFICE/OUTPT VISIT, EST, LEVL V, 40-54 MIN: ICD-10-PCS | Mod: HCNC,S$GLB,, | Performed by: INTERNAL MEDICINE

## 2021-09-27 PROCEDURE — 1125F AMNT PAIN NOTED PAIN PRSNT: CPT | Mod: HCNC,CPTII,S$GLB, | Performed by: INTERNAL MEDICINE

## 2021-09-27 PROCEDURE — 99999 PR PBB SHADOW E&M-EST. PATIENT-LVL IV: ICD-10-PCS | Mod: PBBFAC,HCNC,, | Performed by: INTERNAL MEDICINE

## 2021-09-27 PROCEDURE — 73130 X-RAY EXAM OF HAND: CPT | Mod: 26,50,HCNC, | Performed by: RADIOLOGY

## 2021-09-27 PROCEDURE — 1159F MED LIST DOCD IN RCRD: CPT | Mod: HCNC,CPTII,S$GLB, | Performed by: INTERNAL MEDICINE

## 2021-09-27 RX ORDER — ATORVASTATIN CALCIUM 40 MG/1
40 TABLET, FILM COATED ORAL DAILY
COMMUNITY
End: 2021-11-09 | Stop reason: SDUPTHER

## 2021-09-27 RX ORDER — GABAPENTIN 300 MG/1
CAPSULE ORAL
Qty: 60 CAPSULE | Refills: 1 | Status: SHIPPED | OUTPATIENT
Start: 2021-09-27 | End: 2021-11-15 | Stop reason: SDUPTHER

## 2021-09-27 RX ORDER — PREDNISONE 20 MG/1
TABLET ORAL
Qty: 5 TABLET | Refills: 0 | Status: SHIPPED | OUTPATIENT
Start: 2021-09-27 | End: 2021-12-07

## 2021-10-16 ENCOUNTER — TELEPHONE (OUTPATIENT)
Dept: FAMILY MEDICINE | Facility: CLINIC | Age: 86
End: 2021-10-16
Payer: MEDICARE

## 2021-10-16 DIAGNOSIS — M25.50 ARTHRALGIA, UNSPECIFIED JOINT: Primary | ICD-10-CM

## 2021-10-17 ENCOUNTER — PATIENT OUTREACH (OUTPATIENT)
Dept: ADMINISTRATIVE | Facility: OTHER | Age: 86
End: 2021-10-17

## 2021-10-18 ENCOUNTER — LAB VISIT (OUTPATIENT)
Dept: LAB | Facility: HOSPITAL | Age: 86
End: 2021-10-18
Attending: INTERNAL MEDICINE
Payer: MEDICARE

## 2021-10-18 ENCOUNTER — OFFICE VISIT (OUTPATIENT)
Dept: RHEUMATOLOGY | Facility: CLINIC | Age: 86
End: 2021-10-18
Payer: MEDICARE

## 2021-10-18 VITALS
SYSTOLIC BLOOD PRESSURE: 130 MMHG | DIASTOLIC BLOOD PRESSURE: 68 MMHG | HEIGHT: 64 IN | HEART RATE: 61 BPM | WEIGHT: 173.75 LBS | BODY MASS INDEX: 29.66 KG/M2

## 2021-10-18 DIAGNOSIS — M25.50 POLYARTHRALGIA: ICD-10-CM

## 2021-10-18 DIAGNOSIS — M25.50 ARTHRALGIA, UNSPECIFIED JOINT: ICD-10-CM

## 2021-10-18 DIAGNOSIS — R05.9 COUGH: Primary | ICD-10-CM

## 2021-10-18 DIAGNOSIS — M25.50 POLYARTHRALGIA: Primary | ICD-10-CM

## 2021-10-18 LAB
CCP AB SER IA-ACNC: <0.5 U/ML
CCP AB SER IA-ACNC: <0.5 U/ML
CRP SERPL-MCNC: 7.7 MG/L (ref 0–8.2)
CRP SERPL-MCNC: 7.7 MG/L (ref 0–8.2)
ERYTHROCYTE [SEDIMENTATION RATE] IN BLOOD BY WESTERGREN METHOD: 49 MM/HR (ref 0–36)
ERYTHROCYTE [SEDIMENTATION RATE] IN BLOOD BY WESTERGREN METHOD: 49 MM/HR (ref 0–36)
HBV CORE AB SERPL QL IA: NEGATIVE
HBV CORE AB SERPL QL IA: NEGATIVE
HBV SURFACE AG SERPL QL IA: NEGATIVE
HBV SURFACE AG SERPL QL IA: NEGATIVE
HCV AB SERPL QL IA: NEGATIVE
HCV AB SERPL QL IA: NEGATIVE

## 2021-10-18 PROCEDURE — 85652 RBC SED RATE AUTOMATED: CPT | Mod: HCNC | Performed by: INTERNAL MEDICINE

## 2021-10-18 PROCEDURE — 1159F PR MEDICATION LIST DOCUMENTED IN MEDICAL RECORD: ICD-10-PCS | Mod: HCNC,CPTII,S$GLB, | Performed by: INTERNAL MEDICINE

## 2021-10-18 PROCEDURE — 86803 HEPATITIS C AB TEST: CPT | Mod: HCNC | Performed by: INTERNAL MEDICINE

## 2021-10-18 PROCEDURE — 36415 COLL VENOUS BLD VENIPUNCTURE: CPT | Mod: HCNC | Performed by: INTERNAL MEDICINE

## 2021-10-18 PROCEDURE — 1159F MED LIST DOCD IN RCRD: CPT | Mod: HCNC,CPTII,S$GLB, | Performed by: INTERNAL MEDICINE

## 2021-10-18 PROCEDURE — 84165 PATHOLOGIST INTERPRETATION SPE: ICD-10-PCS | Mod: 26,HCNC,, | Performed by: PATHOLOGY

## 2021-10-18 PROCEDURE — 86200 CCP ANTIBODY: CPT | Mod: HCNC | Performed by: INTERNAL MEDICINE

## 2021-10-18 PROCEDURE — 86038 ANTINUCLEAR ANTIBODIES: CPT | Mod: HCNC | Performed by: INTERNAL MEDICINE

## 2021-10-18 PROCEDURE — 84165 PROTEIN E-PHORESIS SERUM: CPT | Mod: HCNC | Performed by: INTERNAL MEDICINE

## 2021-10-18 PROCEDURE — 1125F AMNT PAIN NOTED PAIN PRSNT: CPT | Mod: HCNC,CPTII,S$GLB, | Performed by: INTERNAL MEDICINE

## 2021-10-18 PROCEDURE — 99999 PR PBB SHADOW E&M-EST. PATIENT-LVL IV: CPT | Mod: PBBFAC,HCNC,, | Performed by: INTERNAL MEDICINE

## 2021-10-18 PROCEDURE — 87340 HEPATITIS B SURFACE AG IA: CPT | Mod: HCNC | Performed by: INTERNAL MEDICINE

## 2021-10-18 PROCEDURE — 86704 HEP B CORE ANTIBODY TOTAL: CPT | Mod: HCNC | Performed by: INTERNAL MEDICINE

## 2021-10-18 PROCEDURE — 99999 PR PBB SHADOW E&M-EST. PATIENT-LVL IV: ICD-10-PCS | Mod: PBBFAC,HCNC,, | Performed by: INTERNAL MEDICINE

## 2021-10-18 PROCEDURE — 99205 PR OFFICE/OUTPT VISIT, NEW, LEVL V, 60-74 MIN: ICD-10-PCS | Mod: HCNC,S$GLB,, | Performed by: INTERNAL MEDICINE

## 2021-10-18 PROCEDURE — 1125F PR PAIN SEVERITY QUANTIFIED, PAIN PRESENT: ICD-10-PCS | Mod: HCNC,CPTII,S$GLB, | Performed by: INTERNAL MEDICINE

## 2021-10-18 PROCEDURE — 84165 PROTEIN E-PHORESIS SERUM: CPT | Mod: 26,HCNC,, | Performed by: PATHOLOGY

## 2021-10-18 PROCEDURE — 86140 C-REACTIVE PROTEIN: CPT | Mod: HCNC | Performed by: INTERNAL MEDICINE

## 2021-10-18 PROCEDURE — 99205 OFFICE O/P NEW HI 60 MIN: CPT | Mod: HCNC,S$GLB,, | Performed by: INTERNAL MEDICINE

## 2021-10-18 RX ORDER — PREDNISONE 10 MG/1
10 TABLET ORAL DAILY
Qty: 90 TABLET | Refills: 0 | Status: SHIPPED | OUTPATIENT
Start: 2021-10-18 | End: 2022-09-22

## 2021-10-19 LAB
ALBUMIN SERPL ELPH-MCNC: 3.55 G/DL (ref 3.35–5.55)
ALPHA1 GLOB SERPL ELPH-MCNC: 0.35 G/DL (ref 0.17–0.41)
ALPHA2 GLOB SERPL ELPH-MCNC: 0.79 G/DL (ref 0.43–0.99)
ANA SER QL IF: NORMAL
B-GLOBULIN SERPL ELPH-MCNC: 0.86 G/DL (ref 0.5–1.1)
GAMMA GLOB SERPL ELPH-MCNC: 0.86 G/DL (ref 0.67–1.58)
PATHOLOGIST INTERPRETATION SPE: NORMAL
PROT SERPL-MCNC: 6.4 G/DL (ref 6–8.4)

## 2021-10-21 ENCOUNTER — HOSPITAL ENCOUNTER (OUTPATIENT)
Dept: RADIOLOGY | Facility: HOSPITAL | Age: 86
Discharge: HOME | End: 2021-10-21
Attending: INTERNAL MEDICINE
Payer: MEDICARE

## 2021-10-21 DIAGNOSIS — R05.9 COUGH: ICD-10-CM

## 2021-10-21 DIAGNOSIS — M25.50 POLYARTHRALGIA: ICD-10-CM

## 2021-10-21 PROCEDURE — 71046 X-RAY EXAM CHEST 2 VIEWS: CPT | Mod: TC,HCNC,PN

## 2021-10-21 PROCEDURE — 77077 JOINT SURVEY SINGLE VIEW: CPT | Mod: 26,HCNC,, | Performed by: RADIOLOGY

## 2021-10-21 PROCEDURE — 71046 X-RAY EXAM CHEST 2 VIEWS: CPT | Mod: 26,HCNC,, | Performed by: RADIOLOGY

## 2021-10-21 PROCEDURE — 77077 JOINT SURVEY SINGLE VIEW: CPT | Mod: TC,HCNC,PN

## 2021-10-21 PROCEDURE — 71046 XR CHEST PA AND LATERAL: ICD-10-PCS | Mod: 26,HCNC,, | Performed by: RADIOLOGY

## 2021-10-21 PROCEDURE — 77077 XR ARTHRITIS SURVEY: ICD-10-PCS | Mod: 26,HCNC,, | Performed by: RADIOLOGY

## 2021-10-25 ENCOUNTER — TELEPHONE (OUTPATIENT)
Dept: RHEUMATOLOGY | Facility: CLINIC | Age: 86
End: 2021-10-25
Payer: MEDICARE

## 2021-11-09 DIAGNOSIS — Z98.890 S/P CAROTID ENDARTERECTOMY: ICD-10-CM

## 2021-11-09 DIAGNOSIS — E03.9 ACQUIRED HYPOTHYROIDISM: ICD-10-CM

## 2021-11-09 DIAGNOSIS — E66.3 OVERWEIGHT (BMI 25.0-29.9): ICD-10-CM

## 2021-11-09 DIAGNOSIS — E78.2 MIXED HYPERLIPIDEMIA: ICD-10-CM

## 2021-11-10 ENCOUNTER — PATIENT MESSAGE (OUTPATIENT)
Dept: FAMILY MEDICINE | Facility: CLINIC | Age: 86
End: 2021-11-10
Payer: MEDICARE

## 2021-11-11 ENCOUNTER — PATIENT MESSAGE (OUTPATIENT)
Dept: FAMILY MEDICINE | Facility: CLINIC | Age: 86
End: 2021-11-11
Payer: MEDICARE

## 2021-11-12 RX ORDER — LEVOTHYROXINE SODIUM 75 UG/1
TABLET ORAL
Qty: 90 TABLET | Refills: 3 | Status: SHIPPED | OUTPATIENT
Start: 2021-11-12 | End: 2021-12-08

## 2021-11-12 RX ORDER — ATORVASTATIN CALCIUM 40 MG/1
40 TABLET, FILM COATED ORAL DAILY
Qty: 90 TABLET | Refills: 3 | Status: SHIPPED | OUTPATIENT
Start: 2021-11-12 | End: 2022-11-09

## 2021-11-15 DIAGNOSIS — M25.642 STIFFNESS OF FINGER JOINT OF LEFT HAND: ICD-10-CM

## 2021-11-15 DIAGNOSIS — M06.041 RHEUMATOID ARTHRITIS INVOLVING BOTH HANDS WITH NEGATIVE RHEUMATOID FACTOR: ICD-10-CM

## 2021-11-15 DIAGNOSIS — M25.641 STIFFNESS OF FINGER JOINT OF RIGHT HAND: ICD-10-CM

## 2021-11-15 DIAGNOSIS — M19.041 OSTEOARTHRITIS OF BOTH HANDS, UNSPECIFIED OSTEOARTHRITIS TYPE: ICD-10-CM

## 2021-11-15 DIAGNOSIS — M06.042 RHEUMATOID ARTHRITIS INVOLVING BOTH HANDS WITH NEGATIVE RHEUMATOID FACTOR: ICD-10-CM

## 2021-11-15 DIAGNOSIS — M19.042 OSTEOARTHRITIS OF BOTH HANDS, UNSPECIFIED OSTEOARTHRITIS TYPE: ICD-10-CM

## 2021-11-16 RX ORDER — GABAPENTIN 300 MG/1
CAPSULE ORAL
Qty: 60 CAPSULE | Refills: 2 | Status: SHIPPED | OUTPATIENT
Start: 2021-11-16 | End: 2022-04-18

## 2021-11-29 ENCOUNTER — TELEPHONE (OUTPATIENT)
Dept: FAMILY MEDICINE | Facility: CLINIC | Age: 86
End: 2021-11-29
Payer: MEDICARE

## 2021-11-29 DIAGNOSIS — E03.9 HYPOTHYROIDISM, UNSPECIFIED TYPE: Primary | ICD-10-CM

## 2021-12-03 ENCOUNTER — LAB VISIT (OUTPATIENT)
Dept: LAB | Facility: HOSPITAL | Age: 86
End: 2021-12-03
Attending: INTERNAL MEDICINE
Payer: MEDICARE

## 2021-12-03 DIAGNOSIS — E03.9 HYPOTHYROIDISM, UNSPECIFIED TYPE: ICD-10-CM

## 2021-12-03 PROCEDURE — 84439 ASSAY OF FREE THYROXINE: CPT | Mod: HCNC | Performed by: INTERNAL MEDICINE

## 2021-12-03 PROCEDURE — 84481 FREE ASSAY (FT-3): CPT | Mod: HCNC | Performed by: INTERNAL MEDICINE

## 2021-12-03 PROCEDURE — 84443 ASSAY THYROID STIM HORMONE: CPT | Mod: HCNC | Performed by: INTERNAL MEDICINE

## 2021-12-03 PROCEDURE — 80053 COMPREHEN METABOLIC PANEL: CPT | Mod: HCNC | Performed by: INTERNAL MEDICINE

## 2021-12-03 PROCEDURE — 36415 COLL VENOUS BLD VENIPUNCTURE: CPT | Mod: HCNC,PN | Performed by: INTERNAL MEDICINE

## 2021-12-04 LAB
ALBUMIN SERPL BCP-MCNC: 3.5 G/DL (ref 3.5–5.2)
ALP SERPL-CCNC: 64 U/L (ref 55–135)
ALT SERPL W/O P-5'-P-CCNC: 13 U/L (ref 10–44)
ANION GAP SERPL CALC-SCNC: 10 MMOL/L (ref 8–16)
AST SERPL-CCNC: 13 U/L (ref 10–40)
BILIRUB SERPL-MCNC: 0.5 MG/DL (ref 0.1–1)
BUN SERPL-MCNC: 24 MG/DL (ref 8–23)
CALCIUM SERPL-MCNC: 9.2 MG/DL (ref 8.7–10.5)
CHLORIDE SERPL-SCNC: 106 MMOL/L (ref 95–110)
CO2 SERPL-SCNC: 24 MMOL/L (ref 23–29)
CREAT SERPL-MCNC: 1 MG/DL (ref 0.5–1.4)
EST. GFR  (AFRICAN AMERICAN): 58.9 ML/MIN/1.73 M^2
EST. GFR  (NON AFRICAN AMERICAN): 51.1 ML/MIN/1.73 M^2
GLUCOSE SERPL-MCNC: 81 MG/DL (ref 70–110)
POTASSIUM SERPL-SCNC: 4.1 MMOL/L (ref 3.5–5.1)
PROT SERPL-MCNC: 6.7 G/DL (ref 6–8.4)
SODIUM SERPL-SCNC: 140 MMOL/L (ref 136–145)
T3FREE SERPL-MCNC: 2.4 PG/ML (ref 2.3–4.2)
T4 FREE SERPL-MCNC: 1.21 NG/DL (ref 0.71–1.51)
TSH SERPL DL<=0.005 MIU/L-ACNC: 0.41 UIU/ML (ref 0.4–4)

## 2021-12-07 ENCOUNTER — OFFICE VISIT (OUTPATIENT)
Dept: RHEUMATOLOGY | Facility: CLINIC | Age: 86
End: 2021-12-07
Payer: MEDICARE

## 2021-12-07 VITALS
SYSTOLIC BLOOD PRESSURE: 134 MMHG | HEART RATE: 62 BPM | BODY MASS INDEX: 28.95 KG/M2 | WEIGHT: 169.56 LBS | DIASTOLIC BLOOD PRESSURE: 68 MMHG | HEIGHT: 64 IN

## 2021-12-07 DIAGNOSIS — Z51.81 MEDICATION MONITORING ENCOUNTER: ICD-10-CM

## 2021-12-07 DIAGNOSIS — D84.9 IMMUNOSUPPRESSION: ICD-10-CM

## 2021-12-07 DIAGNOSIS — M25.50 ARTHRALGIA, UNSPECIFIED JOINT: ICD-10-CM

## 2021-12-07 DIAGNOSIS — M06.00 SERONEGATIVE RHEUMATOID ARTHRITIS: Primary | ICD-10-CM

## 2021-12-07 DIAGNOSIS — Z79.52 LONG TERM SYSTEMIC STEROID USER: ICD-10-CM

## 2021-12-07 PROCEDURE — 99999 PR PBB SHADOW E&M-EST. PATIENT-LVL IV: CPT | Mod: PBBFAC,HCNC,, | Performed by: STUDENT IN AN ORGANIZED HEALTH CARE EDUCATION/TRAINING PROGRAM

## 2021-12-07 PROCEDURE — 99999 PR PBB SHADOW E&M-EST. PATIENT-LVL IV: ICD-10-PCS | Mod: PBBFAC,HCNC,, | Performed by: STUDENT IN AN ORGANIZED HEALTH CARE EDUCATION/TRAINING PROGRAM

## 2021-12-07 PROCEDURE — 99215 PR OFFICE/OUTPT VISIT, EST, LEVL V, 40-54 MIN: ICD-10-PCS | Mod: HCNC,S$GLB,, | Performed by: STUDENT IN AN ORGANIZED HEALTH CARE EDUCATION/TRAINING PROGRAM

## 2021-12-07 PROCEDURE — 99499 RISK ADDL DX/OHS AUDIT: ICD-10-PCS | Mod: HCNC,S$GLB,, | Performed by: STUDENT IN AN ORGANIZED HEALTH CARE EDUCATION/TRAINING PROGRAM

## 2021-12-07 PROCEDURE — 99499 UNLISTED E&M SERVICE: CPT | Mod: HCNC,S$GLB,, | Performed by: STUDENT IN AN ORGANIZED HEALTH CARE EDUCATION/TRAINING PROGRAM

## 2021-12-07 PROCEDURE — 99215 OFFICE O/P EST HI 40 MIN: CPT | Mod: HCNC,S$GLB,, | Performed by: STUDENT IN AN ORGANIZED HEALTH CARE EDUCATION/TRAINING PROGRAM

## 2021-12-07 RX ORDER — METHOTREXATE 2.5 MG/1
15 TABLET ORAL
Qty: 72 TABLET | Refills: 0 | Status: SHIPPED | OUTPATIENT
Start: 2021-12-07 | End: 2022-02-25 | Stop reason: SDUPTHER

## 2021-12-07 RX ORDER — FOLIC ACID 1 MG/1
1 TABLET ORAL DAILY
Qty: 90 TABLET | Refills: 0 | Status: SHIPPED | OUTPATIENT
Start: 2021-12-07 | End: 2022-02-28 | Stop reason: SDUPTHER

## 2021-12-07 ASSESSMENT — ROUTINE ASSESSMENT OF PATIENT INDEX DATA (RAPID3)
PAIN SCORE: 3.5
AM STIFFNESS SCORE: 1, YES
TOTAL RAPID3 SCORE: 5
MDHAQ FUNCTION SCORE: 1.8
PATIENT GLOBAL ASSESSMENT SCORE: 5.5
FATIGUE SCORE: 2.2
PSYCHOLOGICAL DISTRESS SCORE: 1.1

## 2021-12-08 ENCOUNTER — LAB VISIT (OUTPATIENT)
Dept: LAB | Facility: HOSPITAL | Age: 86
End: 2021-12-08
Payer: MEDICARE

## 2021-12-08 ENCOUNTER — OFFICE VISIT (OUTPATIENT)
Dept: FAMILY MEDICINE | Facility: CLINIC | Age: 86
End: 2021-12-08
Payer: MEDICARE

## 2021-12-08 VITALS
DIASTOLIC BLOOD PRESSURE: 62 MMHG | HEIGHT: 64 IN | SYSTOLIC BLOOD PRESSURE: 108 MMHG | WEIGHT: 169.44 LBS | BODY MASS INDEX: 28.93 KG/M2 | HEART RATE: 64 BPM

## 2021-12-08 DIAGNOSIS — Z98.890 S/P CAROTID ENDARTERECTOMY: ICD-10-CM

## 2021-12-08 DIAGNOSIS — E55.9 VITAMIN D DEFICIENCY: ICD-10-CM

## 2021-12-08 DIAGNOSIS — M25.50 ARTHRALGIA, UNSPECIFIED JOINT: ICD-10-CM

## 2021-12-08 DIAGNOSIS — M06.09 RHEUMATOID ARTHRITIS OF MULTIPLE SITES WITH NEGATIVE RHEUMATOID FACTOR: ICD-10-CM

## 2021-12-08 DIAGNOSIS — M81.6 LOCALIZED OSTEOPOROSIS WITHOUT CURRENT PATHOLOGICAL FRACTURE: ICD-10-CM

## 2021-12-08 DIAGNOSIS — E66.3 OVERWEIGHT (BMI 25.0-29.9): ICD-10-CM

## 2021-12-08 DIAGNOSIS — N18.31 STAGE 3A CHRONIC KIDNEY DISEASE: ICD-10-CM

## 2021-12-08 DIAGNOSIS — K21.9 GASTROESOPHAGEAL REFLUX DISEASE, UNSPECIFIED WHETHER ESOPHAGITIS PRESENT: ICD-10-CM

## 2021-12-08 DIAGNOSIS — E78.2 MIXED HYPERLIPIDEMIA: ICD-10-CM

## 2021-12-08 DIAGNOSIS — I10 ESSENTIAL HYPERTENSION: Primary | ICD-10-CM

## 2021-12-08 DIAGNOSIS — E03.9 ACQUIRED HYPOTHYROIDISM: ICD-10-CM

## 2021-12-08 LAB
ALBUMIN SERPL BCP-MCNC: 3.6 G/DL (ref 3.5–5.2)
ALP SERPL-CCNC: 71 U/L (ref 55–135)
ALT SERPL W/O P-5'-P-CCNC: 12 U/L (ref 10–44)
ANION GAP SERPL CALC-SCNC: 13 MMOL/L (ref 8–16)
AST SERPL-CCNC: 13 U/L (ref 10–40)
BASOPHILS # BLD AUTO: 0.03 K/UL (ref 0–0.2)
BASOPHILS NFR BLD: 0.3 % (ref 0–1.9)
BILIRUB SERPL-MCNC: 0.5 MG/DL (ref 0.1–1)
BUN SERPL-MCNC: 31 MG/DL (ref 8–23)
CALCIUM SERPL-MCNC: 9.7 MG/DL (ref 8.7–10.5)
CHLORIDE SERPL-SCNC: 104 MMOL/L (ref 95–110)
CO2 SERPL-SCNC: 20 MMOL/L (ref 23–29)
CREAT SERPL-MCNC: 1.1 MG/DL (ref 0.5–1.4)
CRP SERPL-MCNC: 3 MG/L (ref 0–8.2)
DIFFERENTIAL METHOD: ABNORMAL
EOSINOPHIL # BLD AUTO: 0 K/UL (ref 0–0.5)
EOSINOPHIL NFR BLD: 0.2 % (ref 0–8)
ERYTHROCYTE [DISTWIDTH] IN BLOOD BY AUTOMATED COUNT: 13.3 % (ref 11.5–14.5)
ERYTHROCYTE [SEDIMENTATION RATE] IN BLOOD BY WESTERGREN METHOD: 40 MM/HR (ref 0–36)
EST. GFR  (AFRICAN AMERICAN): 52.5 ML/MIN/1.73 M^2
EST. GFR  (NON AFRICAN AMERICAN): 45.6 ML/MIN/1.73 M^2
GLUCOSE SERPL-MCNC: 113 MG/DL (ref 70–110)
HCT VFR BLD AUTO: 40 % (ref 37–48.5)
HGB BLD-MCNC: 13.1 G/DL (ref 12–16)
IMM GRANULOCYTES # BLD AUTO: 0.07 K/UL (ref 0–0.04)
IMM GRANULOCYTES NFR BLD AUTO: 0.7 % (ref 0–0.5)
LYMPHOCYTES # BLD AUTO: 0.7 K/UL (ref 1–4.8)
LYMPHOCYTES NFR BLD: 7.2 % (ref 18–48)
MCH RBC QN AUTO: 31.3 PG (ref 27–31)
MCHC RBC AUTO-ENTMCNC: 32.8 G/DL (ref 32–36)
MCV RBC AUTO: 96 FL (ref 82–98)
MONOCYTES # BLD AUTO: 0.4 K/UL (ref 0.3–1)
MONOCYTES NFR BLD: 4 % (ref 4–15)
NEUTROPHILS # BLD AUTO: 8.5 K/UL (ref 1.8–7.7)
NEUTROPHILS NFR BLD: 87.6 % (ref 38–73)
NRBC BLD-RTO: 0 /100 WBC
PLATELET # BLD AUTO: 291 K/UL (ref 150–450)
PMV BLD AUTO: 10.9 FL (ref 9.2–12.9)
POTASSIUM SERPL-SCNC: 4.5 MMOL/L (ref 3.5–5.1)
PROT SERPL-MCNC: 6.8 G/DL (ref 6–8.4)
RBC # BLD AUTO: 4.18 M/UL (ref 4–5.4)
SODIUM SERPL-SCNC: 137 MMOL/L (ref 136–145)
WBC # BLD AUTO: 9.65 K/UL (ref 3.9–12.7)

## 2021-12-08 PROCEDURE — 86592 SYPHILIS TEST NON-TREP QUAL: CPT | Mod: HCNC | Performed by: STUDENT IN AN ORGANIZED HEALTH CARE EDUCATION/TRAINING PROGRAM

## 2021-12-08 PROCEDURE — 85652 RBC SED RATE AUTOMATED: CPT | Mod: HCNC | Performed by: STUDENT IN AN ORGANIZED HEALTH CARE EDUCATION/TRAINING PROGRAM

## 2021-12-08 PROCEDURE — 99214 OFFICE O/P EST MOD 30 MIN: CPT | Mod: HCNC,S$GLB,, | Performed by: INTERNAL MEDICINE

## 2021-12-08 PROCEDURE — 86682 HELMINTH ANTIBODY: CPT | Mod: HCNC | Performed by: STUDENT IN AN ORGANIZED HEALTH CARE EDUCATION/TRAINING PROGRAM

## 2021-12-08 PROCEDURE — 86140 C-REACTIVE PROTEIN: CPT | Mod: HCNC | Performed by: STUDENT IN AN ORGANIZED HEALTH CARE EDUCATION/TRAINING PROGRAM

## 2021-12-08 PROCEDURE — 36415 COLL VENOUS BLD VENIPUNCTURE: CPT | Mod: HCNC,PN | Performed by: STUDENT IN AN ORGANIZED HEALTH CARE EDUCATION/TRAINING PROGRAM

## 2021-12-08 PROCEDURE — 84165 PROTEIN E-PHORESIS SERUM: CPT | Mod: HCNC | Performed by: STUDENT IN AN ORGANIZED HEALTH CARE EDUCATION/TRAINING PROGRAM

## 2021-12-08 PROCEDURE — 87389 HIV-1 AG W/HIV-1&-2 AB AG IA: CPT | Mod: HCNC | Performed by: STUDENT IN AN ORGANIZED HEALTH CARE EDUCATION/TRAINING PROGRAM

## 2021-12-08 PROCEDURE — 85025 COMPLETE CBC W/AUTO DIFF WBC: CPT | Mod: HCNC | Performed by: STUDENT IN AN ORGANIZED HEALTH CARE EDUCATION/TRAINING PROGRAM

## 2021-12-08 PROCEDURE — 84165 PROTEIN E-PHORESIS SERUM: CPT | Mod: 26,HCNC,, | Performed by: PATHOLOGY

## 2021-12-08 PROCEDURE — 99999 PR PBB SHADOW E&M-EST. PATIENT-LVL III: ICD-10-PCS | Mod: PBBFAC,HCNC,, | Performed by: INTERNAL MEDICINE

## 2021-12-08 PROCEDURE — 99999 PR PBB SHADOW E&M-EST. PATIENT-LVL III: CPT | Mod: PBBFAC,HCNC,, | Performed by: INTERNAL MEDICINE

## 2021-12-08 PROCEDURE — 84165 PATHOLOGIST INTERPRETATION SPE: ICD-10-PCS | Mod: 26,HCNC,, | Performed by: PATHOLOGY

## 2021-12-08 PROCEDURE — 80053 COMPREHEN METABOLIC PANEL: CPT | Mod: HCNC | Performed by: STUDENT IN AN ORGANIZED HEALTH CARE EDUCATION/TRAINING PROGRAM

## 2021-12-08 PROCEDURE — 99214 PR OFFICE/OUTPT VISIT, EST, LEVL IV, 30-39 MIN: ICD-10-PCS | Mod: HCNC,S$GLB,, | Performed by: INTERNAL MEDICINE

## 2021-12-08 RX ORDER — LEVOTHYROXINE SODIUM 125 UG/1
TABLET ORAL
Qty: 50 TABLET | Refills: 1 | Status: SHIPPED | OUTPATIENT
Start: 2021-12-08 | End: 2022-04-21 | Stop reason: SDUPTHER

## 2021-12-09 LAB
ALBUMIN SERPL ELPH-MCNC: 3.81 G/DL (ref 3.35–5.55)
ALPHA1 GLOB SERPL ELPH-MCNC: 0.34 G/DL (ref 0.17–0.41)
ALPHA2 GLOB SERPL ELPH-MCNC: 0.8 G/DL (ref 0.43–0.99)
B-GLOBULIN SERPL ELPH-MCNC: 0.84 G/DL (ref 0.5–1.1)
GAMMA GLOB SERPL ELPH-MCNC: 0.92 G/DL (ref 0.67–1.58)
PATHOLOGIST INTERPRETATION SPE: NORMAL
PROT SERPL-MCNC: 6.7 G/DL (ref 6–8.4)
RPR SER QL: NORMAL

## 2021-12-10 LAB — HIV 1+2 AB+HIV1 P24 AG SERPL QL IA: NEGATIVE

## 2021-12-13 LAB — STRONGYLOIDES ANTIBODY IGG: NEGATIVE

## 2021-12-14 ENCOUNTER — PATIENT MESSAGE (OUTPATIENT)
Dept: RHEUMATOLOGY | Facility: CLINIC | Age: 86
End: 2021-12-14
Payer: MEDICARE

## 2021-12-14 DIAGNOSIS — M06.00 SERONEGATIVE RHEUMATOID ARTHRITIS: Primary | ICD-10-CM

## 2021-12-15 ENCOUNTER — IMMUNIZATION (OUTPATIENT)
Dept: FAMILY MEDICINE | Facility: CLINIC | Age: 86
End: 2021-12-15
Payer: MEDICARE

## 2021-12-15 DIAGNOSIS — Z23 NEED FOR VACCINATION: Primary | ICD-10-CM

## 2021-12-15 PROCEDURE — 0004A COVID-19, MRNA, LNP-S, PF, 30 MCG/0.3 ML DOSE VACCINE: CPT | Mod: HCNC,PBBFAC | Performed by: RADIOLOGY

## 2021-12-16 ENCOUNTER — TELEPHONE (OUTPATIENT)
Dept: RHEUMATOLOGY | Facility: CLINIC | Age: 86
End: 2021-12-16
Payer: MEDICARE

## 2021-12-19 ENCOUNTER — TELEPHONE (OUTPATIENT)
Dept: FAMILY MEDICINE | Facility: CLINIC | Age: 86
End: 2021-12-19
Payer: MEDICARE

## 2021-12-19 DIAGNOSIS — M81.6 LOCALIZED OSTEOPOROSIS WITHOUT CURRENT PATHOLOGICAL FRACTURE: Primary | ICD-10-CM

## 2021-12-20 ENCOUNTER — PATIENT MESSAGE (OUTPATIENT)
Dept: FAMILY MEDICINE | Facility: CLINIC | Age: 86
End: 2021-12-20
Payer: MEDICARE

## 2021-12-26 ENCOUNTER — PATIENT MESSAGE (OUTPATIENT)
Dept: RHEUMATOLOGY | Facility: CLINIC | Age: 86
End: 2021-12-26
Payer: MEDICARE

## 2021-12-28 ENCOUNTER — TELEPHONE (OUTPATIENT)
Dept: RHEUMATOLOGY | Facility: CLINIC | Age: 86
End: 2021-12-28

## 2021-12-28 NOTE — TELEPHONE ENCOUNTER
----- Message from Dc Baldwin sent at 12/17/2021  9:56 AM CST -----  Regarding: returning call  Contact: Patient  Type:  Patient Returning Call    Who Called:  Patient   Who Left Message for Patient:  darryn  Does the patient know what this is regarding?:  no  Best Call Back Number:  [phone]

## 2022-01-13 ENCOUNTER — TELEPHONE (OUTPATIENT)
Dept: RHEUMATOLOGY | Facility: CLINIC | Age: 87
End: 2022-01-13
Payer: MEDICARE

## 2022-01-13 NOTE — TELEPHONE ENCOUNTER
Patient said she had questions about completing lab work without having started MTX but they have been resolved. She just started taking methotrexate 01/10/2022. Patient says she will have lab work completed in February.

## 2022-01-14 DIAGNOSIS — K21.9 GASTROESOPHAGEAL REFLUX DISEASE: ICD-10-CM

## 2022-01-14 DIAGNOSIS — I10 ESSENTIAL HYPERTENSION: ICD-10-CM

## 2022-01-14 NOTE — TELEPHONE ENCOUNTER
No new care gaps identified.  Powered by [employer]. Reference number: [id number].   1/14/2022 2:42:17 PM CST

## 2022-01-19 RX ORDER — METOPROLOL SUCCINATE 25 MG/1
TABLET, EXTENDED RELEASE ORAL
Qty: 45 TABLET | Refills: 3 | Status: SHIPPED | OUTPATIENT
Start: 2022-01-19 | End: 2022-12-28

## 2022-01-19 RX ORDER — LOSARTAN POTASSIUM 25 MG/1
TABLET ORAL
Qty: 90 TABLET | Refills: 3 | Status: SHIPPED | OUTPATIENT
Start: 2022-01-19 | End: 2022-12-28

## 2022-01-20 NOTE — TELEPHONE ENCOUNTER
Refill Routing Note   Medication(s) are not appropriate for processing by [facility]:    - Drug-Disease Interaction (Omeprazole and Osteoporosis)     Medication-related problems identified: Drug-disease interaction     Medication reconciliation completed: No      Automatic Epic Protocol Generated Data:    Requested Prescriptions   Pending Prescriptions Disp Refills    omeprazole (PRILOSEC) 20 MG capsule [Pharmacy Med Name: OMEPRAZOLE 20 MG Capsule Delayed Release] 90 capsule 3     Sig: TAKE 1 CAPSULE EVERY DAY AS NEEDED FOR REFLUX       Gastroenterology: Proton Pump Inhibitors Failed - 1/14/2022  3:05 PM        Failed - Osteoporosis is not on problem list        Passed - Patient is at least 18 years old        Passed - Plavix is not on active medication list        Passed - An appropriate indication is on the problem list        Passed - Valid encounter within last 15 months     Recent Visits  Date Type Provider Dept   12/08/21 Office Visit Khoi Vazquez MD [facility]   09/27/21 Office Visit Khoi Vazquez MD [facility]   06/29/21 Office Visit Khoi Vazquez MD [facility] Family [facility]   05/20/21 Office Visit Khoi Vazquez MD [facility] Family [facility]   01/21/21 Office Visit Khoi Vazquez MD [facility]   09/17/20 Office Visit Khoi Vazquez MD [facility]   07/17/20 Office Visit Khoi Vazquez MD [facility]   Showing recent visits within past 720 days and meeting all other requirements  Future Appointments  No visits were found meeting these conditions.  Showing future appointments within next 150 days and meeting all other requirements      Future Appointments              In 2 weeks [facility]    In 1 month [facility] SPECIMEN LABORATORY [facility]    In 1 month [facility] [place] [facility]    In 1 month Leah Gannon MD [facility]umatology,  [place]    In 1 month Khoi Vazquez MD [facility]    In 9 months [facility] DEXA1 [place] - [facility], [place]                 Signed Prescriptions Disp Refills    losartan (COZAAR) 25 MG tablet 90 tablet 3     Sig: TAKE 1 TABLET EVERY DAY       Cardiovascular:  Angiotensin Receptor Blockers Passed - 1/14/2022  3:05 PM        Passed - Patient is at least 18 years old        Passed - Last BP in normal range within 360 days     BP Readings from Last 1 Encounters:   12/08/21 108/62               Passed - Valid encounter within last 15 months     Recent Visits  Date Type Provider Dept   12/08/21 Office Visit Khoi Vazquez MD [facility] Family [facility]   09/27/21 Office Visit Khoi Vazquez MD [facility]   06/29/21 Office Visit Khoi Vazquez MD [facility]   05/20/21 Office Visit Khoi Vazquez MD [facility] Family [facility]   01/21/21 Office Visit Khoi Vazquez MD [facility] Family [facility]   09/17/20 Office Visit Khoi Vzaquez MD [facility]   07/17/20 Office Visit Khoi Vazquez MD [facility]   Showing recent visits within past 720 days and meeting all other requirements  Future Appointments  No visits were found meeting these conditions.  Showing future appointments within next 150 days and meeting all other requirements      Future Appointments              In 2 weeks [facility] [place] [facility]    In 1 month [facility] SPECIMEN LABORATORY [facility] - Spec Lab, [facility]    In 1 month [facility] [place] [facility]    In 1 month Leah Gannon MD [place] - Rheumatology, [place]    In 1 month Khoi Vazquez MD [facility]    In 9 months [facility] DEXA1 [place] - [facility], [place]                Passed - Cr is 1.39 or below and within 360 days     Lab Results   Component Value Date    CREATININE 1.1 12/08/2021    CREATININE  1.0 12/03/2021    CREATININE 0.8 09/20/2021    CREATININE 0.8 09/20/2021              Passed - K is 5.2 or below and within 360 days     Potassium   Date Value Ref Range Status   12/08/2021 4.5 3.5 - 5.1 mmol/L Final   12/03/2021 4.1 3.5 - 5.1 mmol/L Final   09/20/2021 3.9 3.5 - 5.1 mmol/L Final   09/20/2021 3.9 3.5 - 5.1 mmol/L Final              Passed - eGFR within 360 days     Lab Results   Component Value Date    EGFRNONAA 45.6 (A) 12/08/2021    EGFRNONAA 51.1 (A) 12/03/2021    EGFRNONAA >60.0 09/20/2021    EGFRNONAA >60.0 09/20/2021                  metoprolol succinate (TOPROL-XL) 25 MG 24 hr tablet 45 tablet 3     Sig: TAKE 1/2 TABLET EVERY DAY       Cardiovascular:  Beta Blockers Passed - 1/14/2022  3:05 PM        Passed - Patient is at least 18 years old        Passed - Last BP in normal range within 360 days     BP Readings from Last 1 Encounters:   12/08/21 108/62               Passed - Last Heart Rate in normal range within 360 days     Pulse Readings from Last 1 Encounters:   12/08/21 64              Passed - Valid encounter within last 15 months     Recent Visits  Date Type Provider Dept   12/08/21 Office Visit Khoi Vazquez MD [facility]   09/27/21 Office Visit Khoi Vazquez MD [facility]   06/29/21 Office Visit Khoi Vazquez MD [facility]   05/20/21 Office Visit Khoi Vazquez MD [facility]   01/21/21 Office Visit Khoi Vazquez MD [facility]   09/17/20 Office Visit Khoi Vazquez MD [facility]   07/17/20 Office Visit Khoi Vazquez MD [facility]   Showing recent visits within past 720 days and meeting all other requirements  Future Appointments  No visits were found meeting these conditions.  Showing future appointments within next 150 days and meeting all other requirements      Future Appointments              In 2 weeks [facility] LABORATORY [facility] - Lab, [facility]    In 1 month [facility] SPECIMEN LABORATORY [facility]  [place] - Spec Lab, [facility]    In 1 month [facility] LABORATORY [facility] [place] - Lab, [facility]    In 1 month Leah Gannon MD [place] - Rheumatology, [facility]    In 1 month Khoi Vazquez MD [facility] - Family Medicine, [facility]    In 9 months [facility] DEXA1 [place] - [facility], [place]                      Appointments  past 12m or future 3m with PCP    Date Provider   Last Visit   12/8/2021 Khoi Vazquez MD   Next Visit   3/14/2022 Khoi Vazquez MD   ED visits in past 90 days: 0     Note composed:10:18 PM 01/19/2022

## 2022-01-24 ENCOUNTER — TELEPHONE (OUTPATIENT)
Dept: FAMILY MEDICINE | Facility: CLINIC | Age: 87
End: 2022-01-24
Payer: MEDICARE

## 2022-01-24 RX ORDER — OMEPRAZOLE 20 MG/1
CAPSULE, DELAYED RELEASE ORAL
Qty: 90 CAPSULE | Refills: 1 | Status: SHIPPED | OUTPATIENT
Start: 2022-01-24 | End: 2022-07-10

## 2022-01-24 NOTE — TELEPHONE ENCOUNTER
----- Message from Leticia Avendaño sent at 1/24/2022  2:51 PM CST -----  Type: Needs Medical Advice  Who Called:  Patient  Pharmacy name and phone #:   [facility] - [place], [place] - [address]  [address] [address]  Phone: [phone] Fax: [phone]  Best Call Back Number: [phone]  Additional Information: Patient says that her pharmacy has been trying to get a refill on her medication omeprazole but they have not heard back.

## 2022-01-31 ENCOUNTER — TELEPHONE (OUTPATIENT)
Dept: VASCULAR SURGERY | Facility: CLINIC | Age: 87
End: 2022-01-31
Payer: MEDICARE

## 2022-01-31 DIAGNOSIS — I65.23 BILATERAL CAROTID ARTERY STENOSIS: Primary | ICD-10-CM

## 2022-01-31 NOTE — TELEPHONE ENCOUNTER
----- Message from Maria Luisa Carreon sent at 1/31/2022 11:59 AM CST -----  Type: Needs Medical Advice  Who Called:  Pt  Best Call Back Number: [phone]  Additional Information: Pt requesting orders for yearly U/S to be placed in the system-please call to schedule once complete--please advise--Thank you

## 2022-02-07 ENCOUNTER — LAB VISIT (OUTPATIENT)
Dept: LAB | Facility: HOSPITAL | Age: 87
End: 2022-02-07
Attending: STUDENT IN AN ORGANIZED HEALTH CARE EDUCATION/TRAINING PROGRAM
Payer: MEDICARE

## 2022-02-07 DIAGNOSIS — M25.50 ARTHRALGIA, UNSPECIFIED JOINT: ICD-10-CM

## 2022-02-07 LAB
ALBUMIN SERPL BCP-MCNC: 3.4 G/DL (ref 3.5–5.2)
ALP SERPL-CCNC: 87 U/L (ref 55–135)
ALT SERPL W/O P-5'-P-CCNC: 12 U/L (ref 10–44)
ANION GAP SERPL CALC-SCNC: 10 MMOL/L (ref 8–16)
AST SERPL-CCNC: 15 U/L (ref 10–40)
BASOPHILS # BLD AUTO: 0.04 K/UL (ref 0–0.2)
BASOPHILS NFR BLD: 0.6 % (ref 0–1.9)
BILIRUB SERPL-MCNC: 0.7 MG/DL (ref 0.1–1)
BUN SERPL-MCNC: 18 MG/DL (ref 8–23)
CALCIUM SERPL-MCNC: 9.4 MG/DL (ref 8.7–10.5)
CHLORIDE SERPL-SCNC: 105 MMOL/L (ref 95–110)
CO2 SERPL-SCNC: 25 MMOL/L (ref 23–29)
CREAT SERPL-MCNC: 1 MG/DL (ref 0.5–1.4)
CRP SERPL-MCNC: 5.8 MG/L (ref 0–8.2)
DIFFERENTIAL METHOD: ABNORMAL
EOSINOPHIL # BLD AUTO: 0.4 K/UL (ref 0–0.5)
EOSINOPHIL NFR BLD: 6.5 % (ref 0–8)
ERYTHROCYTE [DISTWIDTH] IN BLOOD BY AUTOMATED COUNT: 14 % (ref 11.5–14.5)
ERYTHROCYTE [SEDIMENTATION RATE] IN BLOOD BY WESTERGREN METHOD: 49 MM/HR (ref 0–36)
EST. GFR  (AFRICAN AMERICAN): 58.9 ML/MIN/1.73 M^2
EST. GFR  (NON AFRICAN AMERICAN): 51.1 ML/MIN/1.73 M^2
GLUCOSE SERPL-MCNC: 140 MG/DL (ref 70–110)
HCT VFR BLD AUTO: 38.2 % (ref 37–48.5)
HGB BLD-MCNC: 11.8 G/DL (ref 12–16)
IMM GRANULOCYTES # BLD AUTO: 0.03 K/UL (ref 0–0.04)
IMM GRANULOCYTES NFR BLD AUTO: 0.5 % (ref 0–0.5)
LYMPHOCYTES # BLD AUTO: 1.2 K/UL (ref 1–4.8)
LYMPHOCYTES NFR BLD: 19.5 % (ref 18–48)
MCH RBC QN AUTO: 31.6 PG (ref 27–31)
MCHC RBC AUTO-ENTMCNC: 30.9 G/DL (ref 32–36)
MCV RBC AUTO: 102 FL (ref 82–98)
MONOCYTES # BLD AUTO: 0.7 K/UL (ref 0.3–1)
MONOCYTES NFR BLD: 10.3 % (ref 4–15)
NEUTROPHILS # BLD AUTO: 4 K/UL (ref 1.8–7.7)
NEUTROPHILS NFR BLD: 62.6 % (ref 38–73)
NRBC BLD-RTO: 0 /100 WBC
PLATELET # BLD AUTO: 288 K/UL (ref 150–450)
PMV BLD AUTO: 10.5 FL (ref 9.2–12.9)
POTASSIUM SERPL-SCNC: 4.2 MMOL/L (ref 3.5–5.1)
PROT SERPL-MCNC: 6.6 G/DL (ref 6–8.4)
RBC # BLD AUTO: 3.73 M/UL (ref 4–5.4)
SODIUM SERPL-SCNC: 140 MMOL/L (ref 136–145)
WBC # BLD AUTO: 6.32 K/UL (ref 3.9–12.7)

## 2022-02-07 PROCEDURE — 36415 COLL VENOUS BLD VENIPUNCTURE: CPT | Mod: HCNC,PN | Performed by: STUDENT IN AN ORGANIZED HEALTH CARE EDUCATION/TRAINING PROGRAM

## 2022-02-07 PROCEDURE — 80053 COMPREHEN METABOLIC PANEL: CPT | Mod: HCNC | Performed by: STUDENT IN AN ORGANIZED HEALTH CARE EDUCATION/TRAINING PROGRAM

## 2022-02-07 PROCEDURE — 85025 COMPLETE CBC W/AUTO DIFF WBC: CPT | Mod: HCNC | Performed by: STUDENT IN AN ORGANIZED HEALTH CARE EDUCATION/TRAINING PROGRAM

## 2022-02-07 PROCEDURE — 85652 RBC SED RATE AUTOMATED: CPT | Mod: HCNC | Performed by: STUDENT IN AN ORGANIZED HEALTH CARE EDUCATION/TRAINING PROGRAM

## 2022-02-07 PROCEDURE — 86140 C-REACTIVE PROTEIN: CPT | Mod: HCNC | Performed by: STUDENT IN AN ORGANIZED HEALTH CARE EDUCATION/TRAINING PROGRAM

## 2022-02-15 ENCOUNTER — HOSPITAL ENCOUNTER (OUTPATIENT)
Dept: RADIOLOGY | Facility: HOSPITAL | Age: 87
Discharge: HOME | End: 2022-02-15
Attending: THORACIC SURGERY (CARDIOTHORACIC VASCULAR SURGERY)
Payer: MEDICARE

## 2022-02-15 DIAGNOSIS — I65.23 BILATERAL CAROTID ARTERY STENOSIS: ICD-10-CM

## 2022-02-15 PROCEDURE — 93880 EXTRACRANIAL BILAT STUDY: CPT | Mod: TC,HCNC,PO

## 2022-02-15 PROCEDURE — 93880 EXTRACRANIAL BILAT STUDY: CPT | Mod: 26,HCNC,, | Performed by: RADIOLOGY

## 2022-02-15 PROCEDURE — 93880 US CAROTID BILATERAL: ICD-10-PCS | Mod: 26,HCNC,, | Performed by: RADIOLOGY

## 2022-02-17 ENCOUNTER — TELEPHONE (OUTPATIENT)
Dept: VASCULAR SURGERY | Facility: CLINIC | Age: 87
End: 2022-02-17
Payer: MEDICARE

## 2022-02-17 DIAGNOSIS — I65.23 BILATERAL CAROTID ARTERY STENOSIS: Primary | ICD-10-CM

## 2022-02-17 NOTE — TELEPHONE ENCOUNTER
Informed patient Dr. Humphries reviewed her ultrasound and his recommendations are to repeat in 6 months. Order end recall entered. Verbalized understanding.

## 2022-02-25 DIAGNOSIS — M25.50 ARTHRALGIA, UNSPECIFIED JOINT: ICD-10-CM

## 2022-02-25 RX ORDER — METHOTREXATE 2.5 MG/1
15 TABLET ORAL
Qty: 72 TABLET | Refills: 1 | Status: SHIPPED | OUTPATIENT
Start: 2022-02-25 | End: 2022-03-07 | Stop reason: SDUPTHER

## 2022-02-25 NOTE — TELEPHONE ENCOUNTER
Called pt regarding below message. Advised pt that prescription has been submitted. Pt inquired about Folic acid prescription. t has approx 20 tabs left and has an upcoming appt of 3/7/22. Pt is unsure if she should ask for a refill now. Advised pt to wait until upcoming appt since she has a 20 day supply on hand. Pt verbalized understanding with no further questions.

## 2022-02-25 NOTE — TELEPHONE ENCOUNTER
Pt is requesting medication refill on Methotrexate 2.5 mg   Last refill: 12/7/21  Last visit: 12/7/21  Follow Up: 3/7/22      ----- Message from Asad Monson Patient Care Assistant sent at 2/25/2022  9:52 AM CST -----  Contact: Veda  Type:  RX Refill Request    Who Called:  [facility]  Refill or New Rx:  Refill  RX Name and Strength: methotrexate 2.5 MG Tab  How is the patient currently taking it? (ex. 1XDay):  As Directed  Is this a 30 day or 90 day RX:  90  Preferred Pharmacy with phone number:    [facility] - [place], [place] - [address]  [address] [facility] [address] [address]  Phone: [phone] Fax: [phone]    Local or Mail Order:  Mail  Ordering Provider:  Codey Cordova Call Back Number:  [phone]    Additional Information:  Please contact pt upon completion-Thank you~

## 2022-03-03 NOTE — PROGRESS NOTES
"Subjective:       Patient ID: Nida Kline is a 86 y.o. female.    Chief Complaint: Rheumatoid Arthritis    HPI     This is an 86 year old woman with PMH of CKD, HTN, vit D deficiency, carotid artery disease s/p CEA, HLD, vit D deficiency, hypothyroidism, STEW, GERD, squamous cell carcinoma, osteoporosis not on therapy due to patient preference, seronegative non erosive RA diagnosed in 12/2021 and on methotrexate 15mg weekly plus folic acid. She was last seen in 12/2021 and started on MTX at that time. She feels significantly better since starting the methotrexate and has not had any side effects from the medication.     Objective:   /65   Pulse 67   Ht 5' 4" (1.626 m)   Wt 77.1 kg (170 lb)   BMI 29.18 kg/m²      Physical Exam   HENT:   Head: Normocephalic and atraumatic.   Abdominal: Normal appearance.   Musculoskeletal:      Comments: Swollen Joints: Right wrist, bilateral 2nd and 3rd MCPs (SJC 5)  Tender joints: Bilateral midfeet   Neurological: She is alert.   Skin: Skin is warm and dry. No rash noted.        No data to display     Labs reviewed by me:   CBC (2/7/22): Hgb 11.8 <- 13.1, otherwise WNL   CMP: GFR 51.1, albumin 3.4 otherwise WNL   ESR 49 <- 40  CRP WNL    Assessment:       1. Seronegative rheumatoid arthritis    2. Medication monitoring encounter    3. Immunosuppression    4. Osteoporosis, unspecified osteoporosis type, unspecified pathological fracture presence        This is an 86 year old woman with PMH of CKD, HTN, vit D deficiency, carotid artery disease s/p CEA, HLD, vit D deficiency, hypothyroidism, STEW, GERD, squamous cell carcinoma, osteoporosis not on therapy, seronegative non erosive RA diagnosed in 12/2021 and on methotrexate 15mg weekly plus folic acid. DAS28 is 3.93 consistent with moderate disease activity (TJC 0, SJC 5, PtGA 2, ESR 49). Will increase methotrexate to 20mg split dose weekly.     Plan:       Problem List Items Addressed This Visit        Orthopedic    " Osteoporosis      Other Visit Diagnoses     Seronegative rheumatoid arthritis    -  Primary    Medication monitoring encounter        Immunosuppression            -  Increase methotrexate to 20mg weekly (10mg AM, 10mg PM) + folic acid daily  - Patient will need yearly skin cancer check on MTX given history of squamous cell CA  - X-Ray arthritis survey (10/2021): Osteopenia and diffuse joint space narrowing without erosions  - Pre-DMARD labs completed 12/2021  - DEXA scan showed osteoporosis. At previous visit, I discussed that the patient is high risk for fracture without osteoporosis treatment but she declined therapy as she feels she is already taking too many medications. She takes calcium and vitamin D and states that she takes fall precautions. I discussed this again today and strongly recommended bisphosphonate therapy. Patient info on bisphosphonates given, she will consider it.   - Vaccinations: COVID x 3, PCV13 (12/2015), PPV23 (9/2019), Shingrix x 1 done; patient needs second Shingrix and flu 21    Follow up in 4 weeks    20 minutes of total time spent on the encounter, which includes face to face time and non-face to face time preparing to see the patient (eg, review of tests), Obtaining and/or reviewing separately obtained history, Documenting clinical information in the electronic or other health record, Independently interpreting results (not separately reported) and communicating results to the patient/family/caregiver, or Care coordination (not separately reported).       Leah Gannon M.D.  Rheumatology Dept  [place], [place]

## 2022-03-04 ENCOUNTER — LAB VISIT (OUTPATIENT)
Dept: LAB | Facility: HOSPITAL | Age: 87
End: 2022-03-04
Attending: INTERNAL MEDICINE
Payer: MEDICARE

## 2022-03-04 DIAGNOSIS — E66.3 OVERWEIGHT (BMI 25.0-29.9): ICD-10-CM

## 2022-03-04 DIAGNOSIS — M06.041 RHEUMATOID ARTHRITIS INVOLVING BOTH HANDS WITH NEGATIVE RHEUMATOID FACTOR: ICD-10-CM

## 2022-03-04 DIAGNOSIS — M19.041 OSTEOARTHRITIS OF BOTH HANDS, UNSPECIFIED OSTEOARTHRITIS TYPE: ICD-10-CM

## 2022-03-04 DIAGNOSIS — E03.9 ACQUIRED HYPOTHYROIDISM: ICD-10-CM

## 2022-03-04 DIAGNOSIS — M06.042 RHEUMATOID ARTHRITIS INVOLVING BOTH HANDS WITH NEGATIVE RHEUMATOID FACTOR: ICD-10-CM

## 2022-03-04 DIAGNOSIS — Z98.890 S/P CAROTID ENDARTERECTOMY: ICD-10-CM

## 2022-03-04 DIAGNOSIS — E78.2 MIXED HYPERLIPIDEMIA: ICD-10-CM

## 2022-03-04 DIAGNOSIS — M19.042 OSTEOARTHRITIS OF BOTH HANDS, UNSPECIFIED OSTEOARTHRITIS TYPE: ICD-10-CM

## 2022-03-04 DIAGNOSIS — N18.31 STAGE 3A CHRONIC KIDNEY DISEASE: ICD-10-CM

## 2022-03-04 DIAGNOSIS — I10 ESSENTIAL HYPERTENSION: ICD-10-CM

## 2022-03-04 LAB
ALBUMIN SERPL BCP-MCNC: 3.6 G/DL (ref 3.5–5.2)
ALP SERPL-CCNC: 86 U/L (ref 55–135)
ALT SERPL W/O P-5'-P-CCNC: 13 U/L (ref 10–44)
ANION GAP SERPL CALC-SCNC: 13 MMOL/L (ref 8–16)
AST SERPL-CCNC: 18 U/L (ref 10–40)
BILIRUB SERPL-MCNC: 0.7 MG/DL (ref 0.1–1)
BUN SERPL-MCNC: 15 MG/DL (ref 8–23)
CALCIUM SERPL-MCNC: 9.1 MG/DL (ref 8.7–10.5)
CCP AB SER IA-ACNC: <0.5 U/ML
CHLORIDE SERPL-SCNC: 107 MMOL/L (ref 95–110)
CHOLEST SERPL-MCNC: 134 MG/DL (ref 120–199)
CHOLEST/HDLC SERPL: 2.8 {RATIO} (ref 2–5)
CO2 SERPL-SCNC: 22 MMOL/L (ref 23–29)
CREAT SERPL-MCNC: 0.8 MG/DL (ref 0.5–1.4)
ERYTHROCYTE [SEDIMENTATION RATE] IN BLOOD BY WESTERGREN METHOD: 48 MM/HR (ref 0–36)
EST. GFR  (AFRICAN AMERICAN): >60 ML/MIN/1.73 M^2
EST. GFR  (NON AFRICAN AMERICAN): >60 ML/MIN/1.73 M^2
GLUCOSE SERPL-MCNC: 91 MG/DL (ref 70–110)
HDLC SERPL-MCNC: 48 MG/DL (ref 40–75)
HDLC SERPL: 35.8 % (ref 20–50)
LDLC SERPL CALC-MCNC: 64.8 MG/DL (ref 63–159)
MAGNESIUM SERPL-MCNC: 1.7 MG/DL (ref 1.6–2.6)
NONHDLC SERPL-MCNC: 86 MG/DL
POTASSIUM SERPL-SCNC: 4.4 MMOL/L (ref 3.5–5.1)
PROT SERPL-MCNC: 6.7 G/DL (ref 6–8.4)
SODIUM SERPL-SCNC: 142 MMOL/L (ref 136–145)
T3FREE SERPL-MCNC: 2.9 PG/ML (ref 2.3–4.2)
T4 FREE SERPL-MCNC: 1.1 NG/DL (ref 0.71–1.51)
TRIGL SERPL-MCNC: 106 MG/DL (ref 30–150)
TSH SERPL DL<=0.005 MIU/L-ACNC: 1.7 UIU/ML (ref 0.4–4)

## 2022-03-04 PROCEDURE — 80061 LIPID PANEL: CPT | Performed by: INTERNAL MEDICINE

## 2022-03-04 PROCEDURE — 84443 ASSAY THYROID STIM HORMONE: CPT | Performed by: INTERNAL MEDICINE

## 2022-03-04 PROCEDURE — 80053 COMPREHEN METABOLIC PANEL: CPT | Performed by: INTERNAL MEDICINE

## 2022-03-04 PROCEDURE — 86200 CCP ANTIBODY: CPT | Performed by: INTERNAL MEDICINE

## 2022-03-04 PROCEDURE — 83735 ASSAY OF MAGNESIUM: CPT | Performed by: INTERNAL MEDICINE

## 2022-03-04 PROCEDURE — 84481 FREE ASSAY (FT-3): CPT | Performed by: INTERNAL MEDICINE

## 2022-03-04 PROCEDURE — 36415 COLL VENOUS BLD VENIPUNCTURE: CPT | Mod: PN | Performed by: INTERNAL MEDICINE

## 2022-03-04 PROCEDURE — 85652 RBC SED RATE AUTOMATED: CPT | Performed by: INTERNAL MEDICINE

## 2022-03-04 PROCEDURE — 84439 ASSAY OF FREE THYROXINE: CPT | Performed by: INTERNAL MEDICINE

## 2022-03-07 ENCOUNTER — OFFICE VISIT (OUTPATIENT)
Dept: RHEUMATOLOGY | Facility: CLINIC | Age: 87
End: 2022-03-07
Payer: MEDICARE

## 2022-03-07 ENCOUNTER — PATIENT MESSAGE (OUTPATIENT)
Dept: RHEUMATOLOGY | Facility: CLINIC | Age: 87
End: 2022-03-07

## 2022-03-07 VITALS
BODY MASS INDEX: 29.02 KG/M2 | HEIGHT: 64 IN | WEIGHT: 170 LBS | SYSTOLIC BLOOD PRESSURE: 118 MMHG | DIASTOLIC BLOOD PRESSURE: 65 MMHG | HEART RATE: 67 BPM

## 2022-03-07 DIAGNOSIS — Z51.81 MEDICATION MONITORING ENCOUNTER: ICD-10-CM

## 2022-03-07 DIAGNOSIS — M25.50 ARTHRALGIA, UNSPECIFIED JOINT: ICD-10-CM

## 2022-03-07 DIAGNOSIS — D84.9 IMMUNOSUPPRESSION: ICD-10-CM

## 2022-03-07 DIAGNOSIS — M81.0 OSTEOPOROSIS, UNSPECIFIED OSTEOPOROSIS TYPE, UNSPECIFIED PATHOLOGICAL FRACTURE PRESENCE: ICD-10-CM

## 2022-03-07 DIAGNOSIS — M06.00 SERONEGATIVE RHEUMATOID ARTHRITIS: Primary | ICD-10-CM

## 2022-03-07 PROCEDURE — 1159F MED LIST DOCD IN RCRD: CPT | Mod: CPTII,S$GLB,, | Performed by: STUDENT IN AN ORGANIZED HEALTH CARE EDUCATION/TRAINING PROGRAM

## 2022-03-07 PROCEDURE — 99499 UNLISTED E&M SERVICE: CPT | Mod: HCNC,S$GLB,, | Performed by: STUDENT IN AN ORGANIZED HEALTH CARE EDUCATION/TRAINING PROGRAM

## 2022-03-07 PROCEDURE — 1159F PR MEDICATION LIST DOCUMENTED IN MEDICAL RECORD: ICD-10-PCS | Mod: CPTII,S$GLB,, | Performed by: STUDENT IN AN ORGANIZED HEALTH CARE EDUCATION/TRAINING PROGRAM

## 2022-03-07 PROCEDURE — 1125F PR PAIN SEVERITY QUANTIFIED, PAIN PRESENT: ICD-10-PCS | Mod: CPTII,S$GLB,, | Performed by: STUDENT IN AN ORGANIZED HEALTH CARE EDUCATION/TRAINING PROGRAM

## 2022-03-07 PROCEDURE — 1101F PT FALLS ASSESS-DOCD LE1/YR: CPT | Mod: CPTII,S$GLB,, | Performed by: STUDENT IN AN ORGANIZED HEALTH CARE EDUCATION/TRAINING PROGRAM

## 2022-03-07 PROCEDURE — 1125F AMNT PAIN NOTED PAIN PRSNT: CPT | Mod: CPTII,S$GLB,, | Performed by: STUDENT IN AN ORGANIZED HEALTH CARE EDUCATION/TRAINING PROGRAM

## 2022-03-07 PROCEDURE — 3288F PR FALLS RISK ASSESSMENT DOCUMENTED: ICD-10-PCS | Mod: CPTII,S$GLB,, | Performed by: STUDENT IN AN ORGANIZED HEALTH CARE EDUCATION/TRAINING PROGRAM

## 2022-03-07 PROCEDURE — 99214 PR OFFICE/OUTPT VISIT, EST, LEVL IV, 30-39 MIN: ICD-10-PCS | Mod: S$GLB,,, | Performed by: STUDENT IN AN ORGANIZED HEALTH CARE EDUCATION/TRAINING PROGRAM

## 2022-03-07 PROCEDURE — 99999 PR PBB SHADOW E&M-EST. PATIENT-LVL IV: ICD-10-PCS | Mod: PBBFAC,,, | Performed by: STUDENT IN AN ORGANIZED HEALTH CARE EDUCATION/TRAINING PROGRAM

## 2022-03-07 PROCEDURE — 99499 RISK ADDL DX/OHS AUDIT: ICD-10-PCS | Mod: HCNC,S$GLB,, | Performed by: STUDENT IN AN ORGANIZED HEALTH CARE EDUCATION/TRAINING PROGRAM

## 2022-03-07 PROCEDURE — 1101F PR PT FALLS ASSESS DOC 0-1 FALLS W/OUT INJ PAST YR: ICD-10-PCS | Mod: CPTII,S$GLB,, | Performed by: STUDENT IN AN ORGANIZED HEALTH CARE EDUCATION/TRAINING PROGRAM

## 2022-03-07 PROCEDURE — 3288F FALL RISK ASSESSMENT DOCD: CPT | Mod: CPTII,S$GLB,, | Performed by: STUDENT IN AN ORGANIZED HEALTH CARE EDUCATION/TRAINING PROGRAM

## 2022-03-07 PROCEDURE — 99214 OFFICE O/P EST MOD 30 MIN: CPT | Mod: S$GLB,,, | Performed by: STUDENT IN AN ORGANIZED HEALTH CARE EDUCATION/TRAINING PROGRAM

## 2022-03-07 PROCEDURE — 99999 PR PBB SHADOW E&M-EST. PATIENT-LVL IV: CPT | Mod: PBBFAC,,, | Performed by: STUDENT IN AN ORGANIZED HEALTH CARE EDUCATION/TRAINING PROGRAM

## 2022-03-07 RX ORDER — METHOTREXATE 2.5 MG/1
20 TABLET ORAL
Qty: 96 TABLET | Refills: 1 | Status: SHIPPED | OUTPATIENT
Start: 2022-03-07 | End: 2022-09-22 | Stop reason: SDUPTHER

## 2022-03-07 RX ORDER — ZOSTER VACCINE RECOMBINANT, ADJUVANTED 50 MCG/0.5
KIT INTRAMUSCULAR
COMMUNITY
Start: 2022-01-03 | End: 2022-09-22 | Stop reason: CLARIF

## 2022-03-07 RX ORDER — FOLIC ACID 1 MG/1
1 TABLET ORAL DAILY
Qty: 90 TABLET | Refills: 6 | Status: SHIPPED | OUTPATIENT
Start: 2022-03-07 | End: 2022-09-22 | Stop reason: SDUPTHER

## 2022-03-07 ASSESSMENT — ROUTINE ASSESSMENT OF PATIENT INDEX DATA (RAPID3)
PSYCHOLOGICAL DISTRESS SCORE: 1.1
PAIN SCORE: 1.5
PATIENT GLOBAL ASSESSMENT SCORE: 3
FATIGUE SCORE: 1.1
MDHAQ FUNCTION SCORE: 1.2
TOTAL RAPID3 SCORE: 2.83

## 2022-03-10 ENCOUNTER — PATIENT MESSAGE (OUTPATIENT)
Dept: RHEUMATOLOGY | Facility: CLINIC | Age: 87
End: 2022-03-10
Payer: MEDICARE

## 2022-03-14 ENCOUNTER — OFFICE VISIT (OUTPATIENT)
Dept: FAMILY MEDICINE | Facility: CLINIC | Age: 87
End: 2022-03-14
Payer: MEDICARE

## 2022-03-14 VITALS
BODY MASS INDEX: 30.24 KG/M2 | SYSTOLIC BLOOD PRESSURE: 123 MMHG | OXYGEN SATURATION: 98 % | WEIGHT: 177.13 LBS | HEIGHT: 64 IN | DIASTOLIC BLOOD PRESSURE: 60 MMHG | HEART RATE: 60 BPM

## 2022-03-14 DIAGNOSIS — E55.9 VITAMIN D DEFICIENCY: ICD-10-CM

## 2022-03-14 DIAGNOSIS — I77.9 BILATERAL CAROTID ARTERY DISEASE, UNSPECIFIED TYPE: ICD-10-CM

## 2022-03-14 DIAGNOSIS — E03.9 ACQUIRED HYPOTHYROIDISM: ICD-10-CM

## 2022-03-14 DIAGNOSIS — N18.31 STAGE 3A CHRONIC KIDNEY DISEASE: ICD-10-CM

## 2022-03-14 DIAGNOSIS — M06.041 RHEUMATOID ARTHRITIS INVOLVING BOTH HANDS WITH NEGATIVE RHEUMATOID FACTOR: ICD-10-CM

## 2022-03-14 DIAGNOSIS — E78.2 MIXED HYPERLIPIDEMIA: ICD-10-CM

## 2022-03-14 DIAGNOSIS — K21.9 GASTROESOPHAGEAL REFLUX DISEASE, UNSPECIFIED WHETHER ESOPHAGITIS PRESENT: ICD-10-CM

## 2022-03-14 DIAGNOSIS — Z98.890 S/P CAROTID ENDARTERECTOMY: ICD-10-CM

## 2022-03-14 DIAGNOSIS — E78.5 DYSLIPIDEMIA: ICD-10-CM

## 2022-03-14 DIAGNOSIS — Z01.89 ENCOUNTER FOR LABORATORY TEST: ICD-10-CM

## 2022-03-14 DIAGNOSIS — M06.042 RHEUMATOID ARTHRITIS INVOLVING BOTH HANDS WITH NEGATIVE RHEUMATOID FACTOR: ICD-10-CM

## 2022-03-14 DIAGNOSIS — I10 ESSENTIAL HYPERTENSION: Primary | Chronic | ICD-10-CM

## 2022-03-14 DIAGNOSIS — E66.9 CLASS 1 OBESITY WITH SERIOUS COMORBIDITY AND BODY MASS INDEX (BMI) OF 30.0 TO 30.9 IN ADULT, UNSPECIFIED OBESITY TYPE: ICD-10-CM

## 2022-03-14 DIAGNOSIS — E83.42 HYPOMAGNESEMIA: ICD-10-CM

## 2022-03-14 PROCEDURE — 1126F PR PAIN SEVERITY QUANTIFIED, NO PAIN PRESENT: ICD-10-PCS | Mod: CPTII,S$GLB,, | Performed by: INTERNAL MEDICINE

## 2022-03-14 PROCEDURE — 3288F PR FALLS RISK ASSESSMENT DOCUMENTED: ICD-10-PCS | Mod: CPTII,S$GLB,, | Performed by: INTERNAL MEDICINE

## 2022-03-14 PROCEDURE — 3288F FALL RISK ASSESSMENT DOCD: CPT | Mod: CPTII,S$GLB,, | Performed by: INTERNAL MEDICINE

## 2022-03-14 PROCEDURE — 1159F PR MEDICATION LIST DOCUMENTED IN MEDICAL RECORD: ICD-10-PCS | Mod: CPTII,S$GLB,, | Performed by: INTERNAL MEDICINE

## 2022-03-14 PROCEDURE — 99999 PR PBB SHADOW E&M-EST. PATIENT-LVL V: ICD-10-PCS | Mod: PBBFAC,,, | Performed by: INTERNAL MEDICINE

## 2022-03-14 PROCEDURE — 1101F PR PT FALLS ASSESS DOC 0-1 FALLS W/OUT INJ PAST YR: ICD-10-PCS | Mod: CPTII,S$GLB,, | Performed by: INTERNAL MEDICINE

## 2022-03-14 PROCEDURE — 99999 PR PBB SHADOW E&M-EST. PATIENT-LVL V: CPT | Mod: PBBFAC,,, | Performed by: INTERNAL MEDICINE

## 2022-03-14 PROCEDURE — 1159F MED LIST DOCD IN RCRD: CPT | Mod: CPTII,S$GLB,, | Performed by: INTERNAL MEDICINE

## 2022-03-14 PROCEDURE — 1101F PT FALLS ASSESS-DOCD LE1/YR: CPT | Mod: CPTII,S$GLB,, | Performed by: INTERNAL MEDICINE

## 2022-03-14 PROCEDURE — 99214 OFFICE O/P EST MOD 30 MIN: CPT | Mod: S$GLB,,, | Performed by: INTERNAL MEDICINE

## 2022-03-14 PROCEDURE — 1126F AMNT PAIN NOTED NONE PRSNT: CPT | Mod: CPTII,S$GLB,, | Performed by: INTERNAL MEDICINE

## 2022-03-14 PROCEDURE — 99214 PR OFFICE/OUTPT VISIT, EST, LEVL IV, 30-39 MIN: ICD-10-PCS | Mod: S$GLB,,, | Performed by: INTERNAL MEDICINE

## 2022-03-14 PROCEDURE — 99499 RISK ADDL DX/OHS AUDIT: ICD-10-PCS | Mod: HCNC,S$GLB,, | Performed by: INTERNAL MEDICINE

## 2022-03-14 PROCEDURE — 99499 UNLISTED E&M SERVICE: CPT | Mod: HCNC,S$GLB,, | Performed by: INTERNAL MEDICINE

## 2022-03-14 NOTE — PROGRESS NOTES
Subjective:       Patient ID: Nida Kline is a 86 y.o. female.    Chief Complaint: Follow-up (Lab results)    HPI:  Here today for follow-up reassessment and go over lab work  Essential hypertension: Maintain < 2 Gm Na a day diet, and monitor BP at home; keep a log. Cont present tx.  Blood pressure averaging 130/60 at home.  Here manually she is 123/60 with pulse 60.   -     Lipid Panel; Future; Expected date: 03/14/2022  -     CBC Auto Differential; Future; Expected date: 03/14/2022  -     Comprehensive Metabolic Panel; Future; Expected date: 03/14/2022  -     T4, Free; Future; Expected date: 03/14/2022  -     TSH; Future; Expected date: 03/14/2022  -     Magnesium; Future; Expected date: 03/14/2022  Stage 3a chronic kidney disease; GFR now >60. Keep well hydrated.  No NSA ID agents.  Use Tylenol over-the-counter for any pain or discomfort.  -     CBC Auto Differential; Future; Expected date: 03/14/2022  -     Comprehensive Metabolic Panel; Future; Expected date: 03/14/2022  Mixed hyperlipidemia; Maintain low fat high fiber diet, exercise regularly. Weight reduction attempts to continue. Cont atorvastatin; Cholesterol panel:  Cholesterol 134/triglyceride 106/HDL 48/LDL 64.8  -     Lipid Panel; Future; Expected date: 03/14/2022  -     T4, Free; Future; Expected date: 03/14/2022  -     TSH; Future; Expected date: 03/14/2022  S/P carotid endarterectomy; followed by Dr Salter.   Bilateral carotid artery disease, unspecified type; ASA 81 mg a day.  Acquired hypothyroidism; cont on levothyroxine 1/2 of 125 mcg a day; reduced from 75 mcg per day. TSH has gone from 0.41 to 1.69 now..   -     T4, Free; Future; Expected date: 03/14/2022  -     TSH; Future; Expected date: 03/14/2022  Gastroesophageal reflux disease, unspecified whether esophagitis present; No bedtime snacks; weight reduction. Omeprazole as needed.   Class 1 obesity with serious comorbidity and body mass index (BMI) of 30.0 to 30.9 in adult, unspecified  "obesity type; Caloric restriction w regular exercise and weight reduction.  Hypomagnesemia: increase magnesium to MagOx 400 mg 2 a day. Mg level in 2 weeks; call for results.   -     Magnesium; Future; Expected date: 03/28/2022  Vit D deficiency: cont vit d supplements; Vit D level for f/u. 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  Doing better.  Total time 2:50 p.m. till 3:24 p.m..  Greater than 50% of time spent in discussion, counseling, and review.  Labs reviewed and discussed at length in ordered for follow-up.  Medications reviewed and addressed.  Various different diagnosis is were discussed as well as plan of care.      Review of Systems   Constitutional: Negative for appetite change and fever.   HENT: Negative for congestion, postnasal drip, rhinorrhea and sinus pressure.    Eyes: Negative for discharge and itching.   Respiratory: Negative for cough, chest tightness, shortness of breath and wheezing.    Cardiovascular: Negative for chest pain, palpitations and leg swelling.   Gastrointestinal: Negative for abdominal distention, abdominal pain, blood in stool, constipation, diarrhea, nausea and vomiting.   Endocrine: Negative for polydipsia, polyphagia and polyuria.   Genitourinary: Negative for dysuria and hematuria.   Musculoskeletal: Negative for arthralgias and myalgias.   Skin: Negative for rash.   Allergic/Immunologic: Negative for environmental allergies and food allergies.   Neurological: Negative for tremors, seizures and syncope.   Hematological: Negative for adenopathy. Does not bruise/bleed easily.   Psychiatric/Behavioral: Negative for dysphoric mood. The patient is not nervous/anxious.       Objective:        Vitals:    03/14/22 1415   BP: 123/60   Pulse: 60   SpO2: 98%   Weight: 80.3 kg (177 lb 2.2 oz)   Height: 5' 4" (1.626 m)       BMI Readings from Last 3 " Encounters:   03/14/22 30.41 kg/m²   03/07/22 29.18 kg/m²   12/08/21 29.08 kg/m²        Wt Readings from Last 3 Encounters:   03/14/22 1415 80.3 kg (177 lb 2.2 oz)   03/07/22 1020 77.1 kg (170 lb)   12/08/21 1534 76.9 kg (169 lb 6.8 oz)        BP Readings from Last 3 Encounters:   03/14/22 123/60   03/07/22 118/65   12/08/21 108/62        There are no preventive care reminders to display for this patient.     Health Maintenance Due   Topic Date Due    TETANUS VACCINE  Never done    Shingles Vaccine (2 of 2) 02/28/2022         Past Medical History:   Diagnosis Date    Back pain     Cataract extraction status of eye     Gallbladder disease     GERD (gastroesophageal reflux disease)     HTN (hypertension)     Hypothyroidism     Osteoporosis     Overweight (BMI 25.0-29.9)     PONV (postoperative nausea and vomiting)     Pure hypercholesterolemia     Squamous cell carcinoma     Thyroid disease     hypothyroidism    Trouble in sleeping     Vitamin D deficiency        Past Surgical History:   Procedure Laterality Date    CAROTID ENDARTERECTOMY Left 2015    CATARACT EXTRACTION      OU    CHOLECYSTECTOMY      HYSTERECTOMY      TOTAL ABDOMINAL HYSTERECTOMY W/ BILATERAL SALPINGOOPHORECTOMY         Social History     Tobacco Use    Smoking status: Never Smoker    Smokeless tobacco: Never Used   Substance Use Topics    Alcohol use: No    Drug use: No       Family History   Problem Relation Age of Onset    Kidney disease Mother     Diabetes Mother     COPD Father     Heart disease Father        Review of patient's allergies indicates:   Allergen Reactions    Lunesta [eszopiclone]      Paresthesia, lip swell     Propofol analogues Nausea And Vomiting       Current Outpatient Medications on File Prior to Visit   Medication Sig Dispense Refill    ascorbic acid, vitamin C, (VITAMIN C) 500 MG tablet Take 500 mg by mouth once daily.      aspirin (ECOTRIN) 81 MG EC tablet Take 81 mg by mouth once.       atorvastatin (LIPITOR) 40 MG tablet Take 1 tablet (40 mg total) by mouth once daily. 90 tablet 3    busPIRone (BUSPAR) 15 MG tablet TAKE 1/3 TO 1/2 TABLET THREE TIMES A DAY AS NEEDED FOR ANXIETY 90 tablet 1    calcium carbonate/vitamin D3 (CALTRATE 600 + D ORAL) Take by mouth once daily. Caltrate 600+D3      cyanocobalamin, vitamin B-12, (VITAMIN B-12 ORAL) Take 1,000 mcg by mouth once daily.      folic acid (FOLVITE) 1 MG tablet Take 1 tablet (1 mg total) by mouth once daily. 90 tablet 6    gabapentin (NEURONTIN) 300 MG capsule 300 mg p.o. b.i.d. please provide generic (Patient taking differently: No sig reported) 60 capsule 2    levothyroxine (SYNTHROID) 125 MCG tablet 1/2 of 125 mcg po daily. 50 tablet 1    losartan (COZAAR) 25 MG tablet TAKE 1 TABLET EVERY DAY 90 tablet 3    magnesium oxide 400 mg Cap Take 400 mg by mouth once daily.      methotrexate 2.5 MG Tab Take 8 tablets (20 mg total) by mouth every 7 days. 96 tablet 1    metoprolol succinate (TOPROL-XL) 25 MG 24 hr tablet TAKE 1/2 TABLET EVERY DAY 45 tablet 3    omeprazole (PRILOSEC) 20 MG capsule TAKE 1 CAPSULE EVERY DAY AS NEEDED FOR REFLUX 90 capsule 1    SHINGRIX, PF, 50 mcg/0.5 mL injection       traZODone (DESYREL) 50 MG tablet Take 50 mg by mouth every evening.      vitamin D 1000 units Tab Take 1,000 Units by mouth 2 (two) times daily.      predniSONE (DELTASONE) 10 MG tablet Take 1 tablet (10 mg total) by mouth once daily. (Patient not taking: Reported on 3/14/2022) 90 tablet 0     No current facility-administered medications on file prior to visit.       Physical Exam  Vitals reviewed.   Constitutional:       Appearance: She is well-developed.   HENT:      Head: Normocephalic and atraumatic.   Neck:      Thyroid: No thyromegaly.      Vascular: No carotid bruit.   Cardiovascular:      Rate and Rhythm: Normal rate and regular rhythm.      Heart sounds: Normal heart sounds. No murmur heard.    No gallop.   Pulmonary:      Effort:  Pulmonary effort is normal. No respiratory distress.      Breath sounds: Normal breath sounds. No wheezing or rales.   Abdominal:      General: Bowel sounds are normal. There is no distension.      Palpations: Abdomen is soft.      Tenderness: There is no abdominal tenderness. There is no guarding or rebound.   Musculoskeletal:         General: Normal range of motion.      Cervical back: Normal range of motion and neck supple.      Right lower leg: No edema.      Left lower leg: No edema.   Lymphadenopathy:      Cervical: No cervical adenopathy.   Skin:     Findings: No rash.   Neurological:      Mental Status: She is alert and oriented to person, place, and time.      Comments: Moves all 4 extremities fine.   Psychiatric:         Behavior: Behavior normal.         Thought Content: Thought content normal.         Assessment:       1. Essential hypertension    2. Stage 3a chronic kidney disease    3. Mixed hyperlipidemia    4. S/P carotid endarterectomy    5. Bilateral carotid artery disease, unspecified type    6. Acquired hypothyroidism    7. Gastroesophageal reflux disease, unspecified whether esophagitis present    8. Class 1 obesity with serious comorbidity and body mass index (BMI) of 30.0 to 30.9 in adult, unspecified obesity type    9. Hypomagnesemia    10. Vitamin D deficiency    11. Rheumatoid arthritis involving both hands with negative rheumatoid factor        Plan:       Essential hypertension: Maintain < 2 Gm Na a day diet, and monitor BP at home; keep a log. Cont present tx.   -     Lipid Panel; Future; Expected date: 03/14/2022  -     CBC Auto Differential; Future; Expected date: 03/14/2022  -     Comprehensive Metabolic Panel; Future; Expected date: 03/14/2022  -     T4, Free; Future; Expected date: 03/14/2022  -     TSH; Future; Expected date: 03/14/2022  -     Magnesium; Future; Expected date: 03/14/2022    Stage 3a chronic kidney disease; GFR now >60. Keep well hydrated  -     CBC Auto  Differential; Future; Expected date: 03/14/2022  -     Comprehensive Metabolic Panel; Future; Expected date: 03/14/2022    Mixed hyperlipidemia; Maintain low fat high fiber diet, exercise regularly. Weight reduction continued attempts. Cont atorvastatin; LDL goal less than 70; LDL recently 64.8  -     Lipid Panel; Future; Expected date: 03/14/2022  -     T4, Free; Future; Expected date: 03/14/2022  -     TSH; Future; Expected date: 03/14/2022    Dyslipidemia:  Adhere to low-fat high-fiber diet along with regular exercise and continued attempts at weight reduction; increase aerobic activity..    S/P carotid endarterectomy; followed by Dr Salter.     Bilateral carotid artery disease, unspecified type; ASA 81 mg a day.     Acquired hypothyroidism; cont on levothyroxine 1/2 of 125 mcg a day.   -     T4, Free; Future; Expected date: 03/14/2022  -     TSH; Future; Expected date: 03/14/2022    Gastroesophageal reflux disease, unspecified whether esophagitis present; No bedtime snacks; weight reduction. Omeprazole as needed.     Class 1 obesity with serious comorbidity and body mass index (BMI) of 30.0 to 30.9 in adult, unspecified obesity type; Caloric restriction w regular exercise and weight reduction.    Hypomagnesemia: increase magnesium to MagOx 400 mg 2 a day. Mg level in 2 weeks; call for results.   -     Magnesium; Future; Expected date: 03/28/2022    Vit D deficiency: cont vit d supplements; Vit D level for f/u. Caltrate 600-D; vit D3 at 1000 iu a day    Rheumatoid arthritis involving both hands with rheumatoid factor negative in [facility] antibody negative; seeing rheumatologist Dr. Alegre    Encounter for lab test:  Labs discussed and reviewed with patient at length in ordered for follow-up.

## 2022-03-14 NOTE — PATIENT INSTRUCTIONS
Essential hypertension: Maintain < 2 Gm Na a day diet, and monitor BP at home; keep a log. Cont present tx.   -     Lipid Panel; Future; Expected date: 03/14/2022  -     CBC Auto Differential; Future; Expected date: 03/14/2022  -     Comprehensive Metabolic Panel; Future; Expected date: 03/14/2022  -     T4, Free; Future; Expected date: 03/14/2022  -     TSH; Future; Expected date: 03/14/2022  -     Magnesium; Future; Expected date: 03/14/2022    Stage 3a chronic kidney disease; GFR now >60. Keep well hydrated  -     CBC Auto Differential; Future; Expected date: 03/14/2022  -     Comprehensive Metabolic Panel; Future; Expected date: 03/14/2022    Mixed hyperlipidemia; Maintain low fat high fiber diet, exercise regularly. Weight reduction where indicated. Cont atorvastatin  -     Lipid Panel; Future; Expected date: 03/14/2022  -     T4, Free; Future; Expected date: 03/14/2022  -     TSH; Future; Expected date: 03/14/2022    S/P carotid endarterectomy; followed by Dr Salter.     Bilateral carotid artery disease, unspecified type; ASA 81 mg a day.     Acquired hypothyroidism; cont on levothyroxine 1/2 of 125 mcg a day.   -     T4, Free; Future; Expected date: 03/14/2022  -     TSH; Future; Expected date: 03/14/2022    Gastroesophageal reflux disease, unspecified whether esophagitis present; No bedtime snacks; weight reduction. Omeprazole as needed.     Class 1 obesity with serious comorbidity and body mass index (BMI) of 30.0 to 30.9 in adult, unspecified obesity type; Caloric restriction w regular exercise and weight reduction.    Hypomagnesemia: increase magnesium to MagOx 400 mg 2 a day. Mg level in 2 weeks; call for results.   -     Magnesium; Future; Expected date: 03/28/2022    Vit D deficiency: cont vit d supplements; Vit D level for f/u. Caltrate 600-D; vit D3 at 1000 iu a day

## 2022-03-26 PROBLEM — E66.811 CLASS 1 OBESITY WITH SERIOUS COMORBIDITY AND BODY MASS INDEX (BMI) OF 30.0 TO 30.9 IN ADULT: Status: ACTIVE | Noted: 2022-03-26

## 2022-03-26 PROBLEM — E66.9 CLASS 1 OBESITY WITH SERIOUS COMORBIDITY AND BODY MASS INDEX (BMI) OF 30.0 TO 30.9 IN ADULT: Status: ACTIVE | Noted: 2022-03-26

## 2022-03-26 PROBLEM — M06.041 RHEUMATOID ARTHRITIS INVOLVING BOTH HANDS WITH NEGATIVE RHEUMATOID FACTOR: Status: ACTIVE | Noted: 2022-03-26

## 2022-03-26 PROBLEM — M06.042 RHEUMATOID ARTHRITIS INVOLVING BOTH HANDS WITH NEGATIVE RHEUMATOID FACTOR: Status: ACTIVE | Noted: 2022-03-26

## 2022-03-26 PROBLEM — E83.42 HYPOMAGNESEMIA: Status: ACTIVE | Noted: 2022-03-26

## 2022-03-26 PROBLEM — Z01.89 ENCOUNTER FOR LABORATORY TEST: Status: ACTIVE | Noted: 2022-03-26

## 2022-03-31 ENCOUNTER — LAB VISIT (OUTPATIENT)
Dept: LAB | Facility: HOSPITAL | Age: 87
End: 2022-03-31
Attending: STUDENT IN AN ORGANIZED HEALTH CARE EDUCATION/TRAINING PROGRAM
Payer: MEDICARE

## 2022-03-31 DIAGNOSIS — M06.00 SERONEGATIVE RHEUMATOID ARTHRITIS: ICD-10-CM

## 2022-03-31 DIAGNOSIS — E83.42 HYPOMAGNESEMIA: ICD-10-CM

## 2022-03-31 DIAGNOSIS — I10 ESSENTIAL HYPERTENSION: Chronic | ICD-10-CM

## 2022-03-31 DIAGNOSIS — Z51.81 MEDICATION MONITORING ENCOUNTER: ICD-10-CM

## 2022-03-31 LAB
ALBUMIN SERPL BCP-MCNC: 3.6 G/DL (ref 3.5–5.2)
ALP SERPL-CCNC: 91 U/L (ref 55–135)
ALT SERPL W/O P-5'-P-CCNC: 10 U/L (ref 10–44)
ANION GAP SERPL CALC-SCNC: 11 MMOL/L (ref 8–16)
AST SERPL-CCNC: 14 U/L (ref 10–40)
BASOPHILS # BLD AUTO: 0.03 K/UL (ref 0–0.2)
BASOPHILS NFR BLD: 0.6 % (ref 0–1.9)
BILIRUB SERPL-MCNC: 0.7 MG/DL (ref 0.1–1)
BUN SERPL-MCNC: 18 MG/DL (ref 8–23)
CALCIUM SERPL-MCNC: 9.5 MG/DL (ref 8.7–10.5)
CHLORIDE SERPL-SCNC: 104 MMOL/L (ref 95–110)
CO2 SERPL-SCNC: 25 MMOL/L (ref 23–29)
CREAT SERPL-MCNC: 1 MG/DL (ref 0.5–1.4)
CRP SERPL-MCNC: 5.3 MG/L (ref 0–8.2)
DIFFERENTIAL METHOD: ABNORMAL
EOSINOPHIL # BLD AUTO: 0.3 K/UL (ref 0–0.5)
EOSINOPHIL NFR BLD: 5.9 % (ref 0–8)
ERYTHROCYTE [DISTWIDTH] IN BLOOD BY AUTOMATED COUNT: 13.3 % (ref 11.5–14.5)
ERYTHROCYTE [SEDIMENTATION RATE] IN BLOOD BY WESTERGREN METHOD: 44 MM/HR (ref 0–36)
EST. GFR  (AFRICAN AMERICAN): 58.9 ML/MIN/1.73 M^2
EST. GFR  (NON AFRICAN AMERICAN): 51.1 ML/MIN/1.73 M^2
GLUCOSE SERPL-MCNC: 153 MG/DL (ref 70–110)
HCT VFR BLD AUTO: 37.1 % (ref 37–48.5)
HGB BLD-MCNC: 11.8 G/DL (ref 12–16)
IMM GRANULOCYTES # BLD AUTO: 0 K/UL (ref 0–0.04)
IMM GRANULOCYTES NFR BLD AUTO: 0 % (ref 0–0.5)
LYMPHOCYTES # BLD AUTO: 1.1 K/UL (ref 1–4.8)
LYMPHOCYTES NFR BLD: 22.2 % (ref 18–48)
MAGNESIUM SERPL-MCNC: 1.8 MG/DL (ref 1.6–2.6)
MAGNESIUM SERPL-MCNC: 1.8 MG/DL (ref 1.6–2.6)
MCH RBC QN AUTO: 32.6 PG (ref 27–31)
MCHC RBC AUTO-ENTMCNC: 31.8 G/DL (ref 32–36)
MCV RBC AUTO: 103 FL (ref 82–98)
MONOCYTES # BLD AUTO: 0.5 K/UL (ref 0.3–1)
MONOCYTES NFR BLD: 10 % (ref 4–15)
NEUTROPHILS # BLD AUTO: 3 K/UL (ref 1.8–7.7)
NEUTROPHILS NFR BLD: 61.3 % (ref 38–73)
NRBC BLD-RTO: 0 /100 WBC
PLATELET # BLD AUTO: 265 K/UL (ref 150–450)
PMV BLD AUTO: 11.1 FL (ref 9.2–12.9)
POTASSIUM SERPL-SCNC: 4.5 MMOL/L (ref 3.5–5.1)
PROT SERPL-MCNC: 6.9 G/DL (ref 6–8.4)
RBC # BLD AUTO: 3.62 M/UL (ref 4–5.4)
SODIUM SERPL-SCNC: 140 MMOL/L (ref 136–145)
WBC # BLD AUTO: 4.9 K/UL (ref 3.9–12.7)

## 2022-03-31 PROCEDURE — 86140 C-REACTIVE PROTEIN: CPT | Performed by: STUDENT IN AN ORGANIZED HEALTH CARE EDUCATION/TRAINING PROGRAM

## 2022-03-31 PROCEDURE — 36415 COLL VENOUS BLD VENIPUNCTURE: CPT | Mod: PN | Performed by: STUDENT IN AN ORGANIZED HEALTH CARE EDUCATION/TRAINING PROGRAM

## 2022-03-31 PROCEDURE — 85652 RBC SED RATE AUTOMATED: CPT | Performed by: STUDENT IN AN ORGANIZED HEALTH CARE EDUCATION/TRAINING PROGRAM

## 2022-03-31 PROCEDURE — 80053 COMPREHEN METABOLIC PANEL: CPT | Performed by: STUDENT IN AN ORGANIZED HEALTH CARE EDUCATION/TRAINING PROGRAM

## 2022-03-31 PROCEDURE — 83735 ASSAY OF MAGNESIUM: CPT | Performed by: INTERNAL MEDICINE

## 2022-03-31 PROCEDURE — 85025 COMPLETE CBC W/AUTO DIFF WBC: CPT | Performed by: STUDENT IN AN ORGANIZED HEALTH CARE EDUCATION/TRAINING PROGRAM

## 2022-04-04 ENCOUNTER — TELEPHONE (OUTPATIENT)
Dept: RHEUMATOLOGY | Facility: CLINIC | Age: 87
End: 2022-04-04
Payer: MEDICARE

## 2022-04-04 ENCOUNTER — PATIENT MESSAGE (OUTPATIENT)
Dept: RHEUMATOLOGY | Facility: CLINIC | Age: 87
End: 2022-04-04
Payer: MEDICARE

## 2022-04-16 DIAGNOSIS — M06.042 RHEUMATOID ARTHRITIS INVOLVING BOTH HANDS WITH NEGATIVE RHEUMATOID FACTOR: ICD-10-CM

## 2022-04-16 DIAGNOSIS — M25.642 STIFFNESS OF FINGER JOINT OF LEFT HAND: ICD-10-CM

## 2022-04-16 DIAGNOSIS — M06.041 RHEUMATOID ARTHRITIS INVOLVING BOTH HANDS WITH NEGATIVE RHEUMATOID FACTOR: ICD-10-CM

## 2022-04-16 DIAGNOSIS — M19.041 OSTEOARTHRITIS OF BOTH HANDS, UNSPECIFIED OSTEOARTHRITIS TYPE: ICD-10-CM

## 2022-04-16 DIAGNOSIS — M25.641 STIFFNESS OF FINGER JOINT OF RIGHT HAND: ICD-10-CM

## 2022-04-16 DIAGNOSIS — M19.042 OSTEOARTHRITIS OF BOTH HANDS, UNSPECIFIED OSTEOARTHRITIS TYPE: ICD-10-CM

## 2022-04-16 NOTE — TELEPHONE ENCOUNTER
No new care gaps identified.  Powered by [employer]. Reference number: [id number].   4/16/2022 3:26:22 PM CDT

## 2022-04-16 NOTE — TELEPHONE ENCOUNTER
Patient Requesting Refill  LOV:3/14/22  Last fill date:11/16/21  Allergies reviewed  Medication Pended

## 2022-04-18 RX ORDER — GABAPENTIN 300 MG/1
CAPSULE ORAL
Qty: 60 CAPSULE | Refills: 2 | Status: SHIPPED | OUTPATIENT
Start: 2022-04-18 | End: 2022-11-28 | Stop reason: SDUPTHER

## 2022-04-21 DIAGNOSIS — E03.9 ACQUIRED HYPOTHYROIDISM: ICD-10-CM

## 2022-04-21 DIAGNOSIS — E78.2 MIXED HYPERLIPIDEMIA: ICD-10-CM

## 2022-04-21 DIAGNOSIS — I10 ESSENTIAL HYPERTENSION: ICD-10-CM

## 2022-04-21 NOTE — TELEPHONE ENCOUNTER
No new care gaps identified.  Powered by [employer]. Reference number: [id number].   4/21/2022 3:35:40 PM CDT

## 2022-04-22 RX ORDER — LEVOTHYROXINE SODIUM 125 UG/1
TABLET ORAL
Qty: 50 TABLET | Refills: 3 | Status: SHIPPED | OUTPATIENT
Start: 2022-04-22 | End: 2023-04-27

## 2022-06-24 ENCOUNTER — TELEPHONE (OUTPATIENT)
Dept: FAMILY MEDICINE | Facility: CLINIC | Age: 87
End: 2022-06-24
Payer: MEDICARE

## 2022-07-07 DIAGNOSIS — K21.9 GASTROESOPHAGEAL REFLUX DISEASE: ICD-10-CM

## 2022-07-07 NOTE — TELEPHONE ENCOUNTER
No new care gaps identified.  [facility] Embedded Care Gaps. Reference number: [id number]. 7/07/2022   6:20:48 PM CDT

## 2022-07-08 NOTE — TELEPHONE ENCOUNTER
Refill Routing Note   Medication(s) are not appropriate for processing by [facility] for the following reason(s):      - Required indication for medication not on problem list ( Osteoporosis is not on problem list)    [facility] action(s):  Defer          Medication reconciliation completed: No     Appointments  past 12m or future 3m with PCP    Date Provider   Last Visit   3/14/2022 Khoi Vazquez MD   Next Visit   9/19/2022 Khoi Vazquez MD   ED visits in past 90 days: 0        Note composed:9:15 PM 07/07/2022

## 2022-07-10 RX ORDER — OMEPRAZOLE 20 MG/1
CAPSULE, DELAYED RELEASE ORAL
Qty: 90 CAPSULE | Refills: 1 | Status: SHIPPED | OUTPATIENT
Start: 2022-07-10 | End: 2023-04-27

## 2022-08-22 ENCOUNTER — HOSPITAL ENCOUNTER (OUTPATIENT)
Dept: RADIOLOGY | Facility: HOSPITAL | Age: 87
Discharge: HOME | End: 2022-08-22
Attending: THORACIC SURGERY (CARDIOTHORACIC VASCULAR SURGERY)
Payer: MEDICARE

## 2022-08-22 DIAGNOSIS — I65.23 BILATERAL CAROTID ARTERY STENOSIS: ICD-10-CM

## 2022-08-22 PROCEDURE — 93880 EXTRACRANIAL BILAT STUDY: CPT | Mod: TC,PO

## 2022-08-22 PROCEDURE — 93880 US CAROTID BILATERAL: ICD-10-PCS | Mod: 26,,, | Performed by: RADIOLOGY

## 2022-08-22 PROCEDURE — 93880 EXTRACRANIAL BILAT STUDY: CPT | Mod: 26,,, | Performed by: RADIOLOGY

## 2022-08-31 ENCOUNTER — PATIENT MESSAGE (OUTPATIENT)
Dept: VASCULAR SURGERY | Facility: CLINIC | Age: 87
End: 2022-08-31
Payer: MEDICARE

## 2022-08-31 DIAGNOSIS — I65.23 BILATERAL CAROTID ARTERY STENOSIS: Primary | ICD-10-CM

## 2022-09-01 ENCOUNTER — PATIENT MESSAGE (OUTPATIENT)
Dept: HEMATOLOGY/ONCOLOGY | Facility: CLINIC | Age: 87
End: 2022-09-01
Payer: MEDICARE

## 2022-09-13 ENCOUNTER — LAB VISIT (OUTPATIENT)
Dept: LAB | Facility: HOSPITAL | Age: 87
End: 2022-09-13
Attending: INTERNAL MEDICINE
Payer: MEDICARE

## 2022-09-13 DIAGNOSIS — N18.31 STAGE 3A CHRONIC KIDNEY DISEASE: ICD-10-CM

## 2022-09-13 DIAGNOSIS — E55.9 VITAMIN D DEFICIENCY: ICD-10-CM

## 2022-09-13 DIAGNOSIS — E03.9 ACQUIRED HYPOTHYROIDISM: ICD-10-CM

## 2022-09-13 DIAGNOSIS — E78.2 MIXED HYPERLIPIDEMIA: ICD-10-CM

## 2022-09-13 DIAGNOSIS — I10 ESSENTIAL HYPERTENSION: Chronic | ICD-10-CM

## 2022-09-13 DIAGNOSIS — M06.00 SERONEGATIVE RHEUMATOID ARTHRITIS: ICD-10-CM

## 2022-09-13 LAB
25(OH)D3+25(OH)D2 SERPL-MCNC: 82 NG/ML (ref 30–96)
ALBUMIN SERPL BCP-MCNC: 3.8 G/DL (ref 3.5–5.2)
ALBUMIN SERPL BCP-MCNC: 3.8 G/DL (ref 3.5–5.2)
ALP SERPL-CCNC: 88 U/L (ref 55–135)
ALP SERPL-CCNC: 88 U/L (ref 55–135)
ALT SERPL W/O P-5'-P-CCNC: 60 U/L (ref 10–44)
ALT SERPL W/O P-5'-P-CCNC: 60 U/L (ref 10–44)
ANION GAP SERPL CALC-SCNC: 10 MMOL/L (ref 8–16)
ANION GAP SERPL CALC-SCNC: 10 MMOL/L (ref 8–16)
AST SERPL-CCNC: 37 U/L (ref 10–40)
AST SERPL-CCNC: 37 U/L (ref 10–40)
BASOPHILS # BLD AUTO: 0.04 K/UL (ref 0–0.2)
BASOPHILS # BLD AUTO: 0.04 K/UL (ref 0–0.2)
BASOPHILS NFR BLD: 0.7 % (ref 0–1.9)
BASOPHILS NFR BLD: 0.7 % (ref 0–1.9)
BILIRUB SERPL-MCNC: 0.7 MG/DL (ref 0.1–1)
BILIRUB SERPL-MCNC: 0.7 MG/DL (ref 0.1–1)
BUN SERPL-MCNC: 28 MG/DL (ref 8–23)
BUN SERPL-MCNC: 28 MG/DL (ref 8–23)
CALCIUM SERPL-MCNC: 9.3 MG/DL (ref 8.7–10.5)
CALCIUM SERPL-MCNC: 9.3 MG/DL (ref 8.7–10.5)
CHLORIDE SERPL-SCNC: 106 MMOL/L (ref 95–110)
CHLORIDE SERPL-SCNC: 106 MMOL/L (ref 95–110)
CHOLEST SERPL-MCNC: 138 MG/DL (ref 120–199)
CHOLEST/HDLC SERPL: 2.9 {RATIO} (ref 2–5)
CO2 SERPL-SCNC: 23 MMOL/L (ref 23–29)
CO2 SERPL-SCNC: 23 MMOL/L (ref 23–29)
CREAT SERPL-MCNC: 1 MG/DL (ref 0.5–1.4)
CREAT SERPL-MCNC: 1 MG/DL (ref 0.5–1.4)
CRP SERPL-MCNC: 6.2 MG/L (ref 0–8.2)
DIFFERENTIAL METHOD: ABNORMAL
DIFFERENTIAL METHOD: ABNORMAL
EOSINOPHIL # BLD AUTO: 0.4 K/UL (ref 0–0.5)
EOSINOPHIL # BLD AUTO: 0.4 K/UL (ref 0–0.5)
EOSINOPHIL NFR BLD: 7.5 % (ref 0–8)
EOSINOPHIL NFR BLD: 7.5 % (ref 0–8)
ERYTHROCYTE [DISTWIDTH] IN BLOOD BY AUTOMATED COUNT: 15.8 % (ref 11.5–14.5)
ERYTHROCYTE [DISTWIDTH] IN BLOOD BY AUTOMATED COUNT: 15.8 % (ref 11.5–14.5)
ERYTHROCYTE [SEDIMENTATION RATE] IN BLOOD BY PHOTOMETRIC METHOD: 27 MM/HR (ref 0–36)
EST. GFR  (NO RACE VARIABLE): 54.5 ML/MIN/1.73 M^2
EST. GFR  (NO RACE VARIABLE): 54.5 ML/MIN/1.73 M^2
GLUCOSE SERPL-MCNC: 98 MG/DL (ref 70–110)
GLUCOSE SERPL-MCNC: 98 MG/DL (ref 70–110)
HCT VFR BLD AUTO: 33.3 % (ref 37–48.5)
HCT VFR BLD AUTO: 33.3 % (ref 37–48.5)
HDLC SERPL-MCNC: 47 MG/DL (ref 40–75)
HDLC SERPL: 34.1 % (ref 20–50)
HGB BLD-MCNC: 10.7 G/DL (ref 12–16)
HGB BLD-MCNC: 10.7 G/DL (ref 12–16)
IMM GRANULOCYTES # BLD AUTO: 0.02 K/UL (ref 0–0.04)
IMM GRANULOCYTES # BLD AUTO: 0.02 K/UL (ref 0–0.04)
IMM GRANULOCYTES NFR BLD AUTO: 0.4 % (ref 0–0.5)
IMM GRANULOCYTES NFR BLD AUTO: 0.4 % (ref 0–0.5)
LDLC SERPL CALC-MCNC: 70.2 MG/DL (ref 63–159)
LYMPHOCYTES # BLD AUTO: 1 K/UL (ref 1–4.8)
LYMPHOCYTES # BLD AUTO: 1 K/UL (ref 1–4.8)
LYMPHOCYTES NFR BLD: 17.9 % (ref 18–48)
LYMPHOCYTES NFR BLD: 17.9 % (ref 18–48)
MCH RBC QN AUTO: 33.2 PG (ref 27–31)
MCH RBC QN AUTO: 33.2 PG (ref 27–31)
MCHC RBC AUTO-ENTMCNC: 32.1 G/DL (ref 32–36)
MCHC RBC AUTO-ENTMCNC: 32.1 G/DL (ref 32–36)
MCV RBC AUTO: 103 FL (ref 82–98)
MCV RBC AUTO: 103 FL (ref 82–98)
MONOCYTES # BLD AUTO: 0.6 K/UL (ref 0.3–1)
MONOCYTES # BLD AUTO: 0.6 K/UL (ref 0.3–1)
MONOCYTES NFR BLD: 10.8 % (ref 4–15)
MONOCYTES NFR BLD: 10.8 % (ref 4–15)
NEUTROPHILS # BLD AUTO: 3.4 K/UL (ref 1.8–7.7)
NEUTROPHILS # BLD AUTO: 3.4 K/UL (ref 1.8–7.7)
NEUTROPHILS NFR BLD: 62.7 % (ref 38–73)
NEUTROPHILS NFR BLD: 62.7 % (ref 38–73)
NONHDLC SERPL-MCNC: 91 MG/DL
NRBC BLD-RTO: 0 /100 WBC
NRBC BLD-RTO: 0 /100 WBC
PLATELET # BLD AUTO: 225 K/UL (ref 150–450)
PLATELET # BLD AUTO: 225 K/UL (ref 150–450)
PMV BLD AUTO: 10.9 FL (ref 9.2–12.9)
PMV BLD AUTO: 10.9 FL (ref 9.2–12.9)
POTASSIUM SERPL-SCNC: 4.8 MMOL/L (ref 3.5–5.1)
POTASSIUM SERPL-SCNC: 4.8 MMOL/L (ref 3.5–5.1)
PROT SERPL-MCNC: 6.9 G/DL (ref 6–8.4)
PROT SERPL-MCNC: 6.9 G/DL (ref 6–8.4)
RBC # BLD AUTO: 3.22 M/UL (ref 4–5.4)
RBC # BLD AUTO: 3.22 M/UL (ref 4–5.4)
SODIUM SERPL-SCNC: 139 MMOL/L (ref 136–145)
SODIUM SERPL-SCNC: 139 MMOL/L (ref 136–145)
T4 FREE SERPL-MCNC: 1.05 NG/DL (ref 0.71–1.51)
TRIGL SERPL-MCNC: 104 MG/DL (ref 30–150)
TSH SERPL DL<=0.005 MIU/L-ACNC: 1.88 UIU/ML (ref 0.4–4)
WBC # BLD AUTO: 5.36 K/UL (ref 3.9–12.7)
WBC # BLD AUTO: 5.36 K/UL (ref 3.9–12.7)

## 2022-09-13 PROCEDURE — 80061 LIPID PANEL: CPT | Performed by: INTERNAL MEDICINE

## 2022-09-13 PROCEDURE — 85025 COMPLETE CBC W/AUTO DIFF WBC: CPT | Performed by: STUDENT IN AN ORGANIZED HEALTH CARE EDUCATION/TRAINING PROGRAM

## 2022-09-13 PROCEDURE — 36415 COLL VENOUS BLD VENIPUNCTURE: CPT | Mod: PN | Performed by: INTERNAL MEDICINE

## 2022-09-13 PROCEDURE — 85652 RBC SED RATE AUTOMATED: CPT | Performed by: STUDENT IN AN ORGANIZED HEALTH CARE EDUCATION/TRAINING PROGRAM

## 2022-09-13 PROCEDURE — 82306 VITAMIN D 25 HYDROXY: CPT | Performed by: INTERNAL MEDICINE

## 2022-09-13 PROCEDURE — 86140 C-REACTIVE PROTEIN: CPT | Performed by: STUDENT IN AN ORGANIZED HEALTH CARE EDUCATION/TRAINING PROGRAM

## 2022-09-13 PROCEDURE — 84439 ASSAY OF FREE THYROXINE: CPT | Performed by: INTERNAL MEDICINE

## 2022-09-13 PROCEDURE — 84443 ASSAY THYROID STIM HORMONE: CPT | Performed by: INTERNAL MEDICINE

## 2022-09-13 PROCEDURE — 80053 COMPREHEN METABOLIC PANEL: CPT | Performed by: STUDENT IN AN ORGANIZED HEALTH CARE EDUCATION/TRAINING PROGRAM

## 2022-09-19 ENCOUNTER — OFFICE VISIT (OUTPATIENT)
Dept: FAMILY MEDICINE | Facility: CLINIC | Age: 87
End: 2022-09-19
Payer: MEDICARE

## 2022-09-19 DIAGNOSIS — D53.9 MACROCYTIC ANEMIA: ICD-10-CM

## 2022-09-19 DIAGNOSIS — N18.31 STAGE 3A CHRONIC KIDNEY DISEASE: ICD-10-CM

## 2022-09-19 DIAGNOSIS — Z98.890 S/P CAROTID ENDARTERECTOMY: ICD-10-CM

## 2022-09-19 DIAGNOSIS — I10 ESSENTIAL HYPERTENSION: Primary | Chronic | ICD-10-CM

## 2022-09-19 DIAGNOSIS — R74.01 TRANSAMINITIS: ICD-10-CM

## 2022-09-19 DIAGNOSIS — M06.042 RHEUMATOID ARTHRITIS INVOLVING BOTH HANDS WITH NEGATIVE RHEUMATOID FACTOR: ICD-10-CM

## 2022-09-19 DIAGNOSIS — E61.1 IRON DEFICIENCY: ICD-10-CM

## 2022-09-19 DIAGNOSIS — Z91.89 AT RISK FOR SIDE EFFECT OF MEDICATION: ICD-10-CM

## 2022-09-19 DIAGNOSIS — M06.041 RHEUMATOID ARTHRITIS INVOLVING BOTH HANDS WITH NEGATIVE RHEUMATOID FACTOR: ICD-10-CM

## 2022-09-19 DIAGNOSIS — Z01.89 ENCOUNTER FOR LABORATORY TEST: ICD-10-CM

## 2022-09-19 DIAGNOSIS — E78.2 MIXED HYPERLIPIDEMIA: ICD-10-CM

## 2022-09-19 DIAGNOSIS — E03.9 ACQUIRED HYPOTHYROIDISM: ICD-10-CM

## 2022-09-19 PROCEDURE — 99214 OFFICE O/P EST MOD 30 MIN: CPT | Mod: S$GLB,,, | Performed by: INTERNAL MEDICINE

## 2022-09-19 PROCEDURE — 1160F PR REVIEW ALL MEDS BY PRESCRIBER/CLIN PHARMACIST DOCUMENTED: ICD-10-PCS | Mod: CPTII,S$GLB,, | Performed by: INTERNAL MEDICINE

## 2022-09-19 PROCEDURE — 1101F PT FALLS ASSESS-DOCD LE1/YR: CPT | Mod: CPTII,S$GLB,, | Performed by: INTERNAL MEDICINE

## 2022-09-19 PROCEDURE — 1126F PR PAIN SEVERITY QUANTIFIED, NO PAIN PRESENT: ICD-10-PCS | Mod: CPTII,S$GLB,, | Performed by: INTERNAL MEDICINE

## 2022-09-19 PROCEDURE — 1126F AMNT PAIN NOTED NONE PRSNT: CPT | Mod: CPTII,S$GLB,, | Performed by: INTERNAL MEDICINE

## 2022-09-19 PROCEDURE — 1160F RVW MEDS BY RX/DR IN RCRD: CPT | Mod: CPTII,S$GLB,, | Performed by: INTERNAL MEDICINE

## 2022-09-19 PROCEDURE — 1159F PR MEDICATION LIST DOCUMENTED IN MEDICAL RECORD: ICD-10-PCS | Mod: CPTII,S$GLB,, | Performed by: INTERNAL MEDICINE

## 2022-09-19 PROCEDURE — 3288F PR FALLS RISK ASSESSMENT DOCUMENTED: ICD-10-PCS | Mod: CPTII,S$GLB,, | Performed by: INTERNAL MEDICINE

## 2022-09-19 PROCEDURE — 99999 PR PBB SHADOW E&M-EST. PATIENT-LVL V: CPT | Mod: PBBFAC,,, | Performed by: INTERNAL MEDICINE

## 2022-09-19 PROCEDURE — 99214 PR OFFICE/OUTPT VISIT, EST, LEVL IV, 30-39 MIN: ICD-10-PCS | Mod: S$GLB,,, | Performed by: INTERNAL MEDICINE

## 2022-09-19 PROCEDURE — 1101F PR PT FALLS ASSESS DOC 0-1 FALLS W/OUT INJ PAST YR: ICD-10-PCS | Mod: CPTII,S$GLB,, | Performed by: INTERNAL MEDICINE

## 2022-09-19 PROCEDURE — 99999 PR PBB SHADOW E&M-EST. PATIENT-LVL V: ICD-10-PCS | Mod: PBBFAC,,, | Performed by: INTERNAL MEDICINE

## 2022-09-19 PROCEDURE — 3288F FALL RISK ASSESSMENT DOCD: CPT | Mod: CPTII,S$GLB,, | Performed by: INTERNAL MEDICINE

## 2022-09-19 PROCEDURE — 1159F MED LIST DOCD IN RCRD: CPT | Mod: CPTII,S$GLB,, | Performed by: INTERNAL MEDICINE

## 2022-09-19 NOTE — PROGRESS NOTES
Subjective:       Patient ID: Nida Kline is a 87 y.o. female.    Chief Complaint: Follow-up  Here today for reassessment and review lab work.  Essential hypertension: Maintain < 2 Gm Na a day diet, and monitor BP at home; keep a log. Adequate fluids during day. Min 6-7 glasses a day.  Sit for 5 minutes at least in the chair with feet flat on the ground before running the blood pressure cuff.  Average blood pressure at home has been 128/78; BP at home has been good.  Mixed hyperlipidemia: Maintain low fat high fiber diet, exercise regularly. Weight reduction where indicated. Cont atorvastatin for now.   Transaminitis; limit tylenol use; keep well hydrated. No NSAID agents or alcohol.   -     Comprehensive Metabolic Panel; Future; Expected date: 09/19/2022  S/P carotid endarterectomy; card is dr Wick.   Stage 3a chronic kidney disease; keep well hydrated.   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 Dr Alvarado her rheum.   Macrocytic anemia; on folate and Women's 50+ MVI one a day  -     Folate; Future; Expected date: 09/19/2022  -     Ferritin; Future; Expected date: 09/19/2022  -     Iron and TIBC; Future; Expected date: 09/19/2022  -     Vitamin B12; Future; Expected date: 09/19/2022  -     CBC Auto Differential; Future; Expected date: 09/19/2022  Iron deficiency; red meats 2x a week. On iron supplements  -     Ferritin; Future; Expected date: 09/19/2022  -     Iron and TIBC; Future; Expected date: 09/19/2022  At risk for side effect of medication; atorvastatin and/or MTX. Ask rheum Dr Alvarado if MTX can be reduced; pt asymptomatic  -     Folate; Future; Expected date: 09/19/2022  -     Ferritin; Future; Expected date: 09/19/2022  -     Iron and TIBC; Future; Expected date: 09/19/2022  -     Vitamin B12; Future; Expected date: 09/19/2022  -     Comprehensive Metabolic Panel; Future;  "Expected date: 09/19/2022  -     CBC Auto Differential; Future; Expected date: 09/19/2022  Acquired hypothyroidism: same levothyroxine dosing. TSH 1.87 w free T4 1.05  Encounter for laboratory test; martina reviewed w pt and ordered for f/u.   -     Folate; Future; Expected date: 09/19/2022  -     Ferritin; Future; Expected date: 09/19/2022  -     Iron and TIBC; Future; Expected date: 09/19/2022  -     Vitamin B12; Future; Expected date: 09/19/2022  -     Comprehensive Metabolic Panel; Future; Expected date: 09/19/2022  -     CBC Auto Differential; Future; Expected date: 09/19/2022        Review of Systems   Constitutional:  Negative for appetite change and fever.   HENT:  Negative for congestion, postnasal drip, rhinorrhea and sinus pressure.    Eyes:  Negative for discharge and itching.   Respiratory:  Negative for cough, chest tightness, shortness of breath and wheezing.    Cardiovascular:  Negative for chest pain, palpitations and leg swelling.   Gastrointestinal:  Negative for abdominal distention, abdominal pain, blood in stool, constipation, diarrhea, nausea and vomiting.   Endocrine: Negative for polydipsia, polyphagia and polyuria.   Genitourinary:  Negative for dysuria and hematuria.   Musculoskeletal:  Negative for arthralgias and myalgias.   Skin:  Negative for rash.   Allergic/Immunologic: Negative for environmental allergies and food allergies.   Neurological:  Negative for tremors, seizures and syncope.   Hematological:  Negative for adenopathy. Does not bruise/bleed easily.   Psychiatric/Behavioral:  Negative for dysphoric mood. The patient is not nervous/anxious.     Objective:        Vitals:    09/19/22 1405   BP: 106/68   BP Location: Right arm   Patient Position: Sitting   BP Method: Medium (Manual)   Pulse: (!) 57   Resp: 14   Temp: 98 °F (36.7 °C)   TempSrc: Oral   SpO2: 98%   Weight: 76.5 kg (168 lb 8.7 oz)   Height: 5' 4" (1.626 m)       BMI Readings from Last 3 Encounters:   09/19/22 28.93 " kg/m²   03/14/22 30.41 kg/m²   03/07/22 29.18 kg/m²        Wt Readings from Last 3 Encounters:   09/19/22 1405 76.5 kg (168 lb 8.7 oz)   03/14/22 1415 80.3 kg (177 lb 2.2 oz)   03/07/22 1020 77.1 kg (170 lb)        BP Readings from Last 3 Encounters:   09/19/22 106/68   03/14/22 123/60   03/07/22 118/65        There are no preventive care reminders to display for this patient.     Health Maintenance Due   Topic Date Due    TETANUS VACCINE  Never done    COVID-19 Vaccine (4 - Booster for Pfizer series) 04/15/2022         Past Medical History:   Diagnosis Date    Back pain     Cataract extraction status of eye     Gallbladder disease     GERD (gastroesophageal reflux disease)     HTN (hypertension)     Hypothyroidism     Osteoporosis     Overweight (BMI 25.0-29.9)     PONV (postoperative nausea and vomiting)     Pure hypercholesterolemia     Squamous cell carcinoma     Thyroid disease     hypothyroidism    Trouble in sleeping     Vitamin D deficiency        Past Surgical History:   Procedure Laterality Date    CAROTID ENDARTERECTOMY Left 2015    CATARACT EXTRACTION      OU    CHOLECYSTECTOMY      HYSTERECTOMY      TOTAL ABDOMINAL HYSTERECTOMY W/ BILATERAL SALPINGOOPHORECTOMY         Social History     Tobacco Use    Smoking status: Never    Smokeless tobacco: Never   Substance Use Topics    Alcohol use: No    Drug use: No       Family History   Problem Relation Age of Onset    Kidney disease Mother     Diabetes Mother     COPD Father     Heart disease Father        Review of patient's allergies indicates:   Allergen Reactions    Lunesta [eszopiclone]      Paresthesia, lip swell     Propofol analogues Nausea And Vomiting       Current Outpatient Medications on File Prior to Visit   Medication Sig Dispense Refill    ascorbic acid, vitamin C, (VITAMIN C) 500 MG tablet Take 500 mg by mouth once daily.      aspirin (ECOTRIN) 81 MG EC tablet Take 81 mg by mouth once.      atorvastatin (LIPITOR) 40 MG tablet Take 1 tablet  (40 mg total) by mouth once daily. 90 tablet 3    busPIRone (BUSPAR) 15 MG tablet TAKE 1/3 TO 1/2 TABLET THREE TIMES A DAY AS NEEDED FOR ANXIETY 90 tablet 1    calcium carbonate/vitamin D3 (CALTRATE 600 + D ORAL) Take by mouth once daily. Caltrate 600+D3      cyanocobalamin, vitamin B-12, (VITAMIN B-12 ORAL) Take 1,000 mcg by mouth once daily.      folic acid (FOLVITE) 1 MG tablet Take 1 tablet (1 mg total) by mouth once daily. 90 tablet 6    gabapentin (NEURONTIN) 300 MG capsule TAKE 1 CAPSULE TWICE DAILY (Patient taking differently: TAKE 1 CAPSULE TWICE DAILY) 60 capsule 2    levothyroxine (SYNTHROID) 125 MCG tablet 1/2 of 125 mcg po daily. 50 tablet 3    losartan (COZAAR) 25 MG tablet TAKE 1 TABLET EVERY DAY 90 tablet 3    magnesium oxide 400 mg Cap Take 2 tablets by mouth once daily.      methotrexate 2.5 MG Tab Take 8 tablets (20 mg total) by mouth every 7 days. 96 tablet 1    metoprolol succinate (TOPROL-XL) 25 MG 24 hr tablet TAKE 1/2 TABLET EVERY DAY 45 tablet 3    omeprazole (PRILOSEC) 20 MG capsule TAKE 1 CAPSULE EVERY DAY AS NEEDED FOR REFLUX 90 capsule 1    vitamin D 1000 units Tab Take 1,000 Units by mouth 2 (two) times daily.      predniSONE (DELTASONE) 10 MG tablet Take 1 tablet (10 mg total) by mouth once daily. 90 tablet 0    SHINGRIX, PF, 50 mcg/0.5 mL injection       traZODone (DESYREL) 50 MG tablet Take 50 mg by mouth every evening.       No current facility-administered medications on file prior to visit.       Physical Exam  Vitals reviewed.   Constitutional:       Appearance: She is well-developed.   HENT:      Head: Normocephalic and atraumatic.   Neck:      Thyroid: No thyromegaly.   Cardiovascular:      Rate and Rhythm: Normal rate and regular rhythm.      Heart sounds: Normal heart sounds. No murmur heard.    No gallop.   Pulmonary:      Effort: Pulmonary effort is normal. No respiratory distress.      Breath sounds: Normal breath sounds. No wheezing or rales.   Abdominal:      General:  Bowel sounds are normal. There is no distension.      Palpations: Abdomen is soft.      Tenderness: There is no abdominal tenderness. There is no guarding or rebound.   Musculoskeletal:         General: Normal range of motion.      Cervical back: Normal range of motion and neck supple.      Right lower leg: No edema.      Left lower leg: No edema.   Lymphadenopathy:      Cervical: No cervical adenopathy.   Skin:     Findings: No rash.   Neurological:      Mental Status: She is alert and oriented to person, place, and time.      Comments: Moves all 4 extremities fine.   Psychiatric:         Behavior: Behavior normal.         Thought Content: Thought content normal.       Assessment:       1. Essential hypertension    2. Mixed hyperlipidemia    3. Transaminitis    4. S/P carotid endarterectomy    5. Stage 3a chronic kidney disease    6. 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 Macrocytic anemia    8. Iron deficiency    9. At risk for side effect of medication    10. Acquired hypothyroidism    11. Encounter for laboratory test        Plan:       Essential hypertension: Maintain < 2 Gm Na a day diet, and monitor BP at home; keep a log. Adequate fluids during day. Min 6-7 glasses a day.  Sit for 5 minutes at least in the chair with feet flat on the ground before running the blood pressure cuff    Mixed hyperlipidemia: Maintain low fat high fiber diet, exercise regularly. Weight reduction where indicated. Cont atorvastatin for now.     Transaminitis; limit tylenol use; keep well hydrated. No NSAID agents or alcohol.   -     Comprehensive Metabolic Panel; Future; Expected date: 09/19/2022    S/P carotid endarterectomy; card is dr Wick.     Stage 3a chronic kidney disease; keep well hydrated.  Avoid any alcohol or NSA ID agents or caffeine.  Can use Tylenol for any pain.  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 Dr Alvarado her rheum.     Macrocytic anemia; on folate and Women's 50+ MVI one a day  -     Folate; Future; Expected date: 09/19/2022  -     Ferritin; Future; Expected date: 09/19/2022  -     Iron and TIBC; Future; Expected date: 09/19/2022  -     Vitamin B12; Future; Expected date: 09/19/2022  -     CBC Auto Differential; Future; Expected date: 09/19/2022    Iron deficiency; red meats 2x a week. On iron supplements  -     Ferritin; Future; Expected date: 09/19/2022  -     Iron and TIBC; Future; Expected date: 09/19/2022    At risk for side effect of medication; atorvastatin and/or MTX. Ask rheum Dr Alvarado if MTX can be reduced; pt asymptomatic  -     Folate; Future; Expected date: 09/19/2022  -     Ferritin; Future; Expected date: 09/19/2022  -     Iron and TIBC; Future; Expected date: 09/19/2022  -     Vitamin B12; Future; Expected date: 09/19/2022  -     Comprehensive Metabolic Panel; Future; Expected date: 09/19/2022  -     CBC Auto Differential; Future; Expected date: 09/19/2022    Acquired hypothyroidism: same levothyroxine dosing. TSH 1.87 w free T4 1.05    Encounter for laboratory test; lasbs reviewed w pt and ordered for f/u.   -     Folate; Future; Expected date: 09/19/2022  -     Ferritin; Future; Expected date: 09/19/2022  -     Iron and TIBC; Future; Expected date: 09/19/2022  -     Vitamin B12; Future; Expected date: 09/19/2022  -     Comprehensive Metabolic Panel; Future; Expected date: 09/19/2022  -     CBC Auto Differential; Future; Expected date: 09/19/2022

## 2022-09-19 NOTE — PATIENT INSTRUCTIONS
Essential hypertension: Maintain < 2 Gm Na a day diet, and monitor BP at home; keep a log. Adequate fluids during day. Min 6-7 glasses a day    Mixed hyperlipidemia: Maintain low fat high fiber diet, exercise regularly. Weight reduction where indicated. Cont atorvastatin for now.     Transaminitis; limit tylenol use; keep well hydrated. No NSAID agents or alcohol.   -     Comprehensive Metabolic Panel; Future; Expected date: 09/19/2022    S/P carotid endarterectomy; card is dr Wick.     Stage 3a chronic kidney disease; keep well hydrated.   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 Dr Alvarado her rheum.     Macrocytic anemia; on folate and Women's 50+ MVI one a day  -     Folate; Future; Expected date: 09/19/2022  -     Ferritin; Future; Expected date: 09/19/2022  -     Iron and TIBC; Future; Expected date: 09/19/2022  -     Vitamin B12; Future; Expected date: 09/19/2022  -     CBC Auto Differential; Future; Expected date: 09/19/2022    Iron deficiency; red meats 2x a week. On iron supplements  -     Ferritin; Future; Expected date: 09/19/2022  -     Iron and TIBC; Future; Expected date: 09/19/2022    At risk for side effect of medication; atorvastatin and/or MTX. Ask rheumatologist if MTX can be reduced.   -     Folate; Future; Expected date: 09/19/2022  -     Ferritin; Future; Expected date: 09/19/2022  -     Iron and TIBC; Future; Expected date: 09/19/2022  -     Vitamin B12; Future; Expected date: 09/19/2022  -     Comprehensive Metabolic Panel; Future; Expected date: 09/19/2022  -     CBC Auto Differential; Future; Expected date: 09/19/2022    Acquired hypothyroidism: same levothyroxine dosing. TSH 1.87 w free T4 1.05    Encounter for laboratory test; martina reviewed w pt and ordered for f/u.   -     Folate; Future; Expected date: 09/19/2022  -     Ferritin; Future; Expected date:  09/19/2022  -     Iron and TIBC; Future; Expected date: 09/19/2022  -     Vitamin B12; Future; Expected date: 09/19/2022  -     Comprehensive Metabolic Panel; Future; Expected date: 09/19/2022  -     CBC Auto Differential; Future; Expected date: 09/19/2022

## 2022-09-20 DIAGNOSIS — F41.9 ANXIETY: ICD-10-CM

## 2022-09-20 RX ORDER — BUSPIRONE HYDROCHLORIDE 15 MG/1
TABLET ORAL
Qty: 90 TABLET | Refills: 1 | Status: SHIPPED | OUTPATIENT
Start: 2022-09-20 | End: 2023-06-14

## 2022-09-21 NOTE — PROGRESS NOTES
Subjective:       Patient ID: Nida Kline is a 87 y.o. female.    Chief Complaint: Disease Management    HPI    This is an 87 year old woman with PMH of CKD, HTN, vit D deficiency, carotid artery disease s/p CEA, HLD, vit D deficiency, hypothyroidism, STEW, GERD, squamous cell carcinoma, osteoporosis not on therapy, seronegative non erosive RA diagnosed in 12/2021 and on methotrexate 20mg weekly plus folic acid. She was last seen in 3/2022 and DAS28 was consistent with moderate disease activity at that time, MTX was increased to 20mg split dose weekly.  She has done very well since then and denies joint pain and swelling.  She has had no side effects from the methotrexate.       Objective:   BP (!) 152/75   Pulse 75   Wt 77.3 kg (170 lb 4.9 oz)   BMI 29.23 kg/m²      Physical Exam   HENT:   Head: Normocephalic and atraumatic.   Abdominal: Normal appearance.   Musculoskeletal:      Comments: Swollen Joints: Bilateral 2nd MCPs and  right 3rd MCP (SJC 3)  Tender Joints: 0   Neurological: She is alert.      No data to display     Labs reviewed by me:   CBC (9/13/22); Hgb 10.7, otherwise WNL   CMP ALT 60, otherwise WNL   ESR CRP WNL    Assessment:       1. Seronegative rheumatoid arthritis    2. Medication monitoring encounter    3. Arthralgia, unspecified joint        This is an 87 year old woman with PMH of CKD, HTN, vit D deficiency, carotid artery disease s/p CEA, HLD, vit D deficiency, hypothyroidism, STEW, GERD, squamous cell carcinoma, osteoporosis not on therapy, seronegative non erosive RA diagnosed in 12/2021 and on methotrexate 20mg weekly plus folic acid. CDAI is consistent with low disease activity (SJC 3, TJC 0, PTGA 0, ESR 27). Patient would like to continue MTX as is.  This is reasonable, ALT is only very mildly elevated at 60.  Per patient, PCP will be rechecking this at the end of the month.    Plan:       Problem List Items Addressed This Visit    None  Visit Diagnoses       Seronegative  rheumatoid arthritis        Relevant Medications    methotrexate 2.5 MG Tab    folic acid (FOLVITE) 1 MG tablet    Other Relevant Orders    CBC Auto Differential    Comprehensive Metabolic Panel    Sedimentation rate    C-Reactive Protein    Medication monitoring encounter        Relevant Medications    methotrexate 2.5 MG Tab    folic acid (FOLVITE) 1 MG tablet    Other Relevant Orders    CBC Auto Differential    Comprehensive Metabolic Panel    Sedimentation rate    C-Reactive Protein    Arthralgia, unspecified joint        Relevant Medications    methotrexate 2.5 MG Tab    folic acid (FOLVITE) 1 MG tablet          -  Methotrexate 20mg weekly (10mg AM, 10mg PM) + folic acid daily  - Patient will need yearly skin cancer check on MTX given history of squamous cell CA  - X-Ray arthritis survey (10/2021): Osteopenia and diffuse joint space narrowing without erosions  - Pre-DMARD labs completed 12/2021  - DEXA scan showed osteoporosis. At previous visit, I discussed that the patient is high risk for fracture without osteoporosis treatment but she declined therapy as she feels she is already taking too many medications. She takes calcium and vitamin D and states that she takes fall precautions. I discussed this again today and strongly recommended bisphosphonate therapy. She declined.   - Vaccinations: COVID x 3, PCV13 (12/2015), PPV23 (9/2019), Shingrix x 1 done; patient needs second Shingrix and flu 21    Follow up in 3 months    20 minutes of total time spent on the encounter, which includes face to face time and non-face to face time preparing to see the patient (eg, review of tests), Obtaining and/or reviewing separately obtained history, Documenting clinical information in the electronic or other health record, Independently interpreting results (not separately reported) and communicating results to the patient/family/caregiver, or Care coordination (not separately reported).       Leah Gannon,  M.D.  Rheumatology Dept  [place], [place]

## 2022-09-22 ENCOUNTER — OFFICE VISIT (OUTPATIENT)
Dept: RHEUMATOLOGY | Facility: CLINIC | Age: 87
End: 2022-09-22
Payer: MEDICARE

## 2022-09-22 VITALS
BODY MASS INDEX: 29.23 KG/M2 | SYSTOLIC BLOOD PRESSURE: 152 MMHG | DIASTOLIC BLOOD PRESSURE: 75 MMHG | WEIGHT: 170.31 LBS | HEART RATE: 75 BPM

## 2022-09-22 DIAGNOSIS — M06.00 SERONEGATIVE RHEUMATOID ARTHRITIS: ICD-10-CM

## 2022-09-22 DIAGNOSIS — M25.50 ARTHRALGIA, UNSPECIFIED JOINT: ICD-10-CM

## 2022-09-22 DIAGNOSIS — Z51.81 MEDICATION MONITORING ENCOUNTER: ICD-10-CM

## 2022-09-22 PROCEDURE — 99214 PR OFFICE/OUTPT VISIT, EST, LEVL IV, 30-39 MIN: ICD-10-PCS | Mod: S$GLB,,, | Performed by: STUDENT IN AN ORGANIZED HEALTH CARE EDUCATION/TRAINING PROGRAM

## 2022-09-22 PROCEDURE — 99999 PR PBB SHADOW E&M-EST. PATIENT-LVL III: ICD-10-PCS | Mod: PBBFAC,,, | Performed by: STUDENT IN AN ORGANIZED HEALTH CARE EDUCATION/TRAINING PROGRAM

## 2022-09-22 PROCEDURE — 1159F PR MEDICATION LIST DOCUMENTED IN MEDICAL RECORD: ICD-10-PCS | Mod: CPTII,S$GLB,, | Performed by: STUDENT IN AN ORGANIZED HEALTH CARE EDUCATION/TRAINING PROGRAM

## 2022-09-22 PROCEDURE — 1101F PR PT FALLS ASSESS DOC 0-1 FALLS W/OUT INJ PAST YR: ICD-10-PCS | Mod: CPTII,S$GLB,, | Performed by: STUDENT IN AN ORGANIZED HEALTH CARE EDUCATION/TRAINING PROGRAM

## 2022-09-22 PROCEDURE — 99214 OFFICE O/P EST MOD 30 MIN: CPT | Mod: S$GLB,,, | Performed by: STUDENT IN AN ORGANIZED HEALTH CARE EDUCATION/TRAINING PROGRAM

## 2022-09-22 PROCEDURE — 3288F PR FALLS RISK ASSESSMENT DOCUMENTED: ICD-10-PCS | Mod: CPTII,S$GLB,, | Performed by: STUDENT IN AN ORGANIZED HEALTH CARE EDUCATION/TRAINING PROGRAM

## 2022-09-22 PROCEDURE — 99999 PR PBB SHADOW E&M-EST. PATIENT-LVL III: CPT | Mod: PBBFAC,,, | Performed by: STUDENT IN AN ORGANIZED HEALTH CARE EDUCATION/TRAINING PROGRAM

## 2022-09-22 PROCEDURE — 1126F AMNT PAIN NOTED NONE PRSNT: CPT | Mod: CPTII,S$GLB,, | Performed by: STUDENT IN AN ORGANIZED HEALTH CARE EDUCATION/TRAINING PROGRAM

## 2022-09-22 PROCEDURE — 1126F PR PAIN SEVERITY QUANTIFIED, NO PAIN PRESENT: ICD-10-PCS | Mod: CPTII,S$GLB,, | Performed by: STUDENT IN AN ORGANIZED HEALTH CARE EDUCATION/TRAINING PROGRAM

## 2022-09-22 PROCEDURE — 1159F MED LIST DOCD IN RCRD: CPT | Mod: CPTII,S$GLB,, | Performed by: STUDENT IN AN ORGANIZED HEALTH CARE EDUCATION/TRAINING PROGRAM

## 2022-09-22 PROCEDURE — 3288F FALL RISK ASSESSMENT DOCD: CPT | Mod: CPTII,S$GLB,, | Performed by: STUDENT IN AN ORGANIZED HEALTH CARE EDUCATION/TRAINING PROGRAM

## 2022-09-22 PROCEDURE — 1101F PT FALLS ASSESS-DOCD LE1/YR: CPT | Mod: CPTII,S$GLB,, | Performed by: STUDENT IN AN ORGANIZED HEALTH CARE EDUCATION/TRAINING PROGRAM

## 2022-09-22 RX ORDER — FOLIC ACID 1 MG/1
1 TABLET ORAL DAILY
Qty: 90 TABLET | Refills: 6 | Status: SHIPPED | OUTPATIENT
Start: 2022-09-22 | End: 2023-01-12 | Stop reason: SDUPTHER

## 2022-09-22 RX ORDER — METHOTREXATE 2.5 MG/1
20 TABLET ORAL
Qty: 96 TABLET | Refills: 1 | Status: SHIPPED | OUTPATIENT
Start: 2022-09-22 | End: 2023-01-12 | Stop reason: SDUPTHER

## 2022-10-03 VITALS
OXYGEN SATURATION: 98 % | HEART RATE: 57 BPM | DIASTOLIC BLOOD PRESSURE: 78 MMHG | BODY MASS INDEX: 28.78 KG/M2 | WEIGHT: 168.56 LBS | TEMPERATURE: 98 F | SYSTOLIC BLOOD PRESSURE: 128 MMHG | HEIGHT: 64 IN | RESPIRATION RATE: 14 BRPM

## 2022-10-10 ENCOUNTER — LAB VISIT (OUTPATIENT)
Dept: LAB | Facility: HOSPITAL | Age: 87
End: 2022-10-10
Attending: INTERNAL MEDICINE
Payer: MEDICARE

## 2022-10-10 DIAGNOSIS — D53.9 MACROCYTIC ANEMIA: ICD-10-CM

## 2022-10-10 DIAGNOSIS — R74.01 TRANSAMINITIS: ICD-10-CM

## 2022-10-10 DIAGNOSIS — Z91.89 AT RISK FOR SIDE EFFECT OF MEDICATION: ICD-10-CM

## 2022-10-10 DIAGNOSIS — E61.1 IRON DEFICIENCY: ICD-10-CM

## 2022-10-10 DIAGNOSIS — Z01.89 ENCOUNTER FOR LABORATORY TEST: ICD-10-CM

## 2022-10-10 DIAGNOSIS — N18.31 STAGE 3A CHRONIC KIDNEY DISEASE: ICD-10-CM

## 2022-10-10 LAB
BASOPHILS # BLD AUTO: 0.02 K/UL (ref 0–0.2)
BASOPHILS NFR BLD: 0.5 % (ref 0–1.9)
DIFFERENTIAL METHOD: ABNORMAL
EOSINOPHIL # BLD AUTO: 0.4 K/UL (ref 0–0.5)
EOSINOPHIL NFR BLD: 9 % (ref 0–8)
ERYTHROCYTE [DISTWIDTH] IN BLOOD BY AUTOMATED COUNT: 15.8 % (ref 11.5–14.5)
HCT VFR BLD AUTO: 32.6 % (ref 37–48.5)
HGB BLD-MCNC: 10 G/DL (ref 12–16)
IMM GRANULOCYTES # BLD AUTO: 0.01 K/UL (ref 0–0.04)
IMM GRANULOCYTES NFR BLD AUTO: 0.2 % (ref 0–0.5)
LYMPHOCYTES # BLD AUTO: 0.9 K/UL (ref 1–4.8)
LYMPHOCYTES NFR BLD: 19.8 % (ref 18–48)
MCH RBC QN AUTO: 33 PG (ref 27–31)
MCHC RBC AUTO-ENTMCNC: 30.7 G/DL (ref 32–36)
MCV RBC AUTO: 108 FL (ref 82–98)
MONOCYTES # BLD AUTO: 0.6 K/UL (ref 0.3–1)
MONOCYTES NFR BLD: 13.6 % (ref 4–15)
NEUTROPHILS # BLD AUTO: 2.5 K/UL (ref 1.8–7.7)
NEUTROPHILS NFR BLD: 56.9 % (ref 38–73)
NRBC BLD-RTO: 0 /100 WBC
PLATELET # BLD AUTO: 234 K/UL (ref 150–450)
PMV BLD AUTO: 11 FL (ref 9.2–12.9)
RBC # BLD AUTO: 3.03 M/UL (ref 4–5.4)
WBC # BLD AUTO: 4.34 K/UL (ref 3.9–12.7)

## 2022-10-10 PROCEDURE — 36415 COLL VENOUS BLD VENIPUNCTURE: CPT | Mod: PN | Performed by: INTERNAL MEDICINE

## 2022-10-10 PROCEDURE — 85025 COMPLETE CBC W/AUTO DIFF WBC: CPT | Performed by: INTERNAL MEDICINE

## 2022-10-10 PROCEDURE — 84466 ASSAY OF TRANSFERRIN: CPT | Performed by: INTERNAL MEDICINE

## 2022-10-10 PROCEDURE — 82728 ASSAY OF FERRITIN: CPT | Performed by: INTERNAL MEDICINE

## 2022-10-10 PROCEDURE — 82607 VITAMIN B-12: CPT | Performed by: INTERNAL MEDICINE

## 2022-10-10 PROCEDURE — 82746 ASSAY OF FOLIC ACID SERUM: CPT | Performed by: INTERNAL MEDICINE

## 2022-10-10 PROCEDURE — 80053 COMPREHEN METABOLIC PANEL: CPT | Performed by: INTERNAL MEDICINE

## 2022-10-10 NOTE — TELEPHONE ENCOUNTER
Pt notified rx has been approved and to check with pharmacy.   Detail Level: Simple Instructions: This plan will send the code FBSE to the PM system.  DO NOT or CHANGE the price. Price (Do Not Change): 0.00

## 2022-10-11 LAB
ALBUMIN SERPL BCP-MCNC: 3.8 G/DL (ref 3.5–5.2)
ALP SERPL-CCNC: 90 U/L (ref 55–135)
ALT SERPL W/O P-5'-P-CCNC: 88 U/L (ref 10–44)
ANION GAP SERPL CALC-SCNC: 10 MMOL/L (ref 8–16)
AST SERPL-CCNC: 56 U/L (ref 10–40)
BILIRUB SERPL-MCNC: 0.7 MG/DL (ref 0.1–1)
BUN SERPL-MCNC: 26 MG/DL (ref 8–23)
CALCIUM SERPL-MCNC: 9.4 MG/DL (ref 8.7–10.5)
CHLORIDE SERPL-SCNC: 106 MMOL/L (ref 95–110)
CO2 SERPL-SCNC: 22 MMOL/L (ref 23–29)
CREAT SERPL-MCNC: 1.1 MG/DL (ref 0.5–1.4)
EST. GFR  (NO RACE VARIABLE): 48.6 ML/MIN/1.73 M^2
FERRITIN SERPL-MCNC: 171 NG/ML (ref 20–300)
FOLATE SERPL-MCNC: 26 NG/ML (ref 4–24)
GLUCOSE SERPL-MCNC: 87 MG/DL (ref 70–110)
IRON SERPL-MCNC: 55 UG/DL (ref 30–160)
POTASSIUM SERPL-SCNC: 5.1 MMOL/L (ref 3.5–5.1)
PROT SERPL-MCNC: 6.7 G/DL (ref 6–8.4)
SATURATED IRON: 19 % (ref 20–50)
SODIUM SERPL-SCNC: 138 MMOL/L (ref 136–145)
TOTAL IRON BINDING CAPACITY: 296 UG/DL (ref 250–450)
TRANSFERRIN SERPL-MCNC: 200 MG/DL (ref 200–375)
VIT B12 SERPL-MCNC: 1001 PG/ML (ref 210–950)

## 2022-10-17 ENCOUNTER — HOSPITAL ENCOUNTER (OUTPATIENT)
Dept: RADIOLOGY | Facility: HOSPITAL | Age: 87
Discharge: HOME | End: 2022-10-17
Attending: INTERNAL MEDICINE
Payer: MEDICARE

## 2022-10-17 DIAGNOSIS — M81.6 LOCALIZED OSTEOPOROSIS WITHOUT CURRENT PATHOLOGICAL FRACTURE: ICD-10-CM

## 2022-10-17 PROCEDURE — 77080 DEXA BONE DENSITY SPINE HIP: ICD-10-PCS | Mod: 26,,, | Performed by: RADIOLOGY

## 2022-10-17 PROCEDURE — 77080 DXA BONE DENSITY AXIAL: CPT | Mod: 26,,, | Performed by: RADIOLOGY

## 2022-10-17 PROCEDURE — 77080 DXA BONE DENSITY AXIAL: CPT | Mod: TC,PO

## 2022-11-04 ENCOUNTER — PATIENT MESSAGE (OUTPATIENT)
Dept: FAMILY MEDICINE | Facility: CLINIC | Age: 87
End: 2022-11-04
Payer: MEDICARE

## 2022-11-04 NOTE — TELEPHONE ENCOUNTER
Please notify patient that I have gone over her DEXA scan results she recently had performed and she has severe osteopenia being noted.  Please ask her to schedule an appointment sometime within the next few weeks to go over the results and plan of care for treatment as well

## 2022-11-09 ENCOUNTER — OFFICE VISIT (OUTPATIENT)
Dept: FAMILY MEDICINE | Facility: CLINIC | Age: 87
End: 2022-11-09
Payer: MEDICARE

## 2022-11-09 VITALS
SYSTOLIC BLOOD PRESSURE: 128 MMHG | DIASTOLIC BLOOD PRESSURE: 64 MMHG | HEIGHT: 64 IN | BODY MASS INDEX: 28.85 KG/M2 | HEART RATE: 72 BPM | WEIGHT: 169 LBS

## 2022-11-09 DIAGNOSIS — Z01.89 ENCOUNTER FOR LABORATORY TEST: ICD-10-CM

## 2022-11-09 DIAGNOSIS — M06.041 RHEUMATOID ARTHRITIS INVOLVING BOTH HANDS WITH NEGATIVE RHEUMATOID FACTOR: ICD-10-CM

## 2022-11-09 DIAGNOSIS — Z98.890 S/P CAROTID ENDARTERECTOMY: ICD-10-CM

## 2022-11-09 DIAGNOSIS — N18.31 STAGE 3A CHRONIC KIDNEY DISEASE: ICD-10-CM

## 2022-11-09 DIAGNOSIS — Z79.899 HIGH RISK MEDICATION USE: ICD-10-CM

## 2022-11-09 DIAGNOSIS — D53.9 MACROCYTIC ANEMIA: ICD-10-CM

## 2022-11-09 DIAGNOSIS — K21.9 GASTROESOPHAGEAL REFLUX DISEASE, UNSPECIFIED WHETHER ESOPHAGITIS PRESENT: ICD-10-CM

## 2022-11-09 DIAGNOSIS — R94.5 ABNORMAL RESULTS OF LIVER FUNCTION STUDIES: ICD-10-CM

## 2022-11-09 DIAGNOSIS — T88.7XXA SIDE EFFECT OF MEDICATION: ICD-10-CM

## 2022-11-09 DIAGNOSIS — E78.2 MIXED HYPERLIPIDEMIA: ICD-10-CM

## 2022-11-09 DIAGNOSIS — I10 ESSENTIAL HYPERTENSION: Primary | Chronic | ICD-10-CM

## 2022-11-09 DIAGNOSIS — E03.9 ACQUIRED HYPOTHYROIDISM: ICD-10-CM

## 2022-11-09 DIAGNOSIS — R74.01 TRANSAMINITIS: ICD-10-CM

## 2022-11-09 DIAGNOSIS — M06.042 RHEUMATOID ARTHRITIS INVOLVING BOTH HANDS WITH NEGATIVE RHEUMATOID FACTOR: ICD-10-CM

## 2022-11-09 DIAGNOSIS — I77.9 BILATERAL CAROTID ARTERY DISEASE, UNSPECIFIED TYPE: ICD-10-CM

## 2022-11-09 PROCEDURE — 1160F RVW MEDS BY RX/DR IN RCRD: CPT | Mod: CPTII,S$GLB,, | Performed by: INTERNAL MEDICINE

## 2022-11-09 PROCEDURE — 1126F PR PAIN SEVERITY QUANTIFIED, NO PAIN PRESENT: ICD-10-PCS | Mod: CPTII,S$GLB,, | Performed by: INTERNAL MEDICINE

## 2022-11-09 PROCEDURE — 1101F PT FALLS ASSESS-DOCD LE1/YR: CPT | Mod: CPTII,S$GLB,, | Performed by: INTERNAL MEDICINE

## 2022-11-09 PROCEDURE — 1159F PR MEDICATION LIST DOCUMENTED IN MEDICAL RECORD: ICD-10-PCS | Mod: CPTII,S$GLB,, | Performed by: INTERNAL MEDICINE

## 2022-11-09 PROCEDURE — 1126F AMNT PAIN NOTED NONE PRSNT: CPT | Mod: CPTII,S$GLB,, | Performed by: INTERNAL MEDICINE

## 2022-11-09 PROCEDURE — 99999 PR PBB SHADOW E&M-EST. PATIENT-LVL III: CPT | Mod: PBBFAC,,, | Performed by: INTERNAL MEDICINE

## 2022-11-09 PROCEDURE — 3288F FALL RISK ASSESSMENT DOCD: CPT | Mod: CPTII,S$GLB,, | Performed by: INTERNAL MEDICINE

## 2022-11-09 PROCEDURE — 3288F PR FALLS RISK ASSESSMENT DOCUMENTED: ICD-10-PCS | Mod: CPTII,S$GLB,, | Performed by: INTERNAL MEDICINE

## 2022-11-09 PROCEDURE — 99215 OFFICE O/P EST HI 40 MIN: CPT | Mod: S$GLB,,, | Performed by: INTERNAL MEDICINE

## 2022-11-09 PROCEDURE — 99999 PR PBB SHADOW E&M-EST. PATIENT-LVL III: ICD-10-PCS | Mod: PBBFAC,,, | Performed by: INTERNAL MEDICINE

## 2022-11-09 PROCEDURE — 1101F PR PT FALLS ASSESS DOC 0-1 FALLS W/OUT INJ PAST YR: ICD-10-PCS | Mod: CPTII,S$GLB,, | Performed by: INTERNAL MEDICINE

## 2022-11-09 PROCEDURE — 99215 PR OFFICE/OUTPT VISIT, EST, LEVL V, 40-54 MIN: ICD-10-PCS | Mod: S$GLB,,, | Performed by: INTERNAL MEDICINE

## 2022-11-09 PROCEDURE — 1160F PR REVIEW ALL MEDS BY PRESCRIBER/CLIN PHARMACIST DOCUMENTED: ICD-10-PCS | Mod: CPTII,S$GLB,, | Performed by: INTERNAL MEDICINE

## 2022-11-09 PROCEDURE — 1159F MED LIST DOCD IN RCRD: CPT | Mod: CPTII,S$GLB,, | Performed by: INTERNAL MEDICINE

## 2022-11-09 RX ORDER — EZETIMIBE 10 MG/1
10 TABLET ORAL DAILY
Qty: 90 TABLET | Refills: 1 | Status: SHIPPED | OUTPATIENT
Start: 2022-11-09 | End: 2023-06-12

## 2022-11-09 NOTE — PROGRESS NOTES
Subjective:       Patient ID: Nida Kline is a 87 y.o. female.    Chief Complaint: Results (F/u lab results and BMD results)    HPI: Here today for reassessment and go over lab results and DEXA bone mineral density study scan  Essential hypertension; Maintain < 2 Gm Na a day diet, and monitor BP at home; keep a log. Same tx.  Needs to monitor her blood pressure at home minimally twice a week and keep a log for office review.  Needs to sit for 5 minutes in the morning with both feet on the ground before pressing her blood pressure cuff to run.  Manually today she is controlled at 128/64 with pulse 72.  BP at home 120/80.    Mixed hyperlipidemia: Maintain low fat high fiber diet, exercise regularly. Weight reduction where indicated. Decrease atorvastatin to 20 mg from 40 mg a day due to elevated LFT's; add zetia 10 mg a day.  AST elevated at 56 and ALT elevated at 88.  By patient's account rheumatology suspects methotrexate which she is on for rheumatoid arthritis is not causing her liver function to be elevated although it can be associated with liver enzyme dysfunction.  -     ezetimibe (ZETIA) 10 mg tablet; Take 1 tablet (10 mg total) by mouth once daily.  Dispense: 90 tablet; Refill: 1  -     Comprehensive Metabolic Panel; Future; Expected date: 11/09/2022  -     Lipid Panel; Future; Expected date: 11/09/2022    S/P carotid endarterectomy; on ASA 81 mg a day.  Status post left carotid endarterectomy  -     ezetimibe (ZETIA) 10 mg tablet; Take 1 tablet (10 mg total) by mouth once daily.  Dispense: 90 tablet; Refill: 1  -     Comprehensive Metabolic Panel; Future; Expected date: 11/09/2022  -     Lipid Panel; Future; Expected date: 11/09/2022    Bilateral carotid artery disease, unspecified type; 02/15/2022 ultrasound with history of right internal carotid artery 50-69% stenosis; no significant left ICA stenosis noted.  Status post left carotid endarterectomy  -     ezetimibe (ZETIA) 10 mg tablet; Take 1 tablet  (10 mg total) by mouth once daily.  Dispense: 90 tablet; Refill: 1  -     Comprehensive Metabolic Panel; Future; Expected date: 11/09/2022  -     Lipid Panel; Future; Expected date: 11/09/2022    Macrocytic anemia; contributing factors likely from MTX therapy and CKD IIIA; continue folic acid 1 mg p.o. daily.  B12 level 1001 will hold B12 supplementation of a 1000 mcg per day.  Iron level returned back 55 and ferritin level 171 with folate 26  -     vitamin renal formula, B-complex-vitamin c-folic acid, (NEPHROCAP) 1 mg Cap; Take 1 capsule by mouth once daily. Generic.  Dispense: 90 capsule; Refill: 1  -     CBC Auto Differential; Future; Expected date: 11/09/2022  -     Occult blood x 1, stool; Future; Expected date: 11/09/2022  -     Occult blood x 1, stool; Future; Expected date: 11/09/2022  -     Occult blood x 1, stool; Future; Expected date: 11/09/2022  -     Reticulocytes; Future; Expected date: 11/09/2022    Stage 3a chronic kidney disease; keeps well hydrated at 64 oz a day.   -     vitamin renal formula, B-complex-vitamin c-folic acid, (NEPHROCAP) 1 mg Cap; Take 1 capsule by mouth once daily. Generic.  Dispense: 90 capsule; Refill: 1  -     CBC Auto Differential; Future; Expected date: 11/09/2022  -     Comprehensive Metabolic Panel; Future; Expected date: 11/09/2022    Rheumatoid arthritis involving both hands with negative rheumatoid factor; on MTX as per rheum Dr Alegre appreciated. Keep f/u w Dr Alegre as directed; pt to contact her about possible MTX reduction due to anemia. Asked by pt already about reducing MTX an she wants her to continue present dosing.  Continue folic acid 1 mg p.o. daily    High risk medication use: on MTX.     Side effect of medication; macrocytic anemia on MTX. Atorvastatin likely contributing w elevation LFT; also may be from MTX as well but suspect less likely.  Rheumatology Dr. Alegre reportedly by patient's account does not suspect methotrexate as being agent r though  still possible    Acquired hypothyroidism; same thyroid dosing.  09/13/2022 TSH normal at 1.876.  Gastroesophageal reflux disease, unspecified whether esophagitis present: No bedtime snacks; weight reduction. Omeprazole as needed daily.     Encounter for laboratory test; labs reviewed w pt and ordered for f/u.   -     CBC Auto Differential; Future; Expected date: 11/09/2022  -     Occult blood x 1, stool; Future; Expected date: 11/09/2022  -     Occult blood x 1, stool; Future; Expected date: 11/09/2022  -     Occult blood x 1, stool; Future; Expected date: 11/09/2022  -     Comprehensive Metabolic Panel; Future; Expected date: 11/09/2022  -     Lipid Panel; Future; Expected date: 11/09/2022  -     Reticulocytes; Future; Expected date: 11/09/2022    Transaminitis; reduce atorvastatin to see if helps.   -     Comprehensive Metabolic Panel; Future; Expected date: 11/09/2022  -     Lipid Panel; Future; Expected date: 11/09/2022    Abnormal results of liver function studies; as above.     Total time: 4:17 p.m. through 5:20 p.m..  Greater than 50% of the time spent in discussion, counseling, and review.  Labs reviewed and discussed with patient at length.  Medications reviewed addressed and prescribed.  Various different topics discussed including plan of care.  Extensive amount of time spent in discussion and review.        Review of Systems   Constitutional:  Negative for appetite change and fever.   HENT:  Negative for congestion, postnasal drip, rhinorrhea and sinus pressure.    Eyes:  Negative for discharge and itching.   Respiratory:  Negative for cough, chest tightness, shortness of breath and wheezing.    Cardiovascular:  Negative for chest pain, palpitations and leg swelling.   Gastrointestinal:  Negative for abdominal distention, abdominal pain, blood in stool, constipation, diarrhea, nausea and vomiting.   Endocrine: Negative for polydipsia, polyphagia and polyuria.   Genitourinary:  Negative for dysuria and  "hematuria.   Musculoskeletal:  Negative for arthralgias and myalgias.   Skin:  Negative for rash.   Allergic/Immunologic: Negative for environmental allergies and food allergies.   Neurological:  Negative for tremors, seizures and syncope.   Hematological:  Negative for adenopathy. Does not bruise/bleed easily.   Psychiatric/Behavioral:  Negative for dysphoric mood. The patient is not nervous/anxious.     Objective:        Vitals:    11/09/22 1525   BP: 128/64   BP Location: Left arm   Pulse: 72   Weight: 76.6 kg (168 lb 15.7 oz)   Height: 5' 4" (1.626 m)       BMI Readings from Last 3 Encounters:   11/09/22 29.01 kg/m²   09/22/22 29.23 kg/m²   09/19/22 28.93 kg/m²        Wt Readings from Last 3 Encounters:   11/09/22 1525 76.6 kg (168 lb 15.7 oz)   09/22/22 1422 77.3 kg (170 lb 4.9 oz)   09/19/22 1405 76.5 kg (168 lb 8.7 oz)        BP Readings from Last 3 Encounters:   11/09/22 128/64   09/22/22 (!) 152/75   09/19/22 128/78        There are no preventive care reminders to display for this patient.     Health Maintenance Due   Topic Date Due    TETANUS VACCINE  Never done    COVID-19 Vaccine (4 - Booster for Pfizer series) 02/09/2022         Past Medical History:   Diagnosis Date    Back pain     Cataract extraction status of eye     Gallbladder disease     GERD (gastroesophageal reflux disease)     HTN (hypertension)     Hypothyroidism     Osteoporosis     Overweight (BMI 25.0-29.9)     PONV (postoperative nausea and vomiting)     Pure hypercholesterolemia     Squamous cell carcinoma     Thyroid disease     hypothyroidism    Trouble in sleeping     Vitamin D deficiency        Past Surgical History:   Procedure Laterality Date    CAROTID ENDARTERECTOMY Left 2015    CATARACT EXTRACTION      OU    CHOLECYSTECTOMY      HYSTERECTOMY      TOTAL ABDOMINAL HYSTERECTOMY W/ BILATERAL SALPINGOOPHORECTOMY         Social History     Tobacco Use    Smoking status: Never    Smokeless tobacco: Never   Substance Use Topics    " Alcohol use: No    Drug use: No       Family History   Problem Relation Age of Onset    Kidney disease Mother     Diabetes Mother     COPD Father     Heart disease Father        Review of patient's allergies indicates:   Allergen Reactions    Lunesta [eszopiclone]      Paresthesia, lip swell     Propofol analogues Nausea And Vomiting       Current Outpatient Medications on File Prior to Visit   Medication Sig Dispense Refill    ascorbic acid, vitamin C, (VITAMIN C) 500 MG tablet Take 500 mg by mouth once daily.      aspirin (ECOTRIN) 81 MG EC tablet Take 81 mg by mouth once.      busPIRone (BUSPAR) 15 MG tablet TAKE 1/3 TO 1/2 TABLET THREE TIMES A DAY AS NEEDED FOR ANXIETY 90 tablet 1    calcium carbonate/vitamin D3 (CALTRATE 600 + D ORAL) Take by mouth once daily. Caltrate 600+D3      folic acid (FOLVITE) 1 MG tablet Take 1 tablet (1 mg total) by mouth once daily. 90 tablet 6    gabapentin (NEURONTIN) 300 MG capsule TAKE 1 CAPSULE TWICE DAILY (Patient taking differently: TAKE 1 CAPSULE TWICE DAILY) 60 capsule 2    levothyroxine (SYNTHROID) 125 MCG tablet 1/2 of 125 mcg po daily. 50 tablet 3    losartan (COZAAR) 25 MG tablet TAKE 1 TABLET EVERY DAY 90 tablet 3    magnesium oxide 400 mg Cap Take 2 tablets by mouth once daily.      methotrexate 2.5 MG Tab Take 8 tablets (20 mg total) by mouth every 7 days. 96 tablet 1    metoprolol succinate (TOPROL-XL) 25 MG 24 hr tablet TAKE 1/2 TABLET EVERY DAY 45 tablet 3    omeprazole (PRILOSEC) 20 MG capsule TAKE 1 CAPSULE EVERY DAY AS NEEDED FOR REFLUX 90 capsule 1    vitamin D 1000 units Tab Take 1,000 Units by mouth 2 (two) times daily.       No current facility-administered medications on file prior to visit.       Physical Exam  Vitals reviewed.   Constitutional:       Appearance: She is well-developed.   HENT:      Head: Normocephalic and atraumatic.   Neck:      Thyroid: No thyromegaly.      Vascular: No carotid bruit.   Cardiovascular:      Rate and Rhythm: Normal rate  and regular rhythm.      Heart sounds: Normal heart sounds. No murmur heard.    No gallop.   Pulmonary:      Effort: Pulmonary effort is normal. No respiratory distress.      Breath sounds: Normal breath sounds. No wheezing or rales.   Abdominal:      General: Bowel sounds are normal. There is no distension.      Palpations: Abdomen is soft.      Tenderness: There is no abdominal tenderness. There is no guarding or rebound.   Musculoskeletal:         General: Normal range of motion.      Cervical back: Normal range of motion and neck supple.      Right lower leg: No edema.      Left lower leg: No edema.      Comments: Bony changes MCP/PIP/DIP's ; no tenderness or erythema noted to joints w good flexion   Lymphadenopathy:      Cervical: No cervical adenopathy.   Skin:     Findings: No rash.   Neurological:      Mental Status: She is alert and oriented to person, place, and time.      Comments: Moves all 4 extremities fine.   Psychiatric:         Behavior: Behavior normal.         Thought Content: Thought content normal.       Assessment:       1. Essential hypertension    2. Mixed hyperlipidemia    3. S/P carotid endarterectomy    4. Bilateral carotid artery disease, unspecified type    5. Macrocytic anemia    6. Stage 3a chronic kidney disease    7. Rheumatoid arthritis involving both hands with negative rheumatoid factor    8. High risk medication use    9. Side effect of medication    10. Acquired hypothyroidism    11. Gastroesophageal reflux disease, unspecified whether esophagitis present    12. Encounter for laboratory test    13. Transaminitis    14. Abnormal results of liver function studies        Plan:       Essential hypertension; Maintain < 2 Gm Na a day diet, and monitor BP at home; keep a log. Same tx.  Monitor blood pressure minimally twice a week and keep a log for office review.  Remain seated in the chair for 5 minutes with both feet flat on the ground before taking blood pressure reading in the  morning.  Continue losartan and metoprolol for blood pressure management    Mixed hyperlipidemia: Maintain low fat high fiber diet, exercise regularly. Weight reduction where indicated. Decrease atorvastatin to 20 mg from 40 mg a day due to elevated LFT's; add zetia 10 mg a day.  Atorvastatin 20 mg p.o. daily sent in to pharmacy number [id number] with 1 refill  -     ezetimibe (ZETIA) 10 mg tablet; Take 1 tablet (10 mg total) by mouth once daily.  Dispense: 90 tablet; Refill: 1  -     Comprehensive Metabolic Panel; Future; Expected date: 11/09/2022  -     Lipid Panel; Future; Expected date: 11/09/2022    S/P carotid endarterectomy; on ASA 81 mg a day.  Left carotid endarterectomy  -     ezetimibe (ZETIA) 10 mg tablet; Take 1 tablet (10 mg total) by mouth once daily.  Dispense: 90 tablet; Refill: 1  -     Comprehensive Metabolic Panel; Future; Expected date: 11/09/2022  -     Lipid Panel; Future; Expected date: 11/09/2022    Bilateral carotid artery disease, unspecified type  -     ezetimibe (ZETIA) 10 mg tablet; Take 1 tablet (10 mg total) by mouth once daily.  Dispense: 90 tablet; Refill: 1  -     Comprehensive Metabolic Panel; Future; Expected date: 11/09/2022  -     Lipid Panel; Future; Expected date: 11/09/2022    Macrocytic anemia; contributing factors likely from MTX therapy and CKD IIIA.  Continue Nephrocaps 1 per day.  Has slightly worsened to hemoglobin 10.1 with an .  B12 level 1001 holding on B12 1000 mcg daily.  Iron level 55 with ferritin 176 and folate 26 with normal TSH  -     vitamin renal formula, B-complex-vitamin c-folic acid, (NEPHROCAP) 1 mg Cap; Take 1 capsule by mouth once daily. Generic.  Dispense: 90 capsule; Refill: 1  -     CBC Auto Differential; Future; Expected date: 11/09/2022  -     Occult blood x 1, stool; Future; Expected date: 11/09/2022  -     Occult blood x 1, stool; Future; Expected date: 11/09/2022  -     Occult blood x 1, stool; Future; Expected date: 11/09/2022  -      Reticulocytes; Future; Expected date: 11/09/2022    Stage 3a chronic kidney disease; keeps well hydrated at 64 oz a day.   -     vitamin renal formula, B-complex-vitamin c-folic acid, (NEPHROCAP) 1 mg Cap; Take 1 capsule by mouth once daily. Generic.  Dispense: 90 capsule; Refill: 1  -     CBC Auto Differential; Future; Expected date: 11/09/2022  -     Comprehensive Metabolic Panel; Future; Expected date: 11/09/2022    Rheumatoid arthritis involving both hands with negative rheumatoid factor; on MTX as per rheum Dr Alegre appreciated. Keep f/u w Dr Alegre as directed; pt to contact her about possible MTX reduction due to anemia. Asked by pt already about reducing MTX an she wants her to continue present dosing.     High risk medication use: on MTX.     Side effect of medication; macrocytic anemia on MTX. Atorvastatin likely contributing w elevation LFT; also may be from MTX as well.     Acquired hypothyroidism; same thyroid dosing.     Gastroesophageal reflux disease, unspecified whether esophagitis present: No bedtime snacks; weight reduction. Omeprazole as needed daily.     Encounter for laboratory test; labs reviewed w pt and ordered for f/u.   -     CBC Auto Differential; Future; Expected date: 11/09/2022  -     Occult blood x 1, stool; Future; Expected date: 11/09/2022  -     Occult blood x 1, stool; Future; Expected date: 11/09/2022  -     Occult blood x 1, stool; Future; Expected date: 11/09/2022  -     Comprehensive Metabolic Panel; Future; Expected date: 11/09/2022  -     Lipid Panel; Future; Expected date: 11/09/2022  -     Reticulocytes; Future; Expected date: 11/09/2022    Transaminitis; reduce atorvastatin to se if helps.   -     Comprehensive Metabolic Panel; Future; Expected date: 11/09/2022  -     Lipid Panel; Future; Expected date: 11/09/2022    Abnormal results of liver function studies; as above.  Reducing atorvastatin and adding Zetia to see if it helps; keep well hydrated.

## 2022-11-09 NOTE — PATIENT INSTRUCTIONS
Essential hypertension; Maintain < 2 Gm Na a day diet, and monitor BP at home; keep a log. Same tx.     Mixed hyperlipidemia: Maintain low fat high fiber diet, exercise regularly. Weight reduction where indicated. Decvrease atorvastatin to 20 mg from 40 mg a day due to elevated LFT's; add zetia 10 mg a day.   -     ezetimibe (ZETIA) 10 mg tablet; Take 1 tablet (10 mg total) by mouth once daily.  Dispense: 90 tablet; Refill: 1  -     Comprehensive Metabolic Panel; Future; Expected date: 11/09/2022  -     Lipid Panel; Future; Expected date: 11/09/2022    S/P carotid endarterectomy; on ASA 81 mg a day.   -     ezetimibe (ZETIA) 10 mg tablet; Take 1 tablet (10 mg total) by mouth once daily.  Dispense: 90 tablet; Refill: 1  -     Comprehensive Metabolic Panel; Future; Expected date: 11/09/2022  -     Lipid Panel; Future; Expected date: 11/09/2022    Bilateral carotid artery disease, unspecified type  -     ezetimibe (ZETIA) 10 mg tablet; Take 1 tablet (10 mg total) by mouth once daily.  Dispense: 90 tablet; Refill: 1  -     Comprehensive Metabolic Panel; Future; Expected date: 11/09/2022  -     Lipid Panel; Future; Expected date: 11/09/2022    Macrocytic anemia; contributing factors likely from MTX therapy and CKD IIIA; continue folic acid 1 mg daily and Nephrocaps 1 per day  -     vitamin renal formula, B-complex-vitamin c-folic acid, (NEPHROCAP) 1 mg Cap; Take 1 capsule by mouth once daily. Generic.  Dispense: 90 capsule; Refill: 1  -     CBC Auto Differential; Future; Expected date: 11/09/2022  -     Occult blood x 1, stool; Future; Expected date: 11/09/2022  -     Occult blood x 1, stool; Future; Expected date: 11/09/2022  -     Occult blood x 1, stool; Future; Expected date: 11/09/2022  -     Reticulocytes; Future; Expected date: 11/09/2022    Stage 3a chronic kidney disease; keeps well hydrated at 64 oz a day.   -     vitamin renal formula, B-complex-vitamin c-folic acid, (NEPHROCAP) 1 mg Cap; Take 1 capsule by  mouth once daily. Generic.  Dispense: 90 capsule; Refill: 1  -     CBC Auto Differential; Future; Expected date: 11/09/2022  -     Comprehensive Metabolic Panel; Future; Expected date: 11/09/2022    Rheumatoid arthritis involving both hands with negative rheumatoid factor; on MTX as per rheum Dr Algere appreciated. Keep f/u w Dr Alegre as directed; pt to contact her about possible MTX reduction due to anemia. Asked by pt already about reducing MTX an she wants her to continue present dosing.     High risk medication use: on MTX.     Side effect of medication; macrocytic anemia on MTX. Atorvastatin likely contributing w elevation LFT; also may be from MTX as well.     Acquired hypothyroidism; same thyroid dosing.     Gastroesophageal reflux disease, unspecified whether esophagitis present: No bedtime snacks; weight reduction. Omeprazole as needed daily.     Encounter for laboratory test; labs reviewed w pt and ordered for f/u.   -     CBC Auto Differential; Future; Expected date: 11/09/2022  -     Occult blood x 1, stool; Future; Expected date: 11/09/2022  -     Occult blood x 1, stool; Future; Expected date: 11/09/2022  -     Occult blood x 1, stool; Future; Expected date: 11/09/2022  -     Comprehensive Metabolic Panel; Future; Expected date: 11/09/2022  -     Lipid Panel; Future; Expected date: 11/09/2022  -     Reticulocytes; Future; Expected date: 11/09/2022    Transaminitis; reduce atorvastatin to se if helps.   -     Comprehensive Metabolic Panel; Future; Expected date: 11/09/2022  -     Lipid Panel; Future; Expected date: 11/09/2022    Abnormal results of liver function studies; as above.   -     Occult blood x 1, stool; Future; Expected date: 11/09/2022  -     Occult blood x 1, stool; Future; Expected date: 11/09/2022  -     Occult blood x 1, stool; Future; Expected date: 11/09/2022

## 2022-11-13 RX ORDER — ATORVASTATIN CALCIUM 20 MG/1
20 TABLET, FILM COATED ORAL DAILY
Qty: 90 TABLET | Refills: 1 | Status: SHIPPED | OUTPATIENT
Start: 2022-11-13 | End: 2023-04-27

## 2022-11-22 DIAGNOSIS — D53.9 MACROCYTIC ANEMIA: ICD-10-CM

## 2022-11-22 DIAGNOSIS — N18.31 STAGE 3A CHRONIC KIDNEY DISEASE: ICD-10-CM

## 2022-11-22 NOTE — TELEPHONE ENCOUNTER
----- Message from Molly Ospina sent at 11/22/2022 11:06 AM CST -----  Contact: Self  Type:  RX Refill Request    Who Called: patient  Refill or New Rx:new  RX Name and Strength:vitamin renal formula, B-complex-vitamin c-folic acid, (NEPHROCAP) 1 mg Cap    Preferred Pharmacy with phone number:  [facility]/pharmacy #[id number] - [place] - [address]  [address]  [place] [place] [address]  Phone: [phone] Fax: [phone]      Local or Mail Order:local  Ordering Provider:George  Would the patient rather a call back or a response via [facility]? call  Best Call Back Number:[phone]    Additional Information: [facility] said this medication is on backorder for them and they don't know if or when they'll ever get it. Patient request the prescription be sent to [facility] instead.

## 2022-11-27 ENCOUNTER — PATIENT MESSAGE (OUTPATIENT)
Dept: FAMILY MEDICINE | Facility: CLINIC | Age: 87
End: 2022-11-27
Payer: MEDICARE

## 2022-11-27 DIAGNOSIS — M06.041 RHEUMATOID ARTHRITIS INVOLVING BOTH HANDS WITH NEGATIVE RHEUMATOID FACTOR: ICD-10-CM

## 2022-11-27 DIAGNOSIS — M25.642 STIFFNESS OF FINGER JOINT OF LEFT HAND: ICD-10-CM

## 2022-11-27 DIAGNOSIS — M06.042 RHEUMATOID ARTHRITIS INVOLVING BOTH HANDS WITH NEGATIVE RHEUMATOID FACTOR: ICD-10-CM

## 2022-11-27 DIAGNOSIS — M25.641 STIFFNESS OF FINGER JOINT OF RIGHT HAND: ICD-10-CM

## 2022-11-27 DIAGNOSIS — M19.041 OSTEOARTHRITIS OF BOTH HANDS, UNSPECIFIED OSTEOARTHRITIS TYPE: ICD-10-CM

## 2022-11-27 DIAGNOSIS — M19.042 OSTEOARTHRITIS OF BOTH HANDS, UNSPECIFIED OSTEOARTHRITIS TYPE: ICD-10-CM

## 2022-11-28 ENCOUNTER — TELEPHONE (OUTPATIENT)
Dept: FAMILY MEDICINE | Facility: CLINIC | Age: 87
End: 2022-11-28
Payer: MEDICARE

## 2022-11-28 RX ORDER — GABAPENTIN 300 MG/1
CAPSULE ORAL
Qty: 60 CAPSULE | Refills: 2 | Status: SHIPPED | OUTPATIENT
Start: 2022-11-28 | End: 2023-04-28

## 2022-11-28 NOTE — TELEPHONE ENCOUNTER
----- Message from Francoise Ferrer sent at 11/28/2022  9:59 AM CST -----  Regarding: refill  Can the clinic reply in [facility]:      Please refill the medication(s) listed below. Please call the patient when the prescription(s) is ready for  at this phone number   Pt would like the following to be sent to her local pharmacy. Pt is completely out. Please advise       Medication #1 gabapentin (NEURONTIN) 300 MG capsule    Medication #2      Preferred Pharmacy:     [facility]/pharmacy #[id number] - [place] - [id number] [facility] [address]  [place] [address]  Phone: [phone] Fax: [phone]

## 2022-12-28 DIAGNOSIS — I10 ESSENTIAL HYPERTENSION: ICD-10-CM

## 2022-12-28 RX ORDER — LOSARTAN POTASSIUM 25 MG/1
TABLET ORAL
Qty: 90 TABLET | Refills: 3 | Status: SHIPPED | OUTPATIENT
Start: 2022-12-28 | End: 2024-01-16 | Stop reason: SDUPTHER

## 2022-12-28 RX ORDER — METOPROLOL SUCCINATE 25 MG/1
TABLET, EXTENDED RELEASE ORAL
Qty: 45 TABLET | Refills: 3 | Status: SHIPPED | OUTPATIENT
Start: 2022-12-28 | End: 2024-01-16 | Stop reason: SDUPTHER

## 2022-12-28 NOTE — TELEPHONE ENCOUNTER
No new care gaps identified.  [facility] Embedded Care Gaps. Reference number: [id number]. 12/28/2022   2:22:30 PM CST

## 2022-12-29 NOTE — TELEPHONE ENCOUNTER
Refill Decision Note   Nida Tariqkalyani  is requesting a refill authorization.  Brief Assessment and Rationale for Refill:  Approve     Medication Therapy Plan:       Medication Reconciliation Completed: No   Comments:     No Care Gaps recommended.     Note composed:7:59 PM 12/28/2022

## 2022-12-30 ENCOUNTER — LAB VISIT (OUTPATIENT)
Dept: LAB | Facility: HOSPITAL | Age: 87
End: 2022-12-30
Attending: STUDENT IN AN ORGANIZED HEALTH CARE EDUCATION/TRAINING PROGRAM
Payer: MEDICARE

## 2022-12-30 DIAGNOSIS — M06.00 SERONEGATIVE RHEUMATOID ARTHRITIS: ICD-10-CM

## 2022-12-30 DIAGNOSIS — Z51.81 MEDICATION MONITORING ENCOUNTER: ICD-10-CM

## 2023-01-09 ENCOUNTER — OFFICE VISIT (OUTPATIENT)
Dept: FAMILY MEDICINE | Facility: CLINIC | Age: 88
End: 2023-01-09
Payer: MEDICARE

## 2023-01-09 ENCOUNTER — TELEPHONE (OUTPATIENT)
Dept: FAMILY MEDICINE | Facility: CLINIC | Age: 88
End: 2023-01-09

## 2023-01-09 VITALS
HEIGHT: 64 IN | SYSTOLIC BLOOD PRESSURE: 116 MMHG | DIASTOLIC BLOOD PRESSURE: 72 MMHG | BODY MASS INDEX: 28.12 KG/M2 | OXYGEN SATURATION: 98 % | HEART RATE: 67 BPM | WEIGHT: 164.69 LBS

## 2023-01-09 DIAGNOSIS — E03.9 ACQUIRED HYPOTHYROIDISM: ICD-10-CM

## 2023-01-09 DIAGNOSIS — E78.2 MIXED HYPERLIPIDEMIA: ICD-10-CM

## 2023-01-09 DIAGNOSIS — Z01.89 ENCOUNTER FOR LABORATORY TEST: ICD-10-CM

## 2023-01-09 DIAGNOSIS — N18.31 STAGE 3A CHRONIC KIDNEY DISEASE: ICD-10-CM

## 2023-01-09 DIAGNOSIS — M85.852 OSTEOPENIA OF NECK OF LEFT FEMUR: ICD-10-CM

## 2023-01-09 DIAGNOSIS — I10 ESSENTIAL HYPERTENSION: Primary | Chronic | ICD-10-CM

## 2023-01-09 DIAGNOSIS — E03.9 ACQUIRED HYPOTHYROIDISM: Primary | ICD-10-CM

## 2023-01-09 DIAGNOSIS — E66.3 OVERWEIGHT (BMI 25.0-29.9): ICD-10-CM

## 2023-01-09 DIAGNOSIS — R74.01 TRANSAMINITIS: ICD-10-CM

## 2023-01-09 DIAGNOSIS — Z98.890 S/P CAROTID ENDARTERECTOMY: ICD-10-CM

## 2023-01-09 DIAGNOSIS — D53.9 MACROCYTIC ANEMIA: ICD-10-CM

## 2023-01-09 DIAGNOSIS — E61.1 IRON DEFICIENCY: ICD-10-CM

## 2023-01-09 DIAGNOSIS — K21.9 GASTROESOPHAGEAL REFLUX DISEASE, UNSPECIFIED WHETHER ESOPHAGITIS PRESENT: ICD-10-CM

## 2023-01-09 PROCEDURE — 99999 PR PBB SHADOW E&M-EST. PATIENT-LVL V: ICD-10-PCS | Mod: PBBFAC,HCNC,, | Performed by: INTERNAL MEDICINE

## 2023-01-09 PROCEDURE — 99214 PR OFFICE/OUTPT VISIT, EST, LEVL IV, 30-39 MIN: ICD-10-PCS | Mod: HCNC,S$GLB,, | Performed by: INTERNAL MEDICINE

## 2023-01-09 PROCEDURE — 99214 OFFICE O/P EST MOD 30 MIN: CPT | Mod: HCNC,S$GLB,, | Performed by: INTERNAL MEDICINE

## 2023-01-09 PROCEDURE — 99999 PR PBB SHADOW E&M-EST. PATIENT-LVL V: CPT | Mod: PBBFAC,HCNC,, | Performed by: INTERNAL MEDICINE

## 2023-01-09 PROCEDURE — 3288F FALL RISK ASSESSMENT DOCD: CPT | Mod: HCNC,CPTII,S$GLB, | Performed by: INTERNAL MEDICINE

## 2023-01-09 PROCEDURE — 1101F PT FALLS ASSESS-DOCD LE1/YR: CPT | Mod: HCNC,CPTII,S$GLB, | Performed by: INTERNAL MEDICINE

## 2023-01-09 PROCEDURE — 1126F AMNT PAIN NOTED NONE PRSNT: CPT | Mod: HCNC,CPTII,S$GLB, | Performed by: INTERNAL MEDICINE

## 2023-01-09 PROCEDURE — 1160F RVW MEDS BY RX/DR IN RCRD: CPT | Mod: HCNC,CPTII,S$GLB, | Performed by: INTERNAL MEDICINE

## 2023-01-09 PROCEDURE — 1159F PR MEDICATION LIST DOCUMENTED IN MEDICAL RECORD: ICD-10-PCS | Mod: HCNC,CPTII,S$GLB, | Performed by: INTERNAL MEDICINE

## 2023-01-09 PROCEDURE — 3288F PR FALLS RISK ASSESSMENT DOCUMENTED: ICD-10-PCS | Mod: HCNC,CPTII,S$GLB, | Performed by: INTERNAL MEDICINE

## 2023-01-09 PROCEDURE — 1160F PR REVIEW ALL MEDS BY PRESCRIBER/CLIN PHARMACIST DOCUMENTED: ICD-10-PCS | Mod: HCNC,CPTII,S$GLB, | Performed by: INTERNAL MEDICINE

## 2023-01-09 PROCEDURE — 1159F MED LIST DOCD IN RCRD: CPT | Mod: HCNC,CPTII,S$GLB, | Performed by: INTERNAL MEDICINE

## 2023-01-09 PROCEDURE — 1101F PR PT FALLS ASSESS DOC 0-1 FALLS W/OUT INJ PAST YR: ICD-10-PCS | Mod: HCNC,CPTII,S$GLB, | Performed by: INTERNAL MEDICINE

## 2023-01-09 PROCEDURE — 1126F PR PAIN SEVERITY QUANTIFIED, NO PAIN PRESENT: ICD-10-PCS | Mod: HCNC,CPTII,S$GLB, | Performed by: INTERNAL MEDICINE

## 2023-01-09 NOTE — PROGRESS NOTES
Subjective:       Patient ID: Nida Kline is a 87 y.o. female.    Chief Complaint: Follow-up (Blood work)  HPI:  Here today for reassessment and go over her blood work as well as her DEXA scan  Follow-up  Pertinent negatives include no abdominal pain, arthralgias, chest pain, congestion, coughing, fever, myalgias, nausea, rash or vomiting.     Essential hypertension: .Maintain < 2 Gm Na a day diet, and monitor BP at home; keep a log.  Cont present tx.  Blood pressure manually 116/72 pulse 67.  At home she is been averaging 125/80.  Take blood pressure in the morning being seated in the chair with both feet on the ground.  After being set up wait 5 minutes before running a blood pressure cuff; keep a log for office review.  Continue losartan 25 mg daily and metoprolol succinate 25 mg half a tablet daily  -     Lipid Panel; Future; Expected date: 01/09/2023  -     Comprehensive Metabolic Panel; Future; Expected date: 01/09/2023  -     CBC Auto Differential; Future; Expected date: 01/09/2023    Stage 3a chronic kidney disease; keep well hydrated w min 6-8 glasses of fluid a day. No NSAIDs but can use tylenol for pain.  GFR 48.6 stable.  Knows to avoid NSA ID agents and alcohol.  Use Tylenol over-the-counter for any pain or discomfort.  Continue Nephrocaps 1 daily.  GFR 48.6, stable.        -     Comprehensive Metabolic Panel; Future; Expected date: 01/09/2023    Transaminitis; keep well hydrated; has improved some. No NSAIDs or alcohol.  Adhere to her diet and regular exercise and continued attempts at weight reduction will also help.  On methotrexate per Rheumatology;; AST 47 down from 56; and ALT 58 down from 88.  Push fluids during the day.  Will check ultrasound of liver if still elevated on follow-up.  -     Comprehensive Metabolic Panel; Future; Expected date: 01/09/2023  -     Hepatitis B Core Antibody, IgM; Future; Expected date: 01/09/2023  -     Hepatitis B Surface Antigen; Future; Expected date:  01/09/2023  -     Hepatitis C Antibody; Future; Expected date: 01/09/2023  -     Hepatitis B Surface Ab, Qualitative; Future; Expected date: 01/09/2023    Mixed hyperlipidemia; Maintain low fat high fiber diet, exercise regularly. Weight reduction where indicated. On atorvastatin 20 mg a day reduced from 40 mg a day to help liver/kidneys. Zetia added. .  Some improvement seen in transaminitis.  Little change in lipid profile:  Cholesterol 147/triglyceride 79/HDL 57/LDL 74 with LDL goal less than 70.  Continue atorvastatin 20 mg per day and Zetia 10 mg daily and attempts to adhere to diet closer and include regular exercise as well  -     Lipid Panel; Future; Expected date: 01/09/2023    Acquired hypothyroidism; same levothyroxine dosing; levothyroxine at 1/2 of 125 mcg daily; TFT for f/u.     S/P carotid endarterectomy; ASA 81 mg a day. Sees Dr Humphries as vasc surg.     Gastroesophageal reflux disease, unspecified whether esophagitis present; No bedtime snacks; weight reduction. Omeprazole as needed daily at 20 mg.     Overweight (BMI 25.0-29.9); Caloric restriction w regular exercise and weight reduction.     Osteopenia of neck of left femur; Weight bearing exercises, continue calcium and vit D supplements. DEXA scabn at 2 yr intervals as needed. Avoiding fosamax due to recent tooth canal; 10/17/2022 DEXA:  T-score lumbar spine at -2.4; (previous DEXA scan 10/16/2020 with lumbar spine T-score -2.8; 6.8 % statistically significant change noted); at now severe osteopenia; and left femoral neck -2.2. (previous DEXA scan 10/16/2020 with her right femoral neck T-score -2.3.).      Needs weight bearing exercises 5x a week for 30 minutes; no alcohol; doesn't smoke.  Repeat DEXA scan after 10/17/2024.  Continue calcium with vitamin-D and separate vitamin D3 supplementation.  Endocrine consult for osteopenia management.  Declined calcitonin nasal spray  -     Ambulatory referral/consult to Endocrinology; Future; Expected date:  01/16/2023    Macrocytic anemia; Nephrocaps; prescribed.   down from 108.  Hemoglobin 11.6 stable iron level 55 with ferritin 171 change ferrous sulfate to 325 mg every Monday Wednesday and Friday from daily dosing.  CKD 3A likely contributing towards her anemia  -     CBC Auto Differential; Future; Expected date: 01/09/2023  -     Ferritin; Future; Expected date: 01/09/2023    Iron deficiency: decrease ferrous sulf to 325 mg every Mon, Wed, and Fri.  Previously was on daily dosing.  Ferritin level 171.    -     Ferritin; Future; Expected date: 01/09/2023    Encounter for lab tests: labs reviewed w pt and ordered for f/u.     Total time 1:40 p.m. through 2:32 p.m..  Greater than 50% of the time spent in discussion, counseling, and review.  Labs reviewed and discussed with patient and ordered for follow-up.  Medications reviewed and addressed.  Different topics discussed including plan of care.    Review of Systems   Constitutional:  Negative for appetite change and fever.   HENT:  Negative for congestion, postnasal drip, rhinorrhea and sinus pressure.    Eyes:  Negative for discharge and itching.   Respiratory:  Negative for cough, chest tightness, shortness of breath and wheezing.    Cardiovascular:  Negative for chest pain, palpitations and leg swelling.   Gastrointestinal:  Negative for abdominal distention, abdominal pain, blood in stool, constipation, diarrhea, nausea and vomiting.   Endocrine: Negative for polydipsia, polyphagia and polyuria.   Genitourinary:  Negative for dysuria and hematuria.   Musculoskeletal:  Negative for arthralgias and myalgias.   Skin:  Negative for rash.   Allergic/Immunologic: Negative for environmental allergies and food allergies.   Neurological:  Negative for tremors, seizures and syncope.   Hematological:  Negative for adenopathy. Does not bruise/bleed easily.   Psychiatric/Behavioral:  Negative for dysphoric mood. The patient is not nervous/anxious.     Objective:     "    Vitals:    01/09/23 1316   BP: 116/72   Pulse: 67   SpO2: 98%   Weight: 74.7 kg (164 lb 10.9 oz)   Height: 5' 4" (1.626 m)       BMI Readings from Last 3 Encounters:   01/09/23 28.27 kg/m²   11/09/22 29.01 kg/m²   09/22/22 29.23 kg/m²        Wt Readings from Last 3 Encounters:   01/12/23 1025 74.9 kg (165 lb 2 oz)   01/09/23 1316 74.7 kg (164 lb 10.9 oz)   11/09/22 1525 76.6 kg (168 lb 15.7 oz)        BP Readings from Last 3 Encounters:   01/12/23 127/71   01/09/23 116/72   11/09/22 128/64        There are no preventive care reminders to display for this patient.     Health Maintenance Due   Topic Date Due    TETANUS VACCINE  Never done    COVID-19 Vaccine (4 - Booster for Pfizer series) 02/09/2022         Past Medical History:   Diagnosis Date    Back pain     Cataract extraction status of eye     Gallbladder disease     GERD (gastroesophageal reflux disease)     HTN (hypertension)     Hypothyroidism     Osteoporosis     Overweight (BMI 25.0-29.9)     PONV (postoperative nausea and vomiting)     Pure hypercholesterolemia     Squamous cell carcinoma     Thyroid disease     hypothyroidism    Trouble in sleeping     Vitamin D deficiency        Past Surgical History:   Procedure Laterality Date    CAROTID ENDARTERECTOMY Left 2015    CATARACT EXTRACTION      OU    CHOLECYSTECTOMY      HYSTERECTOMY      TOTAL ABDOMINAL HYSTERECTOMY W/ BILATERAL SALPINGOOPHORECTOMY         Social History     Tobacco Use    Smoking status: Never    Smokeless tobacco: Never   Substance Use Topics    Alcohol use: No    Drug use: No       Family History   Problem Relation Age of Onset    Kidney disease Mother     Diabetes Mother     COPD Father     Heart disease Father        Review of patient's allergies indicates:   Allergen Reactions    Lunesta [eszopiclone]      Paresthesia, lip swell     Propofol analogues Nausea And Vomiting       Current Outpatient Medications on File Prior to Visit   Medication Sig Dispense Refill    ascorbic " acid, vitamin C, (VITAMIN C) 500 MG tablet Take 500 mg by mouth once daily.      aspirin (ECOTRIN) 81 MG EC tablet Take 81 mg by mouth once.      atorvastatin (LIPITOR) 20 MG tablet Take 1 tablet (20 mg total) by mouth once daily. Generic please 90 tablet 1    busPIRone (BUSPAR) 15 MG tablet TAKE 1/3 TO 1/2 TABLET THREE TIMES A DAY AS NEEDED FOR ANXIETY 90 tablet 1    calcium carbonate/vitamin D3 (CALTRATE 600 + D ORAL) Take by mouth once daily. Caltrate 600+D3      ezetimibe (ZETIA) 10 mg tablet Take 1 tablet (10 mg total) by mouth once daily. 90 tablet 1    levothyroxine (SYNTHROID) 125 MCG tablet 1/2 of 125 mcg po daily. 50 tablet 3    losartan (COZAAR) 25 MG tablet TAKE 1 TABLET EVERY DAY 90 tablet 3    magnesium oxide 400 mg Cap Take 2 tablets by mouth once daily.      metoprolol succinate (TOPROL-XL) 25 MG 24 hr tablet TAKE 1/2 TABLET EVERY DAY 45 tablet 3    omeprazole (PRILOSEC) 20 MG capsule TAKE 1 CAPSULE EVERY DAY AS NEEDED FOR REFLUX 90 capsule 1    vitamin D 1000 units Tab Take 1,000 Units by mouth 2 (two) times daily.      gabapentin (NEURONTIN) 300 MG capsule TAKE 1 CAPSULE TWICE DAILY (Patient not taking: Reported on 1/9/2023) 60 capsule 2     No current facility-administered medications on file prior to visit.       Physical Exam  Vitals reviewed.   Constitutional:       Appearance: She is well-developed.   HENT:      Head: Normocephalic and atraumatic.   Neck:      Thyroid: No thyromegaly.      Vascular: No carotid bruit.   Cardiovascular:      Rate and Rhythm: Normal rate and regular rhythm.      Heart sounds: Normal heart sounds. No murmur heard.    No gallop.   Pulmonary:      Effort: Pulmonary effort is normal. No respiratory distress.      Breath sounds: Normal breath sounds. No wheezing or rales.   Abdominal:      General: Bowel sounds are normal. There is no distension.      Palpations: Abdomen is soft.      Tenderness: There is no abdominal tenderness. There is no guarding or rebound.    Musculoskeletal:         General: Normal range of motion.      Cervical back: Normal range of motion and neck supple.      Right lower leg: No edema.      Left lower leg: No edema.   Lymphadenopathy:      Cervical: No cervical adenopathy.   Skin:     Findings: No rash.   Neurological:      Mental Status: She is alert and oriented to person, place, and time.      Comments: Moves all 4 extremities fine.   Psychiatric:         Behavior: Behavior normal.         Thought Content: Thought content normal.       Assessment:       1. Essential hypertension    2. Stage 3a chronic kidney disease    3. Transaminitis    4. Mixed hyperlipidemia    5. Acquired hypothyroidism    6. S/P carotid endarterectomy    7. Gastroesophageal reflux disease, unspecified whether esophagitis present    8. Overweight (BMI 25.0-29.9)    9. Osteopenia of neck of left femur    10. Macrocytic anemia    11. Iron deficiency    12. Encounter for laboratory test          Plan:       Essential hypertension: .Maintain < 2 Gm Na a day diet, and monitor BP at home; keep a log.  Cont present tx.  Blood pressure manually 116/72 pulse 67.  At home she is been averaging 125/80.  Take blood pressure in the morning being seated in the chair with both feet on the ground.  After being set up wait 5 minutes before running a blood pressure cuff; keep a log for office review.  Continue losartan 25 mg daily and metoprolol succinate 25 mg half a tablet daily  -     Lipid Panel; Future; Expected date: 01/09/2023  -     Comprehensive Metabolic Panel; Future; Expected date: 01/09/2023  -     CBC Auto Differential; Future; Expected date: 01/09/2023    Stage 3a chronic kidney disease; keep well hydrated w min 6-8 glasses of fluid a day. No NSAIDs but can use tylenol for pain.  GFR 48.6 stable.  Knows to avoid NSA ID agents and alcohol.  Use Tylenol over-the-counter for any pain or discomfort.  Continue Nephrocaps 1 daily.  GFR 48.6, stable.        -     Comprehensive  Metabolic Panel; Future; Expected date: 01/09/2023    Transaminitis; keep well hydrated; has improved some. No NSAIDs or alcohol.  Adhere to her diet and regular exercise and continued attempts at weight reduction will also help.  On methotrexate; AST 47 down from 56; and ALT 58 down from 88.  Push fluids during the day.  -     Comprehensive Metabolic Panel; Future; Expected date: 01/09/2023  -     Hepatitis B Core Antibody, IgM; Future; Expected date: 01/09/2023  -     Hepatitis B Surface Antigen; Future; Expected date: 01/09/2023  -     Hepatitis C Antibody; Future; Expected date: 01/09/2023  -     Hepatitis B Surface Ab, Qualitative; Future; Expected date: 01/09/2023    Mixed hyperlipidemia; Maintain low fat high fiber diet, exercise regularly. Weight reduction where indicated. On atorvastatin 20 mg a day reduced from 40 mg a day to help liver/kidneys. Zetia added. .  Some improvement seen in transaminitis.  Little change in lipid profile:  Cholesterol 147/triglyceride 79/HDL 57/LDL 74 with LDL goal less than 70.  Continue atorvastatin 20 mg per day and Zetia 10 mg daily and attempts to adhere to diet closer and include regular exercise as well  -     Lipid Panel; Future; Expected date: 01/09/2023    Acquired hypothyroidism; same levothyroxine dosing; levothyroxine at 1/2 of 125 mcg daily; TFT for f/u.  Four months ago TSH within normal limits at 1.876.    S/P carotid endarterectomy; ASA 81 mg a day. Sees Dr Salter as vasc surg.     Gastroesophageal reflux disease, unspecified whether esophagitis present; No bedtime snacks; weight reduction. Omeprazole as needed daily at 20 mg.     Overweight (BMI 25.0-29.9); Caloric restriction w regular exercise and weight reduction.     Osteopenia of neck of left femur; Weight bearing exercises, continue calcium and vit D supplements. DEXA scabn at 2 yr intervals as needed. Avoiding fosamax due to recent tooth canal; 10/17/2022 DEXA:  T-score lumbar spine at -2.4; (previous DEXA  scan 10/16/2020 with lumbar spine T-score -2.8; 6.8 % statistically significant change noted); at now severe osteopenia; and left femoral neck -2.2. (previous DEXA scan 10/16/2020 with her right femoral neck T-score -2.3.).      Needs weight bearing exercises 5x a week for 30 minutes; no alcohol; doesn't smoke.  Repeat DEXA scan after 10/17/2024.  Continue calcium with vitamin-D and separate vitamin D3 supplementation. Declines calcitonin nasal spray.  Endocrine consult for osteopenia management  -     Ambulatory referral/consult to Endocrinology; Future; Expected date: 01/16/2023    Macrocytic anemia; Nephrocaps; prescribed.   down from 108.  Hemoglobin 11.6 stable iron level 55 with ferritin 171 change ferrous sulfate to 325 mg every Monday Wednesday and Friday from daily dosing.  CKD 3A likely contributing towards her anemia  -     CBC Auto Differential; Future; Expected date: 01/09/2023  -     Ferritin; Future; Expected date: 01/09/2023    Iron deficiency: decrease ferrous sulf to 325 mg every Mon, Wed, and Fri.  Previously was on daily dosing.  Ferritin level 171.  Stool for occult blood negative x3.  -     Ferritin; Future; Expected date: 01/09/2023    Encounter for lab tests: labs reviewed w pt and ordered for f/u.

## 2023-01-09 NOTE — PATIENT INSTRUCTIONS
Essential hypertension: .Maintain < 2 Gm Na a day diet, and monitor BP at home; keep a log.  Cont present tx.   -     Lipid Panel; Future; Expected date: 01/09/2023  -     Comprehensive Metabolic Panel; Future; Expected date: 01/09/2023  -     CBC Auto Differential; Future; Expected date: 01/09/2023    Stage 3a chronic kidney disease; keep well hydrated w min 6-8 glasses of fluid a day. No NSAIDs but can use tylenol for pain  -     Comprehensive Metabolic Panel; Future; Expected date: 01/09/2023    Transaminitis; keep well hydrated; has improved some. No NSAIDs or alcohol.   -     Comprehensive Metabolic Panel; Future; Expected date: 01/09/2023  -     Hepatitis B Core Antibody, IgM; Future; Expected date: 01/09/2023  -     Hepatitis B Surface Antigen; Future; Expected date: 01/09/2023  -     Hepatitis C Antibody; Future; Expected date: 01/09/2023  -     Hepatitis B Surface Ab, Qualitative; Future; Expected date: 01/09/2023    Mixed hyperlipidemia; Maintain low fat high fiber diet, exercise regularly. Weight reduction where indicated. On atorvastatin 20 mg a day reduced from 40 mg a day to help liver/kidneys. Zetia added. .   -     Lipid Panel; Future; Expected date: 01/09/2023    Acquired hypothyroidism; same levothyroxine dosing; TFT for f/u.     S/P carotid endarterectomy; ASA 81 mg a day. Sees Dr Humphries as vasc surg.     Gastroesophageal reflux disease, unspecified whether esophagitis present; No bedtime snacks; weight reduction. Omeprazole as needed.     Overweight (BMI 25.0-29.9); Caloric restriction w regular exercise and weight reduction.     Osteopenia of neck of left femur; Weight bearing exercises, continue calcium and vit D supplements. DEXA scabn at 2 yr intervals as needed. Avoiding fosamax due to recent tooth canal; T-score LFN at -2.4; severe osteopenia; Needs weight bearing exercises 5x a week for 30 minutes; no alcohol; doesn't smoke.   -     Ambulatory referral/consult to Endocrinology; Future;  Expected date: 01/16/2023    Macrocytic anemia; Nephrocaps; prescribed.   -     CBC Auto Differential; Future; Expected date: 01/09/2023  -     Ferritin; Future; Expected date: 01/09/2023    Iron deficiency: decrease ferrous sulf to 325 mg every Mon, Wed, and Fri.   -     Ferritin; Future; Expected date: 01/09/2023    Encounter for lab tests: labs reviewed w pt and ordered for f/u.

## 2023-01-10 NOTE — PROGRESS NOTES
"Subjective:       Patient ID: Nida Kline is a 87 y.o. female.    Chief Complaint: Rheumatoid Arthritis    HPI    This is an 87 year old woman with PMH of CKD, HTN, vit D deficiency, carotid artery disease s/p CEA, HLD, vit D deficiency, hypothyroidism, STEW, GERD, squamous cell carcinoma, osteoporosis not on therapy, seronegative non erosive RA diagnosed in 12/2021 and on methotrexate 20mg weekly plus folic acid. She was last seen in 9/2022 and CDAI was consistent with low disease activity at that time. Currently, she denies joint pain and swelling.     Objective:   /71   Pulse 78   Ht 5' 4" (1.626 m)   Wt 74.9 kg (165 lb 2 oz)   BMI 28.34 kg/m²      Physical Exam   Constitutional: normal appearance.   HENT:   Head: Normocephalic and atraumatic.   Musculoskeletal:      Comments: Swollen Joints: Mild synovial hypertrophy right 2nd to 4th MCP  Tender Joints: 0   Neurological: She is alert.      No data to display    Labs reviewed by me (12/30/2022)   CBC Hgb 11.6, WBC and platelets WNL  CMP Cr 1.1, GFR 51.1, AST 47 <- 56, ALT 58 <- 88  CRP 4.9 (WNL)    Assessment:       1. Seronegative rheumatoid arthritis    2. Medication monitoring encounter    3. Immunosuppression    4. Osteoporosis, unspecified osteoporosis type, unspecified pathological fracture presence    5. History of skin cancer        This is an 87 year old woman with PMH of CKD, HTN, vit D deficiency, carotid artery disease s/p CEA, HLD, vit D deficiency, hypothyroidism, STEW, GERD, squamous cell carcinoma, osteoporosis not on therapy, seronegative non erosive RA diagnosed in 12/2021 and on methotrexate 20mg weekly plus folic acid. Physical examination shows mild chronic synovial hypertrophy on the right. CDAI is consistent with remission. LFTs are still mildly elevated, will decrease MTX to 15mg weekly. I offered Hepatology referral but patient would like to hold off.     Plan:       Problem List Items Addressed This Visit          Orthopedic "    Osteoporosis     Other Visit Diagnoses       Seronegative rheumatoid arthritis    -  Primary    Relevant Medications    methotrexate 2.5 MG Tab    folic acid (FOLVITE) 1 MG tablet    Other Relevant Orders    CBC Auto Differential    Comprehensive Metabolic Panel    Sedimentation rate    C-Reactive Protein    Hepatitis B surface antigen    HBcAB    Hepatitis B surface antibody    Hepatitis C antibody    Quantiferon Gold TB    Medication monitoring encounter        Relevant Medications    methotrexate 2.5 MG Tab    folic acid (FOLVITE) 1 MG tablet    Other Relevant Orders    CBC Auto Differential    Comprehensive Metabolic Panel    Sedimentation rate    C-Reactive Protein    Hepatitis B surface antigen    HBcAB    Hepatitis B surface antibody    Hepatitis C antibody    Quantiferon Gold TB    Immunosuppression        Relevant Medications    methotrexate 2.5 MG Tab    folic acid (FOLVITE) 1 MG tablet    History of skin cancer              - Decrease to methotrexate 15mg weekly + folic acid daily  - CBC, CMP, ESR, CRP every 12 weeks  - Patient will need yearly skin cancer check on MTX given history of squamous cell CA  - X-Ray arthritis survey (10/2021): Osteopenia and diffuse joint space narrowing without erosions  - Pre-DMARD labs completed 12/2021, repeat DMARD labs  - DEXA scan showed osteoporosis. At previous visit, I discussed that the patient is high risk for fracture without osteoporosis treatment but she declined therapy as she feels she is already taking too many medications. She takes calcium and vitamin D and states that she takes fall precautions. I discussed this again today and strongly recommended bisphosphonate therapy. She declined.   - Vaccinations: COVID x 3, PCV13 (12/2015), PPV23 (9/2019), Shingrix x 2; patient declines the flu shot    Follow up in 3 months    30 minutes of total time spent on the encounter, which includes face to face time and non-face to face time preparing to see the patient  (eg, review of tests), Obtaining and/or reviewing separately obtained history, Documenting clinical information in the electronic or other health record, Independently interpreting results (not separately reported) and communicating results to the patient/family/caregiver, or Care coordination (not separately reported).       Leah Gannon M.D.  Rheumatology Dept  [place], [place]

## 2023-01-12 ENCOUNTER — OFFICE VISIT (OUTPATIENT)
Dept: RHEUMATOLOGY | Facility: CLINIC | Age: 88
End: 2023-01-12
Payer: MEDICARE

## 2023-01-12 VITALS
WEIGHT: 165.13 LBS | BODY MASS INDEX: 28.19 KG/M2 | HEART RATE: 78 BPM | HEIGHT: 64 IN | DIASTOLIC BLOOD PRESSURE: 71 MMHG | SYSTOLIC BLOOD PRESSURE: 127 MMHG

## 2023-01-12 DIAGNOSIS — M81.0 OSTEOPOROSIS, UNSPECIFIED OSTEOPOROSIS TYPE, UNSPECIFIED PATHOLOGICAL FRACTURE PRESENCE: ICD-10-CM

## 2023-01-12 DIAGNOSIS — Z85.828 HISTORY OF SKIN CANCER: ICD-10-CM

## 2023-01-12 DIAGNOSIS — M06.00 SERONEGATIVE RHEUMATOID ARTHRITIS: Primary | ICD-10-CM

## 2023-01-12 DIAGNOSIS — D84.9 IMMUNOSUPPRESSION: ICD-10-CM

## 2023-01-12 DIAGNOSIS — Z51.81 MEDICATION MONITORING ENCOUNTER: ICD-10-CM

## 2023-01-12 PROCEDURE — 1101F PR PT FALLS ASSESS DOC 0-1 FALLS W/OUT INJ PAST YR: ICD-10-PCS | Mod: HCNC,CPTII,S$GLB, | Performed by: STUDENT IN AN ORGANIZED HEALTH CARE EDUCATION/TRAINING PROGRAM

## 2023-01-12 PROCEDURE — 1126F AMNT PAIN NOTED NONE PRSNT: CPT | Mod: HCNC,CPTII,S$GLB, | Performed by: STUDENT IN AN ORGANIZED HEALTH CARE EDUCATION/TRAINING PROGRAM

## 2023-01-12 PROCEDURE — 99999 PR PBB SHADOW E&M-EST. PATIENT-LVL III: CPT | Mod: PBBFAC,HCNC,, | Performed by: STUDENT IN AN ORGANIZED HEALTH CARE EDUCATION/TRAINING PROGRAM

## 2023-01-12 PROCEDURE — 1101F PT FALLS ASSESS-DOCD LE1/YR: CPT | Mod: HCNC,CPTII,S$GLB, | Performed by: STUDENT IN AN ORGANIZED HEALTH CARE EDUCATION/TRAINING PROGRAM

## 2023-01-12 PROCEDURE — 99214 PR OFFICE/OUTPT VISIT, EST, LEVL IV, 30-39 MIN: ICD-10-PCS | Mod: HCNC,S$GLB,, | Performed by: STUDENT IN AN ORGANIZED HEALTH CARE EDUCATION/TRAINING PROGRAM

## 2023-01-12 PROCEDURE — 3288F PR FALLS RISK ASSESSMENT DOCUMENTED: ICD-10-PCS | Mod: HCNC,CPTII,S$GLB, | Performed by: STUDENT IN AN ORGANIZED HEALTH CARE EDUCATION/TRAINING PROGRAM

## 2023-01-12 PROCEDURE — 1126F PR PAIN SEVERITY QUANTIFIED, NO PAIN PRESENT: ICD-10-PCS | Mod: HCNC,CPTII,S$GLB, | Performed by: STUDENT IN AN ORGANIZED HEALTH CARE EDUCATION/TRAINING PROGRAM

## 2023-01-12 PROCEDURE — 1159F MED LIST DOCD IN RCRD: CPT | Mod: HCNC,CPTII,S$GLB, | Performed by: STUDENT IN AN ORGANIZED HEALTH CARE EDUCATION/TRAINING PROGRAM

## 2023-01-12 PROCEDURE — 99214 OFFICE O/P EST MOD 30 MIN: CPT | Mod: HCNC,S$GLB,, | Performed by: STUDENT IN AN ORGANIZED HEALTH CARE EDUCATION/TRAINING PROGRAM

## 2023-01-12 PROCEDURE — 99999 PR PBB SHADOW E&M-EST. PATIENT-LVL III: ICD-10-PCS | Mod: PBBFAC,HCNC,, | Performed by: STUDENT IN AN ORGANIZED HEALTH CARE EDUCATION/TRAINING PROGRAM

## 2023-01-12 PROCEDURE — 1159F PR MEDICATION LIST DOCUMENTED IN MEDICAL RECORD: ICD-10-PCS | Mod: HCNC,CPTII,S$GLB, | Performed by: STUDENT IN AN ORGANIZED HEALTH CARE EDUCATION/TRAINING PROGRAM

## 2023-01-12 PROCEDURE — 3288F FALL RISK ASSESSMENT DOCD: CPT | Mod: HCNC,CPTII,S$GLB, | Performed by: STUDENT IN AN ORGANIZED HEALTH CARE EDUCATION/TRAINING PROGRAM

## 2023-01-12 RX ORDER — FOLIC ACID 1 MG/1
1 TABLET ORAL DAILY
Qty: 90 TABLET | Refills: 6 | Status: SHIPPED | OUTPATIENT
Start: 2023-01-12

## 2023-01-12 RX ORDER — METHOTREXATE 2.5 MG/1
15 TABLET ORAL
Qty: 72 TABLET | Refills: 1 | Status: SHIPPED | OUTPATIENT
Start: 2023-01-12 | End: 2023-06-15 | Stop reason: SDUPTHER

## 2023-01-12 ASSESSMENT — ROUTINE ASSESSMENT OF PATIENT INDEX DATA (RAPID3)
FATIGUE SCORE: 2.2
MDHAQ FUNCTION SCORE: 0.6
TOTAL RAPID3 SCORE: 1
PATIENT GLOBAL ASSESSMENT SCORE: 1
PSYCHOLOGICAL DISTRESS SCORE: 1.1
PAIN SCORE: 0

## 2023-01-20 ENCOUNTER — TELEPHONE (OUTPATIENT)
Dept: VASCULAR SURGERY | Facility: CLINIC | Age: 88
End: 2023-01-20
Payer: MEDICARE

## 2023-01-20 NOTE — TELEPHONE ENCOUNTER
Spoke with Mrs Kline and advised that I will call her Monday to schedule the US, as she stated she doesn't need to see a MD and also that she has only seen Dr Salter once when he did Carotid surgery on her left side in 2015. Understanding verbalized.

## 2023-01-20 NOTE — TELEPHONE ENCOUNTER
----- Message from Susana Carl sent at 1/20/2023 11:30 AM CST -----  Contact: Patient  Type:  Sooner Apoointment Request    Name of Caller: Patient    When is the first available appointment?       Would the patient rather a call back or a response via [facility]?  Call    Best Call Back Number: [phone] (home)     Additional Information:  Patient received a letter to do an annual appt she use to see Dr Salter and needs to schedule with another

## 2023-01-21 ENCOUNTER — TELEPHONE (OUTPATIENT)
Dept: FAMILY MEDICINE | Facility: CLINIC | Age: 88
End: 2023-01-21
Payer: MEDICARE

## 2023-01-21 DIAGNOSIS — R74.01 TRANSAMINITIS: Primary | ICD-10-CM

## 2023-01-22 NOTE — TELEPHONE ENCOUNTER
Please notify patient and tell I have reviewed her chart further in lieu of her persistence liver enzyme elevation I would like her to check an ultrasound of her liver and her gallbladder.  An order has been placed so please trying help her by schedule an appointment to have it done.  She is to call 72 hours afterwards for results

## 2023-01-27 ENCOUNTER — PATIENT MESSAGE (OUTPATIENT)
Dept: VASCULAR SURGERY | Facility: CLINIC | Age: 88
End: 2023-01-27
Payer: MEDICARE

## 2023-01-27 NOTE — TELEPHONE ENCOUNTER
Spoke with Samantha. Chart message sent to Ms Kline advising that she needs to schedule an appt with Dr Joel to establish care prior to any testing or procedures being scheduled.

## 2023-01-30 ENCOUNTER — HOSPITAL ENCOUNTER (OUTPATIENT)
Dept: RADIOLOGY | Facility: HOSPITAL | Age: 88
Discharge: HOME | End: 2023-01-30
Attending: INTERNAL MEDICINE
Payer: MEDICARE

## 2023-01-30 DIAGNOSIS — R74.01 TRANSAMINITIS: ICD-10-CM

## 2023-01-30 PROCEDURE — 76700 US ABDOMEN COMPLETE: ICD-10-PCS | Mod: 26,HCNC,, | Performed by: RADIOLOGY

## 2023-01-30 PROCEDURE — 76700 US EXAM ABDOM COMPLETE: CPT | Mod: TC,HCNC,PO

## 2023-01-30 PROCEDURE — 76700 US EXAM ABDOM COMPLETE: CPT | Mod: 26,HCNC,, | Performed by: RADIOLOGY

## 2023-02-07 DIAGNOSIS — Z00.00 ENCOUNTER FOR MEDICARE ANNUAL WELLNESS EXAM: ICD-10-CM

## 2023-02-09 ENCOUNTER — TELEPHONE (OUTPATIENT)
Dept: FAMILY MEDICINE | Facility: CLINIC | Age: 88
End: 2023-02-09

## 2023-02-09 ENCOUNTER — TELEPHONE (OUTPATIENT)
Dept: VASCULAR SURGERY | Facility: CLINIC | Age: 88
End: 2023-02-09
Payer: MEDICARE

## 2023-02-09 DIAGNOSIS — Z98.890 S/P CAROTID ENDARTERECTOMY: Primary | ICD-10-CM

## 2023-02-09 DIAGNOSIS — I70.0 AORTIC ATHEROSCLEROSIS: ICD-10-CM

## 2023-02-09 DIAGNOSIS — Z00.00 ENCOUNTER FOR MEDICARE ANNUAL WELLNESS EXAM: ICD-10-CM

## 2023-02-09 DIAGNOSIS — I65.23 BILATERAL CAROTID ARTERY STENOSIS: ICD-10-CM

## 2023-02-09 DIAGNOSIS — I77.9 BILATERAL CAROTID ARTERY DISEASE, UNSPECIFIED TYPE: ICD-10-CM

## 2023-02-09 NOTE — TELEPHONE ENCOUNTER
Please notify patient that her last carotid ultrasound bilateral was on 08/22/2022; for continued screening I would like her to schedule a bilateral carotid ultrasound 12 months later or on or after 08/22/2023.  Orders placed to have it scheduled; please call patient to have it scheduled.  To be continued as per Dr. Salter's nurse with yearly ultrasounds.

## 2023-02-09 NOTE — TELEPHONE ENCOUNTER
Per Dr Joel, message sent to Dr Vazquez to have him order the US, and if abnormal, He can refer to Dr Joel.

## 2023-02-09 NOTE — TELEPHONE ENCOUNTER
----- Message from Shante White Heath sent at 2/9/2023 11:24 AM CST -----  Contact: self  Type:  Sooner Appointment Request    Caller is requesting a sooner appointment.  Caller declined first available appointment listed below.  Caller will not accept being placed on the waitlist and is requesting a message be sent to doctor.  Name of Caller:self  When is the first available appointment?n/a    Would the patient rather a call back or a response via [facility]? call  Best Call Back Number:[phone] (home)     Additional Information: pt needs to tracie appt with dr. Pt states she needs an ultrasound first. Please advise and thank you.

## 2023-02-09 NOTE — TELEPHONE ENCOUNTER
Spoke with Ms Raisa and advised that Dr Vazquez will be ordering a repeat US in August, 2023, which will be a year since her last study. She stated she had received a letter from [facility] that she needed to repeat in 6 months. Advised that someone from Dr Salter's office had placed a recall for 6 months, but Dr Vazquez looked at the most previous study and agreed since there was no evidence of a hemodynamically significant carotid bifurcation stenosis, that the follow up should be annual. Understanding verbalized.

## 2023-02-10 NOTE — TELEPHONE ENCOUNTER
----- Message from Tamy Favian sent at 2/9/2023 11:31 AM CST -----  Contact: patient  Type:  Needs Medical Advice    Who Called:  patient       Would the patient rather a call back or a response via [facility]? Call     Best Call Back Number: [phone] (home)      Additional Information:  Patient would like to speak with the nurse in regards to find her a cardiovascular surgery. She stated that the doctor that she was seeing has retired.     Please call to advise

## 2023-02-10 NOTE — TELEPHONE ENCOUNTER
Pt scheduled and will follow up with Dr Salter at [facility] in the fall  She is also asking for results of her abd ultrasound  Please advise

## 2023-04-04 ENCOUNTER — LAB VISIT (OUTPATIENT)
Dept: LAB | Facility: HOSPITAL | Age: 88
End: 2023-04-04
Attending: INTERNAL MEDICINE
Payer: MEDICARE

## 2023-04-04 DIAGNOSIS — D53.9 MACROCYTIC ANEMIA: ICD-10-CM

## 2023-04-04 DIAGNOSIS — E61.1 IRON DEFICIENCY: ICD-10-CM

## 2023-04-04 DIAGNOSIS — E03.9 ACQUIRED HYPOTHYROIDISM: ICD-10-CM

## 2023-04-04 DIAGNOSIS — R74.01 TRANSAMINITIS: ICD-10-CM

## 2023-04-04 DIAGNOSIS — E78.2 MIXED HYPERLIPIDEMIA: ICD-10-CM

## 2023-04-04 DIAGNOSIS — I10 ESSENTIAL HYPERTENSION: Chronic | ICD-10-CM

## 2023-04-04 DIAGNOSIS — M06.00 SERONEGATIVE RHEUMATOID ARTHRITIS: ICD-10-CM

## 2023-04-04 DIAGNOSIS — N18.31 STAGE 3A CHRONIC KIDNEY DISEASE: ICD-10-CM

## 2023-04-04 DIAGNOSIS — Z51.81 MEDICATION MONITORING ENCOUNTER: ICD-10-CM

## 2023-04-04 LAB
ALBUMIN SERPL BCP-MCNC: 3.7 G/DL (ref 3.5–5.2)
ALP SERPL-CCNC: 91 U/L (ref 55–135)
ALT SERPL W/O P-5'-P-CCNC: 33 U/L (ref 10–44)
ANION GAP SERPL CALC-SCNC: 11 MMOL/L (ref 8–16)
AST SERPL-CCNC: 27 U/L (ref 10–40)
BASOPHILS # BLD AUTO: 0.03 K/UL (ref 0–0.2)
BASOPHILS NFR BLD: 0.6 % (ref 0–1.9)
BILIRUB SERPL-MCNC: 0.6 MG/DL (ref 0.1–1)
BUN SERPL-MCNC: 19 MG/DL (ref 8–23)
CALCIUM SERPL-MCNC: 9.5 MG/DL (ref 8.7–10.5)
CHLORIDE SERPL-SCNC: 107 MMOL/L (ref 95–110)
CHOLEST SERPL-MCNC: 148 MG/DL (ref 120–199)
CHOLEST/HDLC SERPL: 2.6 {RATIO} (ref 2–5)
CO2 SERPL-SCNC: 22 MMOL/L (ref 23–29)
CREAT SERPL-MCNC: 1 MG/DL (ref 0.5–1.4)
CRP SERPL-MCNC: 4 MG/L (ref 0–8.2)
DIFFERENTIAL METHOD: ABNORMAL
EOSINOPHIL # BLD AUTO: 0.2 K/UL (ref 0–0.5)
EOSINOPHIL NFR BLD: 4.5 % (ref 0–8)
ERYTHROCYTE [DISTWIDTH] IN BLOOD BY AUTOMATED COUNT: 15.6 % (ref 11.5–14.5)
ERYTHROCYTE [SEDIMENTATION RATE] IN BLOOD BY PHOTOMETRIC METHOD: 40 MM/HR (ref 0–36)
EST. GFR  (NO RACE VARIABLE): 54.5 ML/MIN/1.73 M^2
FERRITIN SERPL-MCNC: 159 NG/ML (ref 20–300)
GLUCOSE SERPL-MCNC: 92 MG/DL (ref 70–110)
HBV CORE AB SERPL QL IA: NORMAL
HBV CORE IGM SERPL QL IA: NORMAL
HBV SURFACE AB SER-ACNC: <3 MIU/ML
HBV SURFACE AB SER-ACNC: <3 MIU/ML
HBV SURFACE AB SER-ACNC: NORMAL M[IU]/ML
HBV SURFACE AB SER-ACNC: NORMAL M[IU]/ML
HBV SURFACE AG SERPL QL IA: NORMAL
HCT VFR BLD AUTO: 37.3 % (ref 37–48.5)
HCV AB SERPL QL IA: NORMAL
HDLC SERPL-MCNC: 56 MG/DL (ref 40–75)
HDLC SERPL: 37.8 % (ref 20–50)
HGB BLD-MCNC: 11.9 G/DL (ref 12–16)
IMM GRANULOCYTES # BLD AUTO: 0.02 K/UL (ref 0–0.04)
IMM GRANULOCYTES NFR BLD AUTO: 0.4 % (ref 0–0.5)
LDLC SERPL CALC-MCNC: 73.6 MG/DL (ref 63–159)
LYMPHOCYTES # BLD AUTO: 0.9 K/UL (ref 1–4.8)
LYMPHOCYTES NFR BLD: 18 % (ref 18–48)
MCH RBC QN AUTO: 32.9 PG (ref 27–31)
MCHC RBC AUTO-ENTMCNC: 31.9 G/DL (ref 32–36)
MCV RBC AUTO: 103 FL (ref 82–98)
MONOCYTES # BLD AUTO: 0.5 K/UL (ref 0.3–1)
MONOCYTES NFR BLD: 10.3 % (ref 4–15)
NEUTROPHILS # BLD AUTO: 3.2 K/UL (ref 1.8–7.7)
NEUTROPHILS NFR BLD: 66.2 % (ref 38–73)
NONHDLC SERPL-MCNC: 92 MG/DL
NRBC BLD-RTO: 0 /100 WBC
PLATELET # BLD AUTO: 242 K/UL (ref 150–450)
PMV BLD AUTO: 11.2 FL (ref 9.2–12.9)
POTASSIUM SERPL-SCNC: 5 MMOL/L (ref 3.5–5.1)
PROT SERPL-MCNC: 6.7 G/DL (ref 6–8.4)
RBC # BLD AUTO: 3.62 M/UL (ref 4–5.4)
SODIUM SERPL-SCNC: 140 MMOL/L (ref 136–145)
T4 FREE SERPL-MCNC: 1.28 NG/DL (ref 0.71–1.51)
TRIGL SERPL-MCNC: 92 MG/DL (ref 30–150)
TSH SERPL DL<=0.005 MIU/L-ACNC: 1.24 UIU/ML (ref 0.4–4)
WBC # BLD AUTO: 4.84 K/UL (ref 3.9–12.7)

## 2023-04-04 PROCEDURE — 87340 HEPATITIS B SURFACE AG IA: CPT | Mod: HCNC | Performed by: INTERNAL MEDICINE

## 2023-04-04 PROCEDURE — 80053 COMPREHEN METABOLIC PANEL: CPT | Mod: HCNC | Performed by: INTERNAL MEDICINE

## 2023-04-04 PROCEDURE — 86705 HEP B CORE ANTIBODY IGM: CPT | Mod: HCNC | Performed by: INTERNAL MEDICINE

## 2023-04-04 PROCEDURE — 84443 ASSAY THYROID STIM HORMONE: CPT | Mod: HCNC | Performed by: INTERNAL MEDICINE

## 2023-04-04 PROCEDURE — 80061 LIPID PANEL: CPT | Mod: HCNC | Performed by: INTERNAL MEDICINE

## 2023-04-04 PROCEDURE — 84439 ASSAY OF FREE THYROXINE: CPT | Mod: HCNC | Performed by: INTERNAL MEDICINE

## 2023-04-04 PROCEDURE — 86803 HEPATITIS C AB TEST: CPT | Mod: HCNC | Performed by: INTERNAL MEDICINE

## 2023-04-04 PROCEDURE — 86706 HEP B SURFACE ANTIBODY: CPT | Mod: 91,HCNC | Performed by: INTERNAL MEDICINE

## 2023-04-04 PROCEDURE — 86140 C-REACTIVE PROTEIN: CPT | Mod: HCNC | Performed by: STUDENT IN AN ORGANIZED HEALTH CARE EDUCATION/TRAINING PROGRAM

## 2023-04-04 PROCEDURE — 82728 ASSAY OF FERRITIN: CPT | Mod: HCNC | Performed by: INTERNAL MEDICINE

## 2023-04-04 PROCEDURE — 85652 RBC SED RATE AUTOMATED: CPT | Mod: HCNC | Performed by: STUDENT IN AN ORGANIZED HEALTH CARE EDUCATION/TRAINING PROGRAM

## 2023-04-04 PROCEDURE — 86480 TB TEST CELL IMMUN MEASURE: CPT | Mod: HCNC | Performed by: STUDENT IN AN ORGANIZED HEALTH CARE EDUCATION/TRAINING PROGRAM

## 2023-04-04 PROCEDURE — 86704 HEP B CORE ANTIBODY TOTAL: CPT | Mod: HCNC | Performed by: STUDENT IN AN ORGANIZED HEALTH CARE EDUCATION/TRAINING PROGRAM

## 2023-04-04 PROCEDURE — 85025 COMPLETE CBC W/AUTO DIFF WBC: CPT | Mod: HCNC | Performed by: INTERNAL MEDICINE

## 2023-04-04 PROCEDURE — 36415 COLL VENOUS BLD VENIPUNCTURE: CPT | Mod: HCNC,PN | Performed by: INTERNAL MEDICINE

## 2023-04-06 LAB
GAMMA INTERFERON BACKGROUND BLD IA-ACNC: 0.04 IU/ML
M TB IFN-G CD4+ BCKGRND COR BLD-ACNC: 0.01 IU/ML
MITOGEN IGNF BCKGRD COR BLD-ACNC: 8.62 IU/ML
TB GOLD PLUS: NEGATIVE
TB2 - NIL: 0.01 IU/ML

## 2023-04-27 DIAGNOSIS — I10 ESSENTIAL HYPERTENSION: ICD-10-CM

## 2023-04-27 DIAGNOSIS — E78.2 MIXED HYPERLIPIDEMIA: ICD-10-CM

## 2023-04-27 DIAGNOSIS — K21.9 GASTROESOPHAGEAL REFLUX DISEASE: ICD-10-CM

## 2023-04-27 DIAGNOSIS — R74.01 TRANSAMINITIS: ICD-10-CM

## 2023-04-27 DIAGNOSIS — E03.9 ACQUIRED HYPOTHYROIDISM: ICD-10-CM

## 2023-04-27 DIAGNOSIS — Z98.890 S/P CAROTID ENDARTERECTOMY: ICD-10-CM

## 2023-04-27 DIAGNOSIS — I77.9 BILATERAL CAROTID ARTERY DISEASE, UNSPECIFIED TYPE: ICD-10-CM

## 2023-04-27 RX ORDER — LEVOTHYROXINE SODIUM 125 UG/1
62.5 TABLET ORAL DAILY
Qty: 45 TABLET | Refills: 2 | Status: SHIPPED | OUTPATIENT
Start: 2023-04-27 | End: 2024-01-16 | Stop reason: SDUPTHER

## 2023-04-27 RX ORDER — ATORVASTATIN CALCIUM 20 MG/1
TABLET, FILM COATED ORAL
Qty: 90 TABLET | Refills: 2 | Status: SHIPPED | OUTPATIENT
Start: 2023-04-27 | End: 2024-01-16 | Stop reason: SDUPTHER

## 2023-04-27 RX ORDER — OMEPRAZOLE 20 MG/1
CAPSULE, DELAYED RELEASE ORAL
Qty: 90 CAPSULE | Refills: 2 | Status: SHIPPED | OUTPATIENT
Start: 2023-04-27 | End: 2024-01-16 | Stop reason: SDUPTHER

## 2023-04-27 NOTE — TELEPHONE ENCOUNTER
Refill Decision Note   Nida Kline  is requesting a refill authorization.  Brief Assessment and Rationale for Refill:  Approve     Medication Therapy Plan:         Comments:     Note composed:4:42 PM 04/27/2023

## 2023-04-27 NOTE — TELEPHONE ENCOUNTER
No care due was identified.  [facility] Embedded Care Due Messages. Reference number: [id number].   4/27/2023 2:11:15 PM CDT

## 2023-04-28 ENCOUNTER — OFFICE VISIT (OUTPATIENT)
Dept: FAMILY MEDICINE | Facility: CLINIC | Age: 88
End: 2023-04-28
Payer: MEDICARE

## 2023-04-28 VITALS
HEIGHT: 64 IN | WEIGHT: 165.69 LBS | BODY MASS INDEX: 28.29 KG/M2 | RESPIRATION RATE: 16 BRPM | DIASTOLIC BLOOD PRESSURE: 70 MMHG | OXYGEN SATURATION: 99 % | SYSTOLIC BLOOD PRESSURE: 122 MMHG | HEART RATE: 70 BPM

## 2023-04-28 DIAGNOSIS — D53.9 MACROCYTIC ANEMIA: Primary | ICD-10-CM

## 2023-04-28 DIAGNOSIS — Z86.2 HISTORY OF ANEMIA DUE TO VITAMIN B12 DEFICIENCY: ICD-10-CM

## 2023-04-28 DIAGNOSIS — Z74.09 IMPAIRED FUNCTIONAL MOBILITY, BALANCE, GAIT, AND ENDURANCE: ICD-10-CM

## 2023-04-28 DIAGNOSIS — M81.6 LOCALIZED OSTEOPOROSIS WITHOUT CURRENT PATHOLOGICAL FRACTURE: ICD-10-CM

## 2023-04-28 PROCEDURE — 99999 PR PBB SHADOW E&M-EST. PATIENT-LVL III: ICD-10-PCS | Mod: PBBFAC,HCNC,, | Performed by: STUDENT IN AN ORGANIZED HEALTH CARE EDUCATION/TRAINING PROGRAM

## 2023-04-28 PROCEDURE — 99213 PR OFFICE/OUTPT VISIT, EST, LEVL III, 20-29 MIN: ICD-10-PCS | Mod: HCNC,S$GLB,, | Performed by: STUDENT IN AN ORGANIZED HEALTH CARE EDUCATION/TRAINING PROGRAM

## 2023-04-28 PROCEDURE — 3288F FALL RISK ASSESSMENT DOCD: CPT | Mod: HCNC,CPTII,S$GLB, | Performed by: STUDENT IN AN ORGANIZED HEALTH CARE EDUCATION/TRAINING PROGRAM

## 2023-04-28 PROCEDURE — 1159F PR MEDICATION LIST DOCUMENTED IN MEDICAL RECORD: ICD-10-PCS | Mod: HCNC,CPTII,S$GLB, | Performed by: STUDENT IN AN ORGANIZED HEALTH CARE EDUCATION/TRAINING PROGRAM

## 2023-04-28 PROCEDURE — 1101F PR PT FALLS ASSESS DOC 0-1 FALLS W/OUT INJ PAST YR: ICD-10-PCS | Mod: HCNC,CPTII,S$GLB, | Performed by: STUDENT IN AN ORGANIZED HEALTH CARE EDUCATION/TRAINING PROGRAM

## 2023-04-28 PROCEDURE — 3288F PR FALLS RISK ASSESSMENT DOCUMENTED: ICD-10-PCS | Mod: HCNC,CPTII,S$GLB, | Performed by: STUDENT IN AN ORGANIZED HEALTH CARE EDUCATION/TRAINING PROGRAM

## 2023-04-28 PROCEDURE — 1159F MED LIST DOCD IN RCRD: CPT | Mod: HCNC,CPTII,S$GLB, | Performed by: STUDENT IN AN ORGANIZED HEALTH CARE EDUCATION/TRAINING PROGRAM

## 2023-04-28 PROCEDURE — 99999 PR PBB SHADOW E&M-EST. PATIENT-LVL III: CPT | Mod: PBBFAC,HCNC,, | Performed by: STUDENT IN AN ORGANIZED HEALTH CARE EDUCATION/TRAINING PROGRAM

## 2023-04-28 PROCEDURE — 1101F PT FALLS ASSESS-DOCD LE1/YR: CPT | Mod: HCNC,CPTII,S$GLB, | Performed by: STUDENT IN AN ORGANIZED HEALTH CARE EDUCATION/TRAINING PROGRAM

## 2023-04-28 PROCEDURE — 99213 OFFICE O/P EST LOW 20 MIN: CPT | Mod: HCNC,S$GLB,, | Performed by: STUDENT IN AN ORGANIZED HEALTH CARE EDUCATION/TRAINING PROGRAM

## 2023-04-28 NOTE — PROGRESS NOTES
Plan:      Nida was seen today for follow-up.    Diagnoses and all orders for this visit:    Macrocytic anemia  -     Vitamin B12; Future    History of anemia due to vitamin B12 deficiency  -     Vitamin B12; Future    Impaired functional mobility, balance, gait, and endurance  -     WALKER FOR HOME USE    Localized osteoporosis without current pathological fracture  -     WALKER FOR HOME USE      Follow up in about 4 weeks (around 5/26/2023), or if symptoms worsen or fail to improve.    Sherry Garzon MD  05/02/2023    Subjective:      Patient ID: Nida Kline is a 87 y.o. female    Chief Complaint   Patient presents with    Follow-up     Had blood work done and wants to go over results. Wants to discuss prescription for a walker.     HPI  87 y.o. female with a PMHx as documented below presents to clinic today for the following:    Reviewed most recent test results - all questions answered, pt expressed understanding. ESR elevated @ 40 - Dr. Alegre aware, plan to review at upcoming Rheumatology appointment on 5/3/23. Additionally, CBC w/ evidence of macrocytic anemia. Pt w/ history of B12 deficiency, stopped supplementation in 11/2022 due to B12 levels above normal range.    Requests order for walker for home use.    ROS  Constitutional:  Negative for chills and fever.   Respiratory:  Negative for shortness of breath.    Cardiovascular:  Negative for chest pain.   Gastrointestinal:  Negative for abdominal pain, constipation, diarrhea, nausea and vomiting.     Current Outpatient Medications   Medication Instructions    ascorbic acid (vitamin C) (VITAMIN C) 500 mg, Oral, Daily    aspirin (ECOTRIN) 81 mg, Oral, Once    atorvastatin (LIPITOR) 20 MG tablet TAKE 1 TABLET EVERY DAY    busPIRone (BUSPAR) 15 MG tablet TAKE 1/3 TO 1/2 TABLET THREE TIMES A DAY AS NEEDED FOR ANXIETY    calcium carbonate/vitamin D3 (CALTRATE 600 + D ORAL) Oral, Daily, Caltrate 600+D3     ezetimibe (ZETIA) 10 mg, Oral, Daily    folic  "acid (FOLVITE) 1 mg, Oral, Daily    levothyroxine (SYNTHROID) 62.5 mcg, Oral, Daily    losartan (COZAAR) 25 MG tablet TAKE 1 TABLET EVERY DAY    magnesium oxide 400 mg Cap 2 tablets, Oral, Daily    methotrexate 15 mg, Oral, Every 7 days    metoprolol succinate (TOPROL-XL) 25 MG 24 hr tablet TAKE 1/2 TABLET EVERY DAY    omeprazole (PRILOSEC) 20 MG capsule TAKE 1 CAPSULE EVERY DAY AS NEEDED FOR REFLUX    vitamin D (VITAMIN D3) 1,000 Units, Oral, 2 times daily    vitamin renal formula, B-complex-vitamin c-folic acid, (NEPHROCAP) 1 mg Cap 1 capsule, Oral, Daily, Generic.      Past Medical History:   Diagnosis Date    Back pain     Cataract extraction status of eye     Gallbladder disease     GERD (gastroesophageal reflux disease)     HTN (hypertension)     Hypothyroidism     Osteoporosis     Overweight (BMI 25.0-29.9)     PONV (postoperative nausea and vomiting)     Pure hypercholesterolemia     Squamous cell carcinoma     Thyroid disease     hypothyroidism    Trouble in sleeping     Vitamin D deficiency       Objective:      Vitals:    04/28/23 1618   BP: 122/70   BP Location: Left arm   Patient Position: Sitting   Pulse: 70   Resp: 16   SpO2: 99%   Weight: 75.1 kg (165 lb 10.8 oz)   Height: 5' 4" (1.626 m)     Body mass index is 28.44 kg/m².    Physical Exam   Constitutional:       General: No acute distress.  HENT:      Head: Normocephalic and atraumatic.   Pulmonary:      Effort: Pulmonary effort is normal. No respiratory distress.   Neurological:      General: No focal deficit present.      Mental Status: Alert and oriented to person, place, and time. Mental status is at baseline.    Assessment:       1. Macrocytic anemia    2. History of anemia due to vitamin B12 deficiency    3. Impaired functional mobility, balance, gait, and endurance    4. Localized osteoporosis without current pathological fracture        Sherry Garzon MD  [facility]  Office: [phone]   Fax: [phone]  05/02/2023      Disclaimer: This note was partly generated using dictation software which may occasionally result in transcription errors.    Total time spent on this encounter includes face to face time and non-face to face time preparing to see the patient (eg, review of tests), obtaining and/or reviewing separately obtained history, documenting clinical information in the electronic or other health record, independently interpreting results, and communicating results to the patient/family/caregiver, or care coordinator.

## 2023-04-30 RX ORDER — LEVOTHYROXINE SODIUM 125 UG/1
62.5 TABLET ORAL DAILY
Qty: 45 TABLET | Refills: 2 | Status: CANCELLED | OUTPATIENT
Start: 2023-04-30

## 2023-04-30 RX ORDER — ATORVASTATIN CALCIUM 20 MG/1
20 TABLET, FILM COATED ORAL DAILY
Qty: 90 TABLET | Refills: 2 | Status: CANCELLED | OUTPATIENT
Start: 2023-04-30

## 2023-04-30 RX ORDER — OMEPRAZOLE 20 MG/1
CAPSULE, DELAYED RELEASE ORAL
Qty: 90 CAPSULE | Refills: 2 | Status: CANCELLED | OUTPATIENT
Start: 2023-04-30

## 2023-05-02 NOTE — PROGRESS NOTES
"Subjective:      Patient ID: Nida Kline is a 87 y.o. female.    Chief Complaint: Disease Management    HPI    This is an 87 year old woman with PMH of CKD, HTN, vit D deficiency, carotid artery disease s/p CEA, HLD, vit D deficiency, hypothyroidism, STEW, GERD, squamous cell carcinoma, osteopenia with elevated FRAX, seronegative non erosive RA diagnosed in 12/2021 and on methotrexate 20mg weekly plus folic acid. She was last seen in 1/2023 and at that time physical examination showed mild chronic synovial hypertrophy on the right. CDAI was consistent with remission. LFTs were mildly elevated, therefore MTX was decreased to 15mg weekly. I offered Hepatology referral but patient would like to hold off. She is doing well on the lower dose of methotrexate and denies joint pain and swelling.     Objective:   /62   Pulse 75   Ht 5' 4" (1.626 m)   Wt 75.8 kg (167 lb 3.5 oz)   BMI 28.70 kg/m²   Physical Exam   Constitutional: normal appearance.   HENT:   Head: Normocephalic and atraumatic.   Pulmonary/Chest: Effort normal.   Musculoskeletal:      Comments: Synovial hypertrophy 2nd right MCP   Neurological: She is alert.   Skin: Skin is warm and dry. No rash noted.     No data to display      Labs reviewed by me (4/4/2023)  CBC HGB 11.9, otherwise WNL  CMP GFR 51.1, LFTs WNL  CRP WNL   ESR 40 <- 29     Assessment:     1. Seronegative rheumatoid arthritis    2. Osteopenia after menopause    3. At high risk for fracture    4. Immunosuppression    5. Medication monitoring encounter    6. High risk medication use      This is an 87 year old woman with PMH of CKD, HTN, vit D deficiency, carotid artery disease s/p CEA, HLD, vit D deficiency, hypothyroidism, STEW, GERD, squamous cell carcinoma, osteopenia with elevated FRAX, seronegative non erosive RA diagnosed in 12/2021 and on methotrexate 20mg weekly plus folic acid. She was last seen in 1/2023 and at that time physical examination showed mild chronic synovial " hypertrophy on the right. CDAI was consistent with remission. LFTs were mildly elevated, therefore MTX was decreased to 15mg weekly. I offered Hepatology referral but patient would like to hold off. She is doing well on the lower dose of methotrexate and denies joint pain and swelling.     Plan:     Problem List Items Addressed This Visit    None  Visit Diagnoses       Seronegative rheumatoid arthritis    -  Primary    Relevant Orders    CBC Auto Differential    Comprehensive Metabolic Panel    Sedimentation rate    C-Reactive Protein    Osteopenia after menopause        Relevant Medications    alendronate (FOSAMAX) 70 MG tablet    At high risk for fracture        Relevant Medications    alendronate (FOSAMAX) 70 MG tablet    Immunosuppression        Relevant Orders    CBC Auto Differential    Comprehensive Metabolic Panel    Sedimentation rate    C-Reactive Protein    Medication monitoring encounter        Relevant Orders    CBC Auto Differential    Comprehensive Metabolic Panel    Sedimentation rate    C-Reactive Protein    High risk medication use        Relevant Orders    CBC Auto Differential    Comprehensive Metabolic Panel    Sedimentation rate    C-Reactive Protein          - methotrexate 15mg weekly + folic acid daily  - CBC, CMP, ESR, CRP every 12 weeks  - Patient will need yearly skin cancer check on MTX given history of squamous cell CA  - X-Ray arthritis survey (10/2021): Osteopenia and diffuse joint space narrowing without erosions  - Pre-DMARD labs completed 4/2023  - DEXA scan showed osteoporosis; Start Fosamax 70mg weekly. I discussed potential side effects including osteonecrosis of the jaw should the patient undergo an invasive dental procedure on this medication, atypical femoral fractures, and esophagitis. I advised the patient to take this medication with a large glass of water on an empty stomach and to sit up for at least 30 minutes after taking it.   - Vaccinations: COVID x 3, PCV13  (12/2015), PPV23 (9/2019), Shingrix x 2; patient declines the flu shot    Follow up in 3 months    30 minutes of total time spent on the encounter, which includes face to face time and non-face to face time preparing to see the patient (eg, review of tests), Obtaining and/or reviewing separately obtained history, Documenting clinical information in the electronic or other health record, Independently interpreting results (not separately reported) and communicating results to the patient/family/caregiver, or Care coordination (not separately reported).       Leah Gannon M.D.  Rheumatology Dept  [place], [place]

## 2023-05-03 ENCOUNTER — OFFICE VISIT (OUTPATIENT)
Dept: RHEUMATOLOGY | Facility: CLINIC | Age: 88
End: 2023-05-03
Payer: MEDICARE

## 2023-05-03 VITALS
BODY MASS INDEX: 28.55 KG/M2 | HEIGHT: 64 IN | SYSTOLIC BLOOD PRESSURE: 103 MMHG | HEART RATE: 75 BPM | DIASTOLIC BLOOD PRESSURE: 62 MMHG | WEIGHT: 167.25 LBS

## 2023-05-03 DIAGNOSIS — Z79.899 HIGH RISK MEDICATION USE: ICD-10-CM

## 2023-05-03 DIAGNOSIS — Z91.89 AT HIGH RISK FOR FRACTURE: ICD-10-CM

## 2023-05-03 DIAGNOSIS — M06.00 SERONEGATIVE RHEUMATOID ARTHRITIS: Primary | ICD-10-CM

## 2023-05-03 DIAGNOSIS — Z78.0 OSTEOPENIA AFTER MENOPAUSE: ICD-10-CM

## 2023-05-03 DIAGNOSIS — Z51.81 MEDICATION MONITORING ENCOUNTER: ICD-10-CM

## 2023-05-03 DIAGNOSIS — M85.80 OSTEOPENIA AFTER MENOPAUSE: ICD-10-CM

## 2023-05-03 DIAGNOSIS — D84.9 IMMUNOSUPPRESSION: ICD-10-CM

## 2023-05-03 PROCEDURE — 3288F PR FALLS RISK ASSESSMENT DOCUMENTED: ICD-10-PCS | Mod: HCNC,CPTII,S$GLB, | Performed by: STUDENT IN AN ORGANIZED HEALTH CARE EDUCATION/TRAINING PROGRAM

## 2023-05-03 PROCEDURE — 1101F PR PT FALLS ASSESS DOC 0-1 FALLS W/OUT INJ PAST YR: ICD-10-PCS | Mod: HCNC,CPTII,S$GLB, | Performed by: STUDENT IN AN ORGANIZED HEALTH CARE EDUCATION/TRAINING PROGRAM

## 2023-05-03 PROCEDURE — 3288F FALL RISK ASSESSMENT DOCD: CPT | Mod: HCNC,CPTII,S$GLB, | Performed by: STUDENT IN AN ORGANIZED HEALTH CARE EDUCATION/TRAINING PROGRAM

## 2023-05-03 PROCEDURE — 1160F RVW MEDS BY RX/DR IN RCRD: CPT | Mod: HCNC,CPTII,S$GLB, | Performed by: STUDENT IN AN ORGANIZED HEALTH CARE EDUCATION/TRAINING PROGRAM

## 2023-05-03 PROCEDURE — 99215 OFFICE O/P EST HI 40 MIN: CPT | Mod: HCNC,S$GLB,, | Performed by: STUDENT IN AN ORGANIZED HEALTH CARE EDUCATION/TRAINING PROGRAM

## 2023-05-03 PROCEDURE — 1126F AMNT PAIN NOTED NONE PRSNT: CPT | Mod: HCNC,CPTII,S$GLB, | Performed by: STUDENT IN AN ORGANIZED HEALTH CARE EDUCATION/TRAINING PROGRAM

## 2023-05-03 PROCEDURE — 99999 PR PBB SHADOW E&M-EST. PATIENT-LVL III: CPT | Mod: PBBFAC,HCNC,, | Performed by: STUDENT IN AN ORGANIZED HEALTH CARE EDUCATION/TRAINING PROGRAM

## 2023-05-03 PROCEDURE — 1101F PT FALLS ASSESS-DOCD LE1/YR: CPT | Mod: HCNC,CPTII,S$GLB, | Performed by: STUDENT IN AN ORGANIZED HEALTH CARE EDUCATION/TRAINING PROGRAM

## 2023-05-03 PROCEDURE — 1160F PR REVIEW ALL MEDS BY PRESCRIBER/CLIN PHARMACIST DOCUMENTED: ICD-10-PCS | Mod: HCNC,CPTII,S$GLB, | Performed by: STUDENT IN AN ORGANIZED HEALTH CARE EDUCATION/TRAINING PROGRAM

## 2023-05-03 PROCEDURE — 1159F MED LIST DOCD IN RCRD: CPT | Mod: HCNC,CPTII,S$GLB, | Performed by: STUDENT IN AN ORGANIZED HEALTH CARE EDUCATION/TRAINING PROGRAM

## 2023-05-03 PROCEDURE — 99215 PR OFFICE/OUTPT VISIT, EST, LEVL V, 40-54 MIN: ICD-10-PCS | Mod: HCNC,S$GLB,, | Performed by: STUDENT IN AN ORGANIZED HEALTH CARE EDUCATION/TRAINING PROGRAM

## 2023-05-03 PROCEDURE — 99999 PR PBB SHADOW E&M-EST. PATIENT-LVL III: ICD-10-PCS | Mod: PBBFAC,HCNC,, | Performed by: STUDENT IN AN ORGANIZED HEALTH CARE EDUCATION/TRAINING PROGRAM

## 2023-05-03 PROCEDURE — 1159F PR MEDICATION LIST DOCUMENTED IN MEDICAL RECORD: ICD-10-PCS | Mod: HCNC,CPTII,S$GLB, | Performed by: STUDENT IN AN ORGANIZED HEALTH CARE EDUCATION/TRAINING PROGRAM

## 2023-05-03 PROCEDURE — 1126F PR PAIN SEVERITY QUANTIFIED, NO PAIN PRESENT: ICD-10-PCS | Mod: HCNC,CPTII,S$GLB, | Performed by: STUDENT IN AN ORGANIZED HEALTH CARE EDUCATION/TRAINING PROGRAM

## 2023-05-03 RX ORDER — ALENDRONATE SODIUM 70 MG/1
70 TABLET ORAL
Qty: 12 TABLET | Refills: 1 | Status: SHIPPED | OUTPATIENT
Start: 2023-05-03 | End: 2023-08-15

## 2023-05-03 ASSESSMENT — ROUTINE ASSESSMENT OF PATIENT INDEX DATA (RAPID3)
PAIN SCORE: 0.5
FATIGUE SCORE: 2.2
PSYCHOLOGICAL DISTRESS SCORE: 0
MDHAQ FUNCTION SCORE: 0.2
PATIENT GLOBAL ASSESSMENT SCORE: 3.5
TOTAL RAPID3 SCORE: 1.56

## 2023-05-09 NOTE — TELEPHONE ENCOUNTER
Patient missed her follow-up appointments with me to go over ultrasound of the abdomen results.  She has an appointment scheduled 6/1223 with Family Medicine Dr. Garzon..  If patient would like to schedule a virtual clinic with me I would be more than happy to go over her results in more detail regarding her ultrasound of the abdomen.  Please inform her that the ultrasound showed liver changes of fatty liver thought to be present.  Also showed the spleen lesion on the 11/01/2017 study not identified on the ultrasound.  Recommendations are that she repeat an ultrasound of her abdomen for reassessment in 6-12 months..  To help reverse of fatty liver she needs to exercise regularly as well as portion control and be on low-fat diet to try and help her weight come down.  Presently she is overweight with BMI 28.70.  Maintaining good control of her cholesterol numbers will help as well and avoiding any alcohol intake.  Hope this note finds her doing well

## 2023-05-10 NOTE — TELEPHONE ENCOUNTER
Attempted to contact patient. Lvm for patient to contact clinic.  To go over test results of ultrasound and patient has appointment with Dr. Garzon on 6-1-23

## 2023-06-12 ENCOUNTER — OFFICE VISIT (OUTPATIENT)
Dept: FAMILY MEDICINE | Facility: CLINIC | Age: 88
End: 2023-06-12
Payer: MEDICARE

## 2023-06-12 ENCOUNTER — HOSPITAL ENCOUNTER (OUTPATIENT)
Dept: RADIOLOGY | Facility: HOSPITAL | Age: 88
Discharge: HOME | End: 2023-06-12
Attending: STUDENT IN AN ORGANIZED HEALTH CARE EDUCATION/TRAINING PROGRAM
Payer: MEDICARE

## 2023-06-12 VITALS
WEIGHT: 167.69 LBS | DIASTOLIC BLOOD PRESSURE: 64 MMHG | RESPIRATION RATE: 18 BRPM | SYSTOLIC BLOOD PRESSURE: 108 MMHG | HEIGHT: 64 IN | BODY MASS INDEX: 28.63 KG/M2

## 2023-06-12 DIAGNOSIS — F51.01 PRIMARY INSOMNIA: Chronic | ICD-10-CM

## 2023-06-12 DIAGNOSIS — Z79.631 METHOTREXATE, LONG TERM, CURRENT USE: ICD-10-CM

## 2023-06-12 DIAGNOSIS — M54.50 CHRONIC BILATERAL LOW BACK PAIN WITHOUT SCIATICA: ICD-10-CM

## 2023-06-12 DIAGNOSIS — Z79.899 LONG TERM CURRENT USE OF THERAPEUTIC DRUG: ICD-10-CM

## 2023-06-12 DIAGNOSIS — G89.29 CHRONIC BILATERAL LOW BACK PAIN WITHOUT SCIATICA: ICD-10-CM

## 2023-06-12 DIAGNOSIS — R25.2 LEG CRAMPS: Primary | ICD-10-CM

## 2023-06-12 PROBLEM — M06.042 RHEUMATOID ARTHRITIS INVOLVING BOTH HANDS WITH NEGATIVE RHEUMATOID FACTOR: Chronic | Status: ACTIVE | Noted: 2022-03-26

## 2023-06-12 PROBLEM — M06.041 RHEUMATOID ARTHRITIS INVOLVING BOTH HANDS WITH NEGATIVE RHEUMATOID FACTOR: Chronic | Status: ACTIVE | Noted: 2022-03-26

## 2023-06-12 PROBLEM — E66.9 CLASS 1 OBESITY WITH SERIOUS COMORBIDITY AND BODY MASS INDEX (BMI) OF 30.0 TO 30.9 IN ADULT: Status: RESOLVED | Noted: 2022-03-26 | Resolved: 2023-06-12

## 2023-06-12 PROBLEM — Z01.89 ENCOUNTER FOR LABORATORY TEST: Status: RESOLVED | Noted: 2022-03-26 | Resolved: 2023-06-12

## 2023-06-12 PROBLEM — I70.0 AORTIC ATHEROSCLEROSIS: Chronic | Status: ACTIVE | Noted: 2017-11-16

## 2023-06-12 PROBLEM — E66.811 CLASS 1 OBESITY WITH SERIOUS COMORBIDITY AND BODY MASS INDEX (BMI) OF 30.0 TO 30.9 IN ADULT: Status: RESOLVED | Noted: 2022-03-26 | Resolved: 2023-06-12

## 2023-06-12 PROBLEM — B35.3 TINEA PEDIS OF BOTH FEET: Status: RESOLVED | Noted: 2017-05-15 | Resolved: 2023-06-12

## 2023-06-12 PROCEDURE — 72100 X-RAY EXAM L-S SPINE 2/3 VWS: CPT | Mod: TC,PN

## 2023-06-12 PROCEDURE — 1101F PT FALLS ASSESS-DOCD LE1/YR: CPT | Mod: CPTII,S$GLB,, | Performed by: STUDENT IN AN ORGANIZED HEALTH CARE EDUCATION/TRAINING PROGRAM

## 2023-06-12 PROCEDURE — 99999 PR PBB SHADOW E&M-EST. PATIENT-LVL IV: CPT | Mod: PBBFAC,,, | Performed by: STUDENT IN AN ORGANIZED HEALTH CARE EDUCATION/TRAINING PROGRAM

## 2023-06-12 PROCEDURE — 3288F FALL RISK ASSESSMENT DOCD: CPT | Mod: CPTII,S$GLB,, | Performed by: STUDENT IN AN ORGANIZED HEALTH CARE EDUCATION/TRAINING PROGRAM

## 2023-06-12 PROCEDURE — 99214 OFFICE O/P EST MOD 30 MIN: CPT | Mod: S$GLB,,, | Performed by: STUDENT IN AN ORGANIZED HEALTH CARE EDUCATION/TRAINING PROGRAM

## 2023-06-12 PROCEDURE — 3288F PR FALLS RISK ASSESSMENT DOCUMENTED: ICD-10-PCS | Mod: CPTII,S$GLB,, | Performed by: STUDENT IN AN ORGANIZED HEALTH CARE EDUCATION/TRAINING PROGRAM

## 2023-06-12 PROCEDURE — 99999 PR PBB SHADOW E&M-EST. PATIENT-LVL IV: ICD-10-PCS | Mod: PBBFAC,,, | Performed by: STUDENT IN AN ORGANIZED HEALTH CARE EDUCATION/TRAINING PROGRAM

## 2023-06-12 PROCEDURE — 1101F PR PT FALLS ASSESS DOC 0-1 FALLS W/OUT INJ PAST YR: ICD-10-PCS | Mod: CPTII,S$GLB,, | Performed by: STUDENT IN AN ORGANIZED HEALTH CARE EDUCATION/TRAINING PROGRAM

## 2023-06-12 PROCEDURE — 72100 XR LUMBAR SPINE AP AND LATERAL: ICD-10-PCS | Mod: 26,,, | Performed by: RADIOLOGY

## 2023-06-12 PROCEDURE — 1126F PR PAIN SEVERITY QUANTIFIED, NO PAIN PRESENT: ICD-10-PCS | Mod: CPTII,S$GLB,, | Performed by: STUDENT IN AN ORGANIZED HEALTH CARE EDUCATION/TRAINING PROGRAM

## 2023-06-12 PROCEDURE — 1159F PR MEDICATION LIST DOCUMENTED IN MEDICAL RECORD: ICD-10-PCS | Mod: CPTII,S$GLB,, | Performed by: STUDENT IN AN ORGANIZED HEALTH CARE EDUCATION/TRAINING PROGRAM

## 2023-06-12 PROCEDURE — 99214 PR OFFICE/OUTPT VISIT, EST, LEVL IV, 30-39 MIN: ICD-10-PCS | Mod: S$GLB,,, | Performed by: STUDENT IN AN ORGANIZED HEALTH CARE EDUCATION/TRAINING PROGRAM

## 2023-06-12 PROCEDURE — 1159F MED LIST DOCD IN RCRD: CPT | Mod: CPTII,S$GLB,, | Performed by: STUDENT IN AN ORGANIZED HEALTH CARE EDUCATION/TRAINING PROGRAM

## 2023-06-12 PROCEDURE — 72100 X-RAY EXAM L-S SPINE 2/3 VWS: CPT | Mod: 26,,, | Performed by: RADIOLOGY

## 2023-06-12 PROCEDURE — 1126F AMNT PAIN NOTED NONE PRSNT: CPT | Mod: CPTII,S$GLB,, | Performed by: STUDENT IN AN ORGANIZED HEALTH CARE EDUCATION/TRAINING PROGRAM

## 2023-06-12 RX ORDER — MIRTAZAPINE 7.5 MG/1
7.5 TABLET, FILM COATED ORAL NIGHTLY
Qty: 30 TABLET | Refills: 11 | Status: SHIPPED | OUTPATIENT
Start: 2023-06-12 | End: 2024-06-11

## 2023-06-12 NOTE — PROGRESS NOTES
"Plan:     Nida was seen today for follow-up and leg cramps .    Diagnoses and all orders for this visit:    Leg cramps  -     Magnesium; Future  -     Phosphorus; Future    Methotrexate, long term, current use  -     FOLATE; Future    Long term current use of therapeutic drug  -     FOLATE; Future    Primary insomnia  -     mirtazapine (REMERON) 7.5 MG Tab; Take 1 tablet (7.5 mg total) by mouth every evening.    Chronic bilateral low back pain without sciatica  -     X-Ray Lumbar Spine AP And Lateral; Future       Follow up in about 4 weeks (around 7/10/2023).    Sherry Garzon MD  06/13/2023    Subjective:      Patient ID: Nida Kline is a 88 y.o. female    Chief Complaint   Patient presents with    Follow-up    leg cramps      Pt stated she is experience  legs cramps      HPI  88 y.o. female with a PMHx as documented below presents to clinic today for the following:    Pt reports one episode of bilateral leg cramps lasting for 1 minute and resolving spontaneously this morning - no recurrent symptoms. Pt reports staying hydrated. No recent medication or dietary changes.     Pt reports trouble falling asleep and staying asleep.     Pt reports history of chronic bilateral low back pain w/o sciatica - no workup on file.     HTN:   - Losartan 25 mg daily, Toprol-XL 25 mg     HLD:   - Lipitor 20 mg daily    Diastolic dysfunction:   - TTE (2012) w/ "hyperdynamic left ventricular function (EF 75%), eccentric hypertrophy, diastolic dysfunction, trivial to mild mitral regurg, mild tricuspid regurg"     Bilateral carotid artery disease:   - Bilateral carotid artery US (2014) w/ "20 - 39% right internal carotid stenosis, 80 - 99% left internal carotid stenosis"  - S/p left carotid endarterectomy (2015)  - ASA, statin    Aortic atherosclerosis:   - ASA, statin    CKD stage 3:   - GFR 54.5 (4/4/23), stable    Rheumatoid arthritis:   - Methotrexate 15 mg weekly  - Folic acid 1 mg daily    Hypothyroidism:   - Synthroid " 62.5 mcg daily     Vit D deficiency:   - Daily vit D supplementation    GERD:   - Omeprazole 20 mg daily     Osteoporosis:   - Daily calcium/vit D supplementation  - Fosamax 70 mg weekly    ROS  Constitutional:  Negative for chills and fever.   Respiratory:  Negative for shortness of breath.    Cardiovascular:  Negative for chest pain.   Gastrointestinal:  Negative for abdominal pain, constipation, diarrhea, nausea and vomiting.     Current Outpatient Medications   Medication Instructions    alendronate (FOSAMAX) 70 mg, Oral, Every 7 days    ascorbic acid (vitamin C) (VITAMIN C) 500 mg, Oral, Daily    aspirin (ECOTRIN) 81 mg, Oral, Once    atorvastatin (LIPITOR) 20 MG tablet TAKE 1 TABLET EVERY DAY    busPIRone (BUSPAR) 15 MG tablet TAKE 1/3 TO 1/2 TABLET THREE TIMES A DAY AS NEEDED FOR ANXIETY    calcium carbonate/vitamin D3 (CALTRATE 600 + D ORAL) Oral, Daily, Caltrate 600+D3     folic acid (FOLVITE) 1 mg, Oral, Daily    levothyroxine (SYNTHROID) 62.5 mcg, Oral, Daily    losartan (COZAAR) 25 MG tablet TAKE 1 TABLET EVERY DAY    magnesium oxide 400 mg Cap 2 tablets, Oral, Daily    methotrexate 15 mg, Oral, Every 7 days    metoprolol succinate (TOPROL-XL) 25 MG 24 hr tablet TAKE 1/2 TABLET EVERY DAY    mirtazapine (REMERON) 7.5 mg, Oral, Nightly    omeprazole (PRILOSEC) 20 MG capsule TAKE 1 CAPSULE EVERY DAY AS NEEDED FOR REFLUX    vitamin D (VITAMIN D3) 1,000 Units, Oral, 2 times daily    vitamin renal formula, B-complex-vitamin c-folic acid, (NEPHROCAP) 1 mg Cap 1 capsule, Oral, Daily, Generic.      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    Aortic atherosclerosis 11/16/2017    Back pain     Bilateral carotid artery disease 6/7/2016    Cataract extraction status of eye     Dermatochalasis of eyelid 6/7/2016    Diastolic dysfunction 5/21/2013    Essential hypertension 6/7/2016    Eye refraction disorder 6/7/2016    Gallbladder disease     GERD (gastroesophageal reflux disease)     HTN  "(hypertension)     Hypothyroidism     Mixed hyperlipidemia     Osteoporosis     Overweight (BMI 25.0-29.9)     PONV (postoperative nausea and vomiting)     Posterior vitreous detachment 6/7/2016    Pseudophakia of both eyes 6/7/2016    Pure hypercholesterolemia     S/P carotid endarterectomy 6/7/2016    Squamous cell carcinoma     Thyroid disease     hypothyroidism    Tinea pedis of both feet 5/15/2017    Trouble in sleeping     Vitamin D deficiency      Past Surgical History:   Procedure Laterality Date    CAROTID ENDARTERECTOMY Left 2015    CATARACT EXTRACTION      OU    CHOLECYSTECTOMY      HYSTERECTOMY      TOTAL ABDOMINAL HYSTERECTOMY W/ BILATERAL SALPINGOOPHORECTOMY       Review of patient's allergies indicates:   Allergen Reactions    Lunesta [eszopiclone]      Paresthesia, lip swell     Propofol analogues Nausea And Vomiting     Family History   Problem Relation Age of Onset    Kidney disease Mother     Diabetes Mother     COPD Father     Heart disease Father      Social History     Tobacco Use    Smoking status: Never    Smokeless tobacco: Never   Substance Use Topics    Alcohol use: No    Drug use: No     Currently on File with [facility]:   Most Recent Immunizations   Administered Date(s) Administered    COVID-19, MRNA, LN-S, PF (Pfizer) (Purple Cap) 12/15/2021    Pneumococcal Conjugate - 13 Valent 12/31/2015    Pneumococcal Polysaccharide - 23 Valent 09/26/2019     Objective:      Vitals:    06/12/23 1431   BP: 108/64   BP Location: Left arm   Patient Position: Sitting   Resp: 18   Weight: 76.1 kg (167 lb 10.6 oz)   Height: 5' 4" (1.626 m)     Body mass index is 28.78 kg/m².    Physical Exam   Constitutional:       General: No acute distress.  HENT:      Head: Normocephalic and atraumatic.   Pulmonary:      Effort: Pulmonary effort is normal. No respiratory distress.   Neurological:      General: No focal deficit present.      Mental Status: Alert and oriented to person, place, and time. Mental " status is at baseline.    Assessment:       1. Leg cramps    2. Methotrexate, long term, current use    3. Long term current use of therapeutic drug    4. Primary insomnia    5. Chronic bilateral low back pain without sciatica        Sherry Garzon MD  [facility]  Office: [phone]   Fax: [phone]  06/13/2023      Disclaimer: This note was partly generated using dictation software which may occasionally result in transcription errors.    Total time spent on this encounter includes face to face time and non-face to face time preparing to see the patient (eg, review of tests), obtaining and/or reviewing separately obtained history, documenting clinical information in the electronic or other health record, independently interpreting results, and communicating results to the patient/family/caregiver, or care coordinator.

## 2023-06-14 DIAGNOSIS — D53.9 MACROCYTIC ANEMIA: ICD-10-CM

## 2023-06-14 DIAGNOSIS — F41.9 ANXIETY: ICD-10-CM

## 2023-06-14 DIAGNOSIS — N18.31 STAGE 3A CHRONIC KIDNEY DISEASE: ICD-10-CM

## 2023-06-14 DIAGNOSIS — E61.1 IRON DEFICIENCY: ICD-10-CM

## 2023-06-14 RX ORDER — BUSPIRONE HYDROCHLORIDE 15 MG/1
TABLET ORAL
Qty: 90 TABLET | Refills: 0 | Status: SHIPPED | OUTPATIENT
Start: 2023-06-14 | End: 2023-09-06

## 2023-06-14 NOTE — TELEPHONE ENCOUNTER
----- Message from Jeanie Mcculloughrison sent at 6/14/2023  3:06 PM CDT -----  Contact: self  Type:  RX Refill Request    Who Called: Pt  Refill or New Rx: Refill  RX Name and Strength:vitamin renal formula, B-complex-vitamin c-folic acid, (NEPHROCAP) 1 mg Cap  How is the patient currently taking it? (ex. 1XDay):as directed  Is this a 30 day or 90 day RX: 90  Preferred Pharmacy with phone number:  [facility] - [place], [place] - [address]  [address] [address]  Phone: [phone] Fax: [phone]  Local or Mail Order:Mail  Ordering Provider: Dr. Garzon  Would the patient rather a call back or a response via [facility]? call  Best Call Back Number: [phone]    Thank you...

## 2023-06-15 DIAGNOSIS — Z51.81 MEDICATION MONITORING ENCOUNTER: ICD-10-CM

## 2023-06-15 DIAGNOSIS — D84.9 IMMUNOSUPPRESSION: ICD-10-CM

## 2023-06-15 DIAGNOSIS — M06.00 SERONEGATIVE RHEUMATOID ARTHRITIS: ICD-10-CM

## 2023-06-15 RX ORDER — METHOTREXATE 2.5 MG/1
15 TABLET ORAL
Qty: 72 TABLET | Refills: 1 | Status: SHIPPED | OUTPATIENT
Start: 2023-06-15 | End: 2023-08-08

## 2023-08-01 ENCOUNTER — LAB VISIT (OUTPATIENT)
Dept: LAB | Facility: HOSPITAL | Age: 88
End: 2023-08-01
Attending: STUDENT IN AN ORGANIZED HEALTH CARE EDUCATION/TRAINING PROGRAM
Payer: MEDICARE

## 2023-08-01 DIAGNOSIS — Z79.631 METHOTREXATE, LONG TERM, CURRENT USE: ICD-10-CM

## 2023-08-01 DIAGNOSIS — M06.00 SERONEGATIVE RHEUMATOID ARTHRITIS: ICD-10-CM

## 2023-08-01 DIAGNOSIS — R25.2 LEG CRAMPS: ICD-10-CM

## 2023-08-01 DIAGNOSIS — Z79.899 LONG TERM CURRENT USE OF THERAPEUTIC DRUG: ICD-10-CM

## 2023-08-01 DIAGNOSIS — Z51.81 MEDICATION MONITORING ENCOUNTER: ICD-10-CM

## 2023-08-01 DIAGNOSIS — Z79.899 HIGH RISK MEDICATION USE: ICD-10-CM

## 2023-08-01 DIAGNOSIS — D84.9 IMMUNOSUPPRESSION: ICD-10-CM

## 2023-08-01 LAB
ALBUMIN SERPL BCP-MCNC: 3.4 G/DL (ref 3.5–5.2)
ALP SERPL-CCNC: 80 U/L (ref 55–135)
ALT SERPL W/O P-5'-P-CCNC: 38 U/L (ref 10–44)
ANION GAP SERPL CALC-SCNC: 10 MMOL/L (ref 8–16)
AST SERPL-CCNC: 34 U/L (ref 10–40)
BASOPHILS # BLD AUTO: 0.01 K/UL (ref 0–0.2)
BASOPHILS NFR BLD: 0.2 % (ref 0–1.9)
BILIRUB SERPL-MCNC: 0.6 MG/DL (ref 0.1–1)
BUN SERPL-MCNC: 20 MG/DL (ref 8–23)
CALCIUM SERPL-MCNC: 8.9 MG/DL (ref 8.7–10.5)
CHLORIDE SERPL-SCNC: 109 MMOL/L (ref 95–110)
CO2 SERPL-SCNC: 21 MMOL/L (ref 23–29)
CREAT SERPL-MCNC: 1 MG/DL (ref 0.5–1.4)
CRP SERPL-MCNC: 3.3 MG/L (ref 0–8.2)
DIFFERENTIAL METHOD: ABNORMAL
EOSINOPHIL # BLD AUTO: 0.2 K/UL (ref 0–0.5)
EOSINOPHIL NFR BLD: 4.6 % (ref 0–8)
ERYTHROCYTE [DISTWIDTH] IN BLOOD BY AUTOMATED COUNT: 15.3 % (ref 11.5–14.5)
ERYTHROCYTE [SEDIMENTATION RATE] IN BLOOD BY PHOTOMETRIC METHOD: 20 MM/HR (ref 0–36)
EST. GFR  (NO RACE VARIABLE): 54.2 ML/MIN/1.73 M^2
FOLATE SERPL-MCNC: >40 NG/ML (ref 4–24)
GLUCOSE SERPL-MCNC: 175 MG/DL (ref 70–110)
HCT VFR BLD AUTO: 33.6 % (ref 37–48.5)
HGB BLD-MCNC: 10.8 G/DL (ref 12–16)
IMM GRANULOCYTES # BLD AUTO: 0.02 K/UL (ref 0–0.04)
IMM GRANULOCYTES NFR BLD AUTO: 0.4 % (ref 0–0.5)
LYMPHOCYTES # BLD AUTO: 0.8 K/UL (ref 1–4.8)
LYMPHOCYTES NFR BLD: 15.1 % (ref 18–48)
MAGNESIUM SERPL-MCNC: 1.6 MG/DL (ref 1.6–2.6)
MCH RBC QN AUTO: 33.4 PG (ref 27–31)
MCHC RBC AUTO-ENTMCNC: 32.1 G/DL (ref 32–36)
MCV RBC AUTO: 104 FL (ref 82–98)
MONOCYTES # BLD AUTO: 0.4 K/UL (ref 0.3–1)
MONOCYTES NFR BLD: 7.6 % (ref 4–15)
NEUTROPHILS # BLD AUTO: 3.8 K/UL (ref 1.8–7.7)
NEUTROPHILS NFR BLD: 72.1 % (ref 38–73)
NRBC BLD-RTO: 0 /100 WBC
PHOSPHATE SERPL-MCNC: 3.5 MG/DL (ref 2.7–4.5)
PLATELET # BLD AUTO: 192 K/UL (ref 150–450)
PMV BLD AUTO: 11.4 FL (ref 9.2–12.9)
POTASSIUM SERPL-SCNC: 4.3 MMOL/L (ref 3.5–5.1)
PROT SERPL-MCNC: 6.2 G/DL (ref 6–8.4)
RBC # BLD AUTO: 3.23 M/UL (ref 4–5.4)
SODIUM SERPL-SCNC: 140 MMOL/L (ref 136–145)
WBC # BLD AUTO: 5.24 K/UL (ref 3.9–12.7)

## 2023-08-01 PROCEDURE — 86140 C-REACTIVE PROTEIN: CPT | Mod: HCNC | Performed by: STUDENT IN AN ORGANIZED HEALTH CARE EDUCATION/TRAINING PROGRAM

## 2023-08-01 PROCEDURE — 36415 COLL VENOUS BLD VENIPUNCTURE: CPT | Mod: HCNC,PN | Performed by: STUDENT IN AN ORGANIZED HEALTH CARE EDUCATION/TRAINING PROGRAM

## 2023-08-01 PROCEDURE — 80053 COMPREHEN METABOLIC PANEL: CPT | Mod: HCNC | Performed by: STUDENT IN AN ORGANIZED HEALTH CARE EDUCATION/TRAINING PROGRAM

## 2023-08-01 PROCEDURE — 85652 RBC SED RATE AUTOMATED: CPT | Mod: HCNC | Performed by: STUDENT IN AN ORGANIZED HEALTH CARE EDUCATION/TRAINING PROGRAM

## 2023-08-01 PROCEDURE — 82746 ASSAY OF FOLIC ACID SERUM: CPT | Mod: HCNC | Performed by: STUDENT IN AN ORGANIZED HEALTH CARE EDUCATION/TRAINING PROGRAM

## 2023-08-01 PROCEDURE — 83735 ASSAY OF MAGNESIUM: CPT | Mod: HCNC | Performed by: STUDENT IN AN ORGANIZED HEALTH CARE EDUCATION/TRAINING PROGRAM

## 2023-08-01 PROCEDURE — 85025 COMPLETE CBC W/AUTO DIFF WBC: CPT | Mod: HCNC | Performed by: STUDENT IN AN ORGANIZED HEALTH CARE EDUCATION/TRAINING PROGRAM

## 2023-08-01 PROCEDURE — 84100 ASSAY OF PHOSPHORUS: CPT | Mod: HCNC | Performed by: STUDENT IN AN ORGANIZED HEALTH CARE EDUCATION/TRAINING PROGRAM

## 2023-08-02 NOTE — PROGRESS NOTES
Please call the patient and have them schedule an appointment at their earliest convenience to discuss their lab results (virtual okay). Thanks!

## 2023-08-03 ENCOUNTER — TELEPHONE (OUTPATIENT)
Dept: FAMILY MEDICINE | Facility: CLINIC | Age: 88
End: 2023-08-03
Payer: MEDICARE

## 2023-08-03 NOTE — TELEPHONE ENCOUNTER
----- Message from Sherry Garzon MD sent at 8/2/2023  3:20 PM CDT -----  Please call the patient and have them schedule an appointment at their earliest convenience to discuss their lab results (virtual okay). Thanks!

## 2023-08-07 ENCOUNTER — TELEPHONE (OUTPATIENT)
Dept: FAMILY MEDICINE | Facility: CLINIC | Age: 88
End: 2023-08-07
Payer: MEDICARE

## 2023-08-07 NOTE — PROGRESS NOTES
"Subjective:      Patient ID: Nida Kline is a 88 y.o. female.    Chief Complaint: Disease Management    HPI    Rheumatologic History:     - Diagnosis/es:   - osteopenia with elevated FRAX not currently on therapy  - seronegative non erosive RA diagnosed in 12/2021  - Positive serologies: -  - Negative serologies: ADEBAYO, RF, CCP  - Infectious screening labs: Negative hepatitis B, C, and quantiferon (4/2023)  - Imaging:   - X-Ray arthritis survey (10/2021): Osteopenia and diffuse joint space narrowing without erosions   - DEXA (10/2022) osteopenia with 27% risk of a major osteoporotic fracture and a 11% risk of hip fracture in the next 10 years (FRAX).  - Previous Treatments: -  - Current Treatments:    - MTX 15mg weekly plus folic acid daily   - Fosamax, not yet started  Interval History:   At last visit, methotrexate was reduced to 15 mg weekly due to elevated function tests. She has had no recurrence of joint pain and swelling since then and requests to reduce dose further. She has not started the Fosamax yet as she was going on a cruise and was afraid of side effects.     Objective:   BP (!) 93/55   Pulse 83   Ht 5' 4" (1.626 m)   Wt 75.4 kg (166 lb 3.6 oz)   BMI 28.53 kg/m²   Physical Exam   Constitutional: normal appearance.   HENT:   Head: Normocephalic and atraumatic.   Cardiovascular: Normal rate, regular rhythm and normal heart sounds.   Pulmonary/Chest: Effort normal and breath sounds normal.   Musculoskeletal:      Comments: + Degenerative changes  No synovitis, dactylitis, enthesitis, effusions     Neurological: She is alert.   Skin: Skin is warm and dry.   No skin thickening, telangiectasias, calcinosis, psoriasiform lesions, lupoid lesions        8/8/2023   Tender (PARSONS-28) 0 / 28    Swollen (PARSONS-28) 0 / 28    Provider Global 0 mm   Patient Global 0 mm   ESR 20 mm/hr   CRP 3.3 mg/L   PARSONS-28 (ESR) 2.1 (Remission)   PARSONS-28 (CRP) 1.49 (Remission)   CDAI Score 0      Labs independently reviewed by me " (8/1/2023)  CBC Hgb 10.8, WBC, ANC, PLT WNL   CMP Cr 1, GFR 54.2, LFTs WNL   CRP 3.3 (WNL)  ESR 20 (WNL)    Assessment:     1. Seronegative rheumatoid arthritis    2. Osteopenia after menopause    3. At high risk for fracture    4. Immunosuppression    5. High risk medication use    6. Medication monitoring encounter      This is an 88 year old woman with PMH of CKD, HTN, vit D deficiency, carotid artery disease s/p CEA, HLD, vit D deficiency, hypothyroidism, STEW, GERD, squamous cell carcinoma, osteopenia with elevated FRAX on Fosamax (5/2023- ), seronegative non erosive RA diagnosed in 12/2021 and on methotrexate 15mg weekly plus folic acid. DAS28 ESR and CRP are consistent with remission. Will try decreasing MTX to 10mg weekly.    Plan:     Problem List Items Addressed This Visit    None  Visit Diagnoses       Seronegative rheumatoid arthritis    -  Primary    Relevant Medications    methotrexate 2.5 MG Tab    Other Relevant Orders    CBC Auto Differential    Comprehensive Metabolic Panel    Sedimentation rate    C-Reactive Protein    Osteopenia after menopause        At high risk for fracture        Immunosuppression        Relevant Medications    methotrexate 2.5 MG Tab    Other Relevant Orders    CBC Auto Differential    Comprehensive Metabolic Panel    Sedimentation rate    C-Reactive Protein    High risk medication use        Relevant Orders    CBC Auto Differential    Comprehensive Metabolic Panel    Sedimentation rate    C-Reactive Protein    Medication monitoring encounter        Relevant Medications    methotrexate 2.5 MG Tab          1.) RA  - decrease methotrexate to 10mg weekly + folic acid daily  - CBC, CMP, ESR, CRP every 12 weeks  - Patient will need yearly skin cancer check on MTX given history of squamous cell CA  - Pre-DMARD labs due for repeat 4/2024  - Vaccinations: COVID x 3, PCV13 (12/2015), PPV23 (9/2019), Shingrix x 2; patient declines the flu shot    2.) Osteopenia with elevated FRAX  -  Start Fosamax 70mg weekly (8/2023- )  - DEXA due for repeat 10/2024    Follow up in 4 months    30 minutes of total time spent on the encounter, which includes face to face time and non-face to face time preparing to see the patient (eg, review of tests), Obtaining and/or reviewing separately obtained history, Documenting clinical information in the electronic or other health record, Independently interpreting results (not separately reported) and communicating results to the patient/family/caregiver, or Care coordination (not separately reported).       Leah Gannon M.D.  Rheumatology Dept  [place], [place]

## 2023-08-08 ENCOUNTER — OFFICE VISIT (OUTPATIENT)
Dept: RHEUMATOLOGY | Facility: CLINIC | Age: 88
End: 2023-08-08
Payer: MEDICARE

## 2023-08-08 VITALS
SYSTOLIC BLOOD PRESSURE: 93 MMHG | BODY MASS INDEX: 28.38 KG/M2 | HEART RATE: 83 BPM | DIASTOLIC BLOOD PRESSURE: 55 MMHG | HEIGHT: 64 IN | WEIGHT: 166.25 LBS

## 2023-08-08 DIAGNOSIS — D84.9 IMMUNOSUPPRESSION: ICD-10-CM

## 2023-08-08 DIAGNOSIS — M06.00 SERONEGATIVE RHEUMATOID ARTHRITIS: Primary | ICD-10-CM

## 2023-08-08 DIAGNOSIS — Z51.81 MEDICATION MONITORING ENCOUNTER: ICD-10-CM

## 2023-08-08 DIAGNOSIS — Z91.89 AT HIGH RISK FOR FRACTURE: ICD-10-CM

## 2023-08-08 DIAGNOSIS — Z79.899 HIGH RISK MEDICATION USE: ICD-10-CM

## 2023-08-08 DIAGNOSIS — Z78.0 OSTEOPENIA AFTER MENOPAUSE: ICD-10-CM

## 2023-08-08 DIAGNOSIS — M85.80 OSTEOPENIA AFTER MENOPAUSE: ICD-10-CM

## 2023-08-08 PROCEDURE — 1125F AMNT PAIN NOTED PAIN PRSNT: CPT | Mod: HCNC,CPTII,S$GLB, | Performed by: STUDENT IN AN ORGANIZED HEALTH CARE EDUCATION/TRAINING PROGRAM

## 2023-08-08 PROCEDURE — 3288F FALL RISK ASSESSMENT DOCD: CPT | Mod: HCNC,CPTII,S$GLB, | Performed by: STUDENT IN AN ORGANIZED HEALTH CARE EDUCATION/TRAINING PROGRAM

## 2023-08-08 PROCEDURE — 99215 OFFICE O/P EST HI 40 MIN: CPT | Mod: HCNC,S$GLB,, | Performed by: STUDENT IN AN ORGANIZED HEALTH CARE EDUCATION/TRAINING PROGRAM

## 2023-08-08 PROCEDURE — 99999 PR PBB SHADOW E&M-EST. PATIENT-LVL III: ICD-10-PCS | Mod: PBBFAC,HCNC,, | Performed by: STUDENT IN AN ORGANIZED HEALTH CARE EDUCATION/TRAINING PROGRAM

## 2023-08-08 PROCEDURE — 1125F PR PAIN SEVERITY QUANTIFIED, PAIN PRESENT: ICD-10-PCS | Mod: HCNC,CPTII,S$GLB, | Performed by: STUDENT IN AN ORGANIZED HEALTH CARE EDUCATION/TRAINING PROGRAM

## 2023-08-08 PROCEDURE — 3288F PR FALLS RISK ASSESSMENT DOCUMENTED: ICD-10-PCS | Mod: HCNC,CPTII,S$GLB, | Performed by: STUDENT IN AN ORGANIZED HEALTH CARE EDUCATION/TRAINING PROGRAM

## 2023-08-08 PROCEDURE — 99215 PR OFFICE/OUTPT VISIT, EST, LEVL V, 40-54 MIN: ICD-10-PCS | Mod: HCNC,S$GLB,, | Performed by: STUDENT IN AN ORGANIZED HEALTH CARE EDUCATION/TRAINING PROGRAM

## 2023-08-08 PROCEDURE — 1160F PR REVIEW ALL MEDS BY PRESCRIBER/CLIN PHARMACIST DOCUMENTED: ICD-10-PCS | Mod: HCNC,CPTII,S$GLB, | Performed by: STUDENT IN AN ORGANIZED HEALTH CARE EDUCATION/TRAINING PROGRAM

## 2023-08-08 PROCEDURE — 1159F PR MEDICATION LIST DOCUMENTED IN MEDICAL RECORD: ICD-10-PCS | Mod: HCNC,CPTII,S$GLB, | Performed by: STUDENT IN AN ORGANIZED HEALTH CARE EDUCATION/TRAINING PROGRAM

## 2023-08-08 PROCEDURE — 1160F RVW MEDS BY RX/DR IN RCRD: CPT | Mod: HCNC,CPTII,S$GLB, | Performed by: STUDENT IN AN ORGANIZED HEALTH CARE EDUCATION/TRAINING PROGRAM

## 2023-08-08 PROCEDURE — 99999 PR PBB SHADOW E&M-EST. PATIENT-LVL III: CPT | Mod: PBBFAC,HCNC,, | Performed by: STUDENT IN AN ORGANIZED HEALTH CARE EDUCATION/TRAINING PROGRAM

## 2023-08-08 PROCEDURE — 1101F PR PT FALLS ASSESS DOC 0-1 FALLS W/OUT INJ PAST YR: ICD-10-PCS | Mod: HCNC,CPTII,S$GLB, | Performed by: STUDENT IN AN ORGANIZED HEALTH CARE EDUCATION/TRAINING PROGRAM

## 2023-08-08 PROCEDURE — 1159F MED LIST DOCD IN RCRD: CPT | Mod: HCNC,CPTII,S$GLB, | Performed by: STUDENT IN AN ORGANIZED HEALTH CARE EDUCATION/TRAINING PROGRAM

## 2023-08-08 PROCEDURE — 1101F PT FALLS ASSESS-DOCD LE1/YR: CPT | Mod: HCNC,CPTII,S$GLB, | Performed by: STUDENT IN AN ORGANIZED HEALTH CARE EDUCATION/TRAINING PROGRAM

## 2023-08-08 RX ORDER — METHOTREXATE 2.5 MG/1
10 TABLET ORAL
Qty: 48 TABLET | Refills: 1 | Status: SHIPPED | OUTPATIENT
Start: 2023-08-08 | End: 2024-02-04

## 2023-08-08 ASSESSMENT — ROUTINE ASSESSMENT OF PATIENT INDEX DATA (RAPID3)
FATIGUE SCORE: 2.2
PAIN SCORE: 3
PATIENT GLOBAL ASSESSMENT SCORE: 5
TOTAL RAPID3 SCORE: 3.44
PSYCHOLOGICAL DISTRESS SCORE: 2.2
MDHAQ FUNCTION SCORE: 0.7

## 2023-08-15 ENCOUNTER — OFFICE VISIT (OUTPATIENT)
Dept: FAMILY MEDICINE | Facility: CLINIC | Age: 88
End: 2023-08-15
Payer: MEDICARE

## 2023-08-15 ENCOUNTER — LAB VISIT (OUTPATIENT)
Dept: LAB | Facility: HOSPITAL | Age: 88
End: 2023-08-15
Attending: STUDENT IN AN ORGANIZED HEALTH CARE EDUCATION/TRAINING PROGRAM
Payer: MEDICARE

## 2023-08-15 VITALS
SYSTOLIC BLOOD PRESSURE: 124 MMHG | RESPIRATION RATE: 18 BRPM | OXYGEN SATURATION: 96 % | HEIGHT: 64 IN | HEART RATE: 77 BPM | BODY MASS INDEX: 28.46 KG/M2 | WEIGHT: 166.69 LBS | DIASTOLIC BLOOD PRESSURE: 76 MMHG

## 2023-08-15 DIAGNOSIS — D53.9 MACROCYTIC ANEMIA: ICD-10-CM

## 2023-08-15 DIAGNOSIS — F51.01 PRIMARY INSOMNIA: Chronic | ICD-10-CM

## 2023-08-15 DIAGNOSIS — M54.50 CHRONIC BILATERAL LOW BACK PAIN WITHOUT SCIATICA: ICD-10-CM

## 2023-08-15 DIAGNOSIS — G89.29 CHRONIC BILATERAL LOW BACK PAIN WITHOUT SCIATICA: ICD-10-CM

## 2023-08-15 DIAGNOSIS — M43.16 ANTEROLISTHESIS OF LUMBAR SPINE: ICD-10-CM

## 2023-08-15 DIAGNOSIS — D53.9 MACROCYTIC ANEMIA: Primary | ICD-10-CM

## 2023-08-15 LAB
FERRITIN SERPL-MCNC: 170 NG/ML (ref 20–300)
IRON SERPL-MCNC: 126 UG/DL (ref 30–160)
SATURATED IRON: 41 % (ref 20–50)
TOTAL IRON BINDING CAPACITY: 308 UG/DL (ref 250–450)
TRANSFERRIN SERPL-MCNC: 208 MG/DL (ref 200–375)
VIT B12 SERPL-MCNC: 576 PG/ML (ref 210–950)

## 2023-08-15 PROCEDURE — 1101F PR PT FALLS ASSESS DOC 0-1 FALLS W/OUT INJ PAST YR: ICD-10-PCS | Mod: HCNC,CPTII,S$GLB, | Performed by: STUDENT IN AN ORGANIZED HEALTH CARE EDUCATION/TRAINING PROGRAM

## 2023-08-15 PROCEDURE — 82728 ASSAY OF FERRITIN: CPT | Mod: HCNC | Performed by: STUDENT IN AN ORGANIZED HEALTH CARE EDUCATION/TRAINING PROGRAM

## 2023-08-15 PROCEDURE — 99999 PR PBB SHADOW E&M-EST. PATIENT-LVL V: ICD-10-PCS | Mod: PBBFAC,HCNC,, | Performed by: STUDENT IN AN ORGANIZED HEALTH CARE EDUCATION/TRAINING PROGRAM

## 2023-08-15 PROCEDURE — 1159F MED LIST DOCD IN RCRD: CPT | Mod: HCNC,CPTII,S$GLB, | Performed by: STUDENT IN AN ORGANIZED HEALTH CARE EDUCATION/TRAINING PROGRAM

## 2023-08-15 PROCEDURE — 1126F AMNT PAIN NOTED NONE PRSNT: CPT | Mod: HCNC,CPTII,S$GLB, | Performed by: STUDENT IN AN ORGANIZED HEALTH CARE EDUCATION/TRAINING PROGRAM

## 2023-08-15 PROCEDURE — 99999 PR PBB SHADOW E&M-EST. PATIENT-LVL V: CPT | Mod: PBBFAC,HCNC,, | Performed by: STUDENT IN AN ORGANIZED HEALTH CARE EDUCATION/TRAINING PROGRAM

## 2023-08-15 PROCEDURE — 99214 OFFICE O/P EST MOD 30 MIN: CPT | Mod: HCNC,S$GLB,, | Performed by: STUDENT IN AN ORGANIZED HEALTH CARE EDUCATION/TRAINING PROGRAM

## 2023-08-15 PROCEDURE — 3288F PR FALLS RISK ASSESSMENT DOCUMENTED: ICD-10-PCS | Mod: HCNC,CPTII,S$GLB, | Performed by: STUDENT IN AN ORGANIZED HEALTH CARE EDUCATION/TRAINING PROGRAM

## 2023-08-15 PROCEDURE — 1126F PR PAIN SEVERITY QUANTIFIED, NO PAIN PRESENT: ICD-10-PCS | Mod: HCNC,CPTII,S$GLB, | Performed by: STUDENT IN AN ORGANIZED HEALTH CARE EDUCATION/TRAINING PROGRAM

## 2023-08-15 PROCEDURE — 82607 VITAMIN B-12: CPT | Mod: HCNC | Performed by: STUDENT IN AN ORGANIZED HEALTH CARE EDUCATION/TRAINING PROGRAM

## 2023-08-15 PROCEDURE — 36415 COLL VENOUS BLD VENIPUNCTURE: CPT | Mod: HCNC,PN | Performed by: STUDENT IN AN ORGANIZED HEALTH CARE EDUCATION/TRAINING PROGRAM

## 2023-08-15 PROCEDURE — 84466 ASSAY OF TRANSFERRIN: CPT | Mod: HCNC | Performed by: STUDENT IN AN ORGANIZED HEALTH CARE EDUCATION/TRAINING PROGRAM

## 2023-08-15 PROCEDURE — 1101F PT FALLS ASSESS-DOCD LE1/YR: CPT | Mod: HCNC,CPTII,S$GLB, | Performed by: STUDENT IN AN ORGANIZED HEALTH CARE EDUCATION/TRAINING PROGRAM

## 2023-08-15 PROCEDURE — 1159F PR MEDICATION LIST DOCUMENTED IN MEDICAL RECORD: ICD-10-PCS | Mod: HCNC,CPTII,S$GLB, | Performed by: STUDENT IN AN ORGANIZED HEALTH CARE EDUCATION/TRAINING PROGRAM

## 2023-08-15 PROCEDURE — 3288F FALL RISK ASSESSMENT DOCD: CPT | Mod: HCNC,CPTII,S$GLB, | Performed by: STUDENT IN AN ORGANIZED HEALTH CARE EDUCATION/TRAINING PROGRAM

## 2023-08-15 PROCEDURE — 83540 ASSAY OF IRON: CPT | Mod: HCNC | Performed by: STUDENT IN AN ORGANIZED HEALTH CARE EDUCATION/TRAINING PROGRAM

## 2023-08-15 PROCEDURE — 99214 PR OFFICE/OUTPT VISIT, EST, LEVL IV, 30-39 MIN: ICD-10-PCS | Mod: HCNC,S$GLB,, | Performed by: STUDENT IN AN ORGANIZED HEALTH CARE EDUCATION/TRAINING PROGRAM

## 2023-08-15 NOTE — PROGRESS NOTES
"Plan:      Nida was seen today for follow-up.    Diagnoses and all orders for this visit:    Macrocytic anemia  -     Vitamin B12; Future  -     IRON AND TIBC; Future  -     FERRITIN; Future    Chronic bilateral low back pain without sciatica  -     Ambulatory referral/consult to Physical/Occupational Therapy; Future    Anterolisthesis of lumbar spine  -     Ambulatory referral/consult to Physical/Occupational Therapy; Future    Primary insomnia: Pt elects to trial higher dose of mirtazapine before switching medications - recommended trial of 15 mg qhs.       Follow up in about 3 months (around 11/15/2023), or if symptoms worsen or fail to improve.    Sherry Garzon MD  08/15/2023    Subjective:      Patient ID: Nida Kline is a 88 y.o. female    Chief Complaint   Patient presents with    Follow-up     HPI  88 y.o. female with a PMHx as documented below presents to clinic today for the following:    Pt reports trouble falling asleep and staying asleep. Trial of mirtazapine 7.5 mg daily - per patient, ineffective.     Pt reports history of chronic bilateral low back pain w/o sciatica.     Xray lumbar spine 6/12/23: There is a grade I anterolisthesis of L5 on S1.  There is a grade I retrolisthesis of L3 on L4.  No acute lumbar compression fracture or osseous destructive process.  There is degenerative facet arthropathy present in the lower lumbar spine at L5-S1.  There is degenerative change of the symphysis pubis.     HTN:   - Losartan 25 mg daily, Toprol-XL 25 mg      HLD:   - Lipitor 20 mg daily     Diastolic dysfunction:   - TTE (2012) w/ "hyperdynamic left ventricular function (EF 75%), eccentric hypertrophy, diastolic dysfunction, trivial to mild mitral regurg, mild tricuspid regurg"      Bilateral carotid artery disease:   - Bilateral carotid artery US (2014) w/ "20 - 39% right internal carotid stenosis, 80 - 99% left internal carotid stenosis"  - S/p left carotid endarterectomy (2015)  - ASA, statin   "   Aortic atherosclerosis:   - ASA, statin     CKD stage 3:   - GFR 54.5 (4/4/23), stable     Rheumatoid arthritis:   - Methotrexate 10 mg weekly  - Folic acid 1 mg daily     Hypothyroidism:   - Synthroid 62.5 mcg daily      Vit D deficiency:   - Daily vit D supplementation     GERD:   - Omeprazole 20 mg daily      Osteoporosis:   - Daily calcium/vit D supplementation  - Fosamax 70 mg weekly    ROS  Constitutional:  Negative for chills and fever.   Respiratory:  Negative for shortness of breath.    Cardiovascular:  Negative for chest pain.   Gastrointestinal:  Negative for abdominal pain, constipation, diarrhea, nausea and vomiting.     Current Outpatient Medications   Medication Instructions    alendronate (FOSAMAX) 70 mg, Oral, Every 7 days    ascorbic acid (vitamin C) (VITAMIN C) 500 mg, Oral, Daily    aspirin (ECOTRIN) 81 mg, Oral, Once    atorvastatin (LIPITOR) 20 MG tablet TAKE 1 TABLET EVERY DAY    busPIRone (BUSPAR) 15 MG tablet No dose, route, or frequency recorded.    calcium carbonate/vitamin D3 (CALTRATE 600 + D ORAL) Oral, Daily, Caltrate 600+D3     folic acid (FOLVITE) 1 mg, Oral, Daily    levothyroxine (SYNTHROID) 62.5 mcg, Oral, Daily    losartan (COZAAR) 25 MG tablet TAKE 1 TABLET EVERY DAY    magnesium oxide 400 mg Cap 2 tablets, Oral, Daily    methotrexate 10 mg, Oral, Every 7 days    metoprolol succinate (TOPROL-XL) 25 MG 24 hr tablet TAKE 1/2 TABLET EVERY DAY    mirtazapine (REMERON) 7.5 mg, Oral, Nightly    omeprazole (PRILOSEC) 20 MG capsule TAKE 1 CAPSULE EVERY DAY AS NEEDED FOR REFLUX    vitamin D (VITAMIN D3) 1,000 Units, Oral, 2 times daily    vitamin renal formula, B-complex-vitamin c-folic acid, (NEPHROCAP) 1 mg Cap 1 capsule, Oral, Daily, Generic.      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    Aortic atherosclerosis 11/16/2017    Back pain     Bilateral carotid artery disease 6/7/2016    Cataract extraction status of eye     Dermatochalasis of eyelid 6/7/2016  "   Diastolic dysfunction 5/21/2013    Essential hypertension 6/7/2016    Eye refraction disorder 6/7/2016    Gallbladder disease     GERD (gastroesophageal reflux disease)     HTN (hypertension)     Hypothyroidism     Mixed hyperlipidemia     Osteoporosis     Overweight (BMI 25.0-29.9)     PONV (postoperative nausea and vomiting)     Posterior vitreous detachment 6/7/2016    Pseudophakia of both eyes 6/7/2016    Pure hypercholesterolemia     S/P carotid endarterectomy 6/7/2016    Squamous cell carcinoma     Thyroid disease     hypothyroidism    Tinea pedis of both feet 5/15/2017    Trouble in sleeping     Vitamin D deficiency       Objective:      Vitals:    08/15/23 1044   BP: 124/76   BP Location: Right arm   Patient Position: Sitting   Pulse: 77   Resp: 18   SpO2: 96%   Weight: 75.6 kg (166 lb 10.7 oz)   Height: 5' 4" (1.626 m)     Body mass index is 28.61 kg/m².    Physical Exam   Constitutional:       General: No acute distress.  HENT:      Head: Normocephalic and atraumatic.   Pulmonary:      Effort: Pulmonary effort is normal. No respiratory distress.   Neurological:      General: No focal deficit present.      Mental Status: Alert and oriented to person, place, and time. Mental status is at baseline.    Assessment:       1. Macrocytic anemia    2. Chronic bilateral low back pain without sciatica    3. Anterolisthesis of lumbar spine    4. Primary insomnia        Sherry Garzon MD  [facility]  Office: [phone]   Fax: [phone]  08/15/2023      Disclaimer: This note was partly generated using dictation software which may occasionally result in transcription errors.    Total time spent on this encounter includes face to face time and non-face to face time preparing to see the patient (eg, review of tests), obtaining and/or reviewing separately obtained history, documenting clinical information in the electronic or other health record, independently interpreting results, " and communicating results to the patient/family/caregiver, or care coordinator.

## 2023-08-22 ENCOUNTER — HOSPITAL ENCOUNTER (OUTPATIENT)
Dept: RADIOLOGY | Facility: HOSPITAL | Age: 88
Discharge: HOME | End: 2023-08-22
Attending: INTERNAL MEDICINE
Payer: MEDICARE

## 2023-08-22 DIAGNOSIS — I65.23 BILATERAL CAROTID ARTERY STENOSIS: ICD-10-CM

## 2023-08-22 DIAGNOSIS — Z98.890 S/P CAROTID ENDARTERECTOMY: ICD-10-CM

## 2023-08-22 DIAGNOSIS — I70.0 AORTIC ATHEROSCLEROSIS: ICD-10-CM

## 2023-08-22 PROCEDURE — 93880 EXTRACRANIAL BILAT STUDY: CPT | Mod: TC,HCNC,PO

## 2023-08-22 PROCEDURE — 93880 US CAROTID BILATERAL: ICD-10-PCS | Mod: 26,HCNC,, | Performed by: RADIOLOGY

## 2023-08-22 PROCEDURE — 93880 EXTRACRANIAL BILAT STUDY: CPT | Mod: 26,HCNC,, | Performed by: RADIOLOGY

## 2023-09-05 DIAGNOSIS — F41.9 ANXIETY: ICD-10-CM

## 2023-09-06 RX ORDER — BUSPIRONE HYDROCHLORIDE 15 MG/1
TABLET ORAL
Qty: 90 TABLET | Refills: 0 | Status: SHIPPED | OUTPATIENT
Start: 2023-09-06 | End: 2024-04-02

## 2023-09-11 ENCOUNTER — CLINICAL SUPPORT (OUTPATIENT)
Dept: REHABILITATION | Facility: HOSPITAL | Age: 88
End: 2023-09-11
Attending: STUDENT IN AN ORGANIZED HEALTH CARE EDUCATION/TRAINING PROGRAM
Payer: MEDICARE

## 2023-09-11 DIAGNOSIS — R68.89 DECREASED ACTIVITY TOLERANCE: ICD-10-CM

## 2023-09-11 DIAGNOSIS — G89.29 CHRONIC BILATERAL LOW BACK PAIN WITHOUT SCIATICA: ICD-10-CM

## 2023-09-11 DIAGNOSIS — M62.81 MUSCLE WEAKNESS OF LOWER EXTREMITY: ICD-10-CM

## 2023-09-11 DIAGNOSIS — M53.86 DECREASED RANGE OF MOTION OF INTERVERTEBRAL DISCS OF LUMBAR SPINE: ICD-10-CM

## 2023-09-11 DIAGNOSIS — M43.16 ANTEROLISTHESIS OF LUMBAR SPINE: ICD-10-CM

## 2023-09-11 DIAGNOSIS — R26.89 ANTALGIC GAIT: Primary | ICD-10-CM

## 2023-09-11 DIAGNOSIS — M54.50 CHRONIC BILATERAL LOW BACK PAIN WITHOUT SCIATICA: ICD-10-CM

## 2023-09-11 PROCEDURE — 97112 NEUROMUSCULAR REEDUCATION: CPT | Mod: HCNC,PN

## 2023-09-11 PROCEDURE — 97162 PT EVAL MOD COMPLEX 30 MIN: CPT | Mod: HCNC,PN

## 2023-09-12 PROBLEM — R26.89 ANTALGIC GAIT: Status: ACTIVE | Noted: 2023-09-12

## 2023-09-12 PROBLEM — M62.81 MUSCLE WEAKNESS OF LOWER EXTREMITY: Status: ACTIVE | Noted: 2023-09-12

## 2023-09-12 PROBLEM — R68.89 DECREASED ACTIVITY TOLERANCE: Status: ACTIVE | Noted: 2023-09-12

## 2023-09-12 PROBLEM — M53.86 DECREASED RANGE OF MOTION OF INTERVERTEBRAL DISCS OF LUMBAR SPINE: Status: ACTIVE | Noted: 2023-09-12

## 2023-09-12 NOTE — PLAN OF CARE
[facility]   Physical Therapy Initial Evaluation      Name: Nida Potter  Clinic Number: [id number]    Therapy Diagnosis:   Encounter Diagnoses   Name Primary?    Chronic bilateral low back pain without sciatica     Anterolisthesis of lumbar spine     Decreased range of motion of intervertebral discs of lumbar spine     Muscle weakness of lower extremity     Decreased activity tolerance     Antalgic gait Yes        Physician: Sherry Garzon MD    Physician Orders: Evaluate and Treat  Medical Diagnosis from Referral:   M54.50,G89.29 (ICD-10-CM) - Chronic bilateral low back pain without sciatica   M43.16 (ICD-10-CM) - Anterolisthesis of lumbar spine     Evaluation Date: 9/11/2023  Authorization Period Expiration: 8/15/2025  Plan of Care Expiration: 12/10/2023  Progress Note Due: 10/10/2023  Visit # / Visits authorized: 1/ 20 (Plan of Care 1/12)  FOTO: 1/3    Precautions: Standard     Time In: 1430  Time Out: 1515  Total Appointment Time (timed & untimed codes): 45 minutes    Subjective     Date of onset: chronic low back pain with acute exacerbation     History of current condition - Nida reports: increasing low back pain with standing and walking. Her pain increases immediately when getting up to walk or to stand. Onset is just a few seconds and she can only tolerate < 1 minute before having to sit down to decrease pain. No know mechanism of injury. No pain into bilateral LE but does report knee pain.       Imaging: x-ray  The bones are osteopenic.  There is a grade I anterolisthesis of L5 on S1.  There is a grade I retrolisthesis of L3 on L4.  No acute lumbar compression fracture or osseous destructive process.  There is degenerative facet arthropathy present in the lower lumbar spine at L5-S1.  There is degenerative change of the symphysis pubis.  There are vascular calcifications present within the abdominal aorta and common iliac arteries.  There are surgical clips present in  the right upper quadrant of the abdomen which may relate to previous cholecystectomy.    Prior Therapy: none  Social History: lives alone, single story, family lives close by and are available as needed.   Occupation: retired   Prior Level of Function: independent with home management, yard work, able to travel and tube through caves.   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    Aortic atherosclerosis 11/16/2017    Back pain     Bilateral carotid artery disease 6/7/2016    Cataract extraction status of eye     Dermatochalasis of eyelid 6/7/2016    Diastolic dysfunction 5/21/2013    Essential hypertension 6/7/2016    Eye refraction disorder 6/7/2016    Gallbladder disease     GERD (gastroesophageal reflux disease)     HTN (hypertension)     Hypothyroidism     Mixed hyperlipidemia     Osteoporosis     Overweight (BMI 25.0-29.9)     PONV (postoperative nausea and vomiting)     Posterior vitreous detachment 6/7/2016    Pseudophakia of both eyes 6/7/2016    Pure hypercholesterolemia     S/P carotid endarterectomy 6/7/2016    Squamous cell carcinoma     Thyroid disease     hypothyroidism    Tinea pedis of both feet 5/15/2017    Trouble in sleeping     Vitamin D deficiency        Surgical History:   Nida Kline  has a past surgical history that includes Total abdominal hysterectomy w/ bilateral salpingoophorectomy; Cholecystectomy; Cataract extraction; Hysterectomy; and Carotid endarterectomy (Left, 2015).    Medications:   Nida has a  "current medication list which includes the following prescription(s): alendronate, ascorbic acid (vitamin c), aspirin, atorvastatin, buspirone, calcium carbonate/vitamin d3, folic acid, levothyroxine, losartan, magnesium oxide, methotrexate, metoprolol succinate, mirtazapine, omeprazole, vitamin d, and vitamin renal formula (b-complex-vitamin c-folic acid).    Allergies:   Review of patient's allergies indicates:   Allergen Reactions    Lunesta [eszopiclone]      Paresthesia, lip swell     Propofol analogues Nausea And Vomiting        Objective      Structural/Postural Inspection:       Increased lumbar extension with step noted in standing at ~ L4. Increased innominate anterior rotation with elevated left iliac crest.     Standing tolerance before onset of pain 1'15"  Walking tolerance before onset of pain 42"    Lumbar Range of Motion (AROM):     Lumbar AROM (Degrees) Comments   Flexion 50% Decreased pain    Extension 25% Increased pain    Right Side Bend 25%    Left Side Bend 25%    Right Rotation 25%    Left Rotation 25%      Hip Range of Motion (AROM):   WFL for all planes    Flexibility:     Lumbar/Hip/Knee Right  Left  Comments   Favian Test + +    Hamstring Length (SLR) 45 45      Strength Testing:     Right LE   Left LE     Hip internal rotation:  3+ Hip internal rotation:  3+   Hip external rotation: 3+ Hip external rotation:  3+   Hip flexion: 3+ Hip flexion: 3+   Hip extension:  3+ Hip extension: 3+   Hip abduction: 3+ Hip abduction: 3+   Hip adduction: 3+ Hip adduction 3+   Knee flexion: 3+ Knee flexion: 3+   Knee extension: 3+ Knee extension: 3+   Ankle dorsiflexion: 3+ Ankle dorsiflexion: 3+   Ankle plantarflexion: 3+ Ankle plantarflexion: 3+      Lumbar Strength Result   BET 3+   Isometric abdominal endurance test 3   Isometric rotational oblique endurance test 3-     Special Tests:     Lumbar/SI Results Comments   Repeated Flexion  Decreased pain     Repeated Extension  Not performed   "   Piriformis Test -    S[place]ral [place] +    Spurlings Test +    Prone Instability +    SI provocation testing -      Neuro Dynamic Testing:     Neuro Testing Right  Left Comments   Seated Slump - -    SLR (Sciatic) - -         Sensation:   Intact    Joint Mobility:    noted left anterior innominate, corrected following push on left (no pull on right)    Palpation:  TTP to left ASIS, bilateral lumbar paraspinals    Intake Outcome Measure for FOTO Oswestry Low Back Pain Survey    Therapist reviewed FOTO scores for Nida Kline on 9/11/2023.   FOTO report - see Medial section or FOTO account episode details.     Intake Score: 51%         Treatment     Total Treatment time (time-based codes) separate from Evaluation: 10 minutes     neuromuscular re-education activities to improve: Kinesthetic, Sense, Proprioception, and Posture for 10 minutes. The following activities were included:    Double knee to chest 2 x 10  MET for left AI - performed push on left only  Supine posterior pelvic tilt x 10    Provided 8mm shoe insert for right    - able to ambulate for 3 minutes following treatment and with shoe insert. Discontinued due to knee pain    Patient Education and Home Exercises     Education provided:   - HEP review  - shoe insert wear   - standing posture     Written Home Exercises Provided: yes. Exercises were reviewed and Nida was able to demonstrate them prior to the end of the session.  Nida demonstrated good  understanding of the education provided. See EMR under Patient Instructions for exercises provided during therapy sessions.    Assessment     Nida is a 88 y.o. female referred to outpatient Physical Therapy with a medical diagnosis of chronic low back pain without sciatica. Patient presents with increased lumbar lordosis in standing and anterior rotated innominate on left increasing symptoms during standing and walking. She has general LE strength and decreased activity tolerance along with  unassociated bilateral knee pain. She had improved symptoms following treatment and was able to walk farther with shoe insert. She will continue to benefit from skilled physical therapy to address her low back pain in order for the patient to stand and walk with less limitations.     Patient prognosis is Fair.   Patient will benefit from skilled outpatient Physical Therapy to address the deficits stated above and in the chart below, provide patient /family education, and to maximize patientt's level of independence.     Plan of care discussed with patient: Yes  Patient's spiritual, cultural and educational needs considered and patient is agreeable to the plan of care and goals as stated below:     Anticipated Barriers for therapy: advanced age, PMH, chronicity of pain, pathology per imaging     Medical Necessity is demonstrated by the following  History  Co-morbidities and personal factors that may impact the plan of care [] LOW: no personal factors / co-morbidities  [x] MODERATE: 1-2 personal factors / co-morbidities  [] HIGH: 3+ personal factors / co-morbidities    Moderate / High Support Documentation:   Co-morbidities affecting plan of care: high BMI, osteoporosis, hx of hysterectomy     Personal Factors:   age  lifestyle     Examination  Body Structures and Functions, activity limitations and participation restrictions that may impact the plan of care [] LOW: addressing 1-2 elements  [x] MODERATE: 3+ elements  [] HIGH: 4+ elements (please support below)    Moderate / High Support Documentation: postural imbalance, lumbar weakness and decreased range of motion, antalgic gait.      Clinical Presentation [] LOW: stable  [x] MODERATE: Evolving  [] HIGH: Unstable     Decision Making/ Complexity Score: moderate       Goals:  Short Term Goals: 2 weeks   Patient will be independent with initial HEP for symptoms management   Pt to report low back pain at worst 7/10  Pt able to stand > 3 minutes to facilitate meal  prep  Pt able to walk > 5 minutes to facilitate community mobility  Pt to increased lumbar range of motion > = 50% for all planes    Long Term Goals: 6 weeks   Patient will increase Oswestry Low Back intake score to >=59  Pt will increase BLE strength to > = 4/5 for all deficient areas to facilitate ease of lifting  Pt will be able to stand > =7 minutes in order to perform meal prep  Pt will be able to walk independently > 8 minutes to facilitate community mobility   Pt will demonstrate independence with low back pain management HEP    Plan     Plan of care Certification: 9/11/2023 to 12/10/2023.    Outpatient Physical Therapy 2 times weekly for 6 weeks to include the following interventions: Gait Training, Manual Therapy, Moist Heat/ Ice, Neuromuscular Re-ed, Orthotic Management and Training, Patient Education, Self Care, Therapeutic Activities, Therapeutic Exercise, and Ultrasound.     Ena Tabor, PT

## 2023-09-26 ENCOUNTER — CLINICAL SUPPORT (OUTPATIENT)
Dept: REHABILITATION | Facility: HOSPITAL | Age: 88
End: 2023-09-26
Payer: MEDICARE

## 2023-09-26 DIAGNOSIS — M53.86 DECREASED RANGE OF MOTION OF INTERVERTEBRAL DISCS OF LUMBAR SPINE: Primary | ICD-10-CM

## 2023-09-26 DIAGNOSIS — R26.89 ANTALGIC GAIT: ICD-10-CM

## 2023-09-26 DIAGNOSIS — M62.81 MUSCLE WEAKNESS OF LOWER EXTREMITY: ICD-10-CM

## 2023-09-26 DIAGNOSIS — R68.89 DECREASED ACTIVITY TOLERANCE: ICD-10-CM

## 2023-09-26 PROCEDURE — 97110 THERAPEUTIC EXERCISES: CPT | Mod: HCNC,PN,CQ

## 2023-09-26 PROCEDURE — 97140 MANUAL THERAPY 1/> REGIONS: CPT | Mod: HCNC,PN,CQ

## 2023-09-26 PROCEDURE — 97112 NEUROMUSCULAR REEDUCATION: CPT | Mod: HCNC,PN,CQ

## 2023-09-26 NOTE — PROGRESS NOTES
"[facility]   Physical Therapy Treatment Note      Name: Nida WELCH Formerly [facility], 1928–1983  Clinic Number: [id number]    Therapy Diagnosis:   Encounter Diagnoses   Name Primary?    Decreased range of motion of intervertebral discs of lumbar spine Yes    Muscle weakness of lower extremity     Decreased activity tolerance     Antalgic gait      Physician: Sherry Garzon MD    Visit Date: 9/26/2023      Physician Orders: Evaluate and Treat  Medical Diagnosis from Referral:   M54.50,G89.29 (ICD-10-CM) - Chronic bilateral low back pain without sciatica   M43.16 (ICD-10-CM) - Anterolisthesis of lumbar spine      Evaluation Date: 9/11/2023  Authorization Period Expiration: 12/31/2023  Plan of Care Expiration: 12/10/2023  Progress Note Due: 10/10/2023  Visit # / Visits authorized: 2/ 40 (Plan of Care 2/12)  FOTO: 1/3     Precautions: Standard      Time In: 1325   Time Out: 1405  Total Time: 40 minutes  Total Billable Time: 40 minutes    PTA Visit #: 1/5       Subjective     Patient reports: pain with initial walking at right knee 8-9/10, back pain at 6/10. Improves with continued walking. No pain at rest. Right knee pain started Saturday and using icy hot at home. Not wearing heel lift 2* frequently changing shoes.   .  She was not compliant with home exercise program.   Response to previous treatment: no complaints  Functional change: none stated    Pain: 8-9/10 right knee and 6/10 low back with walking, 0/10 at rest  Location: right knee, bilateral LB    Objective        At evaluation: Increased lumbar extension with step noted in standing at ~ L4. Increased innominate anterior rotation with elevated left iliac crest.      Standing tolerance before onset of pain 1'15"  Walking tolerance before onset of pain 42"    Treatment     Nida received the treatments listed below:      therapeutic exercises to develop strength, endurance, ROM, flexibility, posture, and core stabilization for 08 minutes " including:  Bridges x 10  LTR x 20    manual therapy techniques: Soft tissue Mobilization were applied to the: bilateral LB for 15 minutes, including:  STM anterior pelvis and hips, piriformis    neuromuscular re-education activities to improve: Kinesthetic, Sense, Proprioception, and Posture for 17 minutes. The following activities were included:     Double knee to chest 2 x 10  MET for left AI - performed push on left only  Supine posterior pelvic tilt x 20     Provided 8mm shoe insert for right 9/11/2023     NP-- able to ambulate for 3 minutes following treatment and with shoe insert. Discontinued due to knee pain    therapeutic activities to improve functional performance for 00  minutes, including:        Patient Education and Home Exercises       Education provided:   - Educated pt that he/she may feel soreness after session.      Written Home Exercises Provided: Instructed patient to continue current home exercise program.     Exercises were reviewed and Nida was able to demonstrate them prior to the end of the session.  Nida demonstrated good  understanding of the education provided. See Electronic Medical Record under Patient Instructions for exercises provided during therapy sessions    Assessment     Increased right knee pain on Saturday which has persisted since, only while walking. Pelvic dysfunction today which was corrected with manual therapy. Progressed strengthening and stabilization without pain provocation and pain resolved after session, although pt is guarded and fear avoidant.  Will benefit from continued physical therapy intervention to progress toward goals set forth in plan of care to improve functional mobility and quality of life.     Nida Is progressing well towards her goals.   Patient prognosis is Fair.     Patient will continue to benefit from skilled outpatient physical therapy to address the deficits listed in the problem list box on initial evaluation, provide pt/family  education and to maximize pt's level of independence in the home and community environment.     Patient's spiritual, cultural and educational needs considered and pt agreeable to plan of care and goals.     Anticipated barriers to physical therapy: advanced age, PMH, chronicity of pain, pathology per imaging        Goals:   Short Term Goals: 2 weeks   ongoing  Patient will be independent with initial HEP for symptoms management   Pt to report low back pain at worst 7/10  Pt able to stand > 3 minutes to facilitate meal prep  Pt able to walk > 5 minutes to facilitate community mobility  Pt to increased lumbar range of motion > = 50% for all planes     Long Term Goals: 6 weeks    ongoing  Patient will increase Oswestry Low Back intake score to >=59  Pt will increase BLE strength to > = 4/5 for all deficient areas to facilitate ease of lifting  Pt will be able to stand > =7 minutes in order to perform meal prep  Pt will be able to walk independently > 8 minutes to facilitate community mobility   Pt will demonstrate independence with low back pain management HEP    Plan     Continue per POC, progressing as appropriate to achieve stated goals.    Continue with: Plan of care Certification: 9/11/2023 to 12/10/2023.     Outpatient Physical Therapy 2 times weekly for 6 weeks to include the following interventions: Gait Training, Manual Therapy, Moist Heat/ Ice, Neuromuscular Re-ed, Orthotic Management and Training, Patient Education, Self Care, Therapeutic Activities, Therapeutic Exercise, and Ultrasound.       Amy Mejia, PTA

## 2023-10-02 NOTE — PROGRESS NOTES
"[facility]   Physical Therapy Treatment Note      Name: Nida Kline  Clinic Number: [id number]    Therapy Diagnosis:   Encounter Diagnoses   Name Primary?    Decreased range of motion of intervertebral discs of lumbar spine Yes    Muscle weakness of lower extremity     Decreased activity tolerance     Antalgic gait        Physician: Sherry Garzon MD    Visit Date: 10/3/2023      Physician Orders: Evaluate and Treat  Medical Diagnosis from Referral:   M54.50,G89.29 (ICD-10-CM) - Chronic bilateral low back pain without sciatica   M43.16 (ICD-10-CM) - Anterolisthesis of lumbar spine      Evaluation Date: 9/11/2023  Authorization Period Expiration: 12/31/2023  Plan of Care Expiration: 12/10/2023  Progress Note Due: 10/10/2023  Visit # / Visits authorized: 3/ 40 (Plan of Care 3/12)  FOTO: 1/3     Precautions: Standard      Time In: 1522   Time Out: 1611   Total Time: 49 minutes  Total Billable Time: 49 minutes    PTA Visit #: 2/5       Subjective     Patient reports: posterior right knee discomfort lately and has been icing it. No pain at rest. Right knee has been so painful that she hasn't noticed the back pain. Right knee pain     She was somewhat compliant with home exercise program. Every other day.  Response to previous treatment: no complaints  Functional change: none stated    Pain: 5/10 right knee and 0/10 low back with walking, 0/10 at rest  Location: right knee, bilateral LB    Objective      Pelvic symmetry today.    At evaluation: Increased lumbar extension with step noted in standing at ~ L4. Increased innominate anterior rotation with elevated left iliac crest.      Standing tolerance before onset of pain 1'15"  Walking tolerance before onset of pain 42"    Treatment     Nida received the treatments listed below:      therapeutic exercises to develop strength, endurance, ROM, flexibility, posture, and core stabilization for 08 minutes including:  Bridges 2 x 10  LTR x " 20    manual therapy techniques: Soft tissue Mobilization were applied to the: bilateral LB for 08 minutes, including:  STM anterior pelvis and hips, piriformis-NP  STM right distal hamstrings    neuromuscular re-education activities to improve: Kinesthetic, Sense, Proprioception, and Posture for 33 minutes. The following activities were included:     Double knee to chest 2 x 10  Single knee to chest x 10  MET for left AI - performed push on left only  Supine posterior pelvic tilt x 20  Bent knee fall out x 20  Hip adduction isometrics with core bracing x 20     Provided 8mm shoe insert for right 9/11/2023     NP-- able to ambulate for 3 minutes following treatment and with shoe insert. Discontinued due to knee pain    therapeutic activities to improve functional performance for 00  minutes, including:        Patient Education and Home Exercises       Education provided:   - Educated pt that he/she may feel soreness after session.      Written Home Exercises Provided: Instructed patient to continue current home exercise program.     Exercises were reviewed and Nida was able to demonstrate them prior to the end of the session.  Nida demonstrated good  understanding of the education provided. See Electronic Medical Record under Patient Instructions for exercises provided during therapy sessions    Assessment     Improved lower back pain but continued left knee pain. Symmetrical pelvis today. Performed manual therapy to decreased tightness and irritation at right knee. Progressed strengthening and stabilization with manual and verbal cues for proper form and execution. Decreased pain reported after session at 0/10.  Educated pt that he/she may feel soreness after session.  Will benefit from continued physical therapy intervention to progress toward goals set forth in plan of care to improve functional mobility and quality of life.     Will benefit from continued physical therapy intervention to progress toward  goals set forth in plan of care to improve functional mobility and quality of life.     Nida Is progressing well towards her goals.   Patient prognosis is Fair.     Patient will continue to benefit from skilled outpatient physical therapy to address the deficits listed in the problem list box on initial evaluation, provide pt/family education and to maximize pt's level of independence in the home and community environment.     Patient's spiritual, cultural and educational needs considered and pt agreeable to plan of care and goals.     Anticipated barriers to physical therapy: advanced age, PMH, chronicity of pain, pathology per imaging        Goals:   Short Term Goals: 2 weeks   ongoing  Patient will be independent with initial HEP for symptoms management   Pt to report low back pain at worst 7/10  Pt able to stand > 3 minutes to facilitate meal prep  Pt able to walk > 5 minutes to facilitate community mobility  Pt to increased lumbar range of motion > = 50% for all planes     Long Term Goals: 6 weeks    ongoing  Patient will increase Oswestry Low Back intake score to >=59  Pt will increase BLE strength to > = 4/5 for all deficient areas to facilitate ease of lifting  Pt will be able to stand > =7 minutes in order to perform meal prep  Pt will be able to walk independently > 8 minutes to facilitate community mobility   Pt will demonstrate independence with low back pain management HEP    Plan     Continue per POC, progressing as appropriate to achieve stated goals.    Continue with: Plan of care Certification: 9/11/2023 to 12/10/2023.     Outpatient Physical Therapy 2 times weekly for 6 weeks to include the following interventions: Gait Training, Manual Therapy, Moist Heat/ Ice, Neuromuscular Re-ed, Orthotic Management and Training, Patient Education, Self Care, Therapeutic Activities, Therapeutic Exercise, and Ultrasound.       Amy Mejia, PTA

## 2023-10-03 ENCOUNTER — CLINICAL SUPPORT (OUTPATIENT)
Dept: REHABILITATION | Facility: HOSPITAL | Age: 88
End: 2023-10-03
Payer: MEDICARE

## 2023-10-03 DIAGNOSIS — R68.89 DECREASED ACTIVITY TOLERANCE: ICD-10-CM

## 2023-10-03 DIAGNOSIS — R26.89 ANTALGIC GAIT: ICD-10-CM

## 2023-10-03 DIAGNOSIS — M53.86 DECREASED RANGE OF MOTION OF INTERVERTEBRAL DISCS OF LUMBAR SPINE: Primary | ICD-10-CM

## 2023-10-03 DIAGNOSIS — M62.81 MUSCLE WEAKNESS OF LOWER EXTREMITY: ICD-10-CM

## 2023-10-03 PROCEDURE — 97140 MANUAL THERAPY 1/> REGIONS: CPT | Mod: HCNC,PN,CQ

## 2023-10-03 PROCEDURE — 97110 THERAPEUTIC EXERCISES: CPT | Mod: HCNC,PN,CQ

## 2023-10-03 PROCEDURE — 97112 NEUROMUSCULAR REEDUCATION: CPT | Mod: HCNC,PN,CQ

## 2023-10-10 ENCOUNTER — CLINICAL SUPPORT (OUTPATIENT)
Dept: REHABILITATION | Facility: HOSPITAL | Age: 88
End: 2023-10-10
Payer: MEDICARE

## 2023-10-10 DIAGNOSIS — M62.81 MUSCLE WEAKNESS OF LOWER EXTREMITY: ICD-10-CM

## 2023-10-10 DIAGNOSIS — R26.89 ANTALGIC GAIT: ICD-10-CM

## 2023-10-10 DIAGNOSIS — M53.86 DECREASED RANGE OF MOTION OF INTERVERTEBRAL DISCS OF LUMBAR SPINE: Primary | ICD-10-CM

## 2023-10-10 DIAGNOSIS — R68.89 DECREASED ACTIVITY TOLERANCE: ICD-10-CM

## 2023-10-10 PROCEDURE — 97112 NEUROMUSCULAR REEDUCATION: CPT | Mod: HCNC,PN

## 2023-10-10 PROCEDURE — 97110 THERAPEUTIC EXERCISES: CPT | Mod: HCNC,PN

## 2023-10-10 NOTE — PROGRESS NOTES
[facility]   Physical Therapy Treatment Note      Name: Nida WELCH [facility]  Clinic Number: [id number]    Therapy Diagnosis:   Encounter Diagnoses   Name Primary?    Decreased range of motion of intervertebral discs of lumbar spine Yes    Muscle weakness of lower extremity     Decreased activity tolerance     Antalgic gait        Physician: Sherry Garzon MD    Visit Date: 10/10/2023    Physician Orders: Evaluate and Treat  Medical Diagnosis from Referral:   M54.50,G89.29 (ICD-10-CM) - Chronic bilateral low back pain without sciatica   M43.16 (ICD-10-CM) - Anterolisthesis of lumbar spine      Evaluation Date: 9/11/2023  Authorization Period Expiration: 12/31/2023  Plan of Care Expiration: 12/10/2023  Progress Note Due: 11/10/2023  Visit # / Visits authorized: 3/ 40 (Plan of Care 3/12)  FOTO: 1/3     Precautions: Standard      Time In: 1530  Time Out: 1615   Total Time: 45 minutes  Total Billable Time: 45 minutes    PTA Visit #: 0/5       Subjective     Patient reports: continued right knee pain that is not getting better. Her back is feeling better, but her right knee limits her sit <> stand transfers and gait.     She was somewhat compliant with home exercise program. Every other day.  Response to previous treatment: no changes to knee pain   Functional change: nothing at this time due to knee pain     Pain: 5/10 right knee and 0/10 low back with walking, 0/10 at rest  Location: right knee, bilateral LB    Objective      Pelvic symmetry today.    At evaluation: Increased lumbar extension with step noted in standing at ~ L4. Increased innominate anterior rotation with elevated left iliac crest.      IT band restriction noted on right, + for Straight Leg Raise and slump testing, hamstring stretch provocative to knee pain     Treatment     Nida received the treatments listed below:      Manual therapy techniques: Soft tissue Mobilization were applied to the: bilateral LB for 10 minutes,  including:    STM during passive stretching to anterior pelvis and hips  STM during passive stretching to piriformis 3 x 10 sec  Theragun to right distal IT band  Right fibular head A/P mod grade II  Hamstring splaying during supine passive stretch  Sciatic flossing during supine passive hip flexion     Neuromuscular re-education activities to improve: Kinesthetic, Sense, Proprioception, and Posture for 25 minutes. The following activities were included:     Double knee to chest with pball 2 x 10  MET for left AI - performed push on left only  Supine posterior pelvic tilt x 20  Abdominal bracing with marches x 10 each  Abdominal bracing with bent knee fall out x 20  Abdominal bracing with Straight Leg Raise x 10 each  Hip adduction isometrics with core bracing x 20  Hook lying clamshell green TB x 20    Therapeutic exercises to develop strength, endurance, ROM, flexibility, posture, and core stabilization for 10 minutes including:    LTR x 20  Bridges 2 x 10  Seated hamstring curl green TB x 20     Provided 8mm shoe insert for right 9/11/2023     NP-- able to ambulate for 3 minutes following treatment and with shoe insert. Discontinued due to knee pain    therapeutic activities to improve functional performance for 00  minutes, including:        Patient Education and Home Exercises       Education provided:   - Educated pt that he/she may feel soreness after session.      Written Home Exercises Provided: Instructed patient to continue current home exercise program.     Exercises were reviewed and Nida was able to demonstrate them prior to the end of the session.  Nida demonstrated good  understanding of the education provided. See Electronic Medical Record under Patient Instructions for exercises provided during therapy sessions    Assessment     The patient improved right knee pain following hamstring stretch with sciatic flossing and hamstring and IT soft tissue mobilizations. She was able to walk and  perform sit <> stand with pain down to 2/10. She continues to demonstrate poor lumbar motor control and weakness. Will benefit from continued physical therapy intervention to progress toward goals set forth in plan of care to improve functional mobility and quality of life.     Will benefit from continued physical therapy intervention to progress toward goals set forth in plan of care to improve functional mobility and quality of life.     Nida Is progressing well towards her goals.   Patient prognosis is Fair.     Patient will continue to benefit from skilled outpatient physical therapy to address the deficits listed in the problem list box on initial evaluation, provide pt/family education and to maximize pt's level of independence in the home and community environment.     Patient's spiritual, cultural and educational needs considered and pt agreeable to plan of care and goals.     Anticipated barriers to physical therapy: advanced age, PMH, chronicity of pain, pathology per imaging        Goals:   Short Term Goals: 2 weeks   ongoing  Patient will be independent with initial HEP for symptoms management   Pt to report low back pain at worst 7/10  Pt able to stand > 3 minutes to facilitate meal prep  Pt able to walk > 5 minutes to facilitate community mobility  Pt to increased lumbar range of motion > = 50% for all planes     Long Term Goals: 6 weeks    ongoing  Patient will increase Oswestry Low Back intake score to >=59  Pt will increase BLE strength to > = 4/5 for all deficient areas to facilitate ease of lifting  Pt will be able to stand > =7 minutes in order to perform meal prep  Pt will be able to walk independently > 8 minutes to facilitate community mobility   Pt will demonstrate independence with low back pain management HEP    Plan     Continue per POC, progressing as appropriate to achieve stated goals.    Continue with: Plan of care Certification: 9/11/2023 to 12/10/2023.     Outpatient Physical  Therapy 2 times weekly for 6 weeks to include the following interventions: Gait Training, Manual Therapy, Moist Heat/ Ice, Neuromuscular Re-ed, Orthotic Management and Training, Patient Education, Self Care, Therapeutic Activities, Therapeutic Exercise, and Ultrasound.       Ena Tabor, PT

## 2023-10-17 ENCOUNTER — CLINICAL SUPPORT (OUTPATIENT)
Dept: REHABILITATION | Facility: HOSPITAL | Age: 88
End: 2023-10-17
Payer: MEDICARE

## 2023-10-17 DIAGNOSIS — M53.86 DECREASED RANGE OF MOTION OF INTERVERTEBRAL DISCS OF LUMBAR SPINE: Primary | ICD-10-CM

## 2023-10-17 DIAGNOSIS — R26.89 ANTALGIC GAIT: ICD-10-CM

## 2023-10-17 DIAGNOSIS — M62.81 MUSCLE WEAKNESS OF LOWER EXTREMITY: ICD-10-CM

## 2023-10-17 DIAGNOSIS — R68.89 DECREASED ACTIVITY TOLERANCE: ICD-10-CM

## 2023-10-17 PROCEDURE — 97112 NEUROMUSCULAR REEDUCATION: CPT | Mod: HCNC,PN

## 2023-10-17 PROCEDURE — 97110 THERAPEUTIC EXERCISES: CPT | Mod: HCNC,PN

## 2023-10-17 NOTE — PROGRESS NOTES
[facility]   Physical Therapy Treatment Note      Name: Nida WELCH [facility]  Clinic Number: [id number]    Therapy Diagnosis:   Encounter Diagnoses   Name Primary?    Decreased range of motion of intervertebral discs of lumbar spine Yes    Muscle weakness of lower extremity     Decreased activity tolerance     Antalgic gait        Physician: Sherry Garzon MD    Visit Date: 10/17/2023    Physician Orders: Evaluate and Treat  Medical Diagnosis from Referral:   M54.50,G89.29 (ICD-10-CM) - Chronic bilateral low back pain without sciatica   M43.16 (ICD-10-CM) - Anterolisthesis of lumbar spine      Evaluation Date: 9/11/2023  Authorization Period Expiration: 12/31/2023  Plan of Care Expiration: 12/10/2023  Progress Note Due: 11/10/2023  Visit # / Visits authorized: 4/ 40 (Plan of Care 4/12)  FOTO: 1/3     Precautions: Standard      Time In: 1520  Time Out: 1600  Total Time: 40 minutes  Total Billable Time: 40 minutes    PTA Visit #: 0/5       Subjective     Patient reports: she has been performing her hamstring stretches at home which helps her knee pain. Continued back pain with certain movements.     She was somewhat compliant with home exercise program. Every other day.  Response to previous treatment: changes to knee pain with stretches at home   Functional change: decreased knee pain during standing     Pain: 4/10 right knee and 0/10 low back with walking, 0/10 at rest  Location: right knee, bilateral LB    Objective      Pelvic symmetry today.    At evaluation: Increased lumbar extension with step noted in standing at ~ L4. Increased innominate anterior rotation with elevated left iliac crest.      IT band restriction noted on right, + for Straight Leg Raise and slump testing, hamstring stretch provocative to knee pain     Treatment     Nida received the treatments listed below:      Manual therapy techniques: Soft tissue Mobilization were applied to the: bilateral LB for 10 minutes,  including:    STM during passive stretching to anterior pelvis and hips  STM during passive stretching to piriformis 3 x 10 sec  Theragun to right distal IT band  Right fibular head A/P mod grade II  Hamstring splaying during supine passive stretch  Sciatic flossing during supine passive hip flexion   Theragun to right IT band     Neuromuscular re-education activities to improve: Kinesthetic, Sense, Proprioception, and Posture for 25 minutes. The following activities were included:     Double knee to chest with pball 2 x 10  MET for left AI - performed push on left only  Supine posterior pelvic tilt x 20  Abdominal bracing with marches x 10 each  Abdominal bracing with bent knee fall out x 20  Abdominal bracing with Straight Leg Raise x 10 each  Hip adduction isometrics with core bracing x 20  Hook lying clamshell green TB x 20  Supine lumbar rotational isometric 10 x 5 sec    Therapeutic exercises to develop strength, endurance, ROM, flexibility, posture, and core stabilization for 10 minutes including:    LTR x 20  Bridges 2 x 10  Seated hamstring curl green TB x 20     Provided 8mm shoe insert for right 9/11/2023     NP-- able to ambulate for 3 minutes following treatment and with shoe insert. Discontinued due to knee pain    therapeutic activities to improve functional performance for 00  minutes, including:        Patient Education and Home Exercises       Education provided:   - Educated pt that he/she may feel soreness after session.      Written Home Exercises Provided: Instructed patient to continue current home exercise program.     Exercises were reviewed and Nida was able to demonstrate them prior to the end of the session.  Nida demonstrated good  understanding of the education provided. See Electronic Medical Record under Patient Instructions for exercises provided during therapy sessions    Assessment     The patient demonstrates improved tolerance to therapy allowing for progression. She  continues to have difficultly with innominate motor control requiring max cuing. She will continue to benefit from continued physical therapy intervention to progress toward goals set forth in plan of care to improve functional mobility and quality of life.     Will benefit from continued physical therapy intervention to progress toward goals set forth in plan of care to improve functional mobility and quality of life.     Nida Is progressing well towards her goals.   Patient prognosis is Fair.     Patient will continue to benefit from skilled outpatient physical therapy to address the deficits listed in the problem list box on initial evaluation, provide pt/family education and to maximize pt's level of independence in the home and community environment.     Patient's spiritual, cultural and educational needs considered and pt agreeable to plan of care and goals.     Anticipated barriers to physical therapy: advanced age, PMH, chronicity of pain, pathology per imaging        Goals:   Short Term Goals: 2 weeks   ongoing  Patient will be independent with initial HEP for symptoms management   Pt to report low back pain at worst 7/10  Pt able to stand > 3 minutes to facilitate meal prep  Pt able to walk > 5 minutes to facilitate community mobility  Pt to increased lumbar range of motion > = 50% for all planes     Long Term Goals: 6 weeks    ongoing  Patient will increase Oswestry Low Back intake score to >=59  Pt will increase BLE strength to > = 4/5 for all deficient areas to facilitate ease of lifting  Pt will be able to stand > =7 minutes in order to perform meal prep  Pt will be able to walk independently > 8 minutes to facilitate community mobility   Pt will demonstrate independence with low back pain management HEP    Plan     Continue per POC, progressing as appropriate to achieve stated goals.    Continue with: Plan of care Certification: 9/11/2023 to 12/10/2023.     Outpatient Physical Therapy 2 times  weekly for 6 weeks to include the following interventions: Gait Training, Manual Therapy, Moist Heat/ Ice, Neuromuscular Re-ed, Orthotic Management and Training, Patient Education, Self Care, Therapeutic Activities, Therapeutic Exercise, and Ultrasound.       Ena Tabor, LB

## 2023-10-23 NOTE — PROGRESS NOTES
[facility]   Physical Therapy Treatment Note      Name: Nida Kline  Clinic Number: [id number]    Therapy Diagnosis:   Encounter Diagnoses   Name Primary?    Decreased range of motion of intervertebral discs of lumbar spine Yes    Muscle weakness of lower extremity     Decreased activity tolerance     Antalgic gait          Physician: Sherry Garzon MD    Visit Date: 10/24/2023    Physician Orders: Evaluate and Treat  Medical Diagnosis from Referral:   M54.50,G89.29 (ICD-10-CM) - Chronic bilateral low back pain without sciatica   M43.16 (ICD-10-CM) - Anterolisthesis of lumbar spine      Evaluation Date: 9/11/2023  Authorization Period Expiration: 12/31/2023  Plan of Care Expiration: 12/10/2023  Progress Note Due: 11/10/2023  Visit # / Visits authorized: 5/ 40 (Plan of Care 5/12)  FOTO: 2/3     Precautions: Standard      Time In: 1523   Time Out: 1621   Total Time: 58 minutes  Total Billable Time: 54 minutes    PTA Visit #: 1/5       Subjective     Patient reports: no pain at rest, 3/10 with walking bilateral low back. Right knee is doing so much better. Home exercise program twice daily for knee. "The back, I dean cheat on it." Back exercises every other day. Has difficulty with bridging on couch or bed, so she skips that exercise.     She was somewhat compliant with home exercise program. Every other day.  Response to previous treatment: changes to knee pain with stretches at home   Functional change: decreased knee pain during standing     Pain: 3/10 low back with walking, 0/10 at rest  Location: right knee, bilateral LB    Objective      Pelvic symmetry today.    At evaluation: Increased lumbar extension with step noted in standing at ~ L4. Increased innominate anterior rotation with elevated left iliac crest.      IT band restriction noted on right, + for Straight Leg Raise and slump testing, hamstring stretch provocative to knee pain     Treatment     Nida received the " treatments listed below:      Manual therapy techniques: Soft tissue Mobilization were applied to the: bilateral LB for 15 minutes, including:    STM during passive stretching to anterior pelvis and hips  STM during passive stretching to piriformis 3 x 10 sec  Theragun to right distal IT band  Right fibular head A/P mob grade II  Hamstring splaying during supine passive stretch  Sciatic flossing during supine passive hip flexion       Neuromuscular re-education activities to improve: Kinesthetic, Sense, Proprioception, and Posture for 29 minutes. The following activities were included:     Double knee to chest with pball 2 x 10  MET for left AI - performed push on left only  Supine posterior pelvic tilt x 20  Abdominal bracing with marches x 10 each  Abdominal bracing with bent knee fall out, green band x 20  Abdominal bracing with Straight Leg Raise x 10 each  Hip adduction isometrics with core bracing x 20  Hook lying clamshell green TB x 20  Supine lumbar rotational isometric 10 x 5 seconds      Therapeutic exercises to develop strength, endurance, ROM, flexibility, posture, and core stabilization for 10 minutes including:    LTR with physioball x 20  Bridges with hip adduction isometrics 2 x 10  Seated hamstring curl green TB x 20     Provided 8mm shoe insert for right 9/11/2023     NP-- able to ambulate for 3 minutes following treatment and with shoe insert. Discontinued due to knee pain    therapeutic activities to improve functional performance for 00  minutes, including:        Patient Education and Home Exercises       Education provided:   - Educated pt that he/she may feel soreness after session.      Written Home Exercises Provided: Yes, added to current home exercise program.      Exercises were reviewed and Nida was able to demonstrate them prior to the end of the session.  Nida demonstrated good  understanding of the education provided. See Electronic Medical Record under Patient Instructions  for exercises provided during therapy sessions    Assessment     Improved knee pain. Continued lower back pain, however, she has no pain currently and worst pain with walking today was 3/10. Limited low back home exercise program compliance. Progressed strengthening with good tolerance without pain provocation. Requires moderate cues for maintaining Posterior pelvic tilt with core bracing for supine exercises and seated knee flexion. Denied pain after session. Added to home exercise program and issued green band. She will continue to benefit from continued physical therapy intervention to progress toward goals set forth in plan of care to improve functional mobility and quality of life.     Will benefit from continued physical therapy intervention to progress toward goals set forth in plan of care to improve functional mobility and quality of life.     Nida Is progressing well towards her goals.   Patient prognosis is Fair.     Patient will continue to benefit from skilled outpatient physical therapy to address the deficits listed in the problem list box on initial evaluation, provide pt/family education and to maximize pt's level of independence in the home and community environment.     Patient's spiritual, cultural and educational needs considered and pt agreeable to plan of care and goals.     Anticipated barriers to physical therapy: advanced age, PMH, chronicity of pain, pathology per imaging        Goals:   Short Term Goals: 2 weeks   ongoing  Patient will be independent with initial HEP for symptoms management   Pt to report low back pain at worst 7/10  Pt able to stand > 3 minutes to facilitate meal prep  Pt able to walk > 5 minutes to facilitate community mobility  Pt to increased lumbar range of motion > = 50% for all planes     Long Term Goals: 6 weeks    ongoing  Patient will increase Oswestry Low Back intake score to >=59  Pt will increase BLE strength to > = 4/5 for all deficient areas to  facilitate ease of lifting  Pt will be able to stand > =7 minutes in order to perform meal prep  Pt will be able to walk independently > 8 minutes to facilitate community mobility   Pt will demonstrate independence with low back pain management HEP    Plan     Continue per POC, progressing as appropriate to achieve stated goals.    Continue with: Plan of care Certification: 9/11/2023 to 12/10/2023.     Outpatient Physical Therapy 2 times weekly for 6 weeks to include the following interventions: Gait Training, Manual Therapy, Moist Heat/ Ice, Neuromuscular Re-ed, Orthotic Management and Training, Patient Education, Self Care, Therapeutic Activities, Therapeutic Exercise, and Ultrasound.       Amy Mejia, PTA

## 2023-10-24 ENCOUNTER — CLINICAL SUPPORT (OUTPATIENT)
Dept: REHABILITATION | Facility: HOSPITAL | Age: 88
End: 2023-10-24
Payer: MEDICARE

## 2023-10-24 DIAGNOSIS — M53.86 DECREASED RANGE OF MOTION OF INTERVERTEBRAL DISCS OF LUMBAR SPINE: Primary | ICD-10-CM

## 2023-10-24 DIAGNOSIS — M62.81 MUSCLE WEAKNESS OF LOWER EXTREMITY: ICD-10-CM

## 2023-10-24 DIAGNOSIS — R68.89 DECREASED ACTIVITY TOLERANCE: ICD-10-CM

## 2023-10-24 DIAGNOSIS — R26.89 ANTALGIC GAIT: ICD-10-CM

## 2023-10-24 PROCEDURE — 97112 NEUROMUSCULAR REEDUCATION: CPT | Mod: HCNC,PN,CQ

## 2023-10-24 PROCEDURE — 97140 MANUAL THERAPY 1/> REGIONS: CPT | Mod: HCNC,PN,CQ

## 2023-10-24 PROCEDURE — 97110 THERAPEUTIC EXERCISES: CPT | Mod: HCNC,PN,CQ

## 2023-11-10 ENCOUNTER — TELEPHONE (OUTPATIENT)
Dept: FAMILY MEDICINE | Facility: CLINIC | Age: 88
End: 2023-11-10
Payer: MEDICARE

## 2023-11-10 ENCOUNTER — TELEPHONE (OUTPATIENT)
Dept: RHEUMATOLOGY | Facility: CLINIC | Age: 88
End: 2023-11-10
Payer: MEDICARE

## 2023-11-10 DIAGNOSIS — E83.42 HYPOMAGNESEMIA: ICD-10-CM

## 2023-11-10 DIAGNOSIS — M81.0 OSTEOPOROSIS, UNSPECIFIED OSTEOPOROSIS TYPE, UNSPECIFIED PATHOLOGICAL FRACTURE PRESENCE: Chronic | ICD-10-CM

## 2023-11-10 DIAGNOSIS — E66.3 OVERWEIGHT (BMI 25.0-29.9): ICD-10-CM

## 2023-11-10 DIAGNOSIS — E61.1 IRON DEFICIENCY: ICD-10-CM

## 2023-11-10 DIAGNOSIS — E03.9 ACQUIRED HYPOTHYROIDISM: Chronic | ICD-10-CM

## 2023-11-10 DIAGNOSIS — D75.89 MACROCYTOSIS WITHOUT ANEMIA: ICD-10-CM

## 2023-11-10 DIAGNOSIS — E55.9 VITAMIN D DEFICIENCY: Primary | Chronic | ICD-10-CM

## 2023-11-10 NOTE — TELEPHONE ENCOUNTER
----- Message from Angelina Stewart sent at 11/9/2023  4:15 PM CST -----  Type:  Reschedule Appointment Request    Caller is requesting to reschedule appointment.      Name of Caller:  Pt    When is the first available appointment?  Has appt 11/16    Symptoms:  f/u    Would the patient rather a call back or a response via [facility]?  Call back    Best Call Back Number:  [phone]    Additional Information:  Shows appt needs to be rescheduled 11/16 and she also wants to get labs done before appt.  Please call back to advise. Thanks!

## 2023-11-10 NOTE — TELEPHONE ENCOUNTER
Called patient and rescheduled appointment. Patient is wondering if she needs lab work done before appointment

## 2023-11-10 NOTE — TELEPHONE ENCOUNTER
Left message regarding reschedule appt  ----- Message from Sherry Wagner sent at 11/10/2023  1:34 PM CST -----  Regarding: Appointment Reschedule Request  Contact: patient at [phone]  Type:  Appointment Reschedule Request    Name of Caller:  patient at [phone]    Additional Information:  calling to get appointment on Frid 12/8 reschedule due to another conflict. Please call and advise. Thank you

## 2023-11-13 ENCOUNTER — TELEPHONE (OUTPATIENT)
Dept: RHEUMATOLOGY | Facility: CLINIC | Age: 88
End: 2023-11-13
Payer: MEDICARE

## 2023-11-13 NOTE — TELEPHONE ENCOUNTER
Needed her appt on 12/8 changed, was able to reschedule her appt. States understanding  ----- Message from Debbie Pepper LPN sent at 11/10/2023  4:26 PM CST -----    ----- Message -----  From: Alis Chandler  Sent: 11/10/2023   4:09 PM CST  To: Teressa Lynn Staff    Type:  Patient Returning Call    Who Called: the patient  Who Left Message for Patient:Debbie  Does the patient know what this is regarding?:yes  Would the patient rather a call back or a response via [facility]? call back   Best Call Back Number:[phone]   Additional Information: Thanks

## 2023-11-14 ENCOUNTER — OFFICE VISIT (OUTPATIENT)
Dept: FAMILY MEDICINE | Facility: CLINIC | Age: 88
End: 2023-11-14
Payer: MEDICARE

## 2023-11-14 VITALS
BODY MASS INDEX: 29.24 KG/M2 | SYSTOLIC BLOOD PRESSURE: 130 MMHG | DIASTOLIC BLOOD PRESSURE: 70 MMHG | HEIGHT: 64 IN | HEART RATE: 60 BPM | OXYGEN SATURATION: 96 % | WEIGHT: 171.31 LBS

## 2023-11-14 DIAGNOSIS — D53.9 MACROCYTIC ANEMIA: Chronic | ICD-10-CM

## 2023-11-14 DIAGNOSIS — Z71.85 VACCINE COUNSELING: ICD-10-CM

## 2023-11-14 DIAGNOSIS — Z86.39 HISTORY OF IRON DEFICIENCY: Primary | Chronic | ICD-10-CM

## 2023-11-14 PROBLEM — N18.30 CHRONIC KIDNEY DISEASE, STAGE III (MODERATE): Chronic | Status: ACTIVE | Noted: 2017-11-14

## 2023-11-14 PROBLEM — F41.9 ANXIETY: Chronic | Status: ACTIVE | Noted: 2023-11-14

## 2023-11-14 PROBLEM — M43.10 RETROLISTHESIS: Chronic | Status: ACTIVE | Noted: 2023-11-14

## 2023-11-14 PROBLEM — M47.816 FACET ARTHROPATHY, LUMBAR: Chronic | Status: ACTIVE | Noted: 2023-11-14

## 2023-11-14 PROBLEM — M53.86 DECREASED RANGE OF MOTION OF INTERVERTEBRAL DISCS OF LUMBAR SPINE: Chronic | Status: ACTIVE | Noted: 2023-09-12

## 2023-11-14 PROBLEM — M43.16 ANTEROLISTHESIS OF LUMBAR SPINE: Chronic | Status: ACTIVE | Noted: 2023-11-14

## 2023-11-14 PROBLEM — M43.16 ANTEROLISTHESIS OF LUMBAR SPINE: Status: ACTIVE | Noted: 2023-11-14

## 2023-11-14 PROBLEM — M47.816 FACET ARTHROPATHY, LUMBAR: Status: ACTIVE | Noted: 2023-11-14

## 2023-11-14 PROBLEM — M43.10 RETROLISTHESIS: Status: ACTIVE | Noted: 2023-11-14

## 2023-11-14 PROBLEM — M54.50 CHRONIC BILATERAL LOW BACK PAIN WITHOUT SCIATICA: Chronic | Status: ACTIVE | Noted: 2023-06-12

## 2023-11-14 PROBLEM — G89.29 CHRONIC BILATERAL LOW BACK PAIN WITHOUT SCIATICA: Chronic | Status: ACTIVE | Noted: 2023-06-12

## 2023-11-14 PROCEDURE — 3288F FALL RISK ASSESSMENT DOCD: CPT | Mod: HCNC,CPTII,S$GLB, | Performed by: STUDENT IN AN ORGANIZED HEALTH CARE EDUCATION/TRAINING PROGRAM

## 2023-11-14 PROCEDURE — 1101F PR PT FALLS ASSESS DOC 0-1 FALLS W/OUT INJ PAST YR: ICD-10-PCS | Mod: HCNC,CPTII,S$GLB, | Performed by: STUDENT IN AN ORGANIZED HEALTH CARE EDUCATION/TRAINING PROGRAM

## 2023-11-14 PROCEDURE — 99999 PR PBB SHADOW E&M-EST. PATIENT-LVL III: CPT | Mod: PBBFAC,HCNC,, | Performed by: STUDENT IN AN ORGANIZED HEALTH CARE EDUCATION/TRAINING PROGRAM

## 2023-11-14 PROCEDURE — 1101F PT FALLS ASSESS-DOCD LE1/YR: CPT | Mod: HCNC,CPTII,S$GLB, | Performed by: STUDENT IN AN ORGANIZED HEALTH CARE EDUCATION/TRAINING PROGRAM

## 2023-11-14 PROCEDURE — 99214 PR OFFICE/OUTPT VISIT, EST, LEVL IV, 30-39 MIN: ICD-10-PCS | Mod: HCNC,S$GLB,, | Performed by: STUDENT IN AN ORGANIZED HEALTH CARE EDUCATION/TRAINING PROGRAM

## 2023-11-14 PROCEDURE — 3288F PR FALLS RISK ASSESSMENT DOCUMENTED: ICD-10-PCS | Mod: HCNC,CPTII,S$GLB, | Performed by: STUDENT IN AN ORGANIZED HEALTH CARE EDUCATION/TRAINING PROGRAM

## 2023-11-14 PROCEDURE — 1159F MED LIST DOCD IN RCRD: CPT | Mod: HCNC,CPTII,S$GLB, | Performed by: STUDENT IN AN ORGANIZED HEALTH CARE EDUCATION/TRAINING PROGRAM

## 2023-11-14 PROCEDURE — 99214 OFFICE O/P EST MOD 30 MIN: CPT | Mod: HCNC,S$GLB,, | Performed by: STUDENT IN AN ORGANIZED HEALTH CARE EDUCATION/TRAINING PROGRAM

## 2023-11-14 PROCEDURE — 99999 PR PBB SHADOW E&M-EST. PATIENT-LVL III: ICD-10-PCS | Mod: PBBFAC,HCNC,, | Performed by: STUDENT IN AN ORGANIZED HEALTH CARE EDUCATION/TRAINING PROGRAM

## 2023-11-14 PROCEDURE — 1159F PR MEDICATION LIST DOCUMENTED IN MEDICAL RECORD: ICD-10-PCS | Mod: HCNC,CPTII,S$GLB, | Performed by: STUDENT IN AN ORGANIZED HEALTH CARE EDUCATION/TRAINING PROGRAM

## 2023-11-14 NOTE — PROGRESS NOTES
"Plan:     Nida was seen today for annual exam and lab results.    Diagnoses and all orders for this visit:    History of iron deficiency  -     IRON AND TIBC; Future  -     FERRITIN; Future    Macrocytic anemia  -     FOLATE; Future  -     Vitamin B12; Future    Vaccine counseling    Recommended tdap booster and RSV vaccine. Unable to give tdap today in clinic due to insurance coverage.     Plan to complete labs, will contact w/ results (CBC, CMP, mag, TSH, vit D). Will link additional labs ordered today.    Follow up in about 6 months (around 5/14/2024), or if symptoms worsen or fail to improve.    Sherry Garzon MD  11/14/2023    Subjective:      Patient ID: Nida Kline is a 88 y.o. female    Chief Complaint   Patient presents with    Annual Exam    lab results     HPI  88 y.o. female with a PMHx as documented below presents to clinic today for the following:    Planned to complete labs prior to today's appointment (CBC, CMP, mag, TSH, vit D) - however, ordered labs linked to future lab visit on 12/5/23 and thus not completed/resulted for review at today's appointment.     Macrocytic anemia:   - Most recent CBC (8/1/23) w/ H/H 10.8/33.6,   - B12, iron/TIBC, ferritin all wnl (8/15/23)  - Due for repeat labs as noted above    Chronic low back pain w/o sciatica:   - Xray lumbar spine (6/12/23) w/ grade I anterolisthesis of L5 on S1, grade I retrolisthesis of L3 on L4, degenerative facet arthropathy present in the lower lumbar spine at L5-S1, degenerative change of the symphysis pubis.    - S/p physical therapy x6 weeks for low back pain referred pain to the right knee  - Symptoms manageable without additional medications at this time    Anxiety:   - Buspar 15 mg tablets, 1/3-1/2 tablet TID PRN for acute anxiety     HTN:   - Losartan 25 mg daily, Toprol-XL 25 mg      HLD:   - Lipitor 20 mg daily     Diastolic dysfunction:   - TTE (2012) w/ "hyperdynamic left ventricular function (EF 75%), eccentric " "hypertrophy, diastolic dysfunction, trivial to mild mitral regurg, mild tricuspid regurg"      Bilateral carotid artery disease:   - Bilateral carotid artery US (2014) w/ "20 - 39% right internal carotid stenosis, 80 - 99% left internal carotid stenosis"  - S/p left carotid endarterectomy (2015)  - ASA, statin     Aortic atherosclerosis:   - ASA, statin     CKD stage 3:   - GFR 54.5 (4/4/23), stable     Rheumatoid arthritis:   - Methotrexate 10 mg weekly  - Folic acid 1 mg daily     Hypothyroidism:   - Synthroid 62.5 mcg daily      Vit D deficiency:   - Daily vit D supplementation     GERD:   - Omeprazole 20 mg daily      Osteoporosis:   - Daily calcium/vit D supplementation  - Fosamax 70 mg weekly    Insomnia:   - Current Rx: melatonin, OTC sleep-aids  - Reports Buspar 15 mg qhs sometimes helps  - Previous Rx: Remeron 7.5 mg qhs, Trazodone (ineffective)     ROS  Constitutional:  Negative for chills and fever.   Respiratory:  Negative for shortness of breath.    Cardiovascular:  Negative for chest pain.   Gastrointestinal:  Negative for abdominal pain, constipation, diarrhea, nausea and vomiting.     Current Outpatient Medications   Medication Instructions    alendronate (FOSAMAX) 70 mg, Oral, Every 7 days    ascorbic acid (vitamin C) (VITAMIN C) 500 mg, Oral, Daily    aspirin (ECOTRIN) 81 mg, Oral, Once    atorvastatin (LIPITOR) 20 MG tablet TAKE 1 TABLET EVERY DAY    busPIRone (BUSPAR) 15 MG tablet TAKE 1/3 TO 1/2 TABLET THREE TIMES A DAY AS NEEDED FOR ANXIETY    calcium carbonate/vitamin D3 (CALTRATE 600 + D ORAL) Oral, Daily, Caltrate 600+D3     folic acid (FOLVITE) 1 mg, Oral, Daily    levothyroxine (SYNTHROID) 62.5 mcg, Oral, Daily    losartan (COZAAR) 25 MG tablet TAKE 1 TABLET EVERY DAY    magnesium oxide 400 mg Cap 2 tablets, Oral, Daily    methotrexate 10 mg, Oral, Every 7 days    metoprolol succinate (TOPROL-XL) 25 MG 24 hr tablet TAKE 1/2 TABLET EVERY DAY    mirtazapine (REMERON) 7.5 mg, Oral, Nightly    " omeprazole (PRILOSEC) 20 MG capsule TAKE 1 CAPSULE EVERY DAY AS NEEDED FOR REFLUX    vitamin D (VITAMIN D3) 1,000 Units, Oral, 2 times daily    vitamin renal formula, B-complex-vitamin c-folic acid, (NEPHROCAP) 1 mg Cap 1 capsule, Oral, Daily, Generic.      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/07/2016    Facet arthropathy, lumbar (L5-S1) 11/14/2023    Gallbladder disease     GERD (gastroesophageal reflux disease)     History of iron deficiency 05/15/2017    HTN (hypertension)     Hypothyroidism     Mixed hyperlipidemia     Osteoporosis     Overweight (BMI 25.0-29.9)     PONV (postoperative nausea and vomiting)     Posterior vitreous detachment 06/07/2016    Pseudophakia of both eyes 06/07/2016    Pure hypercholesterolemia     Retrolisthesis (grade 1, L3-4) 11/14/2023    S/P carotid endarterectomy 06/07/2016    Squamous cell carcinoma     Tinea pedis of both feet 05/15/2017    Trouble in sleeping     Vitamin D deficiency      Past Surgical History:   Procedure Laterality Date    CAROTID ENDARTERECTOMY Left 2015    CATARACT EXTRACTION      OU    CHOLECYSTECTOMY      HYSTERECTOMY      TOTAL ABDOMINAL HYSTERECTOMY W/ BILATERAL SALPINGOOPHORECTOMY       Review of patient's allergies indicates:   Allergen Reactions    Lunesta [eszopiclone]      Paresthesia, lip swell     Propofol analogues Nausea And Vomiting     Family History   Problem Relation Age of Onset    Kidney disease Mother     Diabetes Mother     COPD Father     Heart disease Father      Social History     Tobacco Use    Smoking status: Never    Smokeless tobacco: Never  "  Substance Use Topics    Alcohol use: No    Drug use: No     Currently on File with [facility]:   Most Recent Immunizations   Administered Date(s) Administered    COVID-19, MRNA, LN-S, PF (Pfizer) (Purple Cap) 12/15/2021    Pneumococcal Conjugate - 13 Valent 12/31/2015    Pneumococcal Polysaccharide - 23 Valent 09/26/2019    Zoster Recombinant 04/29/2022     Objective:      Vitals:    11/14/23 1429   BP: 130/70   Pulse: 60   SpO2: 96%   Weight: 77.7 kg (171 lb 4.8 oz)   Height: 5' 4" (1.626 m)     Body mass index is 29.4 kg/m².    Physical Exam   Constitutional:       General: No acute distress.  HENT:      Head: Normocephalic and atraumatic.   Pulmonary:      Effort: Pulmonary effort is normal. No respiratory distress.   Neurological:      General: No focal deficit present.      Mental Status: Alert and oriented to person, place, and time. Mental status is at baseline.    Assessment:       1. History of iron deficiency    2. Macrocytic anemia    3. Vaccine counseling        Sherry Garzon MD  [facility]  Office: [phone]   Fax: [phone]  11/14/2023      Disclaimer: This note was partly generated using dictation software which may occasionally result in transcription errors.    Total time spent on this encounter includes face to face time and non-face to face time preparing to see the patient (eg, review of tests), obtaining and/or reviewing separately obtained history, documenting clinical information in the electronic or other health record, independently interpreting results, and communicating results to the patient/family/caregiver, or care coordinator.    "

## 2023-12-05 ENCOUNTER — LAB VISIT (OUTPATIENT)
Dept: LAB | Facility: HOSPITAL | Age: 88
End: 2023-12-05
Attending: STUDENT IN AN ORGANIZED HEALTH CARE EDUCATION/TRAINING PROGRAM
Payer: MEDICARE

## 2023-12-05 DIAGNOSIS — E83.42 HYPOMAGNESEMIA: ICD-10-CM

## 2023-12-05 DIAGNOSIS — M06.00 SERONEGATIVE RHEUMATOID ARTHRITIS: ICD-10-CM

## 2023-12-05 DIAGNOSIS — Z79.899 HIGH RISK MEDICATION USE: ICD-10-CM

## 2023-12-05 DIAGNOSIS — E03.9 ACQUIRED HYPOTHYROIDISM: Chronic | ICD-10-CM

## 2023-12-05 DIAGNOSIS — D84.9 IMMUNOSUPPRESSION: ICD-10-CM

## 2023-12-05 DIAGNOSIS — E61.1 IRON DEFICIENCY: ICD-10-CM

## 2023-12-05 DIAGNOSIS — E55.9 VITAMIN D DEFICIENCY: Chronic | ICD-10-CM

## 2023-12-05 DIAGNOSIS — D75.89 MACROCYTOSIS WITHOUT ANEMIA: ICD-10-CM

## 2023-12-05 DIAGNOSIS — D53.9 MACROCYTIC ANEMIA: ICD-10-CM

## 2023-12-05 DIAGNOSIS — M81.0 OSTEOPOROSIS, UNSPECIFIED OSTEOPOROSIS TYPE, UNSPECIFIED PATHOLOGICAL FRACTURE PRESENCE: Chronic | ICD-10-CM

## 2023-12-05 DIAGNOSIS — Z86.39 HISTORY OF IRON DEFICIENCY: Chronic | ICD-10-CM

## 2023-12-05 DIAGNOSIS — Z86.2 HISTORY OF ANEMIA DUE TO VITAMIN B12 DEFICIENCY: ICD-10-CM

## 2023-12-05 LAB
25(OH)D3+25(OH)D2 SERPL-MCNC: 72 NG/ML (ref 30–96)
ALBUMIN SERPL BCP-MCNC: 3.4 G/DL (ref 3.5–5.2)
ALBUMIN SERPL BCP-MCNC: 3.4 G/DL (ref 3.5–5.2)
ALP SERPL-CCNC: 89 U/L (ref 55–135)
ALP SERPL-CCNC: 89 U/L (ref 55–135)
ALT SERPL W/O P-5'-P-CCNC: 21 U/L (ref 10–44)
ALT SERPL W/O P-5'-P-CCNC: 21 U/L (ref 10–44)
ANION GAP SERPL CALC-SCNC: 8 MMOL/L (ref 8–16)
ANION GAP SERPL CALC-SCNC: 8 MMOL/L (ref 8–16)
AST SERPL-CCNC: 22 U/L (ref 10–40)
AST SERPL-CCNC: 22 U/L (ref 10–40)
BASOPHILS # BLD AUTO: 0.03 K/UL (ref 0–0.2)
BASOPHILS # BLD AUTO: 0.03 K/UL (ref 0–0.2)
BASOPHILS NFR BLD: 0.6 % (ref 0–1.9)
BASOPHILS NFR BLD: 0.6 % (ref 0–1.9)
BILIRUB SERPL-MCNC: 0.5 MG/DL (ref 0.1–1)
BILIRUB SERPL-MCNC: 0.5 MG/DL (ref 0.1–1)
BUN SERPL-MCNC: 22 MG/DL (ref 8–23)
BUN SERPL-MCNC: 22 MG/DL (ref 8–23)
CALCIUM SERPL-MCNC: 9 MG/DL (ref 8.7–10.5)
CALCIUM SERPL-MCNC: 9 MG/DL (ref 8.7–10.5)
CHLORIDE SERPL-SCNC: 109 MMOL/L (ref 95–110)
CHLORIDE SERPL-SCNC: 109 MMOL/L (ref 95–110)
CO2 SERPL-SCNC: 24 MMOL/L (ref 23–29)
CO2 SERPL-SCNC: 24 MMOL/L (ref 23–29)
CREAT SERPL-MCNC: 1 MG/DL (ref 0.5–1.4)
CREAT SERPL-MCNC: 1 MG/DL (ref 0.5–1.4)
CRP SERPL-MCNC: 4.1 MG/L (ref 0–8.2)
DIFFERENTIAL METHOD: ABNORMAL
DIFFERENTIAL METHOD: ABNORMAL
EOSINOPHIL # BLD AUTO: 0.3 K/UL (ref 0–0.5)
EOSINOPHIL # BLD AUTO: 0.3 K/UL (ref 0–0.5)
EOSINOPHIL NFR BLD: 5.8 % (ref 0–8)
EOSINOPHIL NFR BLD: 5.8 % (ref 0–8)
ERYTHROCYTE [DISTWIDTH] IN BLOOD BY AUTOMATED COUNT: 14.5 % (ref 11.5–14.5)
ERYTHROCYTE [DISTWIDTH] IN BLOOD BY AUTOMATED COUNT: 14.5 % (ref 11.5–14.5)
ERYTHROCYTE [SEDIMENTATION RATE] IN BLOOD BY PHOTOMETRIC METHOD: 36 MM/HR (ref 0–36)
EST. GFR  (NO RACE VARIABLE): 54.2 ML/MIN/1.73 M^2
EST. GFR  (NO RACE VARIABLE): 54.2 ML/MIN/1.73 M^2
FERRITIN SERPL-MCNC: 118 NG/ML (ref 20–300)
FOLATE SERPL-MCNC: >40 NG/ML (ref 4–24)
GLUCOSE SERPL-MCNC: 100 MG/DL (ref 70–110)
GLUCOSE SERPL-MCNC: 100 MG/DL (ref 70–110)
HCT VFR BLD AUTO: 35.4 % (ref 37–48.5)
HCT VFR BLD AUTO: 35.4 % (ref 37–48.5)
HGB BLD-MCNC: 11.7 G/DL (ref 12–16)
HGB BLD-MCNC: 11.7 G/DL (ref 12–16)
IMM GRANULOCYTES # BLD AUTO: 0.02 K/UL (ref 0–0.04)
IMM GRANULOCYTES # BLD AUTO: 0.02 K/UL (ref 0–0.04)
IMM GRANULOCYTES NFR BLD AUTO: 0.4 % (ref 0–0.5)
IMM GRANULOCYTES NFR BLD AUTO: 0.4 % (ref 0–0.5)
IRON SERPL-MCNC: 60 UG/DL (ref 30–160)
LYMPHOCYTES # BLD AUTO: 1.2 K/UL (ref 1–4.8)
LYMPHOCYTES # BLD AUTO: 1.2 K/UL (ref 1–4.8)
LYMPHOCYTES NFR BLD: 22.3 % (ref 18–48)
LYMPHOCYTES NFR BLD: 22.3 % (ref 18–48)
MAGNESIUM SERPL-MCNC: 1.7 MG/DL (ref 1.6–2.6)
MCH RBC QN AUTO: 33.6 PG (ref 27–31)
MCH RBC QN AUTO: 33.6 PG (ref 27–31)
MCHC RBC AUTO-ENTMCNC: 33.1 G/DL (ref 32–36)
MCHC RBC AUTO-ENTMCNC: 33.1 G/DL (ref 32–36)
MCV RBC AUTO: 102 FL (ref 82–98)
MCV RBC AUTO: 102 FL (ref 82–98)
MONOCYTES # BLD AUTO: 0.5 K/UL (ref 0.3–1)
MONOCYTES # BLD AUTO: 0.5 K/UL (ref 0.3–1)
MONOCYTES NFR BLD: 10.1 % (ref 4–15)
MONOCYTES NFR BLD: 10.1 % (ref 4–15)
NEUTROPHILS # BLD AUTO: 3.1 K/UL (ref 1.8–7.7)
NEUTROPHILS # BLD AUTO: 3.1 K/UL (ref 1.8–7.7)
NEUTROPHILS NFR BLD: 60.8 % (ref 38–73)
NEUTROPHILS NFR BLD: 60.8 % (ref 38–73)
NRBC BLD-RTO: 0 /100 WBC
NRBC BLD-RTO: 0 /100 WBC
PLATELET # BLD AUTO: 190 K/UL (ref 150–450)
PLATELET # BLD AUTO: 190 K/UL (ref 150–450)
PMV BLD AUTO: 11.2 FL (ref 9.2–12.9)
PMV BLD AUTO: 11.2 FL (ref 9.2–12.9)
POTASSIUM SERPL-SCNC: 4.5 MMOL/L (ref 3.5–5.1)
POTASSIUM SERPL-SCNC: 4.5 MMOL/L (ref 3.5–5.1)
PROT SERPL-MCNC: 6.5 G/DL (ref 6–8.4)
PROT SERPL-MCNC: 6.5 G/DL (ref 6–8.4)
RBC # BLD AUTO: 3.48 M/UL (ref 4–5.4)
RBC # BLD AUTO: 3.48 M/UL (ref 4–5.4)
SATURATED IRON: 19 % (ref 20–50)
SODIUM SERPL-SCNC: 141 MMOL/L (ref 136–145)
SODIUM SERPL-SCNC: 141 MMOL/L (ref 136–145)
TOTAL IRON BINDING CAPACITY: 309 UG/DL (ref 250–450)
TRANSFERRIN SERPL-MCNC: 209 MG/DL (ref 200–375)
TSH SERPL DL<=0.005 MIU/L-ACNC: 2.41 UIU/ML (ref 0.4–4)
VIT B12 SERPL-MCNC: 476 PG/ML (ref 210–950)
VIT B12 SERPL-MCNC: 476 PG/ML (ref 210–950)
WBC # BLD AUTO: 5.16 K/UL (ref 3.9–12.7)
WBC # BLD AUTO: 5.16 K/UL (ref 3.9–12.7)

## 2023-12-05 PROCEDURE — 85652 RBC SED RATE AUTOMATED: CPT | Mod: HCNC | Performed by: STUDENT IN AN ORGANIZED HEALTH CARE EDUCATION/TRAINING PROGRAM

## 2023-12-05 PROCEDURE — 82728 ASSAY OF FERRITIN: CPT | Mod: HCNC | Performed by: STUDENT IN AN ORGANIZED HEALTH CARE EDUCATION/TRAINING PROGRAM

## 2023-12-05 PROCEDURE — 85025 COMPLETE CBC W/AUTO DIFF WBC: CPT | Mod: HCNC | Performed by: STUDENT IN AN ORGANIZED HEALTH CARE EDUCATION/TRAINING PROGRAM

## 2023-12-05 PROCEDURE — 82306 VITAMIN D 25 HYDROXY: CPT | Mod: HCNC | Performed by: STUDENT IN AN ORGANIZED HEALTH CARE EDUCATION/TRAINING PROGRAM

## 2023-12-05 PROCEDURE — 84466 ASSAY OF TRANSFERRIN: CPT | Mod: HCNC | Performed by: STUDENT IN AN ORGANIZED HEALTH CARE EDUCATION/TRAINING PROGRAM

## 2023-12-05 PROCEDURE — 82607 VITAMIN B-12: CPT | Mod: HCNC | Performed by: STUDENT IN AN ORGANIZED HEALTH CARE EDUCATION/TRAINING PROGRAM

## 2023-12-05 PROCEDURE — 84443 ASSAY THYROID STIM HORMONE: CPT | Mod: HCNC | Performed by: STUDENT IN AN ORGANIZED HEALTH CARE EDUCATION/TRAINING PROGRAM

## 2023-12-05 PROCEDURE — 80053 COMPREHEN METABOLIC PANEL: CPT | Mod: HCNC | Performed by: STUDENT IN AN ORGANIZED HEALTH CARE EDUCATION/TRAINING PROGRAM

## 2023-12-05 PROCEDURE — 86140 C-REACTIVE PROTEIN: CPT | Mod: HCNC | Performed by: STUDENT IN AN ORGANIZED HEALTH CARE EDUCATION/TRAINING PROGRAM

## 2023-12-05 PROCEDURE — 82746 ASSAY OF FOLIC ACID SERUM: CPT | Mod: HCNC | Performed by: STUDENT IN AN ORGANIZED HEALTH CARE EDUCATION/TRAINING PROGRAM

## 2023-12-05 PROCEDURE — 36415 COLL VENOUS BLD VENIPUNCTURE: CPT | Mod: HCNC,PN | Performed by: STUDENT IN AN ORGANIZED HEALTH CARE EDUCATION/TRAINING PROGRAM

## 2023-12-05 PROCEDURE — 83735 ASSAY OF MAGNESIUM: CPT | Mod: HCNC | Performed by: STUDENT IN AN ORGANIZED HEALTH CARE EDUCATION/TRAINING PROGRAM

## 2023-12-05 NOTE — PROGRESS NOTES
"Subjective:      Patient ID: Nida Kline is a 88 y.o. female.    Chief Complaint: Disease Management    HPI    Rheumatologic History:      - Diagnosis/es:              - osteopenia with elevated FRAX not currently on therapy  - seronegative non erosive RA diagnosed in 12/2021  - Positive serologies: -  - Negative serologies: ADEBAYO, RF, CCP  - Infectious screening labs: Negative hepatitis B, C, and quantiferon (4/2023)  - Imaging:              - X-Ray arthritis survey (10/2021): Osteopenia and diffuse joint space narrowing without erosions              - DEXA (10/2022) osteopenia with 27% risk of a major osteoporotic fracture and a 11% risk of hip fracture in the next 10 years (FRAX).  - Previous Treatments: -  - Current Treatments:               - MTX 10mg weekly plus folic acid daily              - Fosamax (8/2023- )  Interval History:   At last visit, I advised her to decrease methotrexate to 10 mg weekly.  She has done very well on the lower dose of methotrexate and denies joint pain and swelling.    Objective:   /70   Pulse 66   Ht 5' 4" (1.626 m)   Wt 76 kg (167 lb 8.8 oz)   BMI 28.76 kg/m²   Physical Exam   Constitutional: normal appearance.   HENT:   Head: Normocephalic and atraumatic.   Cardiovascular: Normal rate, regular rhythm and normal heart sounds.   Pulmonary/Chest: Effort normal and breath sounds normal.   Musculoskeletal:      Comments: + Degenerative changes  Ulnar deviation  No synovitis, dactylitis, enthesitis, effusions     Neurological: She is alert.   Skin: Skin is warm and dry.   No skin thickening, telangiectasias, calcinosis, psoriasiform lesions, lupoid lesions        8/8/2023   Tender (PARSONS-28) 0 / 28    Swollen (PARSONS-28) 0 / 28    Provider Global 0 mm   Patient Global 0 mm   ESR 20 mm/hr   CRP 3.3 mg/L   PARSONS-28 (ESR) 2.1 (Remission)   PARSONS-28 (CRP) 1.49 (Remission)   CDAI Score 0      Labs independently reviewed by me (12/05/2023)  CBC HGB 11.7, WBC and platelets WNL  CMP " creatinine 1, GFR 54.2, LFTs WNL  ESR and CRP WNL     Assessment:     1. Seronegative rheumatoid arthritis    2. Stage 3a chronic kidney disease    3. Macrocytic anemia    4. Iron deficiency    5. At high risk for fracture    6. Immunosuppression    7. High risk medication use    8. Osteopenia after menopause      This is an 88 year old woman with PMH of CKD, HTN, vit D deficiency, carotid artery disease s/p CEA, HLD, vit D deficiency, hypothyroidism, STEW, GERD, squamous cell carcinoma, osteopenia with elevated FRAX on Fosamax (5/2023- ), seronegative non erosive RA diagnosed in 12/2021 and on methotrexate 10mg weekly plus folic acid.      Plan:     Problem List Items Addressed This Visit          Renal/    Chronic kidney disease, stage III (moderate) (Chronic)    Relevant Medications    vitamin renal formula, B-complex-vitamin c-folic acid, (NEPHROCAP) 1 mg Cap       Oncology    Macrocytic anemia (Chronic)    Relevant Medications    vitamin renal formula, B-complex-vitamin c-folic acid, (NEPHROCAP) 1 mg Cap     Other Visit Diagnoses       Seronegative rheumatoid arthritis    -  Primary    Relevant Orders    CBC Auto Differential    Comprehensive Metabolic Panel    Sedimentation rate    C-Reactive Protein    Iron deficiency        Relevant Medications    vitamin renal formula, B-complex-vitamin c-folic acid, (NEPHROCAP) 1 mg Cap    At high risk for fracture        Immunosuppression        Relevant Orders    CBC Auto Differential    Comprehensive Metabolic Panel    Sedimentation rate    C-Reactive Protein    High risk medication use        Relevant Orders    CBC Auto Differential    Comprehensive Metabolic Panel    Sedimentation rate    C-Reactive Protein    Osteopenia after menopause              1.) RA  - methotrexate 10mg weekly + folic acid daily  - CBC, CMP, ESR, CRP every 12 weeks  - Patient will need yearly skin cancer check on MTX given history of squamous cell CA  - Pre-DMARD labs due for repeat 4/2024  -  Vaccinations: COVID x 3, PCV13 (12/2015), PPV23 (9/2019), Shingrix x 2; patient declines the flu shot     2.) Osteopenia with elevated FRAX  - Fosamax 70mg weekly (8/2023- )  - DEXA due for repeat 10/2024    Follow up in 6 months    30 minutes of total time spent on the encounter, which includes face to face time and non-face to face time preparing to see the patient (eg, review of tests), Obtaining and/or reviewing separately obtained history, Documenting clinical information in the electronic or other health record, Independently interpreting results (not separately reported) and communicating results to the patient/family/caregiver, or Care coordination (not separately reported).     This note was prepared with [employer] Direct voice recognition transcription software. Garbled syntax, mangled pronouns, and other bizarre constructions may be attributed to that software system       Leah Gannon M.D.  Rheumatology Dept  [place], [place]

## 2023-12-06 ENCOUNTER — OFFICE VISIT (OUTPATIENT)
Dept: RHEUMATOLOGY | Facility: CLINIC | Age: 88
End: 2023-12-06
Payer: MEDICARE

## 2023-12-06 VITALS
HEIGHT: 64 IN | HEART RATE: 66 BPM | BODY MASS INDEX: 28.6 KG/M2 | SYSTOLIC BLOOD PRESSURE: 136 MMHG | WEIGHT: 167.56 LBS | DIASTOLIC BLOOD PRESSURE: 70 MMHG

## 2023-12-06 DIAGNOSIS — D53.9 MACROCYTIC ANEMIA: ICD-10-CM

## 2023-12-06 DIAGNOSIS — M06.00 SERONEGATIVE RHEUMATOID ARTHRITIS: Primary | ICD-10-CM

## 2023-12-06 DIAGNOSIS — Z91.89 AT HIGH RISK FOR FRACTURE: ICD-10-CM

## 2023-12-06 DIAGNOSIS — Z79.899 HIGH RISK MEDICATION USE: ICD-10-CM

## 2023-12-06 DIAGNOSIS — D84.9 IMMUNOSUPPRESSION: ICD-10-CM

## 2023-12-06 DIAGNOSIS — E61.1 IRON DEFICIENCY: ICD-10-CM

## 2023-12-06 DIAGNOSIS — Z78.0 OSTEOPENIA AFTER MENOPAUSE: ICD-10-CM

## 2023-12-06 DIAGNOSIS — M85.80 OSTEOPENIA AFTER MENOPAUSE: ICD-10-CM

## 2023-12-06 DIAGNOSIS — N18.31 STAGE 3A CHRONIC KIDNEY DISEASE: ICD-10-CM

## 2023-12-06 PROCEDURE — 1126F PR PAIN SEVERITY QUANTIFIED, NO PAIN PRESENT: ICD-10-PCS | Mod: HCNC,CPTII,S$GLB, | Performed by: STUDENT IN AN ORGANIZED HEALTH CARE EDUCATION/TRAINING PROGRAM

## 2023-12-06 PROCEDURE — 99999 PR PBB SHADOW E&M-EST. PATIENT-LVL III: ICD-10-PCS | Mod: PBBFAC,HCNC,, | Performed by: STUDENT IN AN ORGANIZED HEALTH CARE EDUCATION/TRAINING PROGRAM

## 2023-12-06 PROCEDURE — 99214 OFFICE O/P EST MOD 30 MIN: CPT | Mod: HCNC,S$GLB,, | Performed by: STUDENT IN AN ORGANIZED HEALTH CARE EDUCATION/TRAINING PROGRAM

## 2023-12-06 PROCEDURE — 99999 PR PBB SHADOW E&M-EST. PATIENT-LVL III: CPT | Mod: PBBFAC,HCNC,, | Performed by: STUDENT IN AN ORGANIZED HEALTH CARE EDUCATION/TRAINING PROGRAM

## 2023-12-06 PROCEDURE — 1159F PR MEDICATION LIST DOCUMENTED IN MEDICAL RECORD: ICD-10-PCS | Mod: HCNC,CPTII,S$GLB, | Performed by: STUDENT IN AN ORGANIZED HEALTH CARE EDUCATION/TRAINING PROGRAM

## 2023-12-06 PROCEDURE — 99214 PR OFFICE/OUTPT VISIT, EST, LEVL IV, 30-39 MIN: ICD-10-PCS | Mod: HCNC,S$GLB,, | Performed by: STUDENT IN AN ORGANIZED HEALTH CARE EDUCATION/TRAINING PROGRAM

## 2023-12-06 PROCEDURE — 1101F PT FALLS ASSESS-DOCD LE1/YR: CPT | Mod: HCNC,CPTII,S$GLB, | Performed by: STUDENT IN AN ORGANIZED HEALTH CARE EDUCATION/TRAINING PROGRAM

## 2023-12-06 PROCEDURE — 1101F PR PT FALLS ASSESS DOC 0-1 FALLS W/OUT INJ PAST YR: ICD-10-PCS | Mod: HCNC,CPTII,S$GLB, | Performed by: STUDENT IN AN ORGANIZED HEALTH CARE EDUCATION/TRAINING PROGRAM

## 2023-12-06 PROCEDURE — 1160F PR REVIEW ALL MEDS BY PRESCRIBER/CLIN PHARMACIST DOCUMENTED: ICD-10-PCS | Mod: HCNC,CPTII,S$GLB, | Performed by: STUDENT IN AN ORGANIZED HEALTH CARE EDUCATION/TRAINING PROGRAM

## 2023-12-06 PROCEDURE — 3288F PR FALLS RISK ASSESSMENT DOCUMENTED: ICD-10-PCS | Mod: HCNC,CPTII,S$GLB, | Performed by: STUDENT IN AN ORGANIZED HEALTH CARE EDUCATION/TRAINING PROGRAM

## 2023-12-06 PROCEDURE — 1159F MED LIST DOCD IN RCRD: CPT | Mod: HCNC,CPTII,S$GLB, | Performed by: STUDENT IN AN ORGANIZED HEALTH CARE EDUCATION/TRAINING PROGRAM

## 2023-12-06 PROCEDURE — 1126F AMNT PAIN NOTED NONE PRSNT: CPT | Mod: HCNC,CPTII,S$GLB, | Performed by: STUDENT IN AN ORGANIZED HEALTH CARE EDUCATION/TRAINING PROGRAM

## 2023-12-06 PROCEDURE — 3288F FALL RISK ASSESSMENT DOCD: CPT | Mod: HCNC,CPTII,S$GLB, | Performed by: STUDENT IN AN ORGANIZED HEALTH CARE EDUCATION/TRAINING PROGRAM

## 2023-12-06 PROCEDURE — 1160F RVW MEDS BY RX/DR IN RCRD: CPT | Mod: HCNC,CPTII,S$GLB, | Performed by: STUDENT IN AN ORGANIZED HEALTH CARE EDUCATION/TRAINING PROGRAM

## 2023-12-06 ASSESSMENT — ROUTINE ASSESSMENT OF PATIENT INDEX DATA (RAPID3)
TOTAL RAPID3 SCORE: 3.56
PSYCHOLOGICAL DISTRESS SCORE: 0
PAIN SCORE: 3.5
PATIENT GLOBAL ASSESSMENT SCORE: 5.5
MDHAQ FUNCTION SCORE: 0.5
FATIGUE SCORE: 1.1

## 2024-01-16 DIAGNOSIS — R74.01 TRANSAMINITIS: ICD-10-CM

## 2024-01-16 DIAGNOSIS — I10 ESSENTIAL HYPERTENSION: ICD-10-CM

## 2024-01-16 DIAGNOSIS — K21.9 GASTROESOPHAGEAL REFLUX DISEASE: ICD-10-CM

## 2024-01-16 DIAGNOSIS — Z98.890 S/P CAROTID ENDARTERECTOMY: ICD-10-CM

## 2024-01-16 DIAGNOSIS — I77.9 BILATERAL CAROTID ARTERY DISEASE, UNSPECIFIED TYPE: ICD-10-CM

## 2024-01-16 DIAGNOSIS — E78.2 MIXED HYPERLIPIDEMIA: ICD-10-CM

## 2024-01-16 DIAGNOSIS — E03.9 ACQUIRED HYPOTHYROIDISM: ICD-10-CM

## 2024-01-16 NOTE — TELEPHONE ENCOUNTER
----- Message from Bebeto Benitez sent at 1/16/2024  1:20 PM CST -----  Type:  RX Refill Request    Who Called: [facility]  Refill or New Rx: Refill on 5 scripts  RX Name and Strength: 1.) losartan (COZAAR) 25 MG tablet 2.) metoprolol succinate (TOPROL-XL) 25 MG 24 hr tablet 3.) levothyroxine (SYNTHROID) 125 MCG tablet 4.) omeprazole (PRILOSEC) 20 MG capsule and 5.) atorvastatin (LIPITOR) 20 MG tablet  How is the patient currently taking it? (ex. 1XDay): as directed  Is this a 30 day or 90 day RX: 90  Preferred Pharmacy with phone number:   [facility] - [place], [place] - [address]  [address] [address]  Phone: [phone] Fax: [phone]  Local or Mail Order: Mail  Ordering Provider: Dr Vazquez and Dr Garzon  Would the patient rather a call back or a response via [facility]?  Call back  Best Call Back Number: [phone]  Additional Information: thanks

## 2024-01-16 NOTE — TELEPHONE ENCOUNTER
Care Due:                  Date            Visit Type   Department     Provider  --------------------------------------------------------------------------------                                SAME DAY -                              ESTABLISHED   [facility]  Last Visit: 11-      PATIENT      MEDICINE       Sherry Garzon                              EP -                              PRIMARY      [facility]  Next Visit: 05-      CARE ([facility])   MEDICINE       Sherry Garzon                                                            Last  Test          Frequency    Reason                     Performed    Due Date  --------------------------------------------------------------------------------    Lipid Panel.  12 months..  atorvastatin.............  04- 03-    [facility] Embedded Care Due Messages. Reference number: [id number].   1/16/2024 3:13:41 PM CST

## 2024-01-17 RX ORDER — ATORVASTATIN CALCIUM 20 MG/1
20 TABLET, FILM COATED ORAL DAILY
Qty: 90 TABLET | Refills: 0 | Status: SHIPPED | OUTPATIENT
Start: 2024-01-17 | End: 2024-04-02

## 2024-01-17 RX ORDER — OMEPRAZOLE 20 MG/1
20 CAPSULE, DELAYED RELEASE ORAL DAILY
Qty: 90 CAPSULE | Refills: 0 | Status: SHIPPED | OUTPATIENT
Start: 2024-01-17 | End: 2024-04-02

## 2024-01-17 RX ORDER — LEVOTHYROXINE SODIUM 125 UG/1
62.5 TABLET ORAL DAILY
Qty: 45 TABLET | Refills: 2 | Status: SHIPPED | OUTPATIENT
Start: 2024-01-17

## 2024-01-17 RX ORDER — METOPROLOL SUCCINATE 25 MG/1
12.5 TABLET, EXTENDED RELEASE ORAL DAILY
Qty: 45 TABLET | Refills: 0 | Status: SHIPPED | OUTPATIENT
Start: 2024-01-17 | End: 2024-04-02

## 2024-01-17 RX ORDER — LOSARTAN POTASSIUM 25 MG/1
25 TABLET ORAL DAILY
Qty: 90 TABLET | Refills: 0 | Status: SHIPPED | OUTPATIENT
Start: 2024-01-17 | End: 2024-04-02

## 2024-03-04 ENCOUNTER — LAB VISIT (OUTPATIENT)
Dept: LAB | Facility: HOSPITAL | Age: 89
End: 2024-03-04
Attending: STUDENT IN AN ORGANIZED HEALTH CARE EDUCATION/TRAINING PROGRAM
Payer: MEDICARE

## 2024-03-04 DIAGNOSIS — M06.00 SERONEGATIVE RHEUMATOID ARTHRITIS: ICD-10-CM

## 2024-03-04 DIAGNOSIS — D84.9 IMMUNOSUPPRESSION: ICD-10-CM

## 2024-03-04 DIAGNOSIS — Z79.899 HIGH RISK MEDICATION USE: ICD-10-CM

## 2024-03-04 LAB
ALBUMIN SERPL BCP-MCNC: 3.3 G/DL (ref 3.5–5.2)
ALP SERPL-CCNC: 95 U/L (ref 55–135)
ALT SERPL W/O P-5'-P-CCNC: 17 U/L (ref 10–44)
ANION GAP SERPL CALC-SCNC: 10 MMOL/L (ref 8–16)
AST SERPL-CCNC: 20 U/L (ref 10–40)
BASOPHILS # BLD AUTO: 0.04 K/UL (ref 0–0.2)
BASOPHILS NFR BLD: 0.8 % (ref 0–1.9)
BILIRUB SERPL-MCNC: 0.6 MG/DL (ref 0.1–1)
BUN SERPL-MCNC: 23 MG/DL (ref 8–23)
CALCIUM SERPL-MCNC: 9.3 MG/DL (ref 8.7–10.5)
CHLORIDE SERPL-SCNC: 108 MMOL/L (ref 95–110)
CO2 SERPL-SCNC: 24 MMOL/L (ref 23–29)
CREAT SERPL-MCNC: 1 MG/DL (ref 0.5–1.4)
CRP SERPL-MCNC: 4.9 MG/L (ref 0–8.2)
DIFFERENTIAL METHOD BLD: ABNORMAL
EOSINOPHIL # BLD AUTO: 0.3 K/UL (ref 0–0.5)
EOSINOPHIL NFR BLD: 5.2 % (ref 0–8)
ERYTHROCYTE [DISTWIDTH] IN BLOOD BY AUTOMATED COUNT: 14.4 % (ref 11.5–14.5)
ERYTHROCYTE [SEDIMENTATION RATE] IN BLOOD BY PHOTOMETRIC METHOD: 52 MM/HR (ref 0–36)
EST. GFR  (NO RACE VARIABLE): 54.2 ML/MIN/1.73 M^2
GLUCOSE SERPL-MCNC: 99 MG/DL (ref 70–110)
HCT VFR BLD AUTO: 35.2 % (ref 37–48.5)
HGB BLD-MCNC: 11.6 G/DL (ref 12–16)
IMM GRANULOCYTES # BLD AUTO: 0.01 K/UL (ref 0–0.04)
IMM GRANULOCYTES NFR BLD AUTO: 0.2 % (ref 0–0.5)
LYMPHOCYTES # BLD AUTO: 1.1 K/UL (ref 1–4.8)
LYMPHOCYTES NFR BLD: 22.1 % (ref 18–48)
MCH RBC QN AUTO: 33.8 PG (ref 27–31)
MCHC RBC AUTO-ENTMCNC: 33 G/DL (ref 32–36)
MCV RBC AUTO: 103 FL (ref 82–98)
MONOCYTES # BLD AUTO: 0.6 K/UL (ref 0.3–1)
MONOCYTES NFR BLD: 12.4 % (ref 4–15)
NEUTROPHILS # BLD AUTO: 3.1 K/UL (ref 1.8–7.7)
NEUTROPHILS NFR BLD: 59.3 % (ref 38–73)
NRBC BLD-RTO: 0 /100 WBC
PLATELET # BLD AUTO: 190 K/UL (ref 150–450)
PMV BLD AUTO: 11.3 FL (ref 9.2–12.9)
POTASSIUM SERPL-SCNC: 4.6 MMOL/L (ref 3.5–5.1)
PROT SERPL-MCNC: 6.4 G/DL (ref 6–8.4)
RBC # BLD AUTO: 3.43 M/UL (ref 4–5.4)
SODIUM SERPL-SCNC: 142 MMOL/L (ref 136–145)
WBC # BLD AUTO: 5.15 K/UL (ref 3.9–12.7)

## 2024-03-04 PROCEDURE — 85652 RBC SED RATE AUTOMATED: CPT | Mod: HCNC | Performed by: STUDENT IN AN ORGANIZED HEALTH CARE EDUCATION/TRAINING PROGRAM

## 2024-03-04 PROCEDURE — 80053 COMPREHEN METABOLIC PANEL: CPT | Mod: HCNC | Performed by: STUDENT IN AN ORGANIZED HEALTH CARE EDUCATION/TRAINING PROGRAM

## 2024-03-04 PROCEDURE — 36415 COLL VENOUS BLD VENIPUNCTURE: CPT | Mod: HCNC,PN | Performed by: STUDENT IN AN ORGANIZED HEALTH CARE EDUCATION/TRAINING PROGRAM

## 2024-03-04 PROCEDURE — 86140 C-REACTIVE PROTEIN: CPT | Mod: HCNC | Performed by: STUDENT IN AN ORGANIZED HEALTH CARE EDUCATION/TRAINING PROGRAM

## 2024-03-04 PROCEDURE — 85025 COMPLETE CBC W/AUTO DIFF WBC: CPT | Mod: HCNC | Performed by: STUDENT IN AN ORGANIZED HEALTH CARE EDUCATION/TRAINING PROGRAM

## 2024-04-01 DIAGNOSIS — Z98.890 S/P CAROTID ENDARTERECTOMY: ICD-10-CM

## 2024-04-01 DIAGNOSIS — F41.9 ANXIETY: ICD-10-CM

## 2024-04-01 DIAGNOSIS — R74.01 TRANSAMINITIS: ICD-10-CM

## 2024-04-01 DIAGNOSIS — E78.2 MIXED HYPERLIPIDEMIA: ICD-10-CM

## 2024-04-01 DIAGNOSIS — I10 ESSENTIAL HYPERTENSION: ICD-10-CM

## 2024-04-01 DIAGNOSIS — K21.9 GASTROESOPHAGEAL REFLUX DISEASE: ICD-10-CM

## 2024-04-01 DIAGNOSIS — I77.9 BILATERAL CAROTID ARTERY DISEASE, UNSPECIFIED TYPE: ICD-10-CM

## 2024-04-01 NOTE — TELEPHONE ENCOUNTER
Care Due:                  Date            Visit Type   Department     Provider  --------------------------------------------------------------------------------                                SAME DAY -                              ESTABLISHED   [facility]  Last Visit: 11-      PATIENT      MEDICINE       Sherry Garzon                              EP -                              PRIMARY      [facility]  Next Visit: 05-      CARE ([facility])   MEDICINE       Sherry Garzon                                                            Last  Test          Frequency    Reason                     Performed    Due Date  --------------------------------------------------------------------------------    Lipid Panel.  12 months..  atorvastatin.............  04- 03-    [facility] Embedded Care Due Messages. Reference number: [id number].   4/01/2024 2:28:52 PM CDT

## 2024-04-02 RX ORDER — ATORVASTATIN CALCIUM 20 MG/1
20 TABLET, FILM COATED ORAL
Qty: 90 TABLET | Refills: 0 | Status: SHIPPED | OUTPATIENT
Start: 2024-04-02

## 2024-04-02 RX ORDER — BUSPIRONE HYDROCHLORIDE 15 MG/1
TABLET ORAL
Qty: 90 TABLET | Refills: 5 | Status: SHIPPED | OUTPATIENT
Start: 2024-04-02

## 2024-04-02 RX ORDER — LOSARTAN POTASSIUM 25 MG/1
25 TABLET ORAL
Qty: 90 TABLET | Refills: 2 | Status: SHIPPED | OUTPATIENT
Start: 2024-04-02

## 2024-04-02 RX ORDER — METOPROLOL SUCCINATE 25 MG/1
TABLET, EXTENDED RELEASE ORAL
Qty: 45 TABLET | Refills: 2 | Status: SHIPPED | OUTPATIENT
Start: 2024-04-02

## 2024-04-02 RX ORDER — OMEPRAZOLE 20 MG/1
20 CAPSULE, DELAYED RELEASE ORAL
Qty: 90 CAPSULE | Refills: 2 | Status: SHIPPED | OUTPATIENT
Start: 2024-04-02

## 2024-04-03 NOTE — TELEPHONE ENCOUNTER
Refill Routing Note   Medication(s) are not appropriate for processing by [facility] for the following reason(s):        Required labs outdated: Lipitor - lipid panel  Outside of protocol: Buspar    OR action(s):  Defer  Route  Approve     Requires labs : Yes             Appointments  past 12m or future 3m with PCP    Date Provider   Last Visit   11/14/2023 Sehrry Garzon MD   Next Visit   5/20/2024 Sherry Garzon MD   ED visits in past 90 days: 0        Note composed:7:06 PM 04/02/2024

## 2024-04-04 DIAGNOSIS — D84.9 IMMUNOSUPPRESSION: ICD-10-CM

## 2024-04-04 DIAGNOSIS — Z51.81 MEDICATION MONITORING ENCOUNTER: ICD-10-CM

## 2024-04-04 DIAGNOSIS — M06.00 SERONEGATIVE RHEUMATOID ARTHRITIS: ICD-10-CM

## 2024-04-04 RX ORDER — METHOTREXATE 2.5 MG/1
10 TABLET ORAL
Qty: 48 TABLET | Refills: 1 | Status: SHIPPED | OUTPATIENT
Start: 2024-04-04 | End: 2024-10-01

## 2024-05-20 ENCOUNTER — OFFICE VISIT (OUTPATIENT)
Dept: FAMILY MEDICINE | Facility: CLINIC | Age: 89
End: 2024-05-20
Payer: MEDICARE

## 2024-05-20 VITALS
DIASTOLIC BLOOD PRESSURE: 66 MMHG | OXYGEN SATURATION: 96 % | WEIGHT: 173.38 LBS | HEART RATE: 61 BPM | RESPIRATION RATE: 18 BRPM | SYSTOLIC BLOOD PRESSURE: 112 MMHG | BODY MASS INDEX: 29.76 KG/M2

## 2024-05-20 DIAGNOSIS — I70.0 ATHEROSCLEROSIS OF AORTA: ICD-10-CM

## 2024-05-20 DIAGNOSIS — M06.00 SERONEGATIVE RHEUMATOID ARTHRITIS: ICD-10-CM

## 2024-05-20 DIAGNOSIS — M53.86 DECREASED RANGE OF MOTION OF INTERVERTEBRAL DISCS OF LUMBAR SPINE: Chronic | ICD-10-CM

## 2024-05-20 DIAGNOSIS — D84.9 IMMUNOSUPPRESSION: ICD-10-CM

## 2024-05-20 DIAGNOSIS — R26.89 ANTALGIC GAIT: ICD-10-CM

## 2024-05-20 DIAGNOSIS — N18.31 STAGE 3A CHRONIC KIDNEY DISEASE: ICD-10-CM

## 2024-05-20 DIAGNOSIS — G89.29 CHRONIC BILATERAL LOW BACK PAIN WITHOUT SCIATICA: Chronic | ICD-10-CM

## 2024-05-20 DIAGNOSIS — M54.50 CHRONIC BILATERAL LOW BACK PAIN WITHOUT SCIATICA: Chronic | ICD-10-CM

## 2024-05-20 DIAGNOSIS — Z00.00 PREVENTATIVE HEALTH CARE: Primary | ICD-10-CM

## 2024-05-20 PROCEDURE — 99214 OFFICE O/P EST MOD 30 MIN: CPT | Mod: HCNC,S$GLB,, | Performed by: STUDENT IN AN ORGANIZED HEALTH CARE EDUCATION/TRAINING PROGRAM

## 2024-05-20 PROCEDURE — 3288F FALL RISK ASSESSMENT DOCD: CPT | Mod: HCNC,CPTII,S$GLB, | Performed by: STUDENT IN AN ORGANIZED HEALTH CARE EDUCATION/TRAINING PROGRAM

## 2024-05-20 PROCEDURE — 1159F MED LIST DOCD IN RCRD: CPT | Mod: HCNC,CPTII,S$GLB, | Performed by: STUDENT IN AN ORGANIZED HEALTH CARE EDUCATION/TRAINING PROGRAM

## 2024-05-20 PROCEDURE — 1160F RVW MEDS BY RX/DR IN RCRD: CPT | Mod: HCNC,CPTII,S$GLB, | Performed by: STUDENT IN AN ORGANIZED HEALTH CARE EDUCATION/TRAINING PROGRAM

## 2024-05-20 PROCEDURE — 99999 PR PBB SHADOW E&M-EST. PATIENT-LVL IV: CPT | Mod: PBBFAC,HCNC,, | Performed by: STUDENT IN AN ORGANIZED HEALTH CARE EDUCATION/TRAINING PROGRAM

## 2024-05-20 PROCEDURE — G2211 COMPLEX E/M VISIT ADD ON: HCPCS | Mod: HCNC,S$GLB,, | Performed by: STUDENT IN AN ORGANIZED HEALTH CARE EDUCATION/TRAINING PROGRAM

## 2024-05-20 PROCEDURE — 1125F AMNT PAIN NOTED PAIN PRSNT: CPT | Mod: HCNC,CPTII,S$GLB, | Performed by: STUDENT IN AN ORGANIZED HEALTH CARE EDUCATION/TRAINING PROGRAM

## 2024-05-20 PROCEDURE — 1101F PT FALLS ASSESS-DOCD LE1/YR: CPT | Mod: HCNC,CPTII,S$GLB, | Performed by: STUDENT IN AN ORGANIZED HEALTH CARE EDUCATION/TRAINING PROGRAM

## 2024-05-20 NOTE — PROGRESS NOTES
"Plan:     Nida was seen today for follow-up.    Diagnoses and all orders for this visit:    Preventative health care: Advised to get vaccines at pharmacy per insurance requirements (COVID boosters, RSV vaccine, tdap). Plan for repeat lipid panel with next set of labs.    Seronegative rheumatoid arthritis: Continue methotrexate 10 mg weekly, folic acid 1 mg daily.    Immunosuppression    Atherosclerosis of aorta: Continue ASA, statin.    Stage 3a chronic kidney disease: Stable.    Antalgic gait  -     WALKER FOR HOME USE    Chronic bilateral low back pain without sciatica  -     WALKER FOR HOME USE    Decreased range of motion of intervertebral discs of lumbar spine  -     WALKER FOR HOME USE    Mobility impairment tag form completed and given to patient prior to end of today's appt.     Follow up in about 6 months (around 11/20/2024), or if symptoms worsen or fail to improve.    Sherry Garzon MD  05/20/2024    Subjective:      Patient ID: Nida Kline is a 88 y.o. female    Chief Complaint   Patient presents with    Follow-up     HPI  88 y.o. female with a PMHx as documented below presents to clinic today for the following:    Macrocytic anemia:   - Most recent CBC (8/1/23) w/ H/H 11.6/35.2,   - B12 and folate wnl     Chronic low back pain w/o sciatica:   - Xray lumbar spine (6/12/23) w/ grade I anterolisthesis of L5 on S1, grade I retrolisthesis of L3 on L4, degenerative facet arthropathy present in the lower lumbar spine at L5-S1, degenerative change of the symphysis pubis.    - S/p physical therapy x6 weeks for low back pain referred pain to the right knee  - Symptoms manageable without additional medications at this time     Anxiety:   - Buspar 15 mg tablets, 1/3-1/2 tablet TID PRN for acute anxiety      HTN:   - Losartan 25 mg daily, Toprol-XL 25 mg      HLD:   - Lipitor 20 mg daily     Diastolic dysfunction:   - TTE (2012) w/ "hyperdynamic left ventricular function (EF 75%), eccentric " "hypertrophy, diastolic dysfunction, trivial to mild mitral regurg, mild tricuspid regurg"      Bilateral carotid artery disease:   - Bilateral carotid artery US (2014) w/ "20 - 39% right internal carotid stenosis, 80 - 99% left internal carotid stenosis"  - S/p left carotid endarterectomy (2015)  - ASA, statin     Aortic atherosclerosis:   - ASA, statin     CKD stage 3:   - GFR 54.2 (3/5/24), stable     Rheumatoid arthritis:   - Methotrexate 10 mg weekly  - Folic acid 1 mg daily     Hypothyroidism:   - Synthroid 62.5 mcg daily      Vit D deficiency:   - Daily vit D supplementation     GERD:   - Omeprazole 20 mg daily      Osteoporosis:   - Daily calcium/vit D supplementation  - Fosamax 70 mg weekly     Insomnia:   - Current Rx: melatonin, OTC sleep-aids  - Reports Buspar 15 mg qhs sometimes helps  - Previous Rx: Remeron 7.5 mg qhs, Trazodone (ineffective)     ROS  Constitutional:  Negative for chills and fever.   Respiratory:  Negative for shortness of breath.    Cardiovascular:  Negative for chest pain.   Gastrointestinal:  Negative for abdominal pain, constipation, diarrhea, nausea and vomiting.     Current Outpatient Medications   Medication Instructions    alendronate (FOSAMAX) 70 mg, Oral, Every 7 days    ascorbic acid (vitamin C) (VITAMIN C) 500 mg, Oral, Daily    aspirin (ECOTRIN) 81 mg, Oral, Once    atorvastatin (LIPITOR) 20 mg, Oral    busPIRone (BUSPAR) 15 MG tablet TAKE 1/3 TO 1/2 TABLET THREE TIMES A DAY AS NEEDED FOR ANXIETY    calcium carbonate/vitamin D3 (CALTRATE 600 + D ORAL) Oral, Daily, Caltrate 600+D3     folic acid (FOLVITE) 1 mg, Oral, Daily    levothyroxine (SYNTHROID) 62.5 mcg, Oral, Daily    losartan (COZAAR) 25 mg, Oral    magnesium oxide 400 mg Cap 2 tablets, Oral, Daily    methotrexate 10 mg, Oral, Every 7 days    metoprolol succinate (TOPROL-XL) 25 MG 24 hr tablet TAKE 1/2 TABLET ONE TIME DAILY    omeprazole (PRILOSEC) 20 mg, Oral    vitamin D (VITAMIN D3) 1,000 Units, Oral, 2 times " daily    vitamin renal formula, B-complex-vitamin c-folic acid, (NEPHROCAP) 1 mg Cap 1 capsule, Oral, Daily, Generic.      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/07/2016    Facet arthropathy, lumbar (L5-S1) 11/14/2023    Gallbladder disease     GERD (gastroesophageal reflux disease)     History of iron deficiency 05/15/2017    HTN (hypertension)     Hypothyroidism     Mixed hyperlipidemia     Osteoporosis     Overweight (BMI 25.0-29.9)     PONV (postoperative nausea and vomiting)     Posterior vitreous detachment 06/07/2016    Pseudophakia of both eyes 06/07/2016    Pure hypercholesterolemia     Retrolisthesis (grade 1, L3-4) 11/14/2023    S/P carotid endarterectomy 06/07/2016    Squamous cell carcinoma     Tinea pedis of both feet 05/15/2017    Trouble in sleeping     Vitamin D deficiency      Past Surgical History:   Procedure Laterality Date    CAROTID ENDARTERECTOMY Left 2015    CATARACT EXTRACTION      OU    CHOLECYSTECTOMY      HYSTERECTOMY      TOTAL ABDOMINAL HYSTERECTOMY W/ BILATERAL SALPINGOOPHORECTOMY       Review of patient's allergies indicates:   Allergen Reactions    Lunesta [eszopiclone]      Paresthesia, lip swell     Propofol analogues Nausea And Vomiting     Family History   Problem Relation Name Age of Onset    Kidney disease Mother      Diabetes Mother      COPD Father      Heart disease Father       Social History     Tobacco Use    Smoking status: Never    Smokeless tobacco: Never   Substance Use Topics    Alcohol use: No    Drug use: No     Currently on File with [facility]:   Most Recent Immunizations    Administered Date(s) Administered    COVID-19, MRNA, LN-S, PF (Pfizer) (Purple Cap) 12/15/2021    Pneumococcal Conjugate - 13 Valent 12/31/2015    Pneumococcal Polysaccharide - 23 Valent 09/26/2019    Zoster Recombinant 04/29/2022     Objective:      Vitals:    05/20/24 1411   BP: 112/66   BP Location: Left arm   Patient Position: Sitting   Pulse: 61   Resp: 18   SpO2: 96%   Weight: 78.7 kg (173 lb 6.3 oz)     Body mass index is 29.76 kg/m².    Physical Exam   Constitutional:       General: No acute distress.  HENT:      Head: Normocephalic and atraumatic.   Pulmonary:      Effort: Pulmonary effort is normal. No respiratory distress.   Neurological:      General: No focal deficit present.      Mental Status: Alert and oriented to person, place, and time. Mental status is at baseline.    Assessment:       1. Preventative health care    2. Seronegative rheumatoid arthritis    3. Immunosuppression    4. Atherosclerosis of aorta    5. Stage 3a chronic kidney disease    6. Antalgic gait    7. Chronic bilateral low back pain without sciatica    8. Decreased range of motion of intervertebral discs of lumbar spine        Sherry Garzon MD  [facility]  Office: [phone]   Fax: [phone]  05/20/2024      Disclaimer: This note was partly generated using dictation software which may occasionally result in transcription errors.    Total time spent on this encounter includes face to face time and non-face to face time preparing to see the patient (eg, review of tests), obtaining and/or reviewing separately obtained history, documenting clinical information in the electronic or other health record, independently interpreting results, and communicating results to the patient/family/caregiver, or care coordinator.      Visit today included increased complexity associated with the care of the episodic problem (see above) addressed and managing the longitudinal care of the patient due to the  serious and/or complex managed problem(s) (see above).

## 2024-05-23 DIAGNOSIS — N18.31 STAGE 3A CHRONIC KIDNEY DISEASE: ICD-10-CM

## 2024-05-23 DIAGNOSIS — E61.1 IRON DEFICIENCY: ICD-10-CM

## 2024-05-23 DIAGNOSIS — D53.9 MACROCYTIC ANEMIA: ICD-10-CM

## 2024-05-23 NOTE — TELEPHONE ENCOUNTER
Pharmacy requesting refill on Renal caps  Pt's LOV 12/06/2023  Pt's NOV 06/17/2024  Medication pending

## 2024-05-28 DIAGNOSIS — M06.00 SERONEGATIVE RHEUMATOID ARTHRITIS: ICD-10-CM

## 2024-05-28 DIAGNOSIS — Z51.81 MEDICATION MONITORING ENCOUNTER: ICD-10-CM

## 2024-05-28 DIAGNOSIS — D84.9 IMMUNOSUPPRESSION: ICD-10-CM

## 2024-05-28 RX ORDER — FOLIC ACID 1 MG/1
1 TABLET ORAL DAILY
Qty: 90 TABLET | Refills: 6 | Status: SHIPPED | OUTPATIENT
Start: 2024-05-28

## 2024-05-28 NOTE — TELEPHONE ENCOUNTER
Pharmacy requesting refill on Folic Acid 1mg  Pt's LOV 12/06/2023  Pt's NOV 06/17/024  Medication pending

## 2024-06-03 ENCOUNTER — LAB VISIT (OUTPATIENT)
Dept: LAB | Facility: HOSPITAL | Age: 89
End: 2024-06-03
Attending: STUDENT IN AN ORGANIZED HEALTH CARE EDUCATION/TRAINING PROGRAM
Payer: MEDICARE

## 2024-06-03 DIAGNOSIS — D84.9 IMMUNOSUPPRESSION: ICD-10-CM

## 2024-06-03 DIAGNOSIS — Z79.899 HIGH RISK MEDICATION USE: ICD-10-CM

## 2024-06-03 DIAGNOSIS — M06.00 SERONEGATIVE RHEUMATOID ARTHRITIS: ICD-10-CM

## 2024-06-03 LAB
ALBUMIN SERPL BCP-MCNC: 3.4 G/DL (ref 3.5–5.2)
ALP SERPL-CCNC: 92 U/L (ref 55–135)
ALT SERPL W/O P-5'-P-CCNC: 20 U/L (ref 10–44)
ANION GAP SERPL CALC-SCNC: 10 MMOL/L (ref 8–16)
AST SERPL-CCNC: 21 U/L (ref 10–40)
BASOPHILS # BLD AUTO: 0.03 K/UL (ref 0–0.2)
BASOPHILS NFR BLD: 0.6 % (ref 0–1.9)
BILIRUB SERPL-MCNC: 0.6 MG/DL (ref 0.1–1)
BUN SERPL-MCNC: 35 MG/DL (ref 8–23)
CALCIUM SERPL-MCNC: 9.1 MG/DL (ref 8.7–10.5)
CHLORIDE SERPL-SCNC: 106 MMOL/L (ref 95–110)
CO2 SERPL-SCNC: 24 MMOL/L (ref 23–29)
CREAT SERPL-MCNC: 1.4 MG/DL (ref 0.5–1.4)
CRP SERPL-MCNC: 6.4 MG/L (ref 0–8.2)
DIFFERENTIAL METHOD BLD: ABNORMAL
EOSINOPHIL # BLD AUTO: 0.2 K/UL (ref 0–0.5)
EOSINOPHIL NFR BLD: 4.1 % (ref 0–8)
ERYTHROCYTE [DISTWIDTH] IN BLOOD BY AUTOMATED COUNT: 13.7 % (ref 11.5–14.5)
ERYTHROCYTE [SEDIMENTATION RATE] IN BLOOD BY PHOTOMETRIC METHOD: 46 MM/HR (ref 0–36)
EST. GFR  (NO RACE VARIABLE): 36.2 ML/MIN/1.73 M^2
GLUCOSE SERPL-MCNC: 142 MG/DL (ref 70–110)
HCT VFR BLD AUTO: 37.6 % (ref 37–48.5)
HGB BLD-MCNC: 12.4 G/DL (ref 12–16)
IMM GRANULOCYTES # BLD AUTO: 0.02 K/UL (ref 0–0.04)
IMM GRANULOCYTES NFR BLD AUTO: 0.4 % (ref 0–0.5)
LYMPHOCYTES # BLD AUTO: 1 K/UL (ref 1–4.8)
LYMPHOCYTES NFR BLD: 20.1 % (ref 18–48)
MCH RBC QN AUTO: 34.1 PG (ref 27–31)
MCHC RBC AUTO-ENTMCNC: 33 G/DL (ref 32–36)
MCV RBC AUTO: 103 FL (ref 82–98)
MONOCYTES # BLD AUTO: 0.5 K/UL (ref 0.3–1)
MONOCYTES NFR BLD: 9.1 % (ref 4–15)
NEUTROPHILS # BLD AUTO: 3.3 K/UL (ref 1.8–7.7)
NEUTROPHILS NFR BLD: 65.7 % (ref 38–73)
NRBC BLD-RTO: 0 /100 WBC
PLATELET # BLD AUTO: 211 K/UL (ref 150–450)
PMV BLD AUTO: 11.2 FL (ref 9.2–12.9)
POTASSIUM SERPL-SCNC: 4.5 MMOL/L (ref 3.5–5.1)
PROT SERPL-MCNC: 6.6 G/DL (ref 6–8.4)
RBC # BLD AUTO: 3.64 M/UL (ref 4–5.4)
SODIUM SERPL-SCNC: 140 MMOL/L (ref 136–145)
WBC # BLD AUTO: 5.07 K/UL (ref 3.9–12.7)

## 2024-06-03 PROCEDURE — 36415 COLL VENOUS BLD VENIPUNCTURE: CPT | Mod: HCNC,PN | Performed by: STUDENT IN AN ORGANIZED HEALTH CARE EDUCATION/TRAINING PROGRAM

## 2024-06-03 PROCEDURE — 85652 RBC SED RATE AUTOMATED: CPT | Mod: HCNC | Performed by: STUDENT IN AN ORGANIZED HEALTH CARE EDUCATION/TRAINING PROGRAM

## 2024-06-03 PROCEDURE — 85025 COMPLETE CBC W/AUTO DIFF WBC: CPT | Mod: HCNC | Performed by: STUDENT IN AN ORGANIZED HEALTH CARE EDUCATION/TRAINING PROGRAM

## 2024-06-03 PROCEDURE — 86140 C-REACTIVE PROTEIN: CPT | Mod: HCNC | Performed by: STUDENT IN AN ORGANIZED HEALTH CARE EDUCATION/TRAINING PROGRAM

## 2024-06-03 PROCEDURE — 80053 COMPREHEN METABOLIC PANEL: CPT | Mod: HCNC | Performed by: STUDENT IN AN ORGANIZED HEALTH CARE EDUCATION/TRAINING PROGRAM

## 2024-06-07 NOTE — PROGRESS NOTES
"Subjective:      Patient ID: Nida Kline is a 89 y.o. female.    Chief Complaint: Disease Management    HPI    Rheumatologic History:      - Diagnosis/es:              - osteopenia with elevated FRAX not currently on therapy  - seronegative non erosive RA diagnosed in 12/2021  - Positive serologies: -  - Negative serologies: ADEBAYO, RF, CCP  - Infectious screening labs: Negative hepatitis B, C, and quantiferon (4/2023)  - Imaging:              - X-Ray arthritis survey (10/2021): Osteopenia and diffuse joint space narrowing without erosions              - DEXA (10/2022) osteopenia with 27% risk of a major osteoporotic fracture and a 11% risk of hip fracture in the next 10 years (FRAX).  - Previous Treatments: -  - Current Treatments:               - MTX 10mg weekly plus folic acid daily              - Fosamax (8/2023- )  Interval History:   She is doing well and denies joint pain, swelling, and intake of NSAIDs. She has no complaints at this time, but does feel that she does not drink enough water.    Objective:   /62   Pulse 71   Ht 5' 4" (1.626 m)   Wt 78 kg (172 lb)   BMI 29.52 kg/m²   Physical Exam   Constitutional: normal appearance.   HENT:   Head: Normocephalic and atraumatic.   Cardiovascular: Normal rate, regular rhythm and normal heart sounds.   Pulmonary/Chest: Effort normal and breath sounds normal.   Musculoskeletal:      Comments: + Degenerative changes  Ulnar deviation  No synovitis, dactylitis, enthesitis, effusions     Neurological: She is alert.   Skin: Skin is warm and dry.   No skin thickening, telangiectasias, calcinosis, psoriasiform lesions, lupoid lesions        8/8/2023   Tender (PARSONS-28) 0 / 28    Swollen (PARSONS-28) 0 / 28    Provider Global 0 mm   Patient Global 0 mm   ESR 20 mm/hr   CRP 3.3 mg/L   PARSONS-28 (ESR) 2.1 (Remission)   PARSONS-28 (CRP) 1.49 (Remission)   CDAI Score 0      Labs independently reviewed by me (6/2/24)  CBC WBC, HGB, PLT WNL   CMP CR 1.4 <- 1, GFR 36.2, LFTs WNL "   CRP WNL   ESR 46 <- 52    Assessment:     1. Seronegative rheumatoid arthritis    2. MARTA (acute kidney injury)    3. High risk medication use    4. Osteoporosis, unspecified osteoporosis type, unspecified pathological fracture presence    5. Drug-induced immunodeficiency        This is an 88 year old woman with PMH of CKD, HTN, vit D deficiency, carotid artery disease s/p CEA, HLD, vit D deficiency, hypothyroidism, STEW, GERD, squamous cell carcinoma, osteopenia with elevated FRAX on Fosamax (5/2023- ), seronegative non erosive RA diagnosed in 12/2021 and on methotrexate 10mg weekly plus folic acid. her kidney function has worsened but she is not taking any NSAIDs or diuretics. Increase fluid intake for now and repeat CMP in 2 weeks. If creatinine does not improve, will have to discontinue MTX.    Plan:     Problem List Items Addressed This Visit          Immunology/Multi System    Drug-induced immunodeficiency    Seronegative rheumatoid arthritis - Primary    Relevant Orders    C-Reactive Protein    CBC Auto Differential    Creatinine, Serum    ALT (SGPT)    AST (SGOT)    Sedimentation rate    Hepatitis B surface antigen    HBcAB    Hepatitis B surface antibody    Hepatitis C antibody    Quantiferon Gold TB       Endocrine    Osteoporosis (Chronic)       Palliative Care    High risk medication use    Relevant Orders    C-Reactive Protein    CBC Auto Differential    Creatinine, Serum    ALT (SGPT)    AST (SGOT)    Sedimentation rate    Hepatitis B surface antigen    HBcAB    Hepatitis B surface antibody    Hepatitis C antibody    Quantiferon Gold TB     Other Visit Diagnoses       MARTA (acute kidney injury)        Relevant Orders    Comprehensive Metabolic Panel          1.) RA  - methotrexate 10mg weekly + folic acid daily  - CBC, CMP, ESR, CRP every 12 weeks  - Patient will need yearly skin cancer check on MTX given history of squamous cell CA  - Pre-DMARD labs due for repeat 4/2024  - Vaccinations: COVID x 3,  PCV13 (12/2015), PPV23 (9/2019), Shingrix x 2; patient declines the flu shot     2.) Osteopenia with elevated FRAX  - Fosamax 70mg weekly (8/2023- )  - DEXA due for repeat 10/2024    Follow up in 6 months    30 minutes of total time spent on the encounter, which includes face to face time and non-face to face time preparing to see the patient (eg, review of tests), Obtaining and/or reviewing separately obtained history, Documenting clinical information in the electronic or other health record, Independently interpreting results (not separately reported) and communicating results to the patient/family/caregiver, or Care coordination (not separately reported).     This note was prepared with [employer] voice recognition transcription software. Garbled syntax, mangled pronouns, and other bizarre constructions may be attributed to that software system       Leah Gannon M.D.  Rheumatology Dept  [place], [place]

## 2024-06-17 ENCOUNTER — OFFICE VISIT (OUTPATIENT)
Dept: RHEUMATOLOGY | Facility: CLINIC | Age: 89
End: 2024-06-17
Payer: MEDICARE

## 2024-06-17 VITALS
HEART RATE: 71 BPM | SYSTOLIC BLOOD PRESSURE: 111 MMHG | HEIGHT: 64 IN | BODY MASS INDEX: 29.37 KG/M2 | WEIGHT: 172 LBS | DIASTOLIC BLOOD PRESSURE: 62 MMHG

## 2024-06-17 DIAGNOSIS — N17.9 AKI (ACUTE KIDNEY INJURY): ICD-10-CM

## 2024-06-17 DIAGNOSIS — D84.821 DRUG-INDUCED IMMUNODEFICIENCY: ICD-10-CM

## 2024-06-17 DIAGNOSIS — Z79.899 HIGH RISK MEDICATION USE: ICD-10-CM

## 2024-06-17 DIAGNOSIS — M06.00 SERONEGATIVE RHEUMATOID ARTHRITIS: Primary | ICD-10-CM

## 2024-06-17 DIAGNOSIS — Z79.899 DRUG-INDUCED IMMUNODEFICIENCY: ICD-10-CM

## 2024-06-17 DIAGNOSIS — M81.0 OSTEOPOROSIS, UNSPECIFIED OSTEOPOROSIS TYPE, UNSPECIFIED PATHOLOGICAL FRACTURE PRESENCE: ICD-10-CM

## 2024-06-17 PROCEDURE — 1125F AMNT PAIN NOTED PAIN PRSNT: CPT | Mod: HCNC,CPTII,S$GLB, | Performed by: STUDENT IN AN ORGANIZED HEALTH CARE EDUCATION/TRAINING PROGRAM

## 2024-06-17 PROCEDURE — 3288F FALL RISK ASSESSMENT DOCD: CPT | Mod: HCNC,CPTII,S$GLB, | Performed by: STUDENT IN AN ORGANIZED HEALTH CARE EDUCATION/TRAINING PROGRAM

## 2024-06-17 PROCEDURE — 99215 OFFICE O/P EST HI 40 MIN: CPT | Mod: HCNC,S$GLB,, | Performed by: STUDENT IN AN ORGANIZED HEALTH CARE EDUCATION/TRAINING PROGRAM

## 2024-06-17 PROCEDURE — 1159F MED LIST DOCD IN RCRD: CPT | Mod: HCNC,CPTII,S$GLB, | Performed by: STUDENT IN AN ORGANIZED HEALTH CARE EDUCATION/TRAINING PROGRAM

## 2024-06-17 PROCEDURE — 1160F RVW MEDS BY RX/DR IN RCRD: CPT | Mod: HCNC,CPTII,S$GLB, | Performed by: STUDENT IN AN ORGANIZED HEALTH CARE EDUCATION/TRAINING PROGRAM

## 2024-06-17 PROCEDURE — 1101F PT FALLS ASSESS-DOCD LE1/YR: CPT | Mod: HCNC,CPTII,S$GLB, | Performed by: STUDENT IN AN ORGANIZED HEALTH CARE EDUCATION/TRAINING PROGRAM

## 2024-06-17 PROCEDURE — 99999 PR PBB SHADOW E&M-EST. PATIENT-LVL III: CPT | Mod: PBBFAC,HCNC,, | Performed by: STUDENT IN AN ORGANIZED HEALTH CARE EDUCATION/TRAINING PROGRAM

## 2024-06-17 ASSESSMENT — ROUTINE ASSESSMENT OF PATIENT INDEX DATA (RAPID3)
PAIN SCORE: 5.5
TOTAL RAPID3 SCORE: 5.05
MDHAQ FUNCTION SCORE: 0.8
PATIENT GLOBAL ASSESSMENT SCORE: 7
PSYCHOLOGICAL DISTRESS SCORE: 2.2
FATIGUE SCORE: 2.2

## 2024-06-18 ENCOUNTER — TELEPHONE (OUTPATIENT)
Dept: RHEUMATOLOGY | Facility: CLINIC | Age: 89
End: 2024-06-18
Payer: MEDICARE

## 2024-06-18 ENCOUNTER — TELEPHONE (OUTPATIENT)
Dept: FAMILY MEDICINE | Facility: CLINIC | Age: 89
End: 2024-06-18
Payer: MEDICARE

## 2024-06-18 DIAGNOSIS — I77.9 BILATERAL CAROTID ARTERY DISEASE, UNSPECIFIED TYPE: Primary | ICD-10-CM

## 2024-06-18 DIAGNOSIS — I77.89 OTHER SPECIFIED DISORDERS OF ARTERIES AND ARTERIOLES: ICD-10-CM

## 2024-06-18 NOTE — TELEPHONE ENCOUNTER
Patient is asking for orders to be placed for a US Carotid. Pt was last seen by you on 5-20. Please advise

## 2024-06-18 NOTE — TELEPHONE ENCOUNTER
Left message to call us back  ----- Message from Ashlyn Hayden sent at 6/17/2024  3:54 PM CDT -----  Regarding: Call back  Type:  Needs Medical Advice    Who Called:Pt    Would the patient rather a call back or a response via [facility]? Call back    Best Call Back Number: [phone] or [phone]    Additional Information: Pt is requesting a call back. Thank you

## 2024-06-18 NOTE — TELEPHONE ENCOUNTER
Spoke with patient and informed her that a message was routed to Dr. Garzon for her to advise about the US Carotid orders

## 2024-06-18 NOTE — TELEPHONE ENCOUNTER
----- Message from Ajith Campbell sent at 6/18/2024 10:52 AM CDT -----  Type: Needs Medical Advice  Who Called:  pt  Symptoms (please be specific):  pt said she need orders for a US Carotid--please call and advise  Best Call Back Number: [phone] cell or [phone] (home)     Additional Information: thank you

## 2024-07-01 ENCOUNTER — LAB VISIT (OUTPATIENT)
Dept: LAB | Facility: HOSPITAL | Age: 89
End: 2024-07-01
Attending: STUDENT IN AN ORGANIZED HEALTH CARE EDUCATION/TRAINING PROGRAM
Payer: MEDICARE

## 2024-07-01 DIAGNOSIS — N17.9 ACUTE KIDNEY FAILURE, UNSPECIFIED: ICD-10-CM

## 2024-07-01 LAB
ALBUMIN SERPL BCP-MCNC: 3.5 G/DL (ref 3.5–5.2)
ALP SERPL-CCNC: 93 U/L (ref 55–135)
ALT SERPL W/O P-5'-P-CCNC: 19 U/L (ref 10–44)
ANION GAP SERPL CALC-SCNC: 11 MMOL/L (ref 8–16)
AST SERPL-CCNC: 19 U/L (ref 10–40)
BILIRUB SERPL-MCNC: 0.6 MG/DL (ref 0.1–1)
BUN SERPL-MCNC: 28 MG/DL (ref 8–23)
CALCIUM SERPL-MCNC: 9.5 MG/DL (ref 8.7–10.5)
CHLORIDE SERPL-SCNC: 107 MMOL/L (ref 95–110)
CO2 SERPL-SCNC: 24 MMOL/L (ref 23–29)
CREAT SERPL-MCNC: 1.5 MG/DL (ref 0.5–1.4)
EST. GFR  (NO RACE VARIABLE): 33.1 ML/MIN/1.73 M^2
GLUCOSE SERPL-MCNC: 181 MG/DL (ref 70–110)
POTASSIUM SERPL-SCNC: 4.8 MMOL/L (ref 3.5–5.1)
PROT SERPL-MCNC: 6.8 G/DL (ref 6–8.4)
SODIUM SERPL-SCNC: 142 MMOL/L (ref 136–145)

## 2024-07-01 PROCEDURE — 80053 COMPREHEN METABOLIC PANEL: CPT | Mod: HCNC | Performed by: STUDENT IN AN ORGANIZED HEALTH CARE EDUCATION/TRAINING PROGRAM

## 2024-07-01 PROCEDURE — 36415 COLL VENOUS BLD VENIPUNCTURE: CPT | Mod: HCNC,PN | Performed by: STUDENT IN AN ORGANIZED HEALTH CARE EDUCATION/TRAINING PROGRAM

## 2024-07-02 ENCOUNTER — TELEPHONE (OUTPATIENT)
Dept: RHEUMATOLOGY | Facility: CLINIC | Age: 89
End: 2024-07-02
Payer: MEDICARE

## 2024-07-02 NOTE — TELEPHONE ENCOUNTER
----- Message from Sherry Carvalho sent at 7/2/2024 12:32 PM CDT -----  Contact: self  Type: Needs Medical Advice  Who Called:  the patient     Best Call Back Number: [phone]  Additional Information: pt called to see what NSAIDS are per a message she received on the portal. Please call  
Left message for patient explaining NSAIDS and if she needed to return my call I would be available.   
not applicable

## 2024-08-05 ENCOUNTER — HOSPITAL ENCOUNTER (OUTPATIENT)
Dept: RADIOLOGY | Facility: HOSPITAL | Age: 89
Discharge: HOME | End: 2024-08-05
Attending: STUDENT IN AN ORGANIZED HEALTH CARE EDUCATION/TRAINING PROGRAM
Payer: MEDICARE

## 2024-08-05 DIAGNOSIS — I77.89 OTHER SPECIFIED DISORDERS OF ARTERIES AND ARTERIOLES: ICD-10-CM

## 2024-08-05 DIAGNOSIS — I77.9 BILATERAL CAROTID ARTERY DISEASE, UNSPECIFIED TYPE: ICD-10-CM

## 2024-08-05 PROCEDURE — 93880 EXTRACRANIAL BILAT STUDY: CPT | Mod: 26,,, | Performed by: RADIOLOGY

## 2024-08-05 PROCEDURE — 93880 EXTRACRANIAL BILAT STUDY: CPT | Mod: TC,PO

## 2024-08-13 DIAGNOSIS — Z98.890 S/P CAROTID ENDARTERECTOMY: ICD-10-CM

## 2024-08-13 DIAGNOSIS — R74.01 TRANSAMINITIS: ICD-10-CM

## 2024-08-13 DIAGNOSIS — E78.2 MIXED HYPERLIPIDEMIA: ICD-10-CM

## 2024-08-13 DIAGNOSIS — I77.9 BILATERAL CAROTID ARTERY DISEASE, UNSPECIFIED TYPE: ICD-10-CM

## 2024-08-13 RX ORDER — ATORVASTATIN CALCIUM 20 MG/1
20 TABLET, FILM COATED ORAL
Qty: 90 TABLET | Refills: 0 | Status: SHIPPED | OUTPATIENT
Start: 2024-08-13

## 2024-08-13 NOTE — TELEPHONE ENCOUNTER
Care Due:                  Date            Visit Type   Department     Provider  --------------------------------------------------------------------------------                                EP -                              [facility] FAMILY  Last Visit: 05-      CARE ([facility])   MEDICINE       Sherry Garzon                              EP -                              PRIMARY      [facility]  Next Visit: 11-      CARE ([facility])   MEDICINE       Sherry Garzon                                                            Last  Test          Frequency    Reason                     Performed    Due Date  --------------------------------------------------------------------------------    Lipid Panel.  12 months..  atorvastatin.............  04- 03-    Health [facility] Embedded Care Due Messages. Reference number: [id number].   8/13/2024 2:01:33 AM CDT

## 2024-08-13 NOTE — TELEPHONE ENCOUNTER
Refill Routing Note   Medication(s) are not appropriate for processing by [facility] for the following reason(s):        Required labs outdated    [facility] action(s):  Defer        Medication Therapy Plan: FLOS      Appointments  past 12m or future 3m with PCP    Date Provider   Last Visit   5/20/2024 Sherry Garzon MD   Next Visit   11/20/2024 Sherry Garzon MD   ED visits in past 90 days: 0        Note composed:9:23 AM 08/13/2024

## 2024-08-26 ENCOUNTER — TELEPHONE (OUTPATIENT)
Dept: FAMILY MEDICINE | Facility: CLINIC | Age: 89
End: 2024-08-26
Payer: MEDICARE

## 2024-08-26 NOTE — TELEPHONE ENCOUNTER
----- Message from Agustin Day sent at 8/26/2024  1:31 PM CDT -----  Contact: Self  Type: Needs Medical Advice  Who Called:  Patient    Best Call Back Number: [phone]  Additional Information: Patient is requesting a call back, she states that she has been waiting for an order for a Rollator since May 20th.

## 2024-08-26 NOTE — TELEPHONE ENCOUNTER
Order faxed to [facility]. Pt is aware. Ph number given so she can call them if she does not hear from them.

## 2024-09-17 ENCOUNTER — LAB VISIT (OUTPATIENT)
Dept: LAB | Facility: HOSPITAL | Age: 89
End: 2024-09-17
Attending: STUDENT IN AN ORGANIZED HEALTH CARE EDUCATION/TRAINING PROGRAM
Payer: MEDICARE

## 2024-09-17 DIAGNOSIS — Z79.899 HIGH RISK MEDICATION USE: ICD-10-CM

## 2024-09-17 DIAGNOSIS — M06.00 SERONEGATIVE RHEUMATOID ARTHRITIS: ICD-10-CM

## 2024-09-17 LAB
ALT SERPL W/O P-5'-P-CCNC: 19 U/L (ref 10–44)
AST SERPL-CCNC: 23 U/L (ref 10–40)
BASOPHILS # BLD AUTO: 0.04 K/UL (ref 0–0.2)
BASOPHILS NFR BLD: 0.7 % (ref 0–1.9)
CREAT SERPL-MCNC: 1.4 MG/DL (ref 0.5–1.4)
CRP SERPL-MCNC: 2.5 MG/L (ref 0–8.2)
DIFFERENTIAL METHOD BLD: ABNORMAL
EOSINOPHIL # BLD AUTO: 0.3 K/UL (ref 0–0.5)
EOSINOPHIL NFR BLD: 4.6 % (ref 0–8)
ERYTHROCYTE [DISTWIDTH] IN BLOOD BY AUTOMATED COUNT: 14.6 % (ref 11.5–14.5)
ERYTHROCYTE [SEDIMENTATION RATE] IN BLOOD BY PHOTOMETRIC METHOD: 25 MM/HR (ref 0–36)
EST. GFR  (NO RACE VARIABLE): 36 ML/MIN/1.73 M^2
HBV CORE AB SERPL QL IA: NORMAL
HBV SURFACE AB SER-ACNC: <3 MIU/ML
HBV SURFACE AB SER-ACNC: NORMAL M[IU]/ML
HBV SURFACE AG SERPL QL IA: NORMAL
HCT VFR BLD AUTO: 36.6 % (ref 37–48.5)
HCV AB SERPL QL IA: NORMAL
HGB BLD-MCNC: 11.6 G/DL (ref 12–16)
IMM GRANULOCYTES # BLD AUTO: 0.02 K/UL (ref 0–0.04)
IMM GRANULOCYTES NFR BLD AUTO: 0.3 % (ref 0–0.5)
LYMPHOCYTES # BLD AUTO: 1.3 K/UL (ref 1–4.8)
LYMPHOCYTES NFR BLD: 20.6 % (ref 18–48)
MCH RBC QN AUTO: 33.9 PG (ref 27–31)
MCHC RBC AUTO-ENTMCNC: 31.7 G/DL (ref 32–36)
MCV RBC AUTO: 107 FL (ref 82–98)
MONOCYTES # BLD AUTO: 0.7 K/UL (ref 0.3–1)
MONOCYTES NFR BLD: 11.2 % (ref 4–15)
NEUTROPHILS # BLD AUTO: 3.8 K/UL (ref 1.8–7.7)
NEUTROPHILS NFR BLD: 62.6 % (ref 38–73)
NRBC BLD-RTO: 0 /100 WBC
PLATELET # BLD AUTO: 210 K/UL (ref 150–450)
PMV BLD AUTO: 11.6 FL (ref 9.2–12.9)
RBC # BLD AUTO: 3.42 M/UL (ref 4–5.4)
WBC # BLD AUTO: 6.07 K/UL (ref 3.9–12.7)

## 2024-09-17 PROCEDURE — 85025 COMPLETE CBC W/AUTO DIFF WBC: CPT | Mod: HCNC | Performed by: STUDENT IN AN ORGANIZED HEALTH CARE EDUCATION/TRAINING PROGRAM

## 2024-09-17 PROCEDURE — 86704 HEP B CORE ANTIBODY TOTAL: CPT | Mod: HCNC | Performed by: STUDENT IN AN ORGANIZED HEALTH CARE EDUCATION/TRAINING PROGRAM

## 2024-09-17 PROCEDURE — 84450 TRANSFERASE (AST) (SGOT): CPT | Mod: HCNC | Performed by: STUDENT IN AN ORGANIZED HEALTH CARE EDUCATION/TRAINING PROGRAM

## 2024-09-17 PROCEDURE — 82565 ASSAY OF CREATININE: CPT | Mod: HCNC | Performed by: STUDENT IN AN ORGANIZED HEALTH CARE EDUCATION/TRAINING PROGRAM

## 2024-09-17 PROCEDURE — 84460 ALANINE AMINO (ALT) (SGPT): CPT | Mod: HCNC | Performed by: STUDENT IN AN ORGANIZED HEALTH CARE EDUCATION/TRAINING PROGRAM

## 2024-09-17 PROCEDURE — 86140 C-REACTIVE PROTEIN: CPT | Mod: HCNC | Performed by: STUDENT IN AN ORGANIZED HEALTH CARE EDUCATION/TRAINING PROGRAM

## 2024-09-17 PROCEDURE — 87340 HEPATITIS B SURFACE AG IA: CPT | Mod: HCNC | Performed by: STUDENT IN AN ORGANIZED HEALTH CARE EDUCATION/TRAINING PROGRAM

## 2024-09-17 PROCEDURE — 86803 HEPATITIS C AB TEST: CPT | Mod: HCNC | Performed by: STUDENT IN AN ORGANIZED HEALTH CARE EDUCATION/TRAINING PROGRAM

## 2024-09-17 PROCEDURE — 86480 TB TEST CELL IMMUN MEASURE: CPT | Mod: HCNC | Performed by: STUDENT IN AN ORGANIZED HEALTH CARE EDUCATION/TRAINING PROGRAM

## 2024-09-17 PROCEDURE — 36415 COLL VENOUS BLD VENIPUNCTURE: CPT | Mod: HCNC,PN | Performed by: STUDENT IN AN ORGANIZED HEALTH CARE EDUCATION/TRAINING PROGRAM

## 2024-09-17 PROCEDURE — 85652 RBC SED RATE AUTOMATED: CPT | Mod: HCNC | Performed by: STUDENT IN AN ORGANIZED HEALTH CARE EDUCATION/TRAINING PROGRAM

## 2024-09-17 PROCEDURE — 86706 HEP B SURFACE ANTIBODY: CPT | Mod: HCNC | Performed by: STUDENT IN AN ORGANIZED HEALTH CARE EDUCATION/TRAINING PROGRAM

## 2024-09-18 LAB
GAMMA INTERFERON BACKGROUND BLD IA-ACNC: 0.09 IU/ML
M TB IFN-G CD4+ BCKGRND COR BLD-ACNC: 0 IU/ML
M TB IFN-G CD4+ BCKGRND COR BLD-ACNC: 0.01 IU/ML
MITOGEN IGNF BCKGRD COR BLD-ACNC: 4.3 IU/ML
TB GOLD PLUS: NEGATIVE

## 2024-09-25 DIAGNOSIS — I10 ESSENTIAL HYPERTENSION: ICD-10-CM

## 2024-09-25 DIAGNOSIS — E03.9 ACQUIRED HYPOTHYROIDISM: ICD-10-CM

## 2024-09-25 DIAGNOSIS — E78.2 MIXED HYPERLIPIDEMIA: ICD-10-CM

## 2024-09-25 RX ORDER — LEVOTHYROXINE SODIUM 125 UG/1
TABLET ORAL
Qty: 45 TABLET | Refills: 0 | Status: SHIPPED | OUTPATIENT
Start: 2024-09-25

## 2024-09-25 NOTE — TELEPHONE ENCOUNTER
Care Due:                  Date            Visit Type   Department     Provider  --------------------------------------------------------------------------------                                EP -                              [facility] FAMILY  Last Visit: 05-      CARE ([facility])   MEDICINE       Sherry Garzon                              EP -                              PRIMARY      [facility]  Next Visit: 11-      CARE ([facility])   MEDICINE       Sherry Garzon                                                            Last  Test          Frequency    Reason                     Performed    Due Date  --------------------------------------------------------------------------------    TSH.........  12 months..  levothyroxine............  12- 11-    Health [facility] Embedded Care Due Messages. Reference number: [id number].   9/25/2024 2:24:17 AM CDT

## 2024-09-26 NOTE — TELEPHONE ENCOUNTER
Provider Staff:  Action required for this patient    Requires labs      Please see care gap opportunities below in Care Due Message.    Thanks!  [facility]     Appointments      Date Provider   Last Visit   5/20/2024 Sherry Garzon MD   Next Visit   11/20/2024 Sherry Garzon MD     Refill Decision Note   Nida Kline  is requesting a refill authorization.  Brief Assessment and Rationale for Refill:  Approve     Medication Therapy Plan:  FOVS      Comments:     Note composed:7:35 PM 09/25/2024

## 2024-10-21 DIAGNOSIS — E78.2 MIXED HYPERLIPIDEMIA: ICD-10-CM

## 2024-10-21 DIAGNOSIS — I77.9 BILATERAL CAROTID ARTERY DISEASE, UNSPECIFIED TYPE: ICD-10-CM

## 2024-10-21 DIAGNOSIS — R74.01 TRANSAMINITIS: ICD-10-CM

## 2024-10-21 DIAGNOSIS — Z98.890 S/P CAROTID ENDARTERECTOMY: ICD-10-CM

## 2024-10-21 RX ORDER — ATORVASTATIN CALCIUM 20 MG/1
20 TABLET, FILM COATED ORAL
Qty: 90 TABLET | Refills: 0 | Status: SHIPPED | OUTPATIENT
Start: 2024-10-21

## 2024-10-21 NOTE — TELEPHONE ENCOUNTER
Care Due:                  Date            Visit Type   Department     Provider  --------------------------------------------------------------------------------                                EP -                              [facility] FAMILY  Last Visit: 05-      CARE ([facility])   MEDICINE       Sherry Garzon                              EP -                              PRIMARY      [facility]  Next Visit: 11-      CARE ([facility])   MEDICINE       Sherry Garzon                                                            Last  Test          Frequency    Reason                     Performed    Due Date  --------------------------------------------------------------------------------    Lipid Panel.  12 months..  atorvastatin.............  04- 03-    Health [facility] Embedded Care Due Messages. Reference number: [id number].   10/21/2024 1:25:16 AM CDT

## 2024-10-21 NOTE — TELEPHONE ENCOUNTER
Refill Routing Note   Medication(s) are not appropriate for processing by [facility] for the following reason(s):        Required labs outdated    [facility] action(s):  Defer     Requires labs : Yes             Appointments  past 12m or future 3m with PCP    Date Provider   Last Visit   5/20/2024 Sherry Garzon MD   Next Visit   11/20/2024 Sherry Garzon MD   ED visits in past 90 days: 0        Note composed:4:34 AM 10/21/2024

## 2024-10-24 DIAGNOSIS — D84.9 IMMUNOSUPPRESSION: ICD-10-CM

## 2024-10-24 DIAGNOSIS — M06.00 SERONEGATIVE RHEUMATOID ARTHRITIS: ICD-10-CM

## 2024-10-24 DIAGNOSIS — Z51.81 MEDICATION MONITORING ENCOUNTER: ICD-10-CM

## 2024-10-24 RX ORDER — METHOTREXATE 2.5 MG/1
10 TABLET ORAL
Qty: 48 TABLET | Refills: 1 | Status: SHIPPED | OUTPATIENT
Start: 2024-10-24 | End: 2024-10-25 | Stop reason: SDUPTHER

## 2024-10-25 DIAGNOSIS — D53.9 MACROCYTIC ANEMIA: ICD-10-CM

## 2024-10-25 DIAGNOSIS — N18.31 STAGE 3A CHRONIC KIDNEY DISEASE: ICD-10-CM

## 2024-10-25 DIAGNOSIS — D84.9 IMMUNOSUPPRESSION: ICD-10-CM

## 2024-10-25 DIAGNOSIS — Z51.81 MEDICATION MONITORING ENCOUNTER: ICD-10-CM

## 2024-10-25 DIAGNOSIS — E61.1 IRON DEFICIENCY: ICD-10-CM

## 2024-10-25 DIAGNOSIS — M06.00 SERONEGATIVE RHEUMATOID ARTHRITIS: ICD-10-CM

## 2024-10-25 RX ORDER — METHOTREXATE 2.5 MG/1
10 TABLET ORAL
Qty: 48 TABLET | Refills: 1 | Status: SHIPPED | OUTPATIENT
Start: 2024-10-25 | End: 2025-04-23

## 2024-11-20 ENCOUNTER — LAB VISIT (OUTPATIENT)
Dept: LAB | Facility: HOSPITAL | Age: 89
End: 2024-11-20
Attending: STUDENT IN AN ORGANIZED HEALTH CARE EDUCATION/TRAINING PROGRAM
Payer: MEDICARE

## 2024-11-20 ENCOUNTER — OFFICE VISIT (OUTPATIENT)
Dept: FAMILY MEDICINE | Facility: CLINIC | Age: 89
End: 2024-11-20
Payer: MEDICARE

## 2024-11-20 VITALS
DIASTOLIC BLOOD PRESSURE: 66 MMHG | OXYGEN SATURATION: 98 % | RESPIRATION RATE: 16 BRPM | HEIGHT: 64 IN | BODY MASS INDEX: 28.64 KG/M2 | HEART RATE: 66 BPM | SYSTOLIC BLOOD PRESSURE: 110 MMHG | WEIGHT: 167.75 LBS | TEMPERATURE: 98 F

## 2024-11-20 DIAGNOSIS — M81.0 AGE-RELATED OSTEOPOROSIS WITHOUT CURRENT PATHOLOGICAL FRACTURE: Chronic | ICD-10-CM

## 2024-11-20 DIAGNOSIS — Z79.899 HIGH RISK MEDICATION USE: Primary | ICD-10-CM

## 2024-11-20 DIAGNOSIS — D84.9 IMMUNOSUPPRESSION: ICD-10-CM

## 2024-11-20 DIAGNOSIS — Z79.899 HIGH RISK MEDICATION USE: ICD-10-CM

## 2024-11-20 DIAGNOSIS — N18.32 STAGE 3B CHRONIC KIDNEY DISEASE: ICD-10-CM

## 2024-11-20 DIAGNOSIS — I10 ESSENTIAL HYPERTENSION: Chronic | ICD-10-CM

## 2024-11-20 DIAGNOSIS — D53.9 MACROCYTIC ANEMIA: Chronic | ICD-10-CM

## 2024-11-20 DIAGNOSIS — E83.42 HYPOMAGNESEMIA: ICD-10-CM

## 2024-11-20 DIAGNOSIS — Z86.39 HISTORY OF IRON DEFICIENCY: Chronic | ICD-10-CM

## 2024-11-20 DIAGNOSIS — E78.2 MIXED HYPERLIPIDEMIA: Chronic | ICD-10-CM

## 2024-11-20 DIAGNOSIS — E03.9 ACQUIRED HYPOTHYROIDISM: Chronic | ICD-10-CM

## 2024-11-20 DIAGNOSIS — E55.9 VITAMIN D DEFICIENCY: Chronic | ICD-10-CM

## 2024-11-20 DIAGNOSIS — M06.00 SERONEGATIVE RHEUMATOID ARTHRITIS: ICD-10-CM

## 2024-11-20 PROBLEM — N18.31 STAGE 3A CHRONIC KIDNEY DISEASE: Status: RESOLVED | Noted: 2017-11-14 | Resolved: 2024-11-20

## 2024-11-20 LAB
25(OH)D3+25(OH)D2 SERPL-MCNC: 71 NG/ML (ref 30–96)
ALBUMIN SERPL BCP-MCNC: 3.7 G/DL (ref 3.5–5.2)
ALP SERPL-CCNC: 78 U/L (ref 40–150)
ALT SERPL W/O P-5'-P-CCNC: 29 U/L (ref 10–44)
ANION GAP SERPL CALC-SCNC: 9 MMOL/L (ref 8–16)
AST SERPL-CCNC: 30 U/L (ref 10–40)
BASOPHILS # BLD AUTO: 0.04 K/UL (ref 0–0.2)
BASOPHILS NFR BLD: 0.7 % (ref 0–1.9)
BILIRUB SERPL-MCNC: 0.5 MG/DL (ref 0.1–1)
BUN SERPL-MCNC: 27 MG/DL (ref 8–23)
CALCIUM SERPL-MCNC: 9.5 MG/DL (ref 8.7–10.5)
CHLORIDE SERPL-SCNC: 108 MMOL/L (ref 95–110)
CHOLEST SERPL-MCNC: 134 MG/DL (ref 120–199)
CHOLEST/HDLC SERPL: 2.4 {RATIO} (ref 2–5)
CO2 SERPL-SCNC: 23 MMOL/L (ref 23–29)
CREAT SERPL-MCNC: 1.3 MG/DL (ref 0.5–1.4)
CRP SERPL-MCNC: 2.3 MG/L (ref 0–8.2)
DIFFERENTIAL METHOD BLD: ABNORMAL
EOSINOPHIL # BLD AUTO: 0.1 K/UL (ref 0–0.5)
EOSINOPHIL NFR BLD: 2 % (ref 0–8)
ERYTHROCYTE [DISTWIDTH] IN BLOOD BY AUTOMATED COUNT: 13.5 % (ref 11.5–14.5)
EST. GFR  (NO RACE VARIABLE): 39.3 ML/MIN/1.73 M^2
ESTIMATED AVG GLUCOSE: 111 MG/DL (ref 68–131)
FERRITIN SERPL-MCNC: 264 NG/ML (ref 20–300)
FOLATE SERPL-MCNC: >40 NG/ML (ref 4–24)
GLUCOSE SERPL-MCNC: 90 MG/DL (ref 70–110)
HBA1C MFR BLD: 5.5 % (ref 4–5.6)
HCT VFR BLD AUTO: 35.6 % (ref 37–48.5)
HDLC SERPL-MCNC: 56 MG/DL (ref 40–75)
HDLC SERPL: 41.8 % (ref 20–50)
HGB BLD-MCNC: 11.3 G/DL (ref 12–16)
IMM GRANULOCYTES # BLD AUTO: 0.02 K/UL (ref 0–0.04)
IMM GRANULOCYTES NFR BLD AUTO: 0.3 % (ref 0–0.5)
IRON SERPL-MCNC: 95 UG/DL (ref 30–160)
LDLC SERPL CALC-MCNC: 63 MG/DL (ref 63–159)
LYMPHOCYTES # BLD AUTO: 1.3 K/UL (ref 1–4.8)
LYMPHOCYTES NFR BLD: 21.3 % (ref 18–48)
MAGNESIUM SERPL-MCNC: 1.7 MG/DL (ref 1.6–2.6)
MCH RBC QN AUTO: 33.7 PG (ref 27–31)
MCHC RBC AUTO-ENTMCNC: 31.7 G/DL (ref 32–36)
MCV RBC AUTO: 106 FL (ref 82–98)
MONOCYTES # BLD AUTO: 0.6 K/UL (ref 0.3–1)
MONOCYTES NFR BLD: 9.7 % (ref 4–15)
NEUTROPHILS # BLD AUTO: 3.9 K/UL (ref 1.8–7.7)
NEUTROPHILS NFR BLD: 66 % (ref 38–73)
NONHDLC SERPL-MCNC: 78 MG/DL
NRBC BLD-RTO: 0 /100 WBC
PLATELET # BLD AUTO: 295 K/UL (ref 150–450)
PMV BLD AUTO: 10.9 FL (ref 9.2–12.9)
POTASSIUM SERPL-SCNC: 4.6 MMOL/L (ref 3.5–5.1)
PROT SERPL-MCNC: 7.2 G/DL (ref 6–8.4)
RBC # BLD AUTO: 3.35 M/UL (ref 4–5.4)
SATURATED IRON: 32 % (ref 20–50)
SODIUM SERPL-SCNC: 140 MMOL/L (ref 136–145)
TOTAL IRON BINDING CAPACITY: 300 UG/DL (ref 250–450)
TRANSFERRIN SERPL-MCNC: 203 MG/DL (ref 200–375)
TRIGL SERPL-MCNC: 75 MG/DL (ref 30–150)
TSH SERPL DL<=0.005 MIU/L-ACNC: 1.22 UIU/ML (ref 0.4–4)
VIT B12 SERPL-MCNC: 704 PG/ML (ref 210–950)
WBC # BLD AUTO: 5.87 K/UL (ref 3.9–12.7)

## 2024-11-20 PROCEDURE — 85025 COMPLETE CBC W/AUTO DIFF WBC: CPT | Mod: HCNC | Performed by: STUDENT IN AN ORGANIZED HEALTH CARE EDUCATION/TRAINING PROGRAM

## 2024-11-20 PROCEDURE — 82306 VITAMIN D 25 HYDROXY: CPT | Mod: HCNC | Performed by: STUDENT IN AN ORGANIZED HEALTH CARE EDUCATION/TRAINING PROGRAM

## 2024-11-20 PROCEDURE — 83036 HEMOGLOBIN GLYCOSYLATED A1C: CPT | Mod: HCNC | Performed by: STUDENT IN AN ORGANIZED HEALTH CARE EDUCATION/TRAINING PROGRAM

## 2024-11-20 PROCEDURE — 80061 LIPID PANEL: CPT | Mod: HCNC | Performed by: STUDENT IN AN ORGANIZED HEALTH CARE EDUCATION/TRAINING PROGRAM

## 2024-11-20 PROCEDURE — 1125F AMNT PAIN NOTED PAIN PRSNT: CPT | Mod: HCNC,CPTII,S$GLB, | Performed by: STUDENT IN AN ORGANIZED HEALTH CARE EDUCATION/TRAINING PROGRAM

## 2024-11-20 PROCEDURE — 99214 OFFICE O/P EST MOD 30 MIN: CPT | Mod: HCNC,S$GLB,, | Performed by: STUDENT IN AN ORGANIZED HEALTH CARE EDUCATION/TRAINING PROGRAM

## 2024-11-20 PROCEDURE — 82728 ASSAY OF FERRITIN: CPT | Mod: HCNC | Performed by: STUDENT IN AN ORGANIZED HEALTH CARE EDUCATION/TRAINING PROGRAM

## 2024-11-20 PROCEDURE — 3288F FALL RISK ASSESSMENT DOCD: CPT | Mod: HCNC,CPTII,S$GLB, | Performed by: STUDENT IN AN ORGANIZED HEALTH CARE EDUCATION/TRAINING PROGRAM

## 2024-11-20 PROCEDURE — 80053 COMPREHEN METABOLIC PANEL: CPT | Mod: HCNC | Performed by: STUDENT IN AN ORGANIZED HEALTH CARE EDUCATION/TRAINING PROGRAM

## 2024-11-20 PROCEDURE — 86140 C-REACTIVE PROTEIN: CPT | Mod: HCNC | Performed by: STUDENT IN AN ORGANIZED HEALTH CARE EDUCATION/TRAINING PROGRAM

## 2024-11-20 PROCEDURE — 1159F MED LIST DOCD IN RCRD: CPT | Mod: HCNC,CPTII,S$GLB, | Performed by: STUDENT IN AN ORGANIZED HEALTH CARE EDUCATION/TRAINING PROGRAM

## 2024-11-20 PROCEDURE — 82746 ASSAY OF FOLIC ACID SERUM: CPT | Mod: HCNC | Performed by: STUDENT IN AN ORGANIZED HEALTH CARE EDUCATION/TRAINING PROGRAM

## 2024-11-20 PROCEDURE — 82607 VITAMIN B-12: CPT | Mod: HCNC | Performed by: STUDENT IN AN ORGANIZED HEALTH CARE EDUCATION/TRAINING PROGRAM

## 2024-11-20 PROCEDURE — 83735 ASSAY OF MAGNESIUM: CPT | Mod: HCNC | Performed by: STUDENT IN AN ORGANIZED HEALTH CARE EDUCATION/TRAINING PROGRAM

## 2024-11-20 PROCEDURE — 99999 PR PBB SHADOW E&M-EST. PATIENT-LVL IV: CPT | Mod: PBBFAC,HCNC,, | Performed by: STUDENT IN AN ORGANIZED HEALTH CARE EDUCATION/TRAINING PROGRAM

## 2024-11-20 PROCEDURE — 84466 ASSAY OF TRANSFERRIN: CPT | Mod: HCNC | Performed by: STUDENT IN AN ORGANIZED HEALTH CARE EDUCATION/TRAINING PROGRAM

## 2024-11-20 PROCEDURE — 84443 ASSAY THYROID STIM HORMONE: CPT | Mod: HCNC | Performed by: STUDENT IN AN ORGANIZED HEALTH CARE EDUCATION/TRAINING PROGRAM

## 2024-11-20 PROCEDURE — 1160F RVW MEDS BY RX/DR IN RCRD: CPT | Mod: HCNC,CPTII,S$GLB, | Performed by: STUDENT IN AN ORGANIZED HEALTH CARE EDUCATION/TRAINING PROGRAM

## 2024-11-20 PROCEDURE — 1101F PT FALLS ASSESS-DOCD LE1/YR: CPT | Mod: HCNC,CPTII,S$GLB, | Performed by: STUDENT IN AN ORGANIZED HEALTH CARE EDUCATION/TRAINING PROGRAM

## 2024-11-20 NOTE — PROGRESS NOTES
Plan:      Nida was seen today for follow-up.    Diagnoses and all orders for this visit:    High risk medication use  -     Hemoglobin A1C; Future  -     Vitamin B12; Future  -     FOLATE; Future    Stage 3b chronic kidney disease  -     Comprehensive Metabolic Panel; Future    Essential hypertension  -     Comprehensive Metabolic Panel; Future    Mixed hyperlipidemia  -     Lipid Panel; Future  -     Comprehensive Metabolic Panel; Future    Hypomagnesemia  -     Comprehensive Metabolic Panel; Future  -     Magnesium; Future    Immunosuppression  -     CBC Auto Differential; Future    History of iron deficiency  -     CBC Auto Differential; Future  -     IRON AND TIBC; Future  -     FERRITIN; Future    Macrocytic anemia  -     CBC Auto Differential; Future    Acquired hypothyroidism  -     TSH; Future    Age-related osteoporosis without current pathological fracture  -     Comprehensive Metabolic Panel; Future  -     Vitamin D; Future    Vitamin D deficiency  -     Vitamin D; Future    Okay to take Tylenol PRN for chronic back pain - continue to avoid NSAIDs.    No refills needed at this time.     Follow up in about 6 months (around 5/20/2025), or if symptoms worsen or fail to improve.    Sherry Garzon MD  11/20/2024    Subjective:      Patient ID: Nida Kline is a 89 y.o. female    Chief Complaint   Patient presents with    Follow-up     HPI  89 y.o. female with a PMHx as documented below presents to clinic today for the following:    Routine follow-up.    She experiences back pain when walking, managing well for shorter distances but using Relistor for longer distances, which she finds helpful. An x-ray of her back reportedly showed age-related deterioration. She has been avoiding Tylenol as she thought it would be bad for her kidneys.         Macrocytic anemia:   - Most recent CBC (09/17/24) w/ H/H 11.6/36.6,   - B12 and folate wnl     Chronic low back pain w/o sciatica:   - Xray lumbar spine  "(6/12/23) w/ grade I anterolisthesis of L5 on S1, grade I retrolisthesis of L3 on L4, degenerative facet arthropathy present in the lower lumbar spine at L5-S1, degenerative change of the symphysis pubis.    - S/p physical therapy x6 weeks for low back pain referred pain to the right knee  - Symptoms manageable without additional medications at this time     Anxiety:   - Buspar 15 mg tablets, 1/3-1/2 tablet TID PRN for acute anxiety      HTN:   - Losartan 25 mg daily, Toprol-XL 25 mg      HLD:   - Lipitor 20 mg daily     Diastolic dysfunction:   - TTE (2012) w/ "hyperdynamic left ventricular function (EF 75%), eccentric hypertrophy, diastolic dysfunction, trivial to mild mitral regurg, mild tricuspid regurg"      Bilateral carotid artery disease:   - Bilateral carotid artery US (2014) w/ "20 - 39% right internal carotid stenosis, 80 - 99% left internal carotid stenosis"  - S/p left carotid endarterectomy (2015)  - ASA, statin     Aortic atherosclerosis:   - ASA, statin     CKD stage 3b:   - GFR 33.1 (07/01/24)     Rheumatoid arthritis:   - Methotrexate 10 mg weekly  - Folic acid 1 mg daily     Hypothyroidism:   - Synthroid 62.5 mcg daily      Vit D deficiency:   - Daily vit D supplementation     GERD:   - Omeprazole 20 mg daily      Osteoporosis:   - Daily calcium/vit D supplementation  - Fosamax 70 mg weekly     Insomnia:   - Current Rx: melatonin, OTC sleep-aids  - Reports Buspar 15 mg qhs sometimes helps  - Previous Rx: Remeron 7.5 mg qhs, Trazodone (ineffective)     ROS  Constitutional:  Negative for chills and fever.   Respiratory:  Negative for shortness of breath.    Cardiovascular:  Negative for chest pain.   Gastrointestinal:  Negative for abdominal pain, constipation, diarrhea, nausea and vomiting.     Current Outpatient Medications   Medication Instructions    ascorbic acid (vitamin C) (VITAMIN C) 500 mg, Daily    aspirin (ECOTRIN) 81 mg, Daily    atorvastatin (LIPITOR) 20 mg, Oral    busPIRone (BUSPAR) " "15 MG tablet TAKE 1/3 TO 1/2 TABLET THREE TIMES A DAY AS NEEDED FOR ANXIETY    calcium carbonate/vitamin D3 (CALTRATE 600 + D ORAL) Daily    folic acid (FOLVITE) 1 mg, Oral, Daily    levothyroxine (SYNTHROID) 125 MCG tablet TAKE 1/2 TABLET ONE TIME DAILY    losartan (COZAAR) 25 mg, Oral    magnesium oxide 400 mg Cap 2 tablets, Daily    methotrexate 10 mg, Oral, Every 7 days    metoprolol succinate (TOPROL-XL) 25 MG 24 hr tablet TAKE 1/2 TABLET ONE TIME DAILY    omeprazole (PRILOSEC) 20 mg, Oral    vitamin D (VITAMIN D3) 1,000 Units, 2 times daily    vitamin renal formula, B-complex-vitamin c-folic acid, (NEPHROCAP) 1 mg Cap 1 capsule, Oral, Daily, Generic.      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/07/2016    Facet arthropathy, lumbar (L5-S1) 11/14/2023    Gallbladder disease     GERD (gastroesophageal reflux disease)     History of iron deficiency 05/15/2017    HTN (hypertension)     Hypothyroidism     Mixed hyperlipidemia     Osteoporosis     Overweight (BMI 25.0-29.9)     PONV (postoperative nausea and vomiting)     Posterior vitreous detachment 06/07/2016    Pseudophakia of both eyes 06/07/2016    Pure hypercholesterolemia     Retrolisthesis (grade 1, L3-4) 11/14/2023    S/P carotid endarterectomy 06/07/2016    Squamous cell carcinoma     Tinea pedis of both feet 05/15/2017    Trouble in sleeping     Vitamin D deficiency       Objective:      Vitals:    11/20/24 1426   BP: 110/66   Pulse: 66   Resp: 16   Temp: 98.2 °F (36.8 °C)   TempSrc: Oral   SpO2: 98%   Weight: 76.1 kg (167 lb 12.3 oz)   Height: 5' 4" (1.626 m)     Body " mass index is 28.8 kg/m².    Physical Exam   Constitutional:       General: No acute distress.  HENT:      Head: Normocephalic and atraumatic.   Pulmonary:      Effort: Pulmonary effort is normal. No respiratory distress.   Neurological:      General: No focal deficit present.      Mental Status: Alert and oriented to person, place, and time. Mental status is at baseline.    Assessment:       1. High risk medication use    2. Stage 3b chronic kidney disease    3. Essential hypertension    4. Mixed hyperlipidemia    5. Hypomagnesemia    6. Immunosuppression    7. History of iron deficiency    8. Macrocytic anemia    9. Acquired hypothyroidism    10. Age-related osteoporosis without current pathological fracture    11. Vitamin D deficiency        Sherry Garozn MD  [facility]  Office: [phone]   Fax: [phone]  11/20/2024      Disclaimer: This note was partly generated using dictation software which may occasionally result in transcription errors.    Total time spent on this encounter includes face to face time and non-face to face time preparing to see the patient (eg, review of tests), obtaining and/or reviewing separately obtained history, documenting clinical information in the electronic or other health record, independently interpreting results, and communicating results to the patient/family/caregiver, or care coordinator.      Visit today included increased complexity associated with the care of the episodic problem (see above) addressed and managing the longitudinal care of the patient due to the serious and/or complex managed problem(s) (see above).

## 2024-12-06 ENCOUNTER — LAB VISIT (OUTPATIENT)
Dept: LAB | Facility: HOSPITAL | Age: 89
End: 2024-12-06
Attending: STUDENT IN AN ORGANIZED HEALTH CARE EDUCATION/TRAINING PROGRAM
Payer: MEDICARE

## 2024-12-06 DIAGNOSIS — Z79.899 HIGH RISK MEDICATION USE: ICD-10-CM

## 2024-12-06 DIAGNOSIS — M06.00 SERONEGATIVE RHEUMATOID ARTHRITIS: ICD-10-CM

## 2024-12-06 LAB
ALT SERPL W/O P-5'-P-CCNC: 30 U/L (ref 10–44)
AST SERPL-CCNC: 27 U/L (ref 10–40)
BASOPHILS # BLD AUTO: 0.05 K/UL (ref 0–0.2)
BASOPHILS NFR BLD: 0.9 % (ref 0–1.9)
CREAT SERPL-MCNC: 1.4 MG/DL (ref 0.5–1.4)
CRP SERPL-MCNC: 6.4 MG/L (ref 0–8.2)
DIFFERENTIAL METHOD BLD: ABNORMAL
EOSINOPHIL # BLD AUTO: 0.3 K/UL (ref 0–0.5)
EOSINOPHIL NFR BLD: 5.6 % (ref 0–8)
ERYTHROCYTE [DISTWIDTH] IN BLOOD BY AUTOMATED COUNT: 13.6 % (ref 11.5–14.5)
ERYTHROCYTE [SEDIMENTATION RATE] IN BLOOD BY PHOTOMETRIC METHOD: 38 MM/HR (ref 0–36)
EST. GFR  (NO RACE VARIABLE): 36 ML/MIN/1.73 M^2
HCT VFR BLD AUTO: 36.9 % (ref 37–48.5)
HGB BLD-MCNC: 12.1 G/DL (ref 12–16)
IMM GRANULOCYTES # BLD AUTO: 0.01 K/UL (ref 0–0.04)
IMM GRANULOCYTES NFR BLD AUTO: 0.2 % (ref 0–0.5)
LYMPHOCYTES # BLD AUTO: 1.4 K/UL (ref 1–4.8)
LYMPHOCYTES NFR BLD: 24.5 % (ref 18–48)
MCH RBC QN AUTO: 34.2 PG (ref 27–31)
MCHC RBC AUTO-ENTMCNC: 32.8 G/DL (ref 32–36)
MCV RBC AUTO: 104 FL (ref 82–98)
MONOCYTES # BLD AUTO: 0.5 K/UL (ref 0.3–1)
MONOCYTES NFR BLD: 9.3 % (ref 4–15)
NEUTROPHILS # BLD AUTO: 3.4 K/UL (ref 1.8–7.7)
NEUTROPHILS NFR BLD: 59.5 % (ref 38–73)
NRBC BLD-RTO: 0 /100 WBC
PLATELET # BLD AUTO: 225 K/UL (ref 150–450)
PMV BLD AUTO: 11.1 FL (ref 9.2–12.9)
RBC # BLD AUTO: 3.54 M/UL (ref 4–5.4)
WBC # BLD AUTO: 5.72 K/UL (ref 3.9–12.7)

## 2024-12-06 PROCEDURE — 84460 ALANINE AMINO (ALT) (SGPT): CPT | Mod: HCNC | Performed by: STUDENT IN AN ORGANIZED HEALTH CARE EDUCATION/TRAINING PROGRAM

## 2024-12-06 PROCEDURE — 82565 ASSAY OF CREATININE: CPT | Mod: HCNC | Performed by: STUDENT IN AN ORGANIZED HEALTH CARE EDUCATION/TRAINING PROGRAM

## 2024-12-06 PROCEDURE — 85652 RBC SED RATE AUTOMATED: CPT | Mod: HCNC | Performed by: STUDENT IN AN ORGANIZED HEALTH CARE EDUCATION/TRAINING PROGRAM

## 2024-12-06 PROCEDURE — 85025 COMPLETE CBC W/AUTO DIFF WBC: CPT | Mod: HCNC | Performed by: STUDENT IN AN ORGANIZED HEALTH CARE EDUCATION/TRAINING PROGRAM

## 2024-12-06 PROCEDURE — 36415 COLL VENOUS BLD VENIPUNCTURE: CPT | Mod: HCNC,PN | Performed by: STUDENT IN AN ORGANIZED HEALTH CARE EDUCATION/TRAINING PROGRAM

## 2024-12-06 PROCEDURE — 86140 C-REACTIVE PROTEIN: CPT | Mod: HCNC | Performed by: STUDENT IN AN ORGANIZED HEALTH CARE EDUCATION/TRAINING PROGRAM

## 2024-12-06 PROCEDURE — 84450 TRANSFERASE (AST) (SGOT): CPT | Mod: HCNC | Performed by: STUDENT IN AN ORGANIZED HEALTH CARE EDUCATION/TRAINING PROGRAM

## 2024-12-10 NOTE — PROGRESS NOTES
Subjective:      Patient ID: Nida Kline is a 89 y.o. female.    Chief Complaint: Follow-up (6 month follow up visit)    HPI    Rheumatologic History:      - Diagnosis/es:              - osteopenia with elevated FRAX not currently on therapy  - seronegative non erosive RA diagnosed in 12/2021  - Positive serologies: -  - Negative serologies: ADEBAYO, RF, CCP  - Infectious screening labs: Negative hepatitis B, C, and quantiferon (4/2023)  - Imaging:              - X-Ray arthritis survey (10/2021): Osteopenia and diffuse joint space narrowing without erosions              - DEXA (10/2022) osteopenia with 27% risk of a major osteoporotic fracture and a 11% risk of hip fracture in the next 10 years (FRAX).  - Previous Treatments: -  - Current Treatments:               - MTX 10mg weekly plus folic acid daily   - Biofreeze   - Voltaren gel  Interval History:   She reports some mild pain over the lateral part of her right knee but is otherwise doing well.     Objective:   /73   Pulse 69   Wt 77 kg (169 lb 12.1 oz)   SpO2 98%   BMI 29.14 kg/m²   Physical Exam   Constitutional: normal appearance.   HENT:   Head: Normocephalic and atraumatic.   Cardiovascular: Normal rate, regular rhythm and normal heart sounds.   Pulmonary/Chest: Effort normal and breath sounds normal.   Musculoskeletal:      Comments: + Degenerative changes  Ulnar deviation  No synovitis, dactylitis, enthesitis, effusions     Neurological: She is alert.   Skin: Skin is warm and dry.   No skin thickening, telangiectasias, calcinosis, psoriasiform lesions, lupoid lesions          8/8/2023   Tender (PARSONS-28) 0 / 28    Swollen (PARSONS-28) 0 / 28    Provider Global 0 / 100   Patient Global 0 / 100   ESR 20 mm/hr   CRP 3.3 mg/L   PARSONS-28 (ESR) 2.1 (Remission)   PARSONS-28 (CRP) 1.49 (Remission)   CDAI Score 0      Labs (12/6/24)   CBC WBC, HGB, PLT WNL   CR, AST, ALT WNL   CRP WNL   ESR 38 <- 25    Assessment:     1. Seronegative rheumatoid arthritis    2.  Drug-induced immunodeficiency    3. High risk medication use    4. Osteoporosis, unspecified osteoporosis type, unspecified pathological fracture presence      This is an 89 year old woman with PMH of CKD, HTN, vit D deficiency, carotid artery disease s/p CEA, HLD, vit D deficiency, hypothyroidism, STWE, GERD, squamous cell carcinoma, osteopenia with elevated FRAX on Fosamax (5/2023- ), seronegative non erosive RA diagnosed in 12/2021 and on methotrexate 10mg weekly plus folic acid. She reports some mild pain over the lateral part of her right knee but is otherwise doing well.     Plan:     Problem List Items Addressed This Visit          Immunology/Multi System    Drug-induced immunodeficiency    Relevant Orders    C-Reactive Protein    CBC Auto Differential    Creatinine, Serum    ALT (SGPT)    AST (SGOT)    Sedimentation rate    Seronegative rheumatoid arthritis - Primary    Relevant Orders    C-Reactive Protein    CBC Auto Differential    Creatinine, Serum    ALT (SGPT)    AST (SGOT)    Sedimentation rate       Endocrine    Osteoporosis (Chronic)    Relevant Orders    C-Reactive Protein    CBC Auto Differential    Creatinine, Serum    ALT (SGPT)    AST (SGOT)    Sedimentation rate    DXA Bone Density Axial Skeleton 1 or more sites       Palliative Care    High risk medication use    Relevant Orders    C-Reactive Protein    CBC Auto Differential    Creatinine, Serum    ALT (SGPT)    AST (SGOT)    Sedimentation rate     1.) RA  2.) Drug induced immunodeficiency  3.) High risk medication use  - methotrexate 10mg weekly + folic acid daily  - Voltaren gel and Biofreeze PRN  - CBC, CMP, ESR, CRP every 12 weeks  - Patient will need yearly skin cancer check on MTX given history of squamous cell CA  - Pre-DMARD labs yearly  - Vaccinations: COVID x 3, PCV13 (12/2015), PPV23 (9/2019), Shingrix x 2; patient declines the flu shot     4.) Osteopenia with elevated FRAX  - She does not take Fosamax out of concern for side  effects   - DEXA    Follow up in 6 months    30 minutes of total time spent on the encounter, which includes face to face time and non-face to face time preparing to see the patient (eg, review of tests), Obtaining and/or reviewing separately obtained history, Documenting clinical information in the electronic or other health record, Independently interpreting results (not separately reported) and communicating results to the patient/family/caregiver, or Care coordination (not separately reported).     This note was prepared with [employer] Direct voice recognition transcription software. Garbled syntax, mangled pronouns, and other bizarre constructions may be attributed to that software system       Leah Gannon M.D.  Rheumatology Dept  [place], [place]

## 2024-12-11 ENCOUNTER — OFFICE VISIT (OUTPATIENT)
Dept: RHEUMATOLOGY | Facility: CLINIC | Age: 89
End: 2024-12-11
Payer: MEDICARE

## 2024-12-11 VITALS
WEIGHT: 169.75 LBS | OXYGEN SATURATION: 98 % | BODY MASS INDEX: 29.14 KG/M2 | HEART RATE: 69 BPM | DIASTOLIC BLOOD PRESSURE: 73 MMHG | SYSTOLIC BLOOD PRESSURE: 124 MMHG

## 2024-12-11 DIAGNOSIS — Z79.899 DRUG-INDUCED IMMUNODEFICIENCY: ICD-10-CM

## 2024-12-11 DIAGNOSIS — M81.0 OSTEOPOROSIS, UNSPECIFIED OSTEOPOROSIS TYPE, UNSPECIFIED PATHOLOGICAL FRACTURE PRESENCE: ICD-10-CM

## 2024-12-11 DIAGNOSIS — Z79.899 HIGH RISK MEDICATION USE: ICD-10-CM

## 2024-12-11 DIAGNOSIS — D84.821 DRUG-INDUCED IMMUNODEFICIENCY: ICD-10-CM

## 2024-12-11 DIAGNOSIS — M06.00 SERONEGATIVE RHEUMATOID ARTHRITIS: Primary | ICD-10-CM

## 2024-12-11 PROCEDURE — 1125F AMNT PAIN NOTED PAIN PRSNT: CPT | Mod: HCNC,CPTII,S$GLB, | Performed by: STUDENT IN AN ORGANIZED HEALTH CARE EDUCATION/TRAINING PROGRAM

## 2024-12-11 PROCEDURE — 1160F RVW MEDS BY RX/DR IN RCRD: CPT | Mod: HCNC,CPTII,S$GLB, | Performed by: STUDENT IN AN ORGANIZED HEALTH CARE EDUCATION/TRAINING PROGRAM

## 2024-12-11 PROCEDURE — 99215 OFFICE O/P EST HI 40 MIN: CPT | Mod: HCNC,S$GLB,, | Performed by: STUDENT IN AN ORGANIZED HEALTH CARE EDUCATION/TRAINING PROGRAM

## 2024-12-11 PROCEDURE — 1101F PT FALLS ASSESS-DOCD LE1/YR: CPT | Mod: HCNC,CPTII,S$GLB, | Performed by: STUDENT IN AN ORGANIZED HEALTH CARE EDUCATION/TRAINING PROGRAM

## 2024-12-11 PROCEDURE — 3288F FALL RISK ASSESSMENT DOCD: CPT | Mod: HCNC,CPTII,S$GLB, | Performed by: STUDENT IN AN ORGANIZED HEALTH CARE EDUCATION/TRAINING PROGRAM

## 2024-12-11 PROCEDURE — 1159F MED LIST DOCD IN RCRD: CPT | Mod: HCNC,CPTII,S$GLB, | Performed by: STUDENT IN AN ORGANIZED HEALTH CARE EDUCATION/TRAINING PROGRAM

## 2024-12-11 PROCEDURE — 99999 PR PBB SHADOW E&M-EST. PATIENT-LVL III: CPT | Mod: PBBFAC,HCNC,, | Performed by: STUDENT IN AN ORGANIZED HEALTH CARE EDUCATION/TRAINING PROGRAM

## 2024-12-30 ENCOUNTER — TELEPHONE (OUTPATIENT)
Dept: FAMILY MEDICINE | Facility: CLINIC | Age: 89
End: 2024-12-30
Payer: MEDICARE

## 2024-12-30 NOTE — TELEPHONE ENCOUNTER
Pt having pain in right knee x 1 week. Pt has been using her walker . Pt denies any swelling .  Pt denies redness Pt says pain is worse when she puts weight on it. Pt has been using ice but it does not help. Pt has been taking Tylenol but it does not help . No appts available this week in [place] . Offered pt to go to urgent care, pt declined . Appt sched 1/6 with Dr Garzon . Pt was advised to go to the ER if symptoms get worse .--lp

## 2024-12-30 NOTE — TELEPHONE ENCOUNTER
----- Message from Giana sent at 12/30/2024 10:55 AM CST -----  Contact: Patient  Type:  Same Day Appointment Request    Caller is requesting a same day appointment.  Caller declined first available appointment listed below.    Name of Caller:Patient    When is the first available appointment? Feb 2025    Symptoms: injection/ hard to walk    Best Call Back Number:[phone]    Additional Information: Please call to advise

## 2025-01-05 DIAGNOSIS — I77.9 BILATERAL CAROTID ARTERY DISEASE, UNSPECIFIED TYPE: ICD-10-CM

## 2025-01-05 DIAGNOSIS — E78.2 MIXED HYPERLIPIDEMIA: ICD-10-CM

## 2025-01-05 DIAGNOSIS — Z98.890 S/P CAROTID ENDARTERECTOMY: ICD-10-CM

## 2025-01-05 DIAGNOSIS — R74.01 TRANSAMINITIS: ICD-10-CM

## 2025-01-05 DIAGNOSIS — I10 ESSENTIAL HYPERTENSION: ICD-10-CM

## 2025-01-05 NOTE — TELEPHONE ENCOUNTER
No care due was identified.  Health [facility] Embedded Care Due Messages. Reference number: [id number].   1/05/2025 1:22:10 PM CST

## 2025-01-06 RX ORDER — LOSARTAN POTASSIUM 25 MG/1
25 TABLET ORAL
Qty: 90 TABLET | Refills: 3 | Status: SHIPPED | OUTPATIENT
Start: 2025-01-06

## 2025-01-06 RX ORDER — ATORVASTATIN CALCIUM 20 MG/1
20 TABLET, FILM COATED ORAL
Qty: 90 TABLET | Refills: 3 | Status: SHIPPED | OUTPATIENT
Start: 2025-01-06

## 2025-01-06 NOTE — TELEPHONE ENCOUNTER
Refill Decision Note   Nida Kline  is requesting a refill authorization.  Brief Assessment and Rationale for Refill:  Approve     Medication Therapy Plan:         Comments:     Note composed:12:59 PM 01/06/2025

## 2025-01-08 ENCOUNTER — OFFICE VISIT (OUTPATIENT)
Dept: FAMILY MEDICINE | Facility: CLINIC | Age: OVER 89
End: 2025-01-08
Payer: MEDICARE

## 2025-01-08 VITALS
HEIGHT: 64 IN | HEART RATE: 68 BPM | DIASTOLIC BLOOD PRESSURE: 68 MMHG | BODY MASS INDEX: 28.98 KG/M2 | TEMPERATURE: 98 F | RESPIRATION RATE: 16 BRPM | SYSTOLIC BLOOD PRESSURE: 124 MMHG | WEIGHT: 169.75 LBS | OXYGEN SATURATION: 98 %

## 2025-01-08 DIAGNOSIS — N18.32 STAGE 3B CHRONIC KIDNEY DISEASE: ICD-10-CM

## 2025-01-08 DIAGNOSIS — M06.00 SERONEGATIVE RHEUMATOID ARTHRITIS: ICD-10-CM

## 2025-01-08 DIAGNOSIS — M76.31 IT BAND SYNDROME, RIGHT: Primary | ICD-10-CM

## 2025-01-08 PROCEDURE — G2211 COMPLEX E/M VISIT ADD ON: HCPCS | Mod: HCNC,S$GLB,, | Performed by: STUDENT IN AN ORGANIZED HEALTH CARE EDUCATION/TRAINING PROGRAM

## 2025-01-08 PROCEDURE — 3288F FALL RISK ASSESSMENT DOCD: CPT | Mod: HCNC,CPTII,S$GLB, | Performed by: STUDENT IN AN ORGANIZED HEALTH CARE EDUCATION/TRAINING PROGRAM

## 2025-01-08 PROCEDURE — 99999 PR PBB SHADOW E&M-EST. PATIENT-LVL IV: CPT | Mod: PBBFAC,HCNC,, | Performed by: STUDENT IN AN ORGANIZED HEALTH CARE EDUCATION/TRAINING PROGRAM

## 2025-01-08 PROCEDURE — 1160F RVW MEDS BY RX/DR IN RCRD: CPT | Mod: HCNC,CPTII,S$GLB, | Performed by: STUDENT IN AN ORGANIZED HEALTH CARE EDUCATION/TRAINING PROGRAM

## 2025-01-08 PROCEDURE — 1101F PT FALLS ASSESS-DOCD LE1/YR: CPT | Mod: HCNC,CPTII,S$GLB, | Performed by: STUDENT IN AN ORGANIZED HEALTH CARE EDUCATION/TRAINING PROGRAM

## 2025-01-08 PROCEDURE — 1159F MED LIST DOCD IN RCRD: CPT | Mod: HCNC,CPTII,S$GLB, | Performed by: STUDENT IN AN ORGANIZED HEALTH CARE EDUCATION/TRAINING PROGRAM

## 2025-01-08 PROCEDURE — 1125F AMNT PAIN NOTED PAIN PRSNT: CPT | Mod: HCNC,CPTII,S$GLB, | Performed by: STUDENT IN AN ORGANIZED HEALTH CARE EDUCATION/TRAINING PROGRAM

## 2025-01-08 PROCEDURE — 99214 OFFICE O/P EST MOD 30 MIN: CPT | Mod: HCNC,S$GLB,, | Performed by: STUDENT IN AN ORGANIZED HEALTH CARE EDUCATION/TRAINING PROGRAM

## 2025-01-08 RX ORDER — PREDNISONE 20 MG/1
20 TABLET ORAL DAILY
Qty: 5 TABLET | Refills: 0 | Status: SHIPPED | OUTPATIENT
Start: 2025-01-08 | End: 2025-01-13

## 2025-01-08 NOTE — PROGRESS NOTES
Plan:      Nida was seen today for knee pain.    Diagnoses and all orders for this visit:    It band syndrome, right: Continue conservative treatment measures including ice on/off 20 min and Tylenol PRN. If symptoms persist, will refer to PT/OT and Ortho for further eval.  -     predniSONE (DELTASONE) 20 MG tablet; Take 1 tablet (20 mg total) by mouth once daily. for 5 days    Seronegative rheumatoid arthritis: Stable.    Stage 3b chronic kidney disease: Stable.      Follow up if symptoms worsen or fail to improve.    Sherry Garzon MD  01/08/2025    Subjective:      Patient ID: Nida Kline is a 89 y.o. female    Chief Complaint   Patient presents with    Knee Pain     Right knee x 3 weeks  / worse when walking      HPI  89 y.o. female with a PMHx as documented below presents to clinic today for the following:    Patient reports three-week history of lateral right knee pain.  Onset of pain was associated with increase in physical activity during holiday season with family in town (cooking, shopping, etc.).  Aggravating factors include walking and lying on her right side when sleeping.  She has been taking Tylenol and icing the knee on/off with moderate relief of symptoms.  No associated weakness/numbness/tingling of the lower extremities.    ROS  Constitutional:  Negative for chills and fever.   Respiratory:  Negative for shortness of breath.    Cardiovascular:  Negative for chest pain.   Gastrointestinal:  Negative for abdominal pain, constipation, diarrhea, nausea and vomiting.     Current Outpatient Medications   Medication Instructions    ascorbic acid (vitamin C) (VITAMIN C) 500 mg, Daily    aspirin (ECOTRIN) 81 mg, Daily    atorvastatin (LIPITOR) 20 mg, Oral    busPIRone (BUSPAR) 15 MG tablet TAKE 1/3 TO 1/2 TABLET THREE TIMES A DAY AS NEEDED FOR ANXIETY    calcium carbonate/vitamin D3 (CALTRATE 600 + D ORAL) Daily    folic acid (FOLVITE) 1 mg, Oral, Daily    levothyroxine (SYNTHROID) 125 MCG tablet  "TAKE 1/2 TABLET ONE TIME DAILY    losartan (COZAAR) 25 mg, Oral    magnesium oxide 400 mg Cap 2 tablets, Daily    methotrexate 10 mg, Oral, Every 7 days    metoprolol succinate (TOPROL-XL) 25 MG 24 hr tablet TAKE 1/2 TABLET ONE TIME DAILY    omeprazole (PRILOSEC) 20 mg, Oral    predniSONE (DELTASONE) 20 mg, Oral, Daily    vitamin D (VITAMIN D3) 1,000 Units, 2 times daily    vitamin renal formula, B-complex-vitamin c-folic acid, (NEPHROCAP) 1 mg Cap 1 capsule, Oral, Daily, Generic.      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/07/2016    Facet arthropathy, lumbar (L5-S1) 11/14/2023    Gallbladder disease     GERD (gastroesophageal reflux disease)     History of iron deficiency 05/15/2017    HTN (hypertension)     Hypothyroidism     Mixed hyperlipidemia     Osteoporosis     Overweight (BMI 25.0-29.9)     PONV (postoperative nausea and vomiting)     Posterior vitreous detachment 06/07/2016    Pseudophakia of both eyes 06/07/2016    Pure hypercholesterolemia     Retrolisthesis (grade 1, L3-4) 11/14/2023    S/P carotid endarterectomy 06/07/2016    Squamous cell carcinoma     Tinea pedis of both feet 05/15/2017    Trouble in sleeping     Vitamin D deficiency       Objective:      Vitals:    01/08/25 0939   BP: 124/68   Pulse: 68   Resp: 16   Temp: 97.6 °F (36.4 °C)   TempSrc: Oral   SpO2: 98%   Weight: 77 kg (169 lb 12.1 oz)   Height: 5' 4" (1.626 m)     Body mass index is 29.14 kg/m².    Physical Exam  Vitals reviewed.   Constitutional:       General: She is not in acute distress.  HENT:      Head: Normocephalic and atraumatic. "   Cardiovascular:      Rate and Rhythm: Normal rate.   Pulmonary:      Effort: Pulmonary effort is normal. No respiratory distress.   Abdominal:      General: Bowel sounds are normal. There is no distension.      Palpations: Abdomen is soft.      Tenderness: There is no abdominal tenderness.   Musculoskeletal:      Right knee: No swelling, effusion, erythema, ecchymosis or bony tenderness. Normal range of motion. Tenderness present over the lateral joint line. No medial joint line tenderness. No LCL laxity, MCL laxity, ACL laxity or PCL laxity. Normal patellar mobility.      Instability Tests: Anterior drawer test negative. Posterior drawer test negative. Medial Shruthi test negative and lateral Shruthi test negative.   Neurological:      General: No focal deficit present.      Mental Status: She is alert and oriented to person, place, and time. Mental status is at baseline.        Assessment:       1. It band syndrome, right    2. Seronegative rheumatoid arthritis    3. Stage 3b chronic kidney disease        Sherry Garzon MD  [facility]  Office: [phone]   Fax: [phone]  01/08/2025      Disclaimer: This note was partly generated using dictation software which may occasionally result in transcription errors.    Total time spent on this encounter includes face to face time and non-face to face time preparing to see the patient (eg, review of tests), obtaining and/or reviewing separately obtained history, documenting clinical information in the electronic or other health record, independently interpreting results, and communicating results to the patient/family/caregiver, or care coordinator.      Visit today included increased complexity associated with the care of the episodic problem (see above) addressed and managing the longitudinal care of the patient due to the serious and/or complex managed problem(s) (see above).

## 2025-01-28 ENCOUNTER — TELEPHONE (OUTPATIENT)
Dept: FAMILY MEDICINE | Facility: CLINIC | Age: OVER 89
End: 2025-01-28
Payer: MEDICARE

## 2025-01-28 DIAGNOSIS — M76.31 IT BAND SYNDROME, RIGHT: Primary | ICD-10-CM

## 2025-01-28 NOTE — TELEPHONE ENCOUNTER
----- Message from Giana sent at 1/28/2025 12:23 PM CST -----  Contact: Patient  Type:  Needs Medical Advice    Who Called: Patient    Symptoms (please be specific): Prednisone did not work        Pharmacy name and phone #:    [facility] - [place], [place] - [address]  [address] [address]  Phone: [phone] Fax: [phone]    [facility]/pharmacy #[id number] - [place] - [address]  [address]  [place] [address]  Phone: [phone] Fax: [phone]      Would the patient rather a call back or a response via [facility]? Call    Best Call Back Number: [phone]    Additional Information: Patient is requesting a call regarding next steps

## 2025-01-28 NOTE — TELEPHONE ENCOUNTER
Per last note: If symptoms persist, will refer to PT/OT and Ortho for further eval.     Referral pending for ortho, OT, PT

## 2025-01-31 RX ORDER — METOPROLOL SUCCINATE 25 MG/1
TABLET, EXTENDED RELEASE ORAL
Qty: 45 TABLET | Refills: 3 | Status: SHIPPED | OUTPATIENT
Start: 2025-01-31

## 2025-01-31 NOTE — TELEPHONE ENCOUNTER
Refill Decision Note   Nida Kline  is requesting a refill authorization.  Brief Assessment and Rationale for Refill:  Approve     Medication Therapy Plan:        Comments:     Note composed:1:19 PM 01/31/2025

## 2025-01-31 NOTE — TELEPHONE ENCOUNTER
No care due was identified.  Health [facility] Embedded Care Due Messages. Reference number: [id number].   1/31/2025 1:10:34 PM CST

## 2025-02-05 DIAGNOSIS — M25.561 RIGHT KNEE PAIN: Primary | ICD-10-CM

## 2025-02-06 ENCOUNTER — OFFICE VISIT (OUTPATIENT)
Dept: ORTHOPEDICS | Facility: CLINIC | Age: OVER 89
End: 2025-02-06
Payer: MEDICARE

## 2025-02-06 ENCOUNTER — HOSPITAL ENCOUNTER (OUTPATIENT)
Dept: RADIOLOGY | Facility: HOSPITAL | Age: OVER 89
Discharge: HOME | End: 2025-02-06
Attending: ORTHOPAEDIC SURGERY
Payer: MEDICARE

## 2025-02-06 DIAGNOSIS — M25.561 RIGHT KNEE PAIN: ICD-10-CM

## 2025-02-06 DIAGNOSIS — M17.11 OSTEOARTHRITIS OF RIGHT KNEE, UNSPECIFIED OSTEOARTHRITIS TYPE: Primary | ICD-10-CM

## 2025-02-06 DIAGNOSIS — M76.31 IT BAND SYNDROME, RIGHT: ICD-10-CM

## 2025-02-06 PROCEDURE — 1125F AMNT PAIN NOTED PAIN PRSNT: CPT | Mod: CPTII,S$GLB,, | Performed by: ORTHOPAEDIC SURGERY

## 2025-02-06 PROCEDURE — 1159F MED LIST DOCD IN RCRD: CPT | Mod: CPTII,S$GLB,, | Performed by: ORTHOPAEDIC SURGERY

## 2025-02-06 PROCEDURE — 99999 PR PBB SHADOW E&M-EST. PATIENT-LVL III: CPT | Mod: PBBFAC,,, | Performed by: ORTHOPAEDIC SURGERY

## 2025-02-06 PROCEDURE — 1101F PT FALLS ASSESS-DOCD LE1/YR: CPT | Mod: CPTII,S$GLB,, | Performed by: ORTHOPAEDIC SURGERY

## 2025-02-06 PROCEDURE — 20610 DRAIN/INJ JOINT/BURSA W/O US: CPT | Mod: RT,S$GLB,, | Performed by: ORTHOPAEDIC SURGERY

## 2025-02-06 PROCEDURE — 3288F FALL RISK ASSESSMENT DOCD: CPT | Mod: CPTII,S$GLB,, | Performed by: ORTHOPAEDIC SURGERY

## 2025-02-06 PROCEDURE — 73562 X-RAY EXAM OF KNEE 3: CPT | Mod: 26,RT,, | Performed by: RADIOLOGY

## 2025-02-06 PROCEDURE — 73562 X-RAY EXAM OF KNEE 3: CPT | Mod: TC,PO,RT

## 2025-02-06 PROCEDURE — 99204 OFFICE O/P NEW MOD 45 MIN: CPT | Mod: 25,S$GLB,, | Performed by: ORTHOPAEDIC SURGERY

## 2025-02-06 PROCEDURE — 73560 X-RAY EXAM OF KNEE 1 OR 2: CPT | Mod: 26,59,LT, | Performed by: RADIOLOGY

## 2025-02-06 RX ORDER — TRIAMCINOLONE ACETONIDE 40 MG/ML
40 INJECTION, SUSPENSION INTRA-ARTICULAR; INTRAMUSCULAR
Status: DISCONTINUED | OUTPATIENT
Start: 2025-02-06 | End: 2025-02-06 | Stop reason: HOSPADM

## 2025-02-06 RX ADMIN — TRIAMCINOLONE ACETONIDE 40 MG: 40 INJECTION, SUSPENSION INTRA-ARTICULAR; INTRAMUSCULAR at 02:02

## 2025-02-06 NOTE — PROGRESS NOTES
89 years old right knee pain for about 6 weeks time no trauma stabbing pain up to 10 on the pain scale points the lateral-sided knee as location for pain does report to have pain when she is seated in the car posterior thigh into the lateral knee and leg she has tried oral medications without relief     Exam shows tenderness lateral joint line no signs infection good strength well-perfused distally hip range of motion painless, straight leg raise testing weakly positive     X-rays show mild degenerative changes of the knee     Assessment: Right knee pain x6 weeks with mild degenerative changes of the knee possible lumbar radicular symptoms     Plan:  Kenalog injection right knee encourage stretching and strengthening exercises, follow up as needed

## 2025-02-06 NOTE — PROCEDURES
Large Joint Aspiration/Injection: R knee    Date/Time: 2/6/2025 2:00 PM    Performed by: Yung Gamble MD  Authorized by: Yung Gamble MD    Consent Done?:  Yes (Verbal)  Timeout: prior to procedure the correct patient, procedure, and site was verified    Prep: patient was prepped and draped in usual sterile fashion      Details:  Needle Size:  21 G  Approach:  Anterolateral  Location:  Knee  Site:  R knee  Medications:  40 mg triamcinolone acetonide 40 mg/mL  Patient tolerance:  Patient tolerated the procedure well with no immediate complications

## 2025-02-21 DIAGNOSIS — Z00.00 ENCOUNTER FOR MEDICARE ANNUAL WELLNESS EXAM: ICD-10-CM

## 2025-03-11 ENCOUNTER — LAB VISIT (OUTPATIENT)
Dept: LAB | Facility: HOSPITAL | Age: OVER 89
End: 2025-03-11
Attending: STUDENT IN AN ORGANIZED HEALTH CARE EDUCATION/TRAINING PROGRAM
Payer: MEDICARE

## 2025-03-11 DIAGNOSIS — M06.00 SERONEGATIVE RHEUMATOID ARTHRITIS: ICD-10-CM

## 2025-03-11 DIAGNOSIS — D84.821 DRUG-INDUCED IMMUNODEFICIENCY: ICD-10-CM

## 2025-03-11 DIAGNOSIS — M81.0 OSTEOPOROSIS, UNSPECIFIED OSTEOPOROSIS TYPE, UNSPECIFIED PATHOLOGICAL FRACTURE PRESENCE: ICD-10-CM

## 2025-03-11 DIAGNOSIS — Z79.899 HIGH RISK MEDICATION USE: ICD-10-CM

## 2025-03-11 DIAGNOSIS — Z79.899 DRUG-INDUCED IMMUNODEFICIENCY: ICD-10-CM

## 2025-03-11 LAB
ALT SERPL W/O P-5'-P-CCNC: 25 U/L (ref 10–44)
AST SERPL-CCNC: 36 U/L (ref 10–40)
BASOPHILS # BLD AUTO: 0.03 K/UL (ref 0–0.2)
BASOPHILS NFR BLD: 0.5 % (ref 0–1.9)
CREAT SERPL-MCNC: 0.9 MG/DL (ref 0.5–1.4)
CRP SERPL-MCNC: 3.8 MG/L (ref 0–8.2)
DIFFERENTIAL METHOD BLD: ABNORMAL
EOSINOPHIL # BLD AUTO: 0.4 K/UL (ref 0–0.5)
EOSINOPHIL NFR BLD: 6.2 % (ref 0–8)
ERYTHROCYTE [DISTWIDTH] IN BLOOD BY AUTOMATED COUNT: 14.5 % (ref 11.5–14.5)
ERYTHROCYTE [SEDIMENTATION RATE] IN BLOOD BY PHOTOMETRIC METHOD: 28 MM/HR (ref 0–36)
EST. GFR  (NO RACE VARIABLE): >60 ML/MIN/1.73 M^2
HCT VFR BLD AUTO: 33.2 % (ref 37–48.5)
HGB BLD-MCNC: 10.7 G/DL (ref 12–16)
IMM GRANULOCYTES # BLD AUTO: 0.03 K/UL (ref 0–0.04)
IMM GRANULOCYTES NFR BLD AUTO: 0.5 % (ref 0–0.5)
LYMPHOCYTES # BLD AUTO: 0.8 K/UL (ref 1–4.8)
LYMPHOCYTES NFR BLD: 14.6 % (ref 18–48)
MCH RBC QN AUTO: 33.3 PG (ref 27–31)
MCHC RBC AUTO-ENTMCNC: 32.2 G/DL (ref 32–36)
MCV RBC AUTO: 103 FL (ref 82–98)
MONOCYTES # BLD AUTO: 0.6 K/UL (ref 0.3–1)
MONOCYTES NFR BLD: 10.5 % (ref 4–15)
NEUTROPHILS # BLD AUTO: 3.8 K/UL (ref 1.8–7.7)
NEUTROPHILS NFR BLD: 67.7 % (ref 38–73)
NRBC BLD-RTO: 0 /100 WBC
PLATELET # BLD AUTO: 212 K/UL (ref 150–450)
PMV BLD AUTO: 10.6 FL (ref 9.2–12.9)
RBC # BLD AUTO: 3.21 M/UL (ref 4–5.4)
WBC # BLD AUTO: 5.61 K/UL (ref 3.9–12.7)

## 2025-03-11 PROCEDURE — 84450 TRANSFERASE (AST) (SGOT): CPT | Mod: HCNC | Performed by: STUDENT IN AN ORGANIZED HEALTH CARE EDUCATION/TRAINING PROGRAM

## 2025-03-11 PROCEDURE — 86140 C-REACTIVE PROTEIN: CPT | Mod: HCNC | Performed by: STUDENT IN AN ORGANIZED HEALTH CARE EDUCATION/TRAINING PROGRAM

## 2025-03-11 PROCEDURE — 84460 ALANINE AMINO (ALT) (SGPT): CPT | Mod: HCNC | Performed by: STUDENT IN AN ORGANIZED HEALTH CARE EDUCATION/TRAINING PROGRAM

## 2025-03-11 PROCEDURE — 82565 ASSAY OF CREATININE: CPT | Mod: HCNC | Performed by: STUDENT IN AN ORGANIZED HEALTH CARE EDUCATION/TRAINING PROGRAM

## 2025-03-11 PROCEDURE — 85025 COMPLETE CBC W/AUTO DIFF WBC: CPT | Mod: HCNC | Performed by: STUDENT IN AN ORGANIZED HEALTH CARE EDUCATION/TRAINING PROGRAM

## 2025-03-11 PROCEDURE — 85652 RBC SED RATE AUTOMATED: CPT | Mod: HCNC | Performed by: STUDENT IN AN ORGANIZED HEALTH CARE EDUCATION/TRAINING PROGRAM

## 2025-03-11 PROCEDURE — 36415 COLL VENOUS BLD VENIPUNCTURE: CPT | Mod: HCNC,PN | Performed by: STUDENT IN AN ORGANIZED HEALTH CARE EDUCATION/TRAINING PROGRAM

## 2025-03-12 ENCOUNTER — RESULTS FOLLOW-UP (OUTPATIENT)
Dept: RHEUMATOLOGY | Facility: CLINIC | Age: OVER 89
End: 2025-03-12

## 2025-03-25 DIAGNOSIS — Z51.81 MEDICATION MONITORING ENCOUNTER: ICD-10-CM

## 2025-03-25 DIAGNOSIS — D84.9 IMMUNOSUPPRESSION: ICD-10-CM

## 2025-03-25 DIAGNOSIS — M06.00 SERONEGATIVE RHEUMATOID ARTHRITIS: ICD-10-CM

## 2025-03-25 NOTE — TELEPHONE ENCOUNTER
Pharmacy requesting refill on METHOTREXATE 2.5MG  Pt's LOV 12/11/2024  Pt's NOV 6/10/2025  Medication pending

## 2025-03-26 RX ORDER — METHOTREXATE 2.5 MG/1
10 TABLET ORAL
Qty: 48 TABLET | Refills: 3 | Status: SHIPPED | OUTPATIENT
Start: 2025-03-26 | End: 2026-03-26

## 2025-03-27 DIAGNOSIS — E61.1 IRON DEFICIENCY: ICD-10-CM

## 2025-03-27 DIAGNOSIS — D53.9 MACROCYTIC ANEMIA: ICD-10-CM

## 2025-03-27 DIAGNOSIS — N18.31 STAGE 3A CHRONIC KIDNEY DISEASE: ICD-10-CM

## 2025-03-27 NOTE — TELEPHONE ENCOUNTER
Pharmacy requesting refill on RENAL CAPS SOFTGEL   Pt's LOV 12/11/2024  Pt's NOV 6/10/2025  Medication pending

## 2025-05-23 ENCOUNTER — TELEPHONE (OUTPATIENT)
Dept: FAMILY MEDICINE | Facility: CLINIC | Age: OVER 89
End: 2025-05-23
Payer: MEDICARE

## 2025-05-23 NOTE — TELEPHONE ENCOUNTER
----- Message from Molly sent at 5/23/2025 10:10 AM CDT -----  Name of Who is Calling:ELIESER TOMPKINS [[id number]]  What is the request in detail:Pt requesting a call back to ask the doctor why her 05/22/2025 appointment was cancel.   Can the clinic reply by [facility]:Call  What Number to Call Back if not in [facility]:Telephone Information:Mobile          [phone]

## 2025-05-27 ENCOUNTER — HOSPITAL ENCOUNTER (OUTPATIENT)
Dept: RADIOLOGY | Facility: HOSPITAL | Age: OVER 89
Discharge: HOME | End: 2025-05-27
Attending: STUDENT IN AN ORGANIZED HEALTH CARE EDUCATION/TRAINING PROGRAM
Payer: MEDICARE

## 2025-05-27 DIAGNOSIS — M81.0 OSTEOPOROSIS, UNSPECIFIED OSTEOPOROSIS TYPE, UNSPECIFIED PATHOLOGICAL FRACTURE PRESENCE: ICD-10-CM

## 2025-05-27 PROCEDURE — 77080 DXA BONE DENSITY AXIAL: CPT | Mod: 26,HCNC,, | Performed by: STUDENT IN AN ORGANIZED HEALTH CARE EDUCATION/TRAINING PROGRAM

## 2025-05-27 PROCEDURE — 77092 TBS I&R FX RSK QHP: CPT | Mod: HCNC,,, | Performed by: STUDENT IN AN ORGANIZED HEALTH CARE EDUCATION/TRAINING PROGRAM

## 2025-05-27 PROCEDURE — 77091 TBS TECHL CALCULATION ONLY: CPT | Mod: HCNC,PO

## 2025-05-28 ENCOUNTER — RESULTS FOLLOW-UP (OUTPATIENT)
Dept: RHEUMATOLOGY | Facility: CLINIC | Age: OVER 89
End: 2025-05-28

## 2025-06-03 ENCOUNTER — LAB VISIT (OUTPATIENT)
Dept: LAB | Facility: HOSPITAL | Age: OVER 89
End: 2025-06-03
Attending: STUDENT IN AN ORGANIZED HEALTH CARE EDUCATION/TRAINING PROGRAM
Payer: MEDICARE

## 2025-06-03 DIAGNOSIS — Z79.899 DRUG-INDUCED IMMUNODEFICIENCY: ICD-10-CM

## 2025-06-03 DIAGNOSIS — Z79.899 HIGH RISK MEDICATION USE: ICD-10-CM

## 2025-06-03 DIAGNOSIS — M06.00 SERONEGATIVE RHEUMATOID ARTHRITIS: ICD-10-CM

## 2025-06-03 DIAGNOSIS — D84.821 DRUG-INDUCED IMMUNODEFICIENCY: ICD-10-CM

## 2025-06-03 DIAGNOSIS — M81.0 OSTEOPOROSIS, UNSPECIFIED OSTEOPOROSIS TYPE, UNSPECIFIED PATHOLOGICAL FRACTURE PRESENCE: ICD-10-CM

## 2025-06-03 LAB
ABSOLUTE EOSINOPHIL (OHS): 0.22 K/UL
ABSOLUTE MONOCYTE (OHS): 0.46 K/UL (ref 0.3–1)
ABSOLUTE NEUTROPHIL COUNT (OHS): 4.79 K/UL (ref 1.8–7.7)
ALT SERPL W/O P-5'-P-CCNC: 19 UNIT/L (ref 10–44)
AST SERPL-CCNC: 24 UNIT/L (ref 11–45)
BASOPHILS # BLD AUTO: 0.03 K/UL
BASOPHILS NFR BLD AUTO: 0.5 %
CREAT SERPL-MCNC: 1.3 MG/DL (ref 0.5–1.4)
CRP SERPL-MCNC: 2.7 MG/L
ERYTHROCYTE [DISTWIDTH] IN BLOOD BY AUTOMATED COUNT: 13.7 % (ref 11.5–14.5)
ERYTHROCYTE [SEDIMENTATION RATE] IN BLOOD BY PHOTOMETRIC METHOD: 14 MM/HR
GFR SERPLBLD CREATININE-BSD FMLA CKD-EPI: 39 ML/MIN/1.73/M2
HCT VFR BLD AUTO: 37 % (ref 37–48.5)
HGB BLD-MCNC: 11.6 GM/DL (ref 12–16)
IMM GRANULOCYTES # BLD AUTO: 0.02 K/UL (ref 0–0.04)
IMM GRANULOCYTES NFR BLD AUTO: 0.3 % (ref 0–0.5)
LYMPHOCYTES # BLD AUTO: 0.9 K/UL (ref 1–4.8)
MCH RBC QN AUTO: 33 PG (ref 27–31)
MCHC RBC AUTO-ENTMCNC: 31.4 G/DL (ref 32–36)
MCV RBC AUTO: 105 FL (ref 82–98)
NUCLEATED RBC (/100WBC) (OHS): 0 /100 WBC
PLATELET # BLD AUTO: 206 K/UL (ref 150–450)
PMV BLD AUTO: 11.2 FL (ref 9.2–12.9)
RBC # BLD AUTO: 3.51 M/UL (ref 4–5.4)
RELATIVE EOSINOPHIL (OHS): 3.4 %
RELATIVE LYMPHOCYTE (OHS): 14 % (ref 18–48)
RELATIVE MONOCYTE (OHS): 7.2 % (ref 4–15)
RELATIVE NEUTROPHIL (OHS): 74.6 % (ref 38–73)
WBC # BLD AUTO: 6.42 K/UL (ref 3.9–12.7)

## 2025-06-03 PROCEDURE — 85652 RBC SED RATE AUTOMATED: CPT | Mod: HCNC

## 2025-06-03 PROCEDURE — 82565 ASSAY OF CREATININE: CPT | Mod: HCNC

## 2025-06-03 PROCEDURE — 86140 C-REACTIVE PROTEIN: CPT | Mod: HCNC

## 2025-06-03 PROCEDURE — 84450 TRANSFERASE (AST) (SGOT): CPT | Mod: HCNC

## 2025-06-03 PROCEDURE — 85025 COMPLETE CBC W/AUTO DIFF WBC: CPT | Mod: HCNC

## 2025-06-03 PROCEDURE — 36415 COLL VENOUS BLD VENIPUNCTURE: CPT | Mod: HCNC,PN

## 2025-06-03 PROCEDURE — 84460 ALANINE AMINO (ALT) (SGPT): CPT | Mod: HCNC

## 2025-06-04 ENCOUNTER — RESULTS FOLLOW-UP (OUTPATIENT)
Dept: RHEUMATOLOGY | Facility: CLINIC | Age: OVER 89
End: 2025-06-04

## 2025-06-06 ENCOUNTER — OFFICE VISIT (OUTPATIENT)
Dept: FAMILY MEDICINE | Facility: CLINIC | Age: OVER 89
End: 2025-06-06
Payer: MEDICARE

## 2025-06-06 VITALS
SYSTOLIC BLOOD PRESSURE: 124 MMHG | HEIGHT: 64 IN | WEIGHT: 168 LBS | HEART RATE: 64 BPM | DIASTOLIC BLOOD PRESSURE: 76 MMHG | BODY MASS INDEX: 28.68 KG/M2

## 2025-06-06 DIAGNOSIS — Z86.39 HISTORY OF IRON DEFICIENCY: Chronic | ICD-10-CM

## 2025-06-06 DIAGNOSIS — E03.9 ACQUIRED HYPOTHYROIDISM: Chronic | ICD-10-CM

## 2025-06-06 DIAGNOSIS — N18.32 STAGE 3B CHRONIC KIDNEY DISEASE: Primary | ICD-10-CM

## 2025-06-06 DIAGNOSIS — D53.9 MACROCYTIC ANEMIA: Chronic | ICD-10-CM

## 2025-06-06 DIAGNOSIS — Z71.2 ENCOUNTER TO DISCUSS TEST RESULTS: ICD-10-CM

## 2025-06-06 PROCEDURE — 99999 PR PBB SHADOW E&M-EST. PATIENT-LVL IV: CPT | Mod: PBBFAC,HCNC,, | Performed by: STUDENT IN AN ORGANIZED HEALTH CARE EDUCATION/TRAINING PROGRAM

## 2025-06-09 NOTE — PROGRESS NOTES
"Subjective:      Patient ID: Nida Kline is a 89 y.o. female.    Chief Complaint: Follow-up ((Seronegative rheumatoid arthritis)/Patient presents to the clinic for her 6 month follow up. She has no complaints. )    HPI    Rheumatologic History:      - Diagnosis/es:              - osteoporosis  - seronegative non erosive RA diagnosed in 12/2021  - Positive serologies: -  - Negative serologies: ADEBAYO, RF, CCP  - Infectious screening labs: Negative hepatitis B, C, and quantiferon (4/2023)  - Imaging:              - X-Ray arthritis survey (10/2021): Osteopenia and diffuse joint space narrowing without erosions              - DEXA (5/27/2025) osteoporosis. The lumbar spine T-score adjusted for TBS is -2.6. The femoral neck T-score adjusted for TBS is -2.7.  - Previous Treatments: -  - Current Treatments:               - MTX 10mg weekly plus folic acid daily              - Biofreeze              - Voltaren gel  Interval History:   She denies joint pain and swelling.     Objective:   /75 (BP Location: Left arm, Patient Position: Sitting)   Pulse 74   Ht 5' 4" (1.626 m)   Wt 76.6 kg (168 lb 14 oz)   SpO2 95%   BMI 28.99 kg/m²   Physical Exam   Constitutional: normal appearance.   HENT:   Head: Normocephalic and atraumatic.   Cardiovascular: Normal rate, regular rhythm and normal heart sounds.   Pulmonary/Chest: Effort normal and breath sounds normal.   Musculoskeletal:      Comments: + Degenerative changes  Ulnar deviation  No synovitis, dactylitis, enthesitis, effusions     Neurological: She is alert.   Skin: Skin is warm and dry.   No skin thickening, telangiectasias, calcinosis, psoriasiform lesions, lupoid lesions        8/8/2023   Tender (PARSONS-28) 0 / 28    Swollen (PARSONS-28) 0 / 28    Provider Global 0 / 100   Patient Global 0 / 100   ESR 20 mm/hr   CRP 3.3 mg/L   PARSONS-28 (ESR) 2.1 (Remission)   PARSONS-28 (CRP) 1.49 (Remission)   CDAI Score 0      Labs (6/3/2025)  CBC HGB 11.6, WBC, PLT WNL  CR 1.3, GFR 39, AST, " ALT WNL  ESR WNL  CRP WNL     Assessment:     1. Seronegative rheumatoid arthritis    2. Iron deficiency    3. Macrocytic anemia    4. Drug-induced immunodeficiency    5. Stage 3a chronic kidney disease      This is an 89 year old woman with PMH of CKD, HTN, vit D deficiency, carotid artery disease s/p CEA, HLD, vit D deficiency, hypothyroidism, STEW, GERD, squamous cell carcinoma, osteoporosis, seronegative non erosive RA diagnosed in 12/2021 and on methotrexate 10mg weekly plus folic acid. She denies joint pain and swelling. DEXA shows osteoporosis. Request PA for Reclast, oral bisphosphonates not a good idea due to GERD.     Plan:     Problem List Items Addressed This Visit          Immunology/Multi System    Drug-induced immunodeficiency    Seronegative rheumatoid arthritis - Primary    Relevant Orders    CBC Auto Differential    Sedimentation rate    C-Reactive Protein    Creatinine, Serum    AST (SGOT)    ALT (SGPT)       Oncology    Macrocytic anemia (Chronic)    Relevant Orders    CBC Auto Differential    Sedimentation rate    C-Reactive Protein    Creatinine, Serum    AST (SGOT)    ALT (SGPT)     Other Visit Diagnoses         Iron deficiency        Relevant Orders    CBC Auto Differential    Sedimentation rate    C-Reactive Protein    Creatinine, Serum    AST (SGOT)    ALT (SGPT)      Stage 3a chronic kidney disease        Relevant Orders    Ambulatory referral/consult to Nephrology          1.) RA  2.) Drug induced immunodeficiency  3.) High risk medication use  - methotrexate 10mg weekly + folic acid daily  - Voltaren gel and Biofreeze PRN  - CBC, CMP, ESR, CRP every 12 weeks  - Patient will need yearly skin cancer check on MTX given history of squamous cell CA  - Pre-DMARD labs yearly  - Vaccinations: COVID x 3, PCV13 (12/2015), PPV23 (9/2019), Shingrix x 2; patient declines the flu shot     4.) Osteoporosis: oral bisphosphonates not a good idea due to GERD  - Start Reclast. I discussed potential side  effects including osteonecrosis of the jaw should the patient undergo an invasive dental procedure on this medication, atypical femoral fractures, infusion reactions, and musculoskeletal pain.    Follow up in 6 months    30 minutes of total time spent on the encounter, which includes face to face time and non-face to face time preparing to see the patient (eg, review of tests), Obtaining and/or reviewing separately obtained history, Documenting clinical information in the electronic or other health record, Independently interpreting results (not separately reported) and communicating results to the patient/family/caregiver, or Care coordination (not separately reported).     This note was prepared with [employer] Direct voice recognition transcription software. Garbled syntax, mangled pronouns, and other bizarre constructions may be attributed to that software system       Leah Gannon M.D.  Rheumatology Dept  [place], [place]

## 2025-06-10 ENCOUNTER — OFFICE VISIT (OUTPATIENT)
Dept: RHEUMATOLOGY | Facility: CLINIC | Age: OVER 89
End: 2025-06-10
Payer: MEDICARE

## 2025-06-10 VITALS
HEART RATE: 74 BPM | OXYGEN SATURATION: 95 % | WEIGHT: 168.88 LBS | HEIGHT: 64 IN | BODY MASS INDEX: 28.83 KG/M2 | SYSTOLIC BLOOD PRESSURE: 131 MMHG | DIASTOLIC BLOOD PRESSURE: 75 MMHG

## 2025-06-10 DIAGNOSIS — N18.31 STAGE 3A CHRONIC KIDNEY DISEASE: ICD-10-CM

## 2025-06-10 DIAGNOSIS — Z79.899 DRUG-INDUCED IMMUNODEFICIENCY: ICD-10-CM

## 2025-06-10 DIAGNOSIS — D84.821 DRUG-INDUCED IMMUNODEFICIENCY: ICD-10-CM

## 2025-06-10 DIAGNOSIS — M06.00 SERONEGATIVE RHEUMATOID ARTHRITIS: Primary | ICD-10-CM

## 2025-06-10 DIAGNOSIS — E61.1 IRON DEFICIENCY: ICD-10-CM

## 2025-06-10 DIAGNOSIS — D53.9 MACROCYTIC ANEMIA: ICD-10-CM

## 2025-06-10 PROCEDURE — 99214 OFFICE O/P EST MOD 30 MIN: CPT | Mod: HCNC,S$GLB,, | Performed by: STUDENT IN AN ORGANIZED HEALTH CARE EDUCATION/TRAINING PROGRAM

## 2025-06-10 PROCEDURE — 1159F MED LIST DOCD IN RCRD: CPT | Mod: CPTII,HCNC,S$GLB, | Performed by: STUDENT IN AN ORGANIZED HEALTH CARE EDUCATION/TRAINING PROGRAM

## 2025-06-10 PROCEDURE — 1101F PT FALLS ASSESS-DOCD LE1/YR: CPT | Mod: CPTII,HCNC,S$GLB, | Performed by: STUDENT IN AN ORGANIZED HEALTH CARE EDUCATION/TRAINING PROGRAM

## 2025-06-10 PROCEDURE — 1160F RVW MEDS BY RX/DR IN RCRD: CPT | Mod: CPTII,HCNC,S$GLB, | Performed by: STUDENT IN AN ORGANIZED HEALTH CARE EDUCATION/TRAINING PROGRAM

## 2025-06-10 PROCEDURE — 3288F FALL RISK ASSESSMENT DOCD: CPT | Mod: CPTII,HCNC,S$GLB, | Performed by: STUDENT IN AN ORGANIZED HEALTH CARE EDUCATION/TRAINING PROGRAM

## 2025-06-10 PROCEDURE — 1125F AMNT PAIN NOTED PAIN PRSNT: CPT | Mod: CPTII,HCNC,S$GLB, | Performed by: STUDENT IN AN ORGANIZED HEALTH CARE EDUCATION/TRAINING PROGRAM

## 2025-06-10 PROCEDURE — 99999 PR PBB SHADOW E&M-EST. PATIENT-LVL IV: CPT | Mod: PBBFAC,HCNC,, | Performed by: STUDENT IN AN ORGANIZED HEALTH CARE EDUCATION/TRAINING PROGRAM

## 2025-06-10 RX ORDER — ACETAMINOPHEN 325 MG/1
650 TABLET ORAL
OUTPATIENT
Start: 2025-06-10

## 2025-06-10 RX ORDER — HEPARIN 100 UNIT/ML
500 SYRINGE INTRAVENOUS
OUTPATIENT
Start: 2025-06-10

## 2025-06-10 RX ORDER — SODIUM CHLORIDE 0.9 % (FLUSH) 0.9 %
10 SYRINGE (ML) INJECTION
OUTPATIENT
Start: 2025-06-10

## 2025-06-10 RX ORDER — ZOLEDRONIC ACID 5 MG/100ML
5 INJECTION, SOLUTION INTRAVENOUS
OUTPATIENT
Start: 2025-06-10

## 2025-06-10 ASSESSMENT — ROUTINE ASSESSMENT OF PATIENT INDEX DATA (RAPID3)
PAIN SCORE: 4
PSYCHOLOGICAL DISTRESS SCORE: 1.1
PATIENT GLOBAL ASSESSMENT SCORE: 3
TOTAL RAPID3 SCORE: 3
FATIGUE SCORE: 2.2
MDHAQ FUNCTION SCORE: 0.6

## 2025-06-12 ENCOUNTER — TELEPHONE (OUTPATIENT)
Dept: RHEUMATOLOGY | Facility: CLINIC | Age: OVER 89
End: 2025-06-12
Payer: MEDICARE

## 2025-06-12 NOTE — TELEPHONE ENCOUNTER
----- Message from Terri sent at 6/12/2025  2:07 PM CDT -----  Regarding: MEDICATION  Please contact patient as she is asking for to be reminded on RX for sleep. They discussed at recent OV.[facility]

## 2025-06-16 ENCOUNTER — LAB VISIT (OUTPATIENT)
Dept: LAB | Facility: HOSPITAL | Age: OVER 89
End: 2025-06-16
Payer: MEDICARE

## 2025-06-16 DIAGNOSIS — N18.32 STAGE 3B CHRONIC KIDNEY DISEASE: ICD-10-CM

## 2025-06-16 DIAGNOSIS — E03.9 ACQUIRED HYPOTHYROIDISM: Chronic | ICD-10-CM

## 2025-06-16 DIAGNOSIS — Z86.39 HISTORY OF IRON DEFICIENCY: ICD-10-CM

## 2025-06-16 DIAGNOSIS — D53.9 MACROCYTIC ANEMIA: ICD-10-CM

## 2025-06-16 LAB
ABSOLUTE EOSINOPHIL (OHS): 0.27 K/UL
ABSOLUTE MONOCYTE (OHS): 0.53 K/UL (ref 0.3–1)
ABSOLUTE NEUTROPHIL COUNT (OHS): 3.78 K/UL (ref 1.8–7.7)
ALBUMIN SERPL BCP-MCNC: 3.7 G/DL (ref 3.5–5.2)
ALP SERPL-CCNC: 80 UNIT/L (ref 40–150)
ALT SERPL W/O P-5'-P-CCNC: 18 UNIT/L (ref 10–44)
ANION GAP (OHS): 9 MMOL/L (ref 8–16)
AST SERPL-CCNC: 18 UNIT/L (ref 11–45)
BASOPHILS # BLD AUTO: 0.04 K/UL
BASOPHILS NFR BLD AUTO: 0.7 %
BILIRUB SERPL-MCNC: 0.6 MG/DL (ref 0.1–1)
BUN SERPL-MCNC: 27 MG/DL (ref 8–23)
CALCIUM SERPL-MCNC: 9.1 MG/DL (ref 8.7–10.5)
CHLORIDE SERPL-SCNC: 111 MMOL/L (ref 95–110)
CO2 SERPL-SCNC: 22 MMOL/L (ref 23–29)
CREAT SERPL-MCNC: 1.2 MG/DL (ref 0.5–1.4)
ERYTHROCYTE [DISTWIDTH] IN BLOOD BY AUTOMATED COUNT: 13.9 % (ref 11.5–14.5)
FERRITIN SERPL-MCNC: 151 NG/ML (ref 20–300)
FOLATE SERPL-MCNC: >40 NG/ML (ref 4–24)
GFR SERPLBLD CREATININE-BSD FMLA CKD-EPI: 43 ML/MIN/1.73/M2
GLUCOSE SERPL-MCNC: 103 MG/DL (ref 70–110)
HCT VFR BLD AUTO: 34.2 % (ref 37–48.5)
HGB BLD-MCNC: 10.7 GM/DL (ref 12–16)
IMM GRANULOCYTES # BLD AUTO: 0.02 K/UL (ref 0–0.04)
IMM GRANULOCYTES NFR BLD AUTO: 0.4 % (ref 0–0.5)
IRON SATN MFR SERPL: 29 % (ref 20–50)
IRON SERPL-MCNC: 83 UG/DL (ref 30–160)
LYMPHOCYTES # BLD AUTO: 0.9 K/UL (ref 1–4.8)
MCH RBC QN AUTO: 33.1 PG (ref 27–31)
MCHC RBC AUTO-ENTMCNC: 31.3 G/DL (ref 32–36)
MCV RBC AUTO: 106 FL (ref 82–98)
NUCLEATED RBC (/100WBC) (OHS): 0 /100 WBC
PLATELET # BLD AUTO: 215 K/UL (ref 150–450)
PMV BLD AUTO: 10.8 FL (ref 9.2–12.9)
POTASSIUM SERPL-SCNC: 4.8 MMOL/L (ref 3.5–5.1)
PROT SERPL-MCNC: 6.7 GM/DL (ref 6–8.4)
RBC # BLD AUTO: 3.23 M/UL (ref 4–5.4)
RELATIVE EOSINOPHIL (OHS): 4.9 %
RELATIVE LYMPHOCYTE (OHS): 16.2 % (ref 18–48)
RELATIVE MONOCYTE (OHS): 9.6 % (ref 4–15)
RELATIVE NEUTROPHIL (OHS): 68.2 % (ref 38–73)
SODIUM SERPL-SCNC: 142 MMOL/L (ref 136–145)
TIBC SERPL-MCNC: 287 UG/DL (ref 250–450)
TRANSFERRIN SERPL-MCNC: 194 MG/DL (ref 200–375)
TSH SERPL-ACNC: 2.02 UIU/ML (ref 0.4–4)
VIT B12 SERPL-MCNC: 462 PG/ML (ref 210–950)
WBC # BLD AUTO: 5.54 K/UL (ref 3.9–12.7)

## 2025-06-16 PROCEDURE — 83540 ASSAY OF IRON: CPT | Mod: HCNC

## 2025-06-16 PROCEDURE — 82607 VITAMIN B-12: CPT | Mod: HCNC

## 2025-06-16 PROCEDURE — 82746 ASSAY OF FOLIC ACID SERUM: CPT | Mod: HCNC

## 2025-06-16 PROCEDURE — 82728 ASSAY OF FERRITIN: CPT | Mod: HCNC

## 2025-06-16 PROCEDURE — 36415 COLL VENOUS BLD VENIPUNCTURE: CPT | Mod: HCNC,PN

## 2025-06-16 PROCEDURE — 84443 ASSAY THYROID STIM HORMONE: CPT | Mod: HCNC

## 2025-06-16 PROCEDURE — 85025 COMPLETE CBC W/AUTO DIFF WBC: CPT | Mod: HCNC

## 2025-06-16 PROCEDURE — 80053 COMPREHEN METABOLIC PANEL: CPT | Mod: HCNC

## 2025-06-17 ENCOUNTER — RESULTS FOLLOW-UP (OUTPATIENT)
Dept: FAMILY MEDICINE | Facility: CLINIC | Age: OVER 89
End: 2025-06-17

## 2025-06-19 ENCOUNTER — OFFICE VISIT (OUTPATIENT)
Dept: NEPHROLOGY | Facility: CLINIC | Age: OVER 89
End: 2025-06-19
Payer: MEDICARE

## 2025-06-19 VITALS
SYSTOLIC BLOOD PRESSURE: 132 MMHG | HEART RATE: 72 BPM | OXYGEN SATURATION: 95 % | WEIGHT: 168.88 LBS | BODY MASS INDEX: 28.83 KG/M2 | DIASTOLIC BLOOD PRESSURE: 68 MMHG | HEIGHT: 64 IN

## 2025-06-19 DIAGNOSIS — D63.8 ANEMIA OF CHRONIC DISEASE: ICD-10-CM

## 2025-06-19 DIAGNOSIS — N18.31 STAGE 3A CHRONIC KIDNEY DISEASE: Primary | ICD-10-CM

## 2025-06-19 DIAGNOSIS — I10 ESSENTIAL HYPERTENSION: Chronic | ICD-10-CM

## 2025-06-19 PROCEDURE — 1160F RVW MEDS BY RX/DR IN RCRD: CPT | Mod: CPTII,HCNC,S$GLB, | Performed by: INTERNAL MEDICINE

## 2025-06-19 PROCEDURE — 1159F MED LIST DOCD IN RCRD: CPT | Mod: CPTII,HCNC,S$GLB, | Performed by: INTERNAL MEDICINE

## 2025-06-19 PROCEDURE — 99999 PR PBB SHADOW E&M-EST. PATIENT-LVL III: CPT | Mod: PBBFAC,HCNC,, | Performed by: INTERNAL MEDICINE

## 2025-06-19 PROCEDURE — 99204 OFFICE O/P NEW MOD 45 MIN: CPT | Mod: HCNC,S$GLB,, | Performed by: INTERNAL MEDICINE

## 2025-06-19 NOTE — PROGRESS NOTES
Subjective:       Patient ID: Nida Kline is a 89 y.o. White female who presents for new patient evaluation for chronic renal failure.    Nida Kline is referred by Sherry Garzon MD to be evaluated for chronic renal failure.      Patient denies previous history of kidney disease. She was previously followed by Dr. Vazquez, was diagnosed with HTN 10 years ago and has been well controlled on metoprolol and losartan, She was diagnosed with RA in 2021 and has been on MTX. Denies NSAID use.    Drinking 45 ounces water nightly  No coffee, occasional tea  Occasional diarrhea, attributes to tea  No LUTS    Daughter is  at [employer])  Volunteers at [employer]  Son has autoimmune disorders  Has identical twin daughters    Review of Systems   All other systems reviewed and are negative.      The past medical, family and social histories were reviewed for this encounter.     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/07/2016    Facet arthropathy, lumbar (L5-S1) 11/14/2023    Gallbladder disease     GERD (gastroesophageal reflux disease)     History of iron deficiency 05/15/2017    HTN (hypertension)     Hypothyroidism     Mixed hyperlipidemia     Osteoporosis     Overweight (BMI 25.0-29.9)     PONV (postoperative nausea and vomiting)     Posterior vitreous detachment 06/07/2016    Pseudophakia of both eyes 06/07/2016    Pure hypercholesterolemia     Retrolisthesis (grade 1, L3-4) 11/14/2023    S/P carotid endarterectomy 06/07/2016    Squamous cell carcinoma     Tinea pedis of both feet 05/15/2017     "Trouble in sleeping     Vitamin D deficiency      Past Surgical History:   Procedure Laterality Date    CAROTID ENDARTERECTOMY Left 2015    CATARACT EXTRACTION      OU    CHOLECYSTECTOMY      HYSTERECTOMY      TOTAL ABDOMINAL HYSTERECTOMY W/ BILATERAL SALPINGOOPHORECTOMY       Social History[1]  Current Outpatient Medications   Medication Sig    ascorbic acid, vitamin C, (VITAMIN C) 500 MG tablet Take 500 mg by mouth once daily.    aspirin (ECOTRIN) 81 MG EC tablet Take 81 mg by mouth once daily.    atorvastatin (LIPITOR) 20 MG tablet TAKE 1 TABLET EVERY DAY    busPIRone (BUSPAR) 15 MG tablet TAKE 1/3 TO 1/2 TABLET THREE TIMES A DAY AS NEEDED FOR ANXIETY    calcium carbonate/vitamin D3 (CALTRATE 600 + D ORAL) Take by mouth once daily. Caltrate 600+D3    folic acid (FOLVITE) 1 MG tablet Take 1 tablet (1 mg total) by mouth once daily.    levothyroxine (SYNTHROID) 125 MCG tablet TAKE 1/2 TABLET ONE TIME DAILY    losartan (COZAAR) 25 MG tablet TAKE 1 TABLET EVERY DAY    magnesium oxide 400 mg Cap Take 2 tablets by mouth once daily.    methotrexate 2.5 MG Tab Take 4 tablets (10 mg total) by mouth every 7 days.    metoprolol succinate (TOPROL-XL) 25 MG 24 hr tablet TAKE 1/2 TABLET ONE TIME DAILY    omeprazole (PRILOSEC) 20 MG capsule TAKE 1 CAPSULE ONE TIME DAILY    vitamin D 1000 units Tab Take 1,000 Units by mouth 2 (two) times daily.    vitamin renal formula, B-complex-vitamin c-folic acid, (NEPHROCAP) 1 mg Cap Take 1 capsule by mouth once daily. Generic.     No current facility-administered medications for this visit.     /68 (BP Location: Left arm, Patient Position: Sitting)   Pulse 72   Ht 5' 4" (1.626 m)   Wt 76.6 kg (168 lb 14 oz)   SpO2 95%   BMI 28.99 kg/m²     Objective:      Physical Exam  Vitals reviewed.   Constitutional:       General: She is not in acute distress.     Appearance: She is well-developed.   HENT:      Head: Normocephalic and atraumatic.   Eyes:      General: No scleral icterus.     " "Conjunctiva/sclera: Conjunctivae normal.   Neck:      Vascular: No JVD.   Cardiovascular:      Rate and Rhythm: Normal rate and regular rhythm.      Heart sounds: Normal heart sounds. No murmur heard.     No friction rub. No gallop.   Pulmonary:      Effort: Pulmonary effort is normal. No respiratory distress.      Breath sounds: Normal breath sounds. No wheezing or rales.   Abdominal:      General: Bowel sounds are normal. There is no distension.      Palpations: Abdomen is soft.      Tenderness: There is no abdominal tenderness.   Musculoskeletal:      Right lower leg: No edema.      Left lower leg: No edema.   Skin:     General: Skin is warm and dry.      Findings: No rash.   Neurological:      Mental Status: She is alert and oriented to person, place, and time.         Assessment:       1. Stage 3a chronic kidney disease    2. Essential hypertension    3. Anemia of chronic disease        Lab Results   Component Value Date    CREATININE 1.2 06/16/2025    BUN 27 (H) 06/16/2025     06/16/2025    K 4.8 06/16/2025     (H) 06/16/2025    CO2 22 (L) 06/16/2025     Lab Results   Component Value Date    CALCIUM 9.1 06/16/2025    PHOS 3.5 08/01/2023     Lab Results   Component Value Date    HCT 34.2 (L) 06/16/2025     No results found for: "UTPCR"    Plan:   Return to clinic in 6 weeks.  Labs for next visit include rfp, pth, upc, ua.  Baseline creatinine is 0.9-1.3. Will get UA and UPC to evaluate for proteinuria and active urine sediment.  Abd US (1/2023): R 9.5, L 9.0  BP at goal for age, no change to medication regimen  Bicarb 22, will monitor for now  Hct 34, iron replete, no indication for CARLOS ENRIQUE  Getting reclast next month       [1]   Social History  Socioeconomic History    Marital status:     Number of children: 4   Occupational History    Occupation: retired insurance work.   Tobacco Use    Smoking status: Never    Smokeless tobacco: Never   Substance and Sexual Activity    Alcohol use: No    Drug " use: No

## 2025-07-14 ENCOUNTER — TELEPHONE (OUTPATIENT)
Dept: INFUSION THERAPY | Facility: HOSPITAL | Age: OVER 89
End: 2025-07-14
Payer: MEDICARE

## 2025-07-14 DIAGNOSIS — M06.00 SERONEGATIVE RHEUMATOID ARTHRITIS: ICD-10-CM

## 2025-07-14 DIAGNOSIS — D84.9 IMMUNOSUPPRESSION: ICD-10-CM

## 2025-07-14 DIAGNOSIS — Z51.81 MEDICATION MONITORING ENCOUNTER: ICD-10-CM

## 2025-07-14 NOTE — TELEPHONE ENCOUNTER
Pharmacy requesting refill on FOLIC ACID 1MG   Pt's LOV 6/10/2025  Pt's NOV 12/8/2025  Medication pending

## 2025-07-14 NOTE — TELEPHONE ENCOUNTER
Copied from CRM #[id number]. Topic: Appointments - Appointment Confirmation  >> Jul 14, 2025 11:10 AM Tameka wrote:  Type:  Needs Medical Advice    Who Called: pt  Symptoms (please be specific):    How long has patient had these symptoms:    Pharmacy name and phone #:    Would the patient rather a call back or a response via [facility]? call  Best Call Back Number: [phone]    Additional Information: pt state she needs to speak with Mile

## 2025-07-14 NOTE — TELEPHONE ENCOUNTER
Confirmed with pt how long she will be here and that she should not need a ride.  Pt appreciated call.

## 2025-07-15 RX ORDER — FOLIC ACID 1 MG/1
1 TABLET ORAL DAILY
Qty: 90 TABLET | Refills: 6 | Status: SHIPPED | OUTPATIENT
Start: 2025-07-15

## 2025-07-18 ENCOUNTER — INFUSION (OUTPATIENT)
Dept: INFUSION THERAPY | Facility: HOSPITAL | Age: OVER 89
End: 2025-07-18
Attending: STUDENT IN AN ORGANIZED HEALTH CARE EDUCATION/TRAINING PROGRAM
Payer: MEDICARE

## 2025-07-18 VITALS
DIASTOLIC BLOOD PRESSURE: 66 MMHG | HEART RATE: 33 BPM | WEIGHT: 168.44 LBS | TEMPERATURE: 98 F | BODY MASS INDEX: 28.76 KG/M2 | OXYGEN SATURATION: 96 % | RESPIRATION RATE: 19 BRPM | HEIGHT: 64 IN | SYSTOLIC BLOOD PRESSURE: 132 MMHG

## 2025-07-18 NOTE — NURSING
"Pt here for Reclast Infusion, upon arrival HR noted to be 32-35, pt states she feels a "little fluttering" in her chest. No shortness of breath or chest pain reported. BP stable. Pt aware of low HR and unable to give her Reclast today, Recommended pt be seen in ED. Pt has called her daughter to transport her to ED. Pt states she will wait for daughter on 1st floor, walked down to 1st floor with pt, walker in use. Pt to call and reschedule Reclast next week.  "

## 2025-07-19 LAB
OHS QRS DURATION: 68 MS
OHS QTC CALCULATION: 433 MS

## 2025-07-21 ENCOUNTER — LAB VISIT (OUTPATIENT)
Dept: LAB | Facility: HOSPITAL | Age: OVER 89
End: 2025-07-21
Payer: MEDICARE

## 2025-07-21 DIAGNOSIS — N18.31 STAGE 3A CHRONIC KIDNEY DISEASE: ICD-10-CM

## 2025-07-21 LAB
ALBUMIN SERPL BCP-MCNC: 3.7 G/DL (ref 3.5–5.2)
ANION GAP (OHS): 9 MMOL/L (ref 8–16)
BUN SERPL-MCNC: 37 MG/DL (ref 8–23)
CALCIUM SERPL-MCNC: 9.1 MG/DL (ref 8.7–10.5)
CHLORIDE SERPL-SCNC: 108 MMOL/L (ref 95–110)
CO2 SERPL-SCNC: 23 MMOL/L (ref 23–29)
CREAT SERPL-MCNC: 1.5 MG/DL (ref 0.5–1.4)
CREAT UR-MCNC: 64 MG/DL (ref 15–325)
GFR SERPLBLD CREATININE-BSD FMLA CKD-EPI: 33 ML/MIN/1.73/M2
GLUCOSE SERPL-MCNC: 153 MG/DL (ref 70–110)
PHOSPHATE SERPL-MCNC: 3.4 MG/DL (ref 2.7–4.5)
POTASSIUM SERPL-SCNC: 4.6 MMOL/L (ref 3.5–5.1)
PROT UR-MCNC: <7 MG/DL
PROT/CREAT UR: NORMAL MG/G{CREAT}
PTH-INTACT SERPL-MCNC: 58.1 PG/ML (ref 9–77)
SODIUM SERPL-SCNC: 140 MMOL/L (ref 136–145)

## 2025-07-21 PROCEDURE — 36415 COLL VENOUS BLD VENIPUNCTURE: CPT | Mod: HCNC,PN

## 2025-07-21 PROCEDURE — 80069 RENAL FUNCTION PANEL: CPT | Mod: HCNC

## 2025-07-21 PROCEDURE — 84156 ASSAY OF PROTEIN URINE: CPT | Mod: HCNC

## 2025-07-21 PROCEDURE — 83970 ASSAY OF PARATHORMONE: CPT | Mod: HCNC

## 2025-07-28 ENCOUNTER — TELEPHONE (OUTPATIENT)
Dept: CARDIOLOGY | Facility: CLINIC | Age: OVER 89
End: 2025-07-28
Payer: MEDICARE

## 2025-07-28 NOTE — TELEPHONE ENCOUNTER
Copied from CRM #[id number]. Topic: Appointments - Appointment Rescheduling  >> Jul 28, 2025  3:53 PM Maxine wrote:  Type:  Sooner Apoointment Request    Caller is requesting a sooner appointment.  Caller declined first available appointment listed below.  Caller will not accept being placed on the waitlist and is requesting a message be sent to doctor.  Name of Caller:patient  When is the first available appointment?  Symptoms:  Would the patient rather a call back or a response via [facility]? call  Best Call Back Number:[phone]    Additional Information: patient has an appt. For 9/9 and wishes to be seen sooner than that. Please call advise if that is possible. Thanks

## 2025-08-01 ENCOUNTER — OFFICE VISIT (OUTPATIENT)
Dept: NEPHROLOGY | Facility: CLINIC | Age: OVER 89
End: 2025-08-01
Payer: MEDICARE

## 2025-08-01 VITALS
OXYGEN SATURATION: 97 % | DIASTOLIC BLOOD PRESSURE: 50 MMHG | BODY MASS INDEX: 28.84 KG/M2 | HEART RATE: 76 BPM | SYSTOLIC BLOOD PRESSURE: 126 MMHG | HEIGHT: 64 IN

## 2025-08-01 DIAGNOSIS — D63.8 ANEMIA OF CHRONIC DISEASE: ICD-10-CM

## 2025-08-01 DIAGNOSIS — N18.32 CKD STAGE 3B, GFR 30-44 ML/MIN: Primary | ICD-10-CM

## 2025-08-01 DIAGNOSIS — I10 ESSENTIAL HYPERTENSION: ICD-10-CM

## 2025-08-01 PROCEDURE — 99999 PR PBB SHADOW E&M-EST. PATIENT-LVL III: CPT | Mod: PBBFAC,HCNC,, | Performed by: INTERNAL MEDICINE

## 2025-08-01 NOTE — PROGRESS NOTES
"Subjective:        Patient ID: Nida Kline is a 89 y.o. White female who presents for return patient evaluation for chronic renal failure.    Seen in ed for bradycardia, didn't get reclast infusion due to not feeling well. Remains on metoprolol, scheduled to see Dr. Smith in September.    Review of Systems   All other systems reviewed and are negative.        The past medical, family and social histories were reviewed for this encounter.     BP (!) 126/50 (BP Location: Right arm, Patient Position: Sitting)   Pulse 76   Ht 5' 4" (1.626 m)   SpO2 97%   BMI 28.84 kg/m²     Objective:      Physical Exam  Vitals reviewed.   Constitutional:       Appearance: Normal appearance.   HENT:      Head: Normocephalic and atraumatic.   Musculoskeletal:      Right lower leg: No edema.      Left lower leg: No edema.   Skin:     General: Skin is warm and dry.   Neurological:      General: No focal deficit present.      Mental Status: She is alert and oriented to person, place, and time.   Psychiatric:         Mood and Affect: Mood normal.         Behavior: Behavior normal.         Assessment:       1. CKD stage 3b, GFR 30-44 ml/min    2. Essential hypertension    3. Anemia of chronic disease          Lab Results   Component Value Date    CREATININE 1.5 (H) 07/21/2025    BUN 37 (H) 07/21/2025     07/21/2025    K 4.6 07/21/2025     07/21/2025    CO2 23 07/21/2025     Lab Results   Component Value Date    PTH 58.1 07/21/2025    CALCIUM 9.1 07/21/2025    PHOS 3.4 07/21/2025     Lab Results   Component Value Date    HCT 37.6 07/18/2025     Urine Protein/Creatinine Ratio   Date Value Ref Range Status   07/21/2025   Final     Comment:     UNABLE TO CALCULATE      Plan:   Return to clinic in 4 months.  Labs for next visit include rfp, pth, upc.  Baseline creatinine is 0.9-1.3. minimal proteinuria, worsening renal function likely due to bradycardia which has since resolved.  Abd US (1/2023): R 9.5, L 9.0  BP at goal for " age, no change to medication regimen  Bicarb 23, will monitor for now  Hct 38, iron replete, no indication for CARLOS ENRIQUE

## 2025-08-19 DIAGNOSIS — K21.9 GASTROESOPHAGEAL REFLUX DISEASE: ICD-10-CM

## 2025-08-20 DIAGNOSIS — E61.1 IRON DEFICIENCY: ICD-10-CM

## 2025-08-20 DIAGNOSIS — D53.9 MACROCYTIC ANEMIA: ICD-10-CM

## 2025-08-20 DIAGNOSIS — N18.31 STAGE 3A CHRONIC KIDNEY DISEASE: ICD-10-CM

## 2025-08-21 RX ORDER — OMEPRAZOLE 20 MG/1
20 CAPSULE, DELAYED RELEASE ORAL
Qty: 90 CAPSULE | Refills: 3 | Status: SHIPPED | OUTPATIENT
Start: 2025-08-21